# Patient Record
Sex: FEMALE | Race: WHITE | NOT HISPANIC OR LATINO | Employment: OTHER | URBAN - METROPOLITAN AREA
[De-identification: names, ages, dates, MRNs, and addresses within clinical notes are randomized per-mention and may not be internally consistent; named-entity substitution may affect disease eponyms.]

---

## 2017-01-26 ENCOUNTER — ALLSCRIPTS OFFICE VISIT (OUTPATIENT)
Dept: OTHER | Facility: OTHER | Age: 82
End: 2017-01-26

## 2017-02-06 ENCOUNTER — ALLSCRIPTS OFFICE VISIT (OUTPATIENT)
Dept: OTHER | Facility: OTHER | Age: 82
End: 2017-02-06

## 2017-03-02 ENCOUNTER — GENERIC CONVERSION - ENCOUNTER (OUTPATIENT)
Dept: OTHER | Facility: OTHER | Age: 82
End: 2017-03-02

## 2017-03-02 ENCOUNTER — ALLSCRIPTS OFFICE VISIT (OUTPATIENT)
Dept: OTHER | Facility: OTHER | Age: 82
End: 2017-03-02

## 2017-03-02 LAB
BILIRUB UR QL STRIP: NEGATIVE
CLARITY UR: NORMAL
COLOR UR: YELLOW
GLUCOSE (HISTORICAL): NORMAL
HGB UR QL STRIP.AUTO: 250
KETONES UR STRIP-MCNC: NEGATIVE MG/DL
LEUKOCYTE ESTERASE UR QL STRIP: NEGATIVE
NITRITE UR QL STRIP: NEGATIVE
PH UR STRIP.AUTO: 6 [PH]
PROT UR STRIP-MCNC: NEGATIVE MG/DL
SP GR UR STRIP.AUTO: 1.01
UROBILINOGEN UR QL STRIP.AUTO: NORMAL

## 2017-03-06 ENCOUNTER — GENERIC CONVERSION - ENCOUNTER (OUTPATIENT)
Dept: OTHER | Facility: OTHER | Age: 82
End: 2017-03-06

## 2017-03-06 LAB
CULTURE RESULT (HISTORICAL): NORMAL
MISCELLANEOUS LAB TEST RESULT (HISTORICAL): NORMAL

## 2017-03-10 ENCOUNTER — HOSPITAL ENCOUNTER (OUTPATIENT)
Dept: RADIOLOGY | Facility: CLINIC | Age: 82
Discharge: HOME | End: 2017-03-10
Payer: COMMERCIAL

## 2017-03-10 ENCOUNTER — ALLSCRIPTS OFFICE VISIT (OUTPATIENT)
Dept: OTHER | Facility: OTHER | Age: 82
End: 2017-03-10

## 2017-03-10 DIAGNOSIS — M25.562 PAIN IN LEFT KNEE: ICD-10-CM

## 2017-03-10 PROCEDURE — 73562 X-RAY EXAM OF KNEE 3: CPT

## 2017-03-17 ENCOUNTER — ALLSCRIPTS OFFICE VISIT (OUTPATIENT)
Dept: OTHER | Facility: OTHER | Age: 82
End: 2017-03-17

## 2017-07-28 ENCOUNTER — ALLSCRIPTS OFFICE VISIT (OUTPATIENT)
Dept: OTHER | Facility: OTHER | Age: 82
End: 2017-07-28

## 2017-08-25 ENCOUNTER — TRANSCRIBE ORDERS (OUTPATIENT)
Dept: LAB | Facility: CLINIC | Age: 82
End: 2017-08-25

## 2017-08-25 ENCOUNTER — APPOINTMENT (OUTPATIENT)
Dept: LAB | Facility: CLINIC | Age: 82
End: 2017-08-25
Payer: COMMERCIAL

## 2017-08-25 DIAGNOSIS — E78.5 OTHER AND UNSPECIFIED HYPERLIPIDEMIA: Primary | ICD-10-CM

## 2017-08-25 LAB
ALBUMIN SERPL BCP-MCNC: 3.5 G/DL (ref 3.5–5)
ALP SERPL-CCNC: 74 U/L (ref 46–116)
ALT SERPL W P-5'-P-CCNC: 19 U/L (ref 12–78)
ANION GAP SERPL CALCULATED.3IONS-SCNC: 9 MMOL/L (ref 4–13)
AST SERPL W P-5'-P-CCNC: 20 U/L (ref 5–45)
BILIRUB SERPL-MCNC: 0.39 MG/DL (ref 0.2–1)
BUN SERPL-MCNC: 19 MG/DL (ref 5–25)
CALCIUM SERPL-MCNC: 9.3 MG/DL (ref 8.3–10.1)
CHLORIDE SERPL-SCNC: 103 MMOL/L (ref 100–108)
CHOLEST SERPL-MCNC: 196 MG/DL (ref 50–200)
CO2 SERPL-SCNC: 26 MMOL/L (ref 21–32)
CREAT SERPL-MCNC: 0.62 MG/DL (ref 0.6–1.3)
GFR SERPL CREATININE-BSD FRML MDRD: 84 ML/MIN/1.73SQ M
GLUCOSE P FAST SERPL-MCNC: 105 MG/DL (ref 65–99)
HDLC SERPL-MCNC: 62 MG/DL (ref 40–60)
LDLC SERPL CALC-MCNC: 105 MG/DL (ref 0–100)
POTASSIUM SERPL-SCNC: 3.5 MMOL/L (ref 3.5–5.3)
PROT SERPL-MCNC: 7.4 G/DL (ref 6.4–8.2)
SODIUM SERPL-SCNC: 138 MMOL/L (ref 136–145)
TRIGL SERPL-MCNC: 144 MG/DL

## 2017-08-25 PROCEDURE — 80061 LIPID PANEL: CPT | Performed by: INTERNAL MEDICINE

## 2017-08-25 PROCEDURE — 36415 COLL VENOUS BLD VENIPUNCTURE: CPT | Performed by: INTERNAL MEDICINE

## 2017-08-25 PROCEDURE — 80053 COMPREHEN METABOLIC PANEL: CPT | Performed by: INTERNAL MEDICINE

## 2017-08-29 ENCOUNTER — ALLSCRIPTS OFFICE VISIT (OUTPATIENT)
Dept: OTHER | Facility: OTHER | Age: 82
End: 2017-08-29

## 2017-08-29 DIAGNOSIS — Z12.31 ENCOUNTER FOR SCREENING MAMMOGRAM FOR MALIGNANT NEOPLASM OF BREAST: ICD-10-CM

## 2017-08-29 DIAGNOSIS — M81.0 AGE-RELATED OSTEOPOROSIS WITHOUT CURRENT PATHOLOGICAL FRACTURE: ICD-10-CM

## 2017-08-29 DIAGNOSIS — M79.671 PAIN OF RIGHT FOOT: ICD-10-CM

## 2017-08-30 ENCOUNTER — GENERIC CONVERSION - ENCOUNTER (OUTPATIENT)
Dept: OTHER | Facility: OTHER | Age: 82
End: 2017-08-30

## 2017-09-12 ENCOUNTER — HOSPITAL ENCOUNTER (OUTPATIENT)
Dept: RADIOLOGY | Facility: CLINIC | Age: 82
Discharge: HOME | End: 2017-09-12
Payer: COMMERCIAL

## 2017-09-12 ENCOUNTER — GENERIC CONVERSION - ENCOUNTER (OUTPATIENT)
Dept: OTHER | Facility: OTHER | Age: 82
End: 2017-09-12

## 2017-09-12 DIAGNOSIS — Z12.31 ENCOUNTER FOR SCREENING MAMMOGRAM FOR MALIGNANT NEOPLASM OF BREAST: ICD-10-CM

## 2017-09-12 DIAGNOSIS — M81.0 AGE-RELATED OSTEOPOROSIS WITHOUT CURRENT PATHOLOGICAL FRACTURE: ICD-10-CM

## 2017-09-12 PROCEDURE — 77080 DXA BONE DENSITY AXIAL: CPT

## 2017-09-12 PROCEDURE — G0202 SCR MAMMO BI INCL CAD: HCPCS

## 2017-09-14 ENCOUNTER — GENERIC CONVERSION - ENCOUNTER (OUTPATIENT)
Dept: OTHER | Facility: OTHER | Age: 82
End: 2017-09-14

## 2017-09-14 ENCOUNTER — TRANSCRIBE ORDERS (OUTPATIENT)
Dept: RADIOLOGY | Facility: CLINIC | Age: 82
End: 2017-09-14

## 2017-09-14 ENCOUNTER — APPOINTMENT (OUTPATIENT)
Dept: RADIOLOGY | Facility: CLINIC | Age: 82
End: 2017-09-14
Payer: COMMERCIAL

## 2017-09-14 DIAGNOSIS — M79.671 PAIN OF RIGHT FOOT: ICD-10-CM

## 2017-09-14 PROCEDURE — 73630 X-RAY EXAM OF FOOT: CPT

## 2018-01-11 NOTE — PROGRESS NOTES
Chief Complaint  F/U - ekg done for cardiac clearance - prolapse uterine  ylm/ma      Active Problems    1  Actinic keratosis (702 0) (L57 0)   2  Arteriosclerosis of coronary artery (414 00) (I25 10)   3  Back pain (724 5) (M54 9)   4  Benign essential hypertension (401 1) (I10)   5  Benign paroxysmal positional vertigo of right ear (386 11) (H81 11)   6  Bilateral hearing loss (389 9) (H91 93)   7  Bladder prolapse, female, acquired (618 01) (N81 10)   8  Chronic systolic congestive heart failure (428 22,428 0) (I50 22)   9  Dyslipidemia (272 4) (E78 5)   10  Encounter for screening mammogram for malignant neoplasm of breast (V76 12)    (Z12 31)   11  Esophagitis, reflux (530 11) (K21 0)   12  Fatigue (780 79) (R53 83)   13  Hyperlipidemia (272 4) (E78 5)   14  Insomnia, persistent (307 42) (G47 00)   15  Irregular heart beats (427 9) (I49 9)   16  Ischemic heart disease, chronic (414 9) (I25 9)   17  Localized, primary osteoarthritis of shoulder region, unspecified laterality (715 11)    (M19 019)   18  Need for DTaP vaccine (V06 1) (Z23)   19  Need for prophylactic vaccination and inoculation against influenza (V04 81) (Z23)   20  Osteoarthritis of ankle or foot (715 97) (M19 079)   21  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)   22  Post-artificial Menopause State (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 4)   23  Post-menopausal osteoporosis (733 01) (M81 0)   24  Pre-operative clearance (V72 84) (Z01 818)   25  Primary localized osteoarthrosis of the hip, unspecified laterality (715 15) (M16 10)   26  Seasonal allergies (477 9) (J30 2)   27  Shoulder joint pain, unspecified laterality   28  Statin intolerance (995 27) (Z78 9)   29  Status post angioplasty (V45 89) (Z98 62)    Current Meds   1  Allegra 180 MG TABS; 1 Every Morning as needed; Therapy: 22Csq2527 to  Requested for: 22Sjg7387 Recorded   2  Aspirin Low Dose 81 MG TABS; TAKE 1 TABLET DAILY; Therapy: 88AXS0476 to (Evaluate:09Apr2016);  Last Rx:09Jan2015 Ordered 3  Belsomra 15 MG Oral Tablet; TAKE 1 TABLET AT BEDTIME AS NEEDED FOR SLEEP; Therapy: 40ZOB6548 to (Evaluate:01Jun2016); Last Rx:15Bie1145 Ordered   4  Crestor 5 MG Oral Tablet; one twice a week; Therapy: 26GVT0825 to (Last Rx:31Zmu0523)  Requested for: 77Zcl5950 Ordered   5  Nitrostat 0 4 MG Sublingual Tablet Sublingual; DISSOLVE 1 TABLET UNDER THE   TONGUE AS NEEDED FOR CHEST PAIN;   Therapy: 91AKK1605 to (Evaluate:51Ulp7153)  Requested for: 25LAC6297; Last   Rx:82Ixc9962 Ordered   6  RABEprazole Sodium 20 MG Oral Tablet Delayed Release; Take 1 tablet daily; Therapy: 07Gkg3399 to (Last Rx:27Jun2016)  Requested for: 36UDO0266 Ordered   7  Tribenzor 40-5-12 5 MG Oral Tablet; Take 1 tablet daily; Therapy: 13FUD4504 to (Last Rx:67Mwz2165)  Requested for: 96NXU2922 Ordered   8  Zolpidem Tartrate 10 MG Oral Tablet; TAKE 1 TABLET DAILY AT BEDTIME AS NEEDED; Therapy: 69GCL6996 to (Evaluate:01Jun2016)  Requested for: 37DWJ8472; Last   Rx:58Afq1091 Ordered    Allergies    1  Versed SOLN   2  Erythromycin TABS   3  Zithromax TABS   4  Altace TABS   5  Estrogens   6  Penicillins   7  Statins   8  Vasotec TABS    9  Bee sting    Plan  Chronic systolic congestive heart failure, Pre-operative clearance    · EKG/ECG- POC; Status:Complete;   Done: 65XJS4666    Discussion/Summary    EKG reviewed  No change from previous  She may proceed with surgery as planned  She is medically optimized for procedure        Future Appointments    Date/Time Provider Specialty Site   07/20/2016 11:00 AM Minna Valdez DO Internal Medicine [facility]     Signatures   Electronically signed by : Vianey Farmer DO; Jul 15 2016 12:28PM EST                       (Author)

## 2018-01-12 NOTE — RESULT NOTES
Discussion/Summary   Your mammogram is normal  NICHOLAS Ramirez NP      Verified Results  * DXA BONE DENSITY SPINE HIP AND PELVIS 66Dvq4357 01:49PM Samy Carmona Order Number: [id number]    - Patient Instructions: To schedule this appointment, please contact Central Scheduling at [phone]  Test Name Result Flag Reference   DXA BONE DENSITY SPINE HIP AND PELVIS (Report)     CENTRAL DXA SCAN     CLINICAL HISTORY:  80year old post-menopausal  female risk factors include right arm fracture  TECHNIQUE: Bone densitometry was performed using a [employer] bone densitometer  Regions of interest appear properly placed  L4 was excluded due to sclerosis  There are no obvious fractures or other confounding variables which could limit the study  COMPARISON: None  RESULTS:    LUMBAR SPINE: L1-L4:   BMD 0 944 gm/cm2   T-score -1 9   Z-score 0 0     LEFT TOTAL HIP:   BMD 0 772 gm/cm2   T-score -1 9   Z-score 0 3     LEFT FEMORAL NECK:   BMD 0 787 gm/cm2   T-score -1 8   Z-score 0 5     RIGHT TOTAL HIP:   BMD 0 747 gm/cm2   T-score -2 1   Z-score 0 1     RIGHT FEMORAL NECK:   BMD 0 775 gm/cm2   T-score -1 9   Z-score 0 4             IMPRESSION:   1  Based on the [facility] classification, the T-score of -2 1 in the right hip is consistent with low bone mineral density  2  Any secondary causes of low bone mineral density should be excluded prior to treatment, if clinically indicated  3  A daily intake of at least 1200 mg calcium and 800 to 1000 IU of Vitamin D, as well as weight bearing and muscle strengthening exercise, fall prevention and avoidance of tobacco and excessive alcohol intake as basic preventive measures are suggested  4  Repeat DXA in 18 - 24 months, on the same machine, as clinically indicated  The 10 year risk of hip fracture is 6 2%, with the 10 year risk of major osteoporotic fracture being 22%, as calculated by the [facility] fracture risk assessment tool (FRAX)   The current NOF guidelines recommend treating patients with FRAX 10 year risk score    of >3% for hip fracture and >20% for major osteoporotic fracture  WHO CLASSIFICATION:   Normal (a T-score of -1 0 or higher)   Low bone mineral density (a T-score of less than -1 0 but higher than -2 5)   Osteoporosis (a T-score of -2 5 or less)   Severe osteoporosis (a T-score of -2 5 or less with a fragility fracture)             Workstation performed: [id number]     Signed by:   Joie Ayers MD   9/12/17     * MAMMO SCREENING BILATERAL W CAD [id number] 01:47PM [facility] Order Number: [id number]    - Patient Instructions: To schedule this appointment, please contact Central Scheduling at [phone]  Do not wear any perfume, powder, lotion or deodorant on breast or underarm area  Please bring your doctors order, referral (if needed) and insurance information with you on the day of the test  Failure to bring this information may result in this test being rescheduled  Arrive 15 minutes prior to your appointment time to register  On the day of your test, please bring any prior mammogram or breast studies with you that were not performed at a [facility]  Failure to bring prior exams may result in your test needing to be rescheduled  Test Name Result Flag Reference   MAMMO SCREENING BILATERAL W CAD (Report)     Patient History:   Patient is postmenopausal    Benign excisional biopsy of both breasts, 1976  Patient has never smoked  Patient's BMI is 25 2  Reason for exam: screening, asymptomatic  Mammo Screening Bilateral W CAD: September 12, 2017 - Check In #:   [de-identified]   Bilateral CC and MLO view(s) were taken  Technologist: ALFREDO Wade   Prior study comparison: July 28, 2016, mammo screening bilateral    W CAD, performed at [place] [address]  November 12, 2012, bilateral screening mammogram, performed at    [place] [address]   October 24, 2011, bilateral   screening mammogram, performed at [facility]  October 7, 2010, bilateral screening mammogram,    performed at [facility]  August 14, 2009, bilateral screening mammogram, performed at [facility]  There are scattered fibroglandular densities  The parenchymal pattern appears stable  No dominant soft tissue    mass or suspicious calcifications are noted  The skin and nipple   contours are within normal limits  No mammographic evidence of malignancy  No    significant changes when compared with prior studies  ACR BI-RADSï¾® Assessments: BiRad:1 - Negative     Recommendation:   Routine screening mammogram in 1 year  Analyzed by CAD     The patient is scheduled in a reminder system for screening    mammography  8-10% of cancers will be missed on mammography  Management of a    palpable abnormality must be based on clinical grounds  Patients   will be notified of their results via letter from our facility  Accredited by [employer] of [employer] and FDA       Transcription Location:  [address]: [id number]     Risk Value(s):   Tyrer-Cuzick 10 Year: 0 500%, Tyrer-Cuzick Lifetime: 0 500%,    Myriad Table: 1 5%, MURALI 5 Year: 1 4%, NCI Lifetime: 1 7%   Signed by:   Uma Bradshaw MD   9/12/17

## 2018-01-12 NOTE — RESULT NOTES
Verified Results  *VB - Urinary Incontinence Screen (Dx Z13 89 Screen for UI) 78Qiw2437 01:30PM Joby Johnson     Test Name Result Flag Reference   Urinary Incontinence Assessment [id number]

## 2018-01-13 VITALS
HEART RATE: 76 BPM | HEIGHT: 65 IN | TEMPERATURE: 98.1 F | BODY MASS INDEX: 24.32 KG/M2 | OXYGEN SATURATION: 98 % | RESPIRATION RATE: 16 BRPM | WEIGHT: 146 LBS | DIASTOLIC BLOOD PRESSURE: 90 MMHG | SYSTOLIC BLOOD PRESSURE: 130 MMHG

## 2018-01-13 VITALS
HEART RATE: 92 BPM | WEIGHT: 142 LBS | HEIGHT: 65 IN | SYSTOLIC BLOOD PRESSURE: 110 MMHG | BODY MASS INDEX: 23.66 KG/M2 | DIASTOLIC BLOOD PRESSURE: 72 MMHG | TEMPERATURE: 98.2 F | RESPIRATION RATE: 16 BRPM

## 2018-01-14 VITALS
TEMPERATURE: 97 F | DIASTOLIC BLOOD PRESSURE: 80 MMHG | HEIGHT: 64 IN | HEART RATE: 72 BPM | WEIGHT: 145.41 LBS | BODY MASS INDEX: 24.83 KG/M2 | RESPIRATION RATE: 16 BRPM | SYSTOLIC BLOOD PRESSURE: 120 MMHG

## 2018-01-14 VITALS
BODY MASS INDEX: 24.63 KG/M2 | WEIGHT: 144.25 LBS | HEIGHT: 64 IN | SYSTOLIC BLOOD PRESSURE: 116 MMHG | HEART RATE: 80 BPM | OXYGEN SATURATION: 97 % | DIASTOLIC BLOOD PRESSURE: 70 MMHG

## 2018-01-14 VITALS
HEIGHT: 65 IN | HEART RATE: 88 BPM | OXYGEN SATURATION: 97 % | DIASTOLIC BLOOD PRESSURE: 66 MMHG | BODY MASS INDEX: 23.58 KG/M2 | SYSTOLIC BLOOD PRESSURE: 120 MMHG | WEIGHT: 141.56 LBS

## 2018-01-14 NOTE — RESULT NOTES
Verified Results  Urine Dip Automated- POC [id number] 12:00AM Christopher Barnett     Test Name Result Flag Reference   Color Yellow     Clarity Transparent     Leukocytes negative     Nitrite negative     Blood 250     Bilirubin negative     Urobilinogen normal     Protein negative     Ph 6     Specific Gravity 1 015     Ketone negative     Glucose normal     Color Yellow     Clarity Transparent     Leukocytes negative     Nitrite negative     Blood 250     Bilirubin negative     Urobilinogen normal     Protein negative     Ph 6     Specific Gravity 1 015     Ketone negative     Glucose normal               Plan  Vulvovaginitis    · (1) URINE CULTURE; Source:Urine, Clean Catch; Status:Active;  Requested  for:02Mar2017;

## 2018-01-14 NOTE — PROGRESS NOTES
Assessment    1  Encounter for preventive health examination (V70 0) (Z00 00)   2  Benign essential hypertension (401 1) (I10)   3  Dyslipidemia (272 4) (E78 5)   4  Status post angioplasty (V45 89) (Z98 62)    Plan  Dyslipidemia    · Crestor 5 MG Oral Tablet; one twice a week  Health Maintenance    · *VB - Fall Risk Assessment  (Dx V80 09 Screen for Neurologic Disorder);  Status:Complete;   Done: 17IHV9965 04:00PM   · *VB-Depression Screening; Status:Complete;   Done: 96GSU1589 04:00PM   · *VB-Urinary Incontinence Screen (Dx V81 6 Screen for UI); Status:Complete;   Done:  80FUX5641 04:00PM   · Follow-up visit in 1 year Evaluation and Treatment  Follow-up  Status: Hold For -  Scheduling  Requested for: 74Wji2929   · We encourage all of our patients to exercise regularly  30 minutes of exercise or physical  activity five or more days a week is recommended for children and adults ;  Status:Complete;   Done: 83BGL3885 04:01PM    Discussion/Summary    Patient is anxious to continue exercising, prolapsed bladder is causing some concern  Has appointment with GYN surgeon in one month  She denies urinary incontinence  Taking statin twice a week has avoided side effects of muscle pain  Cholesterol done in December is excellent  Impression: Initial Annual Wellness Visit, with preventive exam as well as age and risk appropriate counseling completed  Cardiovascular screening and counseling: screening is current and counseling was given on maintaining a healthy diet  Diabetes screening and counseling: screening is current  Colorectal cancer screening and counseling: screening not indicated  Breast cancer screening and counseling: screening not indicated  Cervical cancer screening and counseling: screening not indicated  Abdominal aortic aneurysm screening and counseling: screening not indicated  Glaucoma screening and counseling: screening is current  HIV screening and counseling: screening not indicated  Immunizations: influenza vaccine is up to date this year, the lifetime pneumococcal vaccine has been completed, hepatitis B vaccination series is not indicated at this time due to the patient's low risk of vivienne the disease and Zostavax vaccine needed today  Patient Discussion: plan discussed with the patient, follow-up visit needed in one year  Chief Complaint  AWV  ck/lpn      History of Present Illness  Welcome to Medicare and Wellness Visits: The patient is being seen for the initial annual wellness visit  Medicare Screening and Risk Factors   Hospitalizations: she has been previously hospitalizied, she has been hospitalized times and cardiac cath  Once per lifetime medicare screening tests: ECG has not been done and AAA screening US has not yet been done  Medicare Screening Tests Risk Questions   Abdominal aortic aneurysm risk assessment: none indicated  Osteoporosis risk assessment: none indicated  HIV risk assessment: none indicated  Drug and Alcohol Use: The patient has never smoked cigarettes  The patient reports occasional alcohol use  She has never used illicit drugs  Mood Disorder and Cognitive Impairment Screening: PHQ-9 Depression Scale   Over the past 2 weeks, how often have you been bothered by the following problems? 1 ) Little interest or pleasure in doing things? Not at all    2 ) Feeling down, depressed or hopeless? Not at all  TOTAL SCORE: 0    Cognitive impairment screening: denies difficulty learning/retaining new information, denies difficulty handling complex tasks, denies difficulty with reasoning, denies difficulty with spatial ability and orientation, denies difficulty with language and denies difficulty with behavior  Functional Ability/Level of Safety: Hearing is normal bilaterally and a hearing aid is used  She reports hearing difficulties   The patient is currently able to do activities of daily living without limitations, able to do instrumental activities of daily living without limitations, able to participate in social activities without limitations and able to drive without limitations  Activities of daily living details: does not need help using the phone, no transportation help needed, does not need help shopping, no meal preparation help needed, does not need help doing housework, does not need help doing laundry, does not need help managing medications and does not need help managing money  Fall risk factors:  alcohol use and antihypertensive use, but The patient fell 0 times in the past 12 months , no polypharmacy, no mobility impairment, no antidepressant use, no deconditioning, no postural hypotension, no visual impairment, no urinary incontinence, no cognitive impairment, up and go test was normal and no previous fall  Home safety risk factors:  no unfamiliar surroundings, no loose rugs, no poor household lighting, no uneven floors, no household clutter, grab bars in the bathroom and handrails on the stairs  Advance Directives: Advance directives: living will, durable power of  for health care directives and advance directives  end of life decisions were reviewed with the patient and I agree with the patient's decisions  Co-Managers and Medical Equipment/Suppliers: See Patient Care Team      Patient Care Team    Care Team Member Role Specialty Office Number   Blaire Cantu [facility]  Cardiology [phone]   Wilma TEJEDA  Family Medicine [phone]   Henreitta Lesches MD  Ophthalmology [phone]     Review of Systems    Constitutional: negative  Eyes: negative  ENT: negative  Cardiovascular: negative  Respiratory: negative  Gastrointestinal: negative  Genitourinary: negative  Musculoskeletal: negative  Integumentary and Breasts: negative  Neurological: negative  Psychiatric: negative  Endocrine: negative  Hematologic and Lymphatic: negative  Active Problems    1   Actinic keratosis (702 0) (L57 0)   2  Back pain (724 5) (M54 9)   3  Benign essential hypertension (401 1) (I10)   4  Benign paroxysmal positional vertigo of right ear (386 11) (H81 11)   5  Bilateral hearing loss (389 9) (H91 93)   6  Bladder prolapse, female, acquired (618 01) (N81 10)   7  CAD (coronary artery disease), native coronary artery (414 01) (I25 10)   8  Chronic systolic congestive heart failure (428 22,428 0) (I50 22)   9  Cough (786 2) (R05)   10  Dyslipidemia (272 4) (E78 5)   11  Encounter for screening mammogram for malignant neoplasm of breast (V76 12)    (Z12 31)   12  Esophagitis, reflux (530 11) (K21 0)   13  Fatigue (780 79) (R53 83)   14  Hyperlipidemia (272 4) (E78 5)   15  Insomnia, persistent (307 42) (G47 00)   16  Irregular heart beats (427 9) (I49 9)   17  Ischemic heart disease, chronic (414 9) (I25 9)   18  Localized, primary osteoarthritis of shoulder region, unspecified laterality (715 11)    (M19 019)   19  Midline low back pain without sciatica (724 2) (M54 5)   20  Nausea (787 02) (R11 0)   21  Need for DTaP vaccine (V06 1) (Z23)   22  Need for prophylactic vaccination and inoculation against influenza (V04 81) (Z23)   23  Osteoarthritis of ankle or foot (715 97) (M19 079)   24  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)   25  Post-artificial Menopause State (627 4)   26  Post-menopausal osteoporosis (733 01) (M81 0)   27  Primary localized osteoarthrosis of the hip, unspecified laterality (715 15) (M16 10)   28  Seasonal allergies (477 9) (J30 2)   29  Shoulder joint pain, unspecified laterality   30  Statin intolerance (995 27) (Z78 9)   31  Status post angioplasty (V45 89) (Z98 62)   32   Thoracic back pain (724 1) (M54 6)    Past Medical History    · History of Abdominal pain, epigastric (789 06) (R10 13)   · History of Abdominal pain, left upper quadrant (789 02) (R10 12)   · History of Abdominal pain, RLQ (right lower quadrant) (789 03) (R10 31)   · History of Abscess of chest wall (682  2) (L02 213)   · Acute cystitis (595 0) (N30 00)   · Acute maxillary sinusitis (461 0) (J01 00)   · History of Acute upper respiratory infection (465 9) (J06 9)   · 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)   · History of Allergic rhinitis due to pollen (477 0) (J30 1)   · History of Cervicalgia (723 1) (M54 2)   · History of Earache on left (388 70) (H92 02)   · History of Fracture Of The Humerus (812 20)   · History of Genitourinary infection, candidal (112 2) (B37 49)   · History of Hip pain, unspecified laterality   · History of chest pain (V13 89) (A13 741)   · History of conjunctivitis (V12 49) (Z86 69)   · History of diverticulitis of colon (V12 79) (Z87 19)   · History of eczema (V13 3) (Z87 2)   · History of headache (V13 89) (K92 387)   · History of hematuria (V13 09) (Z87 448)   · History of hyperkeratosis of skin (V13 3) (Z87 2)   · History of hypokalemia (V12 29) (Z86 39)   · History of nausea (V12 79) (P59 066)   · History of sciatica (V12 49) (Z86 69)   · History of sebaceous cyst (V13 3) (Z87 2)   · History of syncope (V15 89) (Z19 091)   · History of syncope (V15 89) (W68 358)   · History of upper respiratory infection (V12 09) (Z87 09)   · History of vertigo (V12 49) (R69 279)   · History of viral gastroenteritis (V12 09) (Z86 19)   · History of viral gastroenteritis (V12 09) (Z86 19)   · History of viremia (V12 3) (Z86 2)   · History of Incomplete uterovaginal prolapse (618 2) (N81 2)   · History of Limb pain (729 5) (M79 609)   · History of Myalgia And Myositis (729 1)   · History of Neck Sprain (847 0)   · History of Pain, upper back (724 5) (M54 9)   · 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)   · History of Reported Hearing Problems   · History of Shoulder Sprain (840 9)   · History of Sprain Of The Back (847 9)   · History of Trigeminal neuralgia (350 1) (G50 0)    Surgical History    · History of Appendectomy   · History of Appendico-vesicostomy status (V44 52) (Z93 52)   · History of Cath Stent 2 Distal Left Anterior Descending Artery   · History of Cholecystectomy   · History of Colonoscopy (Fiberoptic) Screening   · History of Hysterectomy   · History of PTCA   · History of Rectal Surgery   · History of Vaginal Pap smear (V76 47) (Z12 72)    Family History    · Family history of Coronary Artery Disease (V17 49)   · Family history of Hyperlipidemia   · Family history of Hypertension (V17 49)    · Family history of Sudden/Instantaneous Cardiac Death (V17 41)    · Family history of Coronary Artery Disease (V17 49)    Social History    · Being A Social Drinker   · Daily Coffee Consumption (2  Cups/Day)   · Exercising Regularly   · Marital History -    · Never A Smoker  The social history was reviewed and is unchanged  Current Meds   1  Allegra 180 MG TABS; 1 Every Morning as needed; Therapy: 75Fur5848 to  Requested for: 75Ixk0195 Recorded   2  Aspirin Low Dose 81 MG Oral Tablet; TAKE 1 TABLET DAILY; Therapy: 95HPG7098 to (Evaluate:09Apr2016); Last Rx:09Jan2015 Ordered   3  Belsomra 15 MG Oral Tablet; TAKE 1 TABLET AT BEDTIME AS NEEDED FOR SLEEP; Therapy: 63VTK2678 to (Evaluate:01Jun2016); Last Rx:10Vxc7892 Ordered   4  Crestor 5 MG Oral Tablet; one twice a week; Therapy: 72OEM3931 to (Last Rx:01Feb2016)  Requested for: 53QVW2221 Ordered   5  Nitrostat 0 4 MG Sublingual Tablet Sublingual; DISSOLVE 1 TABLET UNDER THE   TONGUE AS NEEDED FOR CHEST PAIN;   Therapy: 78HRT3311 to (Evaluate:39Xld5330)  Requested for: 20OWD5269; Last   Rx:74Rfy3107 Ordered   6  Potassium Chloride Saumya ER 20 MEQ Oral Tablet Extended Release; take 1 tablet twice a   day; Therapy: 26Wpp3616 to (Last Rx:12Nov2015)  Requested for: 57ZJS3661 Ordered   7  RABEprazole Sodium 20 MG Oral Tablet Delayed Release; Take 1 tablet daily; Therapy: 39Ylo3981 to (Last Rx:12Nov2015)  Requested for: 02IBY1591 Ordered   8   Tribenzor 40-5-12 5 MG Oral Tablet; Take 1 tablet daily; Therapy: 02NXK7060 to (Last Rx:17Jan2016)  Requested for: 97QVA6596 Ordered   9  Zolpidem Tartrate 10 MG Oral Tablet; TAKE 1 TABLET DAILY AT BEDTIME AS NEEDED; Therapy: 28IXM5349 to (Evaluate:01Jun2016)  Requested for: 58ZZV3981; Last   Rx:56Zdy7839 Ordered    The medication list was reviewed and updated today  Allergies    1  Versed SOLN   2  Erythromycin TABS   3  Zithromax TABS   4  Altace TABS   5  Estrogens   6  Penicillins   7  Statins   8  Vasotec TABS    9  Bee sting    Immunizations   ** Printed in Appendix #1 below  Vitals  Signs [Data Includes: Current Encounter]    Temperature: 96 8 F  Heart Rate: 66  Respiration: 16  Systolic: 656  Diastolic: 90  Height: 5 ft 3 6 in  Weight: 146 lb   BMI Calculated: 25 38  BSA Calculated: 1 7    Physical Exam    Constitutional   General appearance: No acute distress, well appearing and well nourished  Eyes   Conjunctiva and lids: No swelling, erythema or discharge  Pupils and irises: Equal, round, reactive to light  Ophthalmoscopic examination: Normal fundi and optic discs  Ears, Nose, Mouth, and Throat   Otoscopic examination: Tympanic membranes translucent with normal light reflex  Canals patent without erythema  Lips, teeth, and gums: Normal, good dentition  Oropharynx: Normal with no erythema, edema, exudate or lesions  Neck   Neck: Supple, symmetric, trachea midline, no masses  Thyroid: Normal, no thyromegaly  Pulmonary   Respiratory effort: No increased work of breathing or signs of respiratory distress  Percussion of chest: Normal     Palpation of chest: Normal     Auscultation of lungs: Clear to auscultation  Cardiovascular   Auscultation of heart: Normal rate and rhythm, normal S1 and S2, no murmurs  Carotid pulses: 2+ bilaterally  Abdominal aorta: Normal     Examination of extremities for edema and/or varicosities: Normal     Abdomen   Abdomen: Non-tender, no masses      Liver and spleen: No hepatomegaly or splenomegaly  Lymphatic   Palpation of lymph nodes in neck: No lymphadenopathy  Musculoskeletal   Gait and station: Normal     Skin   Skin and subcutaneous tissue: Normal without rashes or lesions  Neurologic   Cranial nerves: Cranial nerves II-XII intact  Cortical function: Normal mental status  Psychiatric   Judgment and insight: Normal     Mood and affect: Normal        Health Management  CAD (coronary artery disease), native coronary artery   (1) LIPID PANEL, FASTING; every 1 day; Last 63RPF7521;  Next Permanently Deferred;     Signatures   Electronically signed by : JON Escobar ; 2016  4:04PM EST                       (Author)    Appendix #1     Sadie Miller ; : 1933; MRN: [id number]      1 2 3 4 5 6    DTP/DTaP  95WFA4221         Influenza  85KTL9711 24Bqf4205 10HDF4904 50PVN1960 80Lwr7988 86Kte6442    Pneumococcal  58Kzy3762 00Cri0659        Tdap  28JQW5943

## 2018-01-15 VITALS
HEIGHT: 64 IN | WEIGHT: 146.31 LBS | HEART RATE: 76 BPM | OXYGEN SATURATION: 98 % | DIASTOLIC BLOOD PRESSURE: 80 MMHG | SYSTOLIC BLOOD PRESSURE: 122 MMHG | BODY MASS INDEX: 24.98 KG/M2

## 2018-01-15 NOTE — RESULT NOTES
Verified Results  * XR FOOT 3+ VIEW RIGHT 01Ley3960 12:35PM Jean Paul Gunning Order Number: [id number]     Test Name Result Flag Reference   XR FOOT 3+ VW RIGHT (Report)     RIGHT FOOT     INDICATION: Injury in home pain laterally     COMPARISON: None     VIEWS: 3     IMAGES: 3     FINDINGS:     There is a nondisplaced oblique fracture seen running through the proximal diaphysis of the proximal phalanx 5th digit  No additional fractures were seen  Degenerative changes are noted within the midfoot  No lytic or blastic lesions are seen  Soft tissues are unremarkable  IMPRESSION:     Nondisplaced fracture proximal aspect proximal phalanx 5th digit         ##imslh##imslh       Workstation performed: [id number]     Signed by:   Damari Ko MD   9/14/17

## 2018-01-15 NOTE — PROGRESS NOTES
Assessment    1  Patellofemoral syndrome, left (760 97) (M22 2X2)    Plan  Left knee pain    · * XR KNEE 3 VW LEFT NON INJURY; Status:Active - Retrospective By Protocol  Authorization; Requested for:10Mar2017; She is suffering from patellar tendinitis or patellofemoral syndrome  I did give her some exercises to do at home and she has a relatively mild disorder at this point  I also gave her some information sheet on her disorder  She will do ice as well  She will follow-up with me as needed  Her arthritic changes minimal      Discussion/Summary  The patient was counseled regarding diagnostic results, instructions for management, prognosis, patient and family education, impressions, risks and benefits of treatment options, importance of compliance with treatment  Chief Complaint    1  Knee Pain    History of Present Illness  Trung Piña is an 70-year-old female who has had left knee pain for the past 3 weeks  She does not recall trauma but states that she has had a moderate and dull ache about the left knee that radiates anteromedially  It hurts her more when she goes downstairs  It is somewhat better with rest  The pain really only radiates anteriorly  Review of Systems    Constitutional: No fever, no chills, feels well, no tiredness, no recent weight gain or loss  Eyes: No complaints of eyesight problems, no red eyes  ENT: no loss of hearing, no nosebleeds, no sore throat  Cardiovascular: No complaints of chest pain, no palpitations, no leg claudication or lower extremity edema  Respiratory: no compliants of shortness of breath, no wheezing, no cough  Gastrointestinal: no complaints of abdominal pain, no constipation, no nausea or diarrhea, no vomiting, no bloody stools  Genitourinary: no complaints of dysuria, no incontinence  Musculoskeletal: as noted in HPI  Integumentary: no complaints of skin rash or lesion, no itching or dry skin, no skin wounds     Neurological: no complaints of headache, no confusion, no numbness or tingling, no dizziness  Endocrine: No complaints of muscle weakness, no feelings of weakness, no frequent urination, no excessive thirst    Psychiatric: No suicidal thoughts, no anxiety, no feelings of depression  Active Problems    1  Actinic keratosis (702 0) (L57 0)   2  Acute cystitis (595 0) (N30 00)   3  Acute upper respiratory infection (465 9) (J06 9)   4  Arteriosclerosis of coronary artery (414 00) (I25 10)   5  Back pain (724 5) (M54 9)   6  Benign essential hypertension (401 1) (I10)   7  Benign paroxysmal positional vertigo of right ear (386 11) (H81 11)   8  Bilateral hearing loss (389 9) (H91 93)   9  Bladder prolapse, female, acquired (618 01) (N81 10)   10  Breast pain, left (611 71) (N64 4)   11  Chronic systolic congestive heart failure (428 22,428 0) (I50 22)   12  Dyslipidemia (272 4) (E78 5)   13  Encounter for screening mammogram for malignant neoplasm of breast (V76 12)    (Z12 31)   14  Esophagitis, reflux (530 11) (K21 0)   15  Fatigue (780 79) (R53 83)   16  Hyperlipidemia (272 4) (E78 5)   17  Insomnia, persistent (307 42) (G47 00)   18  Irregular heart beats (427 9) (I49 9)   19  Ischemic heart disease, chronic (414 9) (I25 9)   20  Left knee pain (719 46) (M25 562)   21  Localized, primary osteoarthritis of shoulder region, unspecified laterality (715 11)    (M19 019)   22  Need for DTaP vaccine (V06 1) (Z23)   23  Need for prophylactic vaccination and inoculation against influenza (V04 81) (Z23)   24  Osteoarthritis of ankle or foot (715 97) (M19 079)   25  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)   26  Post-artificial Menopause State (627 4)   27  Post-menopausal osteoporosis (733 01) (M81 0)   28  Preoperative clearance (V72 84) (Z01 818)   29  Pre-operative clearance (V72 84) (Z01 818)   30  Primary localized osteoarthrosis of the hip, unspecified laterality (715 15) (M16 10)   31   Seasonal allergies (477 9) (J30 2) 32  Shoulder joint pain, unspecified laterality   33  Statin intolerance (995 27) (Z78 9)   34  Status post angioplasty (V45 89) (Z98 62)   35  Vaginal bleeding (623 8) (N93 9)   36   Vulvovaginitis (616 10) (N76 0)    Past Medical History    · History of Abdominal pain, epigastric (789 06) (R10 13)   · History of Abdominal pain, left upper quadrant (789 02) (R10 12)   · History of Abdominal pain, RLQ (right lower quadrant) (789 03) (R10 31)   · History of Abscess of chest wall (682 2) (L02 213)   · Acute maxillary sinusitis (461 0) (J01 00)   · 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)   · History of Allergic rhinitis due to pollen (477 0) (J30 1)   · History of Cervicalgia (723 1) (M54 2)   · History of Cough (786 2) (R05)   · History of Earache on left (388 70) (H92 02)   · History of Fracture Of The Humerus (812 20)   · History of Genitourinary infection, candidal (112 2) (B37 49)   · History of Hip pain, unspecified laterality   · History of chest pain (V13 89) (H28 291)   · History of conjunctivitis (V12 49) (Z86 69)   · History of diverticulitis of colon (V12 79) (Z87 19)   · History of eczema (V13 3) (Z87 2)   · History of headache (V13 89) (R16 282)   · History of hematuria (V13 09) (Z87 448)   · History of hyperkeratosis of skin (V13 3) (Z87 2)   · History of hypokalemia (V12 29) (Z86 39)   · History of nausea (V12 79) (Z87 898)   · History of nausea (V12 79) (Z87 898)   · History of sciatica (V12 49) (Z86 69)   · History of sebaceous cyst (V13 3) (Z87 2)   · History of syncope (V15 89) (O01 244)   · History of syncope (V15 89) (N26 145)   · History of upper respiratory infection (V12 09) (Z87 09)   · History of vertigo (V12 49) (U14 048)   · History of viral gastroenteritis (V12 09) (Z86 19)   · History of viral gastroenteritis (V12 09) (Z86 19)   · History of viremia (V12 3) (Z86 2)   · History of Incomplete uterovaginal prolapse (618 2) (N81 2)   · History of Limb pain (729 5) (M79 609)   · History of Midline low back pain without sciatica (724 2) (M54 5)   · History of Myalgia And Myositis (729 1)   · History of Neck Sprain (847 0)   · History of Pain, upper back (724 5) (M54 9)   · 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)   · History of Reported Hearing Problems   · History of Shoulder Sprain (840 9)   · History of Sprain Of The Back (847 9)   · History of Thoracic back pain (724 1) (M54 6)   · History of Trigeminal neuralgia (350 1) (G50 0)   · URI, acute (465 9) (J06 9)    The active problems and past medical history were reviewed and updated today  Surgical History    · History of Appendectomy   · History of Appendico-vesicostomy status (V44 52) (Z93 52)   · History of Cath Stent 2 Distal Left Anterior Descending Artery   · History of Cholecystectomy   · History of Colonoscopy (Fiberoptic) Screening   · History of Hysterectomy   · History of PTCA   · History of Rectal Surgery   · History of Vaginal Pap smear (V76 47) (Z12 72)    The surgical history was reviewed and updated today  Family History  Mother    · Family history of Coronary Artery Disease (V17 49)   · Family history of arthritis (V17 7) (Z82 61)   · Family history of osteoporosis (V17 81) (Z82 62)   · Family history of Hyperlipidemia   · Family history of Hypertension (V17 49)  Father    · Family history of Sudden/Instantaneous Cardiac Death (V17 41)  Sister    · Family history of Coronary Artery Disease (V17 49)    The family history was reviewed and updated today  Social History    · Being A Social Drinker   · Daily Coffee Consumption (2  Cups/Day)   · Exercising Regularly   · Marital History -    · Never A Smoker  The social history was reviewed and updated today  Current Meds   1  Allegra 180 MG TABS (Fexofenadine HCl); 1 Every Morning as needed; Therapy: 14Wbx6302 to  Requested for: 75Cbk7842 Recorded   2   Aspirin Low Dose 81 MG TABS; TAKE 1 TABLET DAILY; Therapy: 42IMP2591 to (Evaluate:09Apr2016); Last Rx:09Jan2015 Ordered   3  Ciprofloxacin HCl - 250 MG Oral Tablet; one tab BID; Therapy: 23CGT4861 to (Last Rx:02Mar2017)  Requested for: 02Mar2017; Status:   ACTIVE - Transmit to Pharmacy - Awaiting Verification Ordered   4  Clotrimazole-Betamethasone 1-0 05 % External Cream (Lotrisone); Apply thin film BID   Do not exceed 2 weeks of use; Therapy: 24LEB8476 to (Last Rx:02Mar2017)  Requested for: 73YKE2684 Ordered   5  Mometasone Furoate 50 MCG/ACT Nasal Suspension (Nasonex); Two sprays in each   nostril once daily; Therapy: 17HXS9627 to (Last Rx:23Nov2016)  Requested for: 23Nov2016 Ordered   6  Nitrostat 0 4 MG Sublingual Tablet Sublingual (Nitroglycerin); DISSOLVE 1 TABLET   UNDER THE TONGUE AS NEEDED FOR CHEST PAIN;   Therapy: 00FXB5968 to (Evaluate:75Cfa6116)  Requested for: 19MVV3961; Last   Rx:40Rmz2461 Ordered   7  Olmesartan-Amlodipine-HCTZ 20-5-12 5 MG Oral Tablet (Tribenzor); TAKE 1 TABLET BY   MOUTH ONCE DAILY; Therapy: 42OKX4851 to (Evaluate:80Gag0040)  Requested for: 26YND2439; Last   Rx:06Feb2017 Ordered   8  Potassium Chloride Saumya ER 20 MEQ Oral Tablet Extended Release; take 1 tablet twice a   day; Therapy: 88Hes8803 to (Last Rx:10Jan2017)  Requested for: 47DED8826 Ordered   9  RABEprazole Sodium 20 MG Oral Tablet Delayed Release; Take 1 tablet daily; Therapy: 18Apr2014 to (Last Rx:10Jan2017)  Requested for: 36KOX7685 Ordered   10  Ranexa 500 MG Oral Tablet Extended Release 12 Hour; TAKE 1 TABLET EVERY 12    HOURS; Therapy: 64ZLK7364 to (Evaluate:78Yjp3258); Last Rx:38Umm2212 Ordered   11  Rosuvastatin Calcium 5 MG Oral Tablet (Crestor); one twice a week; Therapy: 81NCM9647 to (Evaluate:87Lqn9018)  Requested for: 72DRM4974; Last    Rx:24Rep6853 Ordered   12  Zolpidem Tartrate 10 MG Oral Tablet (Ambien); TAKE 1 TABLET DAILY AT BEDTIME AS    NEEDED;     Therapy: 69JEV9330 to (Evaluate:70Mlz8027)  Requested for: 76Exg2162; Last    Rx:41Fqz1037 Ordered    The medication list was reviewed and updated today  Allergies    1  Versed SOLN   2  Erythromycin TABS   3  Zithromax TABS   4  Altace TABS   5  Estrogens   6  Penicillins   7  Statins   8  Vasotec TABS    9  Bee sting    Vitals  Signs    Heart Rate: 87  Systolic: 579  Diastolic: 84   Height: 5 ft 4 in  Weight: 144 lb   BMI Calculated: 24 72  BSA Calculated: 1 7    Physical Exam    Left Knee: Appearance: Normal  Tenderness: diffuse anteromedial aspect  Flexion: painless normal AROM  Extension: painless normal AROM  Flexion was 5/5  Extension was 5/5  Special Test: negative Anterior Drawer sign, negative Posterior Drawer sign, no laxity on Valgus Stress and no laxity on Varus Stress  Constitutional - General appearance: Normal    Musculoskeletal - Gait and station: Normal  Digits and nails: Normal  Muscle strength/tone: Normal  Lower extremity compartments: Normal    Cardiovascular - Pulses: Normal  Examination of extremities for edema and/or varicosities: Normal    Skin - Skin and subcutaneous tissue: Normal    Neurologic - Sensation: Normal  Lower extremity peripheral neuro exam: Normal    Psychiatric - Orientation to person, place, and time: Normal  Mood and affect: Normal    Eyes   Conjunctiva and lids: Normal     Pupils and irises: Normal        Results/Data  I personally reviewed the films/images/results in the office today  My interpretation follows  X-ray Review Left knee x-rays are straight  Minimal arthritic change in the medial compartment        Future Appointments    Date/Time Provider Specialty Site   03/17/2017 02:20 PM Michael Santos DO Cardiology [address] [address] [facility] [address]     Signatures   Electronically signed by : JON Cummings ; Mar 10 2017 10:00AM EST                       (Author)

## 2018-01-15 NOTE — PROGRESS NOTES
Assessment    1  Encounter for Medicare annual wellness exam (V70 0) (Z00 00)    Plan  Encounter for Medicare annual wellness exam    · (1) COMPREHENSIVE METABOLIC PANEL; Status:Active; Requested for:94Hkb0683;   Encounter for screening mammogram for malignant neoplasm of breast    · * MAMMO SCREENING BILATERAL W CAD; Status:Hold For - Scheduling; Requested  for:57Ujs9777;   Need for hepatitis C screening test    · (Q) HEPATITIS C AB W/RFL RNA, PCR, W/ RFL GENOTYPE, LIPA; Status:Active; Requested for:76Ucs8995; Post-menopausal osteoporosis    · * DXA BONE DENSITY SPINE HIP AND PELVIS; Status:Hold For - Scheduling; Requested  for:80Nfq6258;   Screening for genitourinary condition    · *VB - Urinary Incontinence Screen (Dx Z13 89 Screen for UI); Status:Resulted - Requires  Verification,Retrospective Authorization;   Done: 04WFQ9831 01:30PM    Discussion/Summary    Pt doing very, rto one year awv  chronic visit in 6 years  Impression: Subsequent Annual Wellness Visit  Cardiovascular screening and counseling: the risks and benefits of screening were discussed, screening is current and Dx - V81 2 Screen for CV Disorder  Diabetes screening and counseling: the risks and benefits of screening were discussed, screening is current and Dx - V77 1 Screen for DM  Colorectal cancer screening and counseling: the risks and benefits of screening were discussed, the patient declines screening and Dx - V76 51 Screen for CRC  Breast cancer screening and counseling: the risks and benefits of screening were discussed and due for a screening mammogram    Cervical cancer screening and counseling: the risks and benefits of screening were discussed and screening is current  Osteoporosis screening and counseling: the risks and benefits of screening were discussed and due for DXA axial skeleton     Abdominal aortic aneurysm screening and counseling: the risks and benefits of screening were discussed, the patient declines screening and Dx - V81 2 Screen for CV Disorder  Glaucoma screening and counseling: the risks and benefits of screening were discussed, screening is current and Dx - V80 1 Screen for Glaucoma  HIV screening and counseling: the risks and benefits of screening were discussed and the patient declines screening  Hepatitis C Screening: the patient was counseled on Hepatitis C screening  The patient agrees to Hepatitis C screening  Immunizations: the risks and benefits of influenza vaccination were discussed with the patient, influenza vaccination is recommended annually, the risks and benefits of pneumococcal vaccination were discussed with the patient, the lifetime pneumococcal vaccine has been completed, hepatitis B prevention counseling was provided, hepatitis B vaccination series is not indicated at this time due to the patient's low risk of vivienne the disease, the risks and benefits of the Zostavax vaccine were discussed with the patient, the patient declines the Zostavax vaccine, the risks and benefits of the Td vaccine were discussed with the patient, Td vaccination up to date, the risks and benefits of the Tdap vaccine were discussed with the patient and Tdap vaccination up to date  Advance Directive Planning: complete and up to date, she was encouraged to follow-up with me to discuss her questions and/or decisions  Advice and education were given regarding fall risk reduction, helmet use, nutrition (non-diabetic), seat belt use, skin self-exam and sunscreen use  She was referred to No new specialty referrals needed  Patient Discussion: plan discussed with the patient, follow-up visit needed in one year        Chief Complaint  patient presenting for her AWV, Patient requested an order for a mammography and will also print order for DEXA scan sl/cma      Advance Directives  Advance Directive [facility]:   The patient is in agreement to receive the Advance Care Planning service    YES - Patient has an advance health care directive  The patient has a living will located   Capacity/Competence: This patient does not have full decision making capacity for discussion of advance care planning, This patient does not have limited decision making capacity for discussion of advance care planning, This patient does not have full decision making competency for discussion of advance care planning and This patient does not have limited decision making competency for discussion of advance care planning  Summary of Advance Directive Conversation  patient has a living will , she states a copy is a SLW sl/cma  History of Present Illness  The patient is being seen for the subsequent annual wellness visit  Medicare Screening and Risk Factors   Hospitalizations: no previous hospitalizations  Once per lifetime medicare screening tests: ECG has not been done and AAA screening US has not yet been done  Medicare Screening Tests Risk Questions   Abdominal aortic aneurysm risk assessment: none indicated  Osteoporosis risk assessment: none indicated  HIV risk assessment: none indicated  Drug and Alcohol Use: The patient has never smoked cigarettes  The patient reports occasional alcohol use  Alcohol concern:   The patient has no concerns about alcohol abuse  She has never used illicit drugs  Diet and Physical Activity: Current diet includes well balanced meals and low fat food choices  She exercises infrequently and exercises 3 times per week  Exercise: walking, strength training  Mood Disorder and Cognitive Impairment Screening: PHQ-9 Depression Scale Anxiety screening was done using  Depression screening  no significant symptoms  She denies feeling down, depressed, or hopeless over the past two weeks  She denies feeling little interest or pleasure in doing things over the past two weeks     Cognitive impairment screening: denies difficulty learning/retaining new information, denies difficulty handling complex tasks, denies difficulty with reasoning, denies difficulty with spatial ability and orientation, denies difficulty with language and denies difficulty with behavior  Functional Ability/Level of Safety: Hearing is slightly decreased, slightly decreased in the right ear and slightly decreased in the left ear  She uses a hearing aid  Activities of daily living details: does not need help using the phone, no transportation help needed, does not need help shopping, no meal preparation help needed, does not need help doing housework, does not need help doing laundry, does not need help managing medications and does not need help managing money  Injury History: no polypharmacy, no alcohol use, no mobility impairment, no antidepressant use, no deconditioning, no postural hypotension, no sedative use, no visual impairment, no urinary incontinence, no antihypertensive use, no cognitive impairment, up and go test was normal and no previous fall  Home safety risk factors:  no unfamiliar surroundings, no loose rugs, no poor household lighting, no uneven floors, no household clutter, grab bars in the bathroom and handrails on the stairs  Advance Directives: Advance directives: living will, durable power of  for health care directives and advance directives  end of life decisions were reviewed with the patient and I agree with the patient's decisions  Co-Managers and Medical Equipment/Suppliers: See Patient Care Team     Last Medicare Wellness Visit Information was reviewed, patient interviewed, no change since last AWV  Falls Risk: The patient fell 0 times in the past 12 months  The patient is currently asymptomatic Symptoms Include:  Associated symptoms:  No associated symptoms are reported  Urinary Incontinence Symptoms includes: urinary incontinence and urinary urgency    improved after surgery with Dr Marimar Farias     Date of last glaucoma screen was Dr Cathleen Sher      Patient Care Team    Care Team Member Role Specialty Office Number   Nori Martínez DO  Cardiology [phone]   Jazzy TEJEDA  Family Medicine (1) Sirisha Rajan MD  Ophthalmology [phone]     Review of Systems    Constitutional: negative  Head and Face: negative  Eyes: negative  ENT: negative  Cardiovascular: negative  Respiratory: negative  Gastrointestinal: negative  Genitourinary: as noted in HPI  Musculoskeletal: negative  Integumentary and Breasts: negative  Neurological: negative  Psychiatric: negative  Endocrine: negative  Hematologic and Lymphatic: negative  Over the past 2 weeks, how often have you been bothered by the following problems? 1 ) Little interest or pleasure in doing things? Not at all    2 ) Feeling down, depressed or hopeless? Not at all    3 ) Trouble falling asleep or sleeping too much? Not at all    4 ) Feeling tired or having little energy? Not at all    5 ) Poor appetite or overeating? Not at all    6 ) Feeling bad about yourself, or that you are a failure, or have let yourself or your family down? Not at all    7 ) Trouble concentrating on things, such as reading a newspaper or watching television? Not at all    8 ) Moving or speaking so slowly that other people could have noticed, or the opposite, moving or speaking faster than usual? Not at all  How difficult have these problems made it for you to do your work, take care of things at home, or get along with people? Not at all  Active Problems    1  Actinic keratosis (702 0) (L57 0)   2  Acute cystitis (595 0) (N30 00)   3  Acute upper respiratory infection (465 9) (J06 9)   4  Arteriosclerosis of coronary artery (414 00) (I25 10)   5  Back pain (724 5) (M54 9)   6  Benign essential hypertension (401 1) (I10)   7  Benign paroxysmal positional vertigo of right ear (386 11) (H81 11)   8  Bilateral hearing loss (389 9) (H91 93)   9  Bladder prolapse, female, acquired (618 01) (N81 10)   10   Breast pain, left (611 71) (N64 4)   11  Chronic systolic congestive heart failure (428 22,428 0) (I50 22)   12  Dyslipidemia (272 4) (E78 5)   13  Encounter for screening mammogram for malignant neoplasm of breast (V76 12)    (Z12 31)   14  Esophagitis, reflux (530 11) (K21 0)   15  Fatigue (780 79) (R53 83)   16  Hyperlipidemia (272 4) (E78 5)   17  Insomnia, persistent (307 42) (G47 00)   18  Irregular heart beats (427 9) (I49 9)   19  Ischemic heart disease, chronic (414 9) (I25 9)   20  Left knee pain (719 46) (M25 562)   21  Localized, primary osteoarthritis of shoulder region, unspecified laterality (715 11)    (M19 019)   22  Need for DTaP vaccine (V06 1) (Z23)   23  Need for prophylactic vaccination and inoculation against influenza (V04 81) (Z23)   24  Osteoarthritis of ankle or foot (715 97) (M19 079)   25  Patellofemoral syndrome, left (719 46) (M22 2X2)   26  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)   27  Post-artificial Menopause State (627 4)   28  Post-menopausal osteoporosis (733 01) (M81 0)   29  Preoperative clearance (V72 84) (Z01 818)   30  Pre-operative clearance (V72 84) (Z01 818)   31  Primary localized osteoarthrosis of the hip, unspecified laterality (715 15) (M16 10)   32  Seasonal allergies (477 9) (J30 2)   33  Shoulder joint pain, unspecified laterality   34  Statin intolerance (995 27) (Z78 9)   35  Status post angioplasty (V45 89) (Z98 62)   36  Vaginal bleeding (623 8) (N93 9)   37  Vulvovaginitis (616 10) (N76 0)    Past Medical History    1  History of Abdominal pain, epigastric (789 06) (R10 13)   2  History of Abdominal pain, left upper quadrant (789 02) (R10 12)   3  History of Abdominal pain, RLQ (right lower quadrant) (789 03) (R10 31)   4  History of Abscess of chest wall (682 2) (L02 213)   5  Acute maxillary sinusitis (461 0) (J01 00)   6  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)   7   History of Allergic rhinitis due to pollen (477 0) (J30 1)   8  History of Cervicalgia (723 1) (M54 2)   9  History of Cough (786 2) (R05)   10  History of Earache on left (388 70) (H92 02)   11  History of Fracture Of The Humerus (812 20)   12  History of Genitourinary infection, candidal (112 2) (B37 49)   13  History of Hip pain, unspecified laterality   14  History of chest pain (V13 89) (Z87 898)   15  History of conjunctivitis (V12 49) (Z86 69)   16  History of diverticulitis of colon (V12 79) (Z87 19)   17  History of eczema (V13 3) (Z87 2)   18  History of headache (V13 89) (Z87 898)   19  History of hematuria (V13 09) (Z87 448)   20  History of hyperkeratosis of skin (V13 3) (Z87 2)   21  History of hypokalemia (V12 29) (Z86 39)   22  History of nausea (V12 79) (Z87 898)   23  History of nausea (V12 79) (Z87 898)   24  History of sciatica (V12 49) (Z86 69)   25  History of sebaceous cyst (V13 3) (Z87 2)   26  History of syncope (V15 89) (Z87 898)   27  History of syncope (V15 89) (Z87 898)   28  History of upper respiratory infection (V12 09) (Z87 09)   29  History of vertigo (V12 49) (Z87 898)   30  History of viral gastroenteritis (V12 09) (Z86 19)   31  History of viral gastroenteritis (V12 09) (Z86 19)   32  History of viremia (V12 3) (Z86 2)   33  History of Incomplete uterovaginal prolapse (618 2) (N81 2)   34  History of Limb pain (729 5) (M79 609)   35  History of Midline low back pain without sciatica (724 2) (M54 5)   36  History of Myalgia And Myositis (729 1)   37  History of Neck Sprain (847 0)   38  History of Pain, upper back (724 5) (M54 9)   39  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)   40  History of Reported Hearing Problems   41  History of Shoulder Sprain (840 9)   42  History of Sprain Of The Back (847 9)   43  History of Thoracic back pain (724 1) (M54 6)   44  History of Trigeminal neuralgia (350 1) (G50 0)   45  URI, acute (465 9) (J06 9)    Surgical History    1  History of Appendectomy   2  History of Appendico-vesicostomy status (V44 52) (Z93 52)   3  History of Cath Stent 2 Distal Left Anterior Descending Artery   4  History of Cholecystectomy   5  History of Colonoscopy (Fiberoptic) Screening   6  History of Hysterectomy   7  History of PTCA   8  History of Rectal Surgery   9  History of Vaginal Pap smear (V76 47) (Z12 72)    The surgical history was reviewed and updated today  Family History  Mother    1  Family history of Coronary Artery Disease (V17 49)   2  Family history of arthritis (V17 7) (Z82 61)   3  Family history of osteoporosis (V17 81) (Z82 62)   4  Family history of Hyperlipidemia   5  Family history of Hypertension (V17 49)  Father    10  Family history of Sudden/Instantaneous Cardiac Death (V17 41)  Sister    7  Family history of Coronary Artery Disease (V17 49)    The family history was reviewed and updated today  Social History    · Being A Social Drinker   · Daily Coffee Consumption (2  Cups/Day)   · Exercising Regularly   · Marital History -    · Never A Smoker  The social history was reviewed and updated today  Current Meds   1  Allegra 180 MG TABS; 1 Every Morning as needed; Therapy: 31Jiw8949 to  Requested for: 24Bro5857 Recorded   2  Aspirin Low Dose 81 MG TABS; TAKE 1 TABLET DAILY; Therapy: 62QWD8032 to (Evaluate:09Apr2016); Last Rx:09Jan2015 Ordered   3  Clotrimazole-Betamethasone 1-0 05 % External Cream; Apply thin film BID Do not   exceed 2 weeks of use; Therapy: 61YMI6079 to (Last Rx:02Mar2017)  Requested for: 70AHM9313 Ordered   4  CO-Q 10 Omega-3 Fish Oil CAPS; take 1 capsule daily; Therapy: (Recorded:17Mar2017) to Recorded   5  Nitrostat 0 4 MG Sublingual Tablet Sublingual; DISSOLVE 1 TABLET UNDER THE   TONGUE AS NEEDED FOR CHEST PAIN;   Therapy: 22UJC7509 to (Evaluate:13Sep2016)  Requested for: 46KCV8752; Last   Rx:76Iph8462 Ordered   6   Olmesartan-Amlodipine-HCTZ 20-5-12 5 MG Oral Tablet; TAKE 1 TABLET BY MOUTH   ONCE DAILY; Therapy: 38XMW3346 to (Evaluate:81Nsf1572)  Requested for: 03KBG4535; Last   Rx:05Lgq5673 Ordered   7  Potassium Chloride Saumya ER 20 MEQ Oral Tablet Extended Release; take 1 tablet twice a   day; Therapy: 48Alr0381 to (Last Rx:47Gbo1073)  Requested for: 22DXG5601 Ordered   8  RABEprazole Sodium 20 MG Oral Tablet Delayed Release; Take 1 tablet daily; Therapy: 20Kgx1621 to (Last Rx:88Usm1788)  Requested for: 54KPT0207 Ordered   9  Rosuvastatin Calcium 5 MG Oral Tablet; one twice a week; Therapy: 03YFL6774 to (Evaluate:86Oyg7915)  Requested for: 04CBQ7149; Last   Rx:50Ahc9481 Ordered    The medication list was reviewed and updated today  Allergies    1  Versed SOLN   2  Erythromycin TABS   3  Zithromax TABS   4  Altace TABS   5  Estrogens   6  Penicillins   7  Statins   8  Vasotec TABS    9  Bee sting    Immunizations  DTP/DTaP --- Miri Jobs: 21-Mar-2007   Influenza --- Miri Jobs: [id number]-6878Rqtsmv Elton: 15-Dec-2000; Virgia Shells: 06-Nov-2001; Lanny Cellar:  33-IWD-7735; Series5: 12-Oct-2005; Series6: 37-Fmz-1213Xetmvyu Martini: 16-Oct-2007; Series8:  09-Oct-2008; Dante Corti: 33-Ekl-5562Rqypee Ree: 05-Oct-2010;  VRZVII36: 15-Sep-2011; UAVEFL70:  24-Oct-2012; HNAISD79: 03-Oct-2013; FVXSJW05: 17-Oct-2014; IQJXZE02: 15-Oct-2015; Bfdqqd71:  07-Nov-2016   PCV --- Series1: 26-Aug-2015   PPSV --- Series1: 18-Feb-2000   Tdap --- Series1: 18-Dec-2014     Vitals  Signs   Recorded: 01Wdd2764 01:13PM   Temperature: 97 F  Heart Rate: 72  Respiration: 16  Systolic: 356  Diastolic: 80  Height: 5 ft 4 in  Weight: 145 lb 6 56 oz  BMI Calculated: 24 96  BSA Calculated: 1 71    Results/Data  *VB - Urinary Incontinence Screen (Dx Z13 89 Screen for UI) 93EVH4508 01:30PM Phuong Perez     Test Name Result Flag Reference   Urinary Incontinence Assessment 32Jwo2120                   Falls Risk Assessment (Dx Z13 89 Screen for Neurologic Disorder) 23Dfq1158 01:22PM User, Ahs     Test Name Result Flag Reference   Falls Risk      No falls in the past year Health Management  Arteriosclerosis of coronary artery   (1) LIPID PANEL, FASTING; every 1 day; Last 25Aug2017; Next Permanently Deferred;     Signatures   Electronically signed by : BERTIN Dhillon;  Aug 29 2017  1:58PM EST                       (Author)    Electronically signed by : JON Paniagua ; Aug 29 2017  2:21PM EST                       (Author)

## 2018-01-16 NOTE — MISCELLANEOUS
Message  patient called with fever and cough  Started Friday  Much worse  pneumonia? Plan  Benign essential hypertension, CAD (coronary artery disease), native coronary artery    · Tribenzor 40-5-12 5 MG Oral Tablet;  Take 1 tablet daily  Cough    · Doxycycline Hyclate 100 MG Oral Tablet; TAKE 1 TABLET TWICE DAILY  Dyslipidemia    · Crestor 5 MG Oral Tablet; one twice a week   · Crestor 5 MG Oral Tablet; one twice a week    Signatures   Electronically signed by : JON Johns ; Feb 28 2016  7:35AM EST                       (Author)

## 2018-01-18 NOTE — RESULT NOTES
Message   no growth on urine culture, make sure her sx are resolving, if not needs re-eval appt  thanks  Verified Results  (1) URINE CULTURE 44QIA7351 12:00AM Hien Morris     Test Name Result Flag Reference   Urine Culture,Comprehensive Final report     Result 1 Comment     No growth in 36 - 48 hours

## 2018-01-22 VITALS
SYSTOLIC BLOOD PRESSURE: 130 MMHG | HEART RATE: 84 BPM | WEIGHT: 146 LBS | RESPIRATION RATE: 14 BRPM | BODY MASS INDEX: 24.92 KG/M2 | DIASTOLIC BLOOD PRESSURE: 80 MMHG | TEMPERATURE: 97.8 F | HEIGHT: 64 IN

## 2018-01-22 VITALS
HEIGHT: 64 IN | WEIGHT: 144 LBS | DIASTOLIC BLOOD PRESSURE: 84 MMHG | BODY MASS INDEX: 24.59 KG/M2 | SYSTOLIC BLOOD PRESSURE: 169 MMHG | HEART RATE: 87 BPM

## 2018-02-26 DIAGNOSIS — Z00.00 ENCOUNTER FOR GENERAL ADULT MEDICAL EXAMINATION WITHOUT ABNORMAL FINDINGS: ICD-10-CM

## 2018-03-01 ENCOUNTER — TRANSCRIBE ORDERS (OUTPATIENT)
Dept: LAB | Facility: CLINIC | Age: 83
End: 2018-03-01

## 2018-03-01 ENCOUNTER — APPOINTMENT (OUTPATIENT)
Dept: LAB | Facility: CLINIC | Age: 83
End: 2018-03-01
Payer: COMMERCIAL

## 2018-03-01 DIAGNOSIS — Z11.59 SCREENING EXAMINATION FOR POLIOMYELITIS: ICD-10-CM

## 2018-03-01 DIAGNOSIS — Z11.59 SCREENING EXAMINATION FOR POLIOMYELITIS: Primary | ICD-10-CM

## 2018-03-01 DIAGNOSIS — Z00.00 ENCOUNTER FOR GENERAL ADULT MEDICAL EXAMINATION WITHOUT ABNORMAL FINDINGS: ICD-10-CM

## 2018-03-01 LAB
ALBUMIN SERPL BCP-MCNC: 3.5 G/DL (ref 3.5–5)
ALP SERPL-CCNC: 79 U/L (ref 46–116)
ALT SERPL W P-5'-P-CCNC: 23 U/L (ref 12–78)
ANION GAP SERPL CALCULATED.3IONS-SCNC: 6 MMOL/L (ref 4–13)
AST SERPL W P-5'-P-CCNC: 21 U/L (ref 5–45)
BILIRUB SERPL-MCNC: 0.43 MG/DL (ref 0.2–1)
BUN SERPL-MCNC: 16 MG/DL (ref 5–25)
CALCIUM SERPL-MCNC: 9 MG/DL (ref 8.3–10.1)
CHLORIDE SERPL-SCNC: 104 MMOL/L (ref 100–108)
CO2 SERPL-SCNC: 29 MMOL/L (ref 21–32)
CREAT SERPL-MCNC: 0.6 MG/DL (ref 0.6–1.3)
GFR SERPL CREATININE-BSD FRML MDRD: 84 ML/MIN/1.73SQ M
GLUCOSE SERPL-MCNC: 100 MG/DL (ref 65–140)
POTASSIUM SERPL-SCNC: 3.2 MMOL/L (ref 3.5–5.3)
PROT SERPL-MCNC: 7.3 G/DL (ref 6.4–8.2)
SODIUM SERPL-SCNC: 139 MMOL/L (ref 136–145)

## 2018-03-01 PROCEDURE — 80053 COMPREHEN METABOLIC PANEL: CPT

## 2018-03-01 PROCEDURE — 86803 HEPATITIS C AB TEST: CPT

## 2018-03-01 PROCEDURE — 36415 COLL VENOUS BLD VENIPUNCTURE: CPT

## 2018-03-02 LAB — HCV AB SER QL: NORMAL

## 2018-03-05 RX ORDER — NITROGLYCERIN 0.4 MG/1
1 TABLET SUBLINGUAL
COMMUNITY
Start: 2015-08-14 | End: 2018-05-19

## 2018-03-05 RX ORDER — POTASSIUM CHLORIDE 20 MEQ/1
1 TABLET, EXTENDED RELEASE ORAL 2 TIMES DAILY
Status: ON HOLD | COMMUNITY
Start: 2009-07-22 | End: 2018-05-20

## 2018-03-05 RX ORDER — FEXOFENADINE HCL 180 MG/1
TABLET ORAL
COMMUNITY
Start: 2009-08-31

## 2018-03-05 RX ORDER — CLOTRIMAZOLE AND BETAMETHASONE DIPROPIONATE 10; .64 MG/G; MG/G
CREAM TOPICAL
COMMUNITY
Start: 2017-03-02 | End: 2018-03-09

## 2018-03-06 ENCOUNTER — OFFICE VISIT (OUTPATIENT)
Dept: ENDOCRINOLOGY | Facility: CLINIC | Age: 83
End: 2018-03-06
Payer: COMMERCIAL

## 2018-03-06 VITALS
WEIGHT: 145.8 LBS | DIASTOLIC BLOOD PRESSURE: 80 MMHG | HEART RATE: 74 BPM | SYSTOLIC BLOOD PRESSURE: 142 MMHG | HEIGHT: 64 IN | BODY MASS INDEX: 24.89 KG/M2

## 2018-03-06 DIAGNOSIS — M81.0 OSTEOPOROSIS, UNSPECIFIED OSTEOPOROSIS TYPE, UNSPECIFIED PATHOLOGICAL FRACTURE PRESENCE: Primary | ICD-10-CM

## 2018-03-06 PROCEDURE — 99204 OFFICE O/P NEW MOD 45 MIN: CPT | Performed by: INTERNAL MEDICINE

## 2018-03-06 NOTE — LETTER
March 6, 2018     Zhanna Blount [address] [place] [address]   [id number]    Patient: Daryle Baptist   YOB: 1933   Date of Visit: 3/6/2018       Dear Dr Ayo Segura: Thank you for referring Alisia Thapa to me for evaluation  Below are my notes for this consultation  If you have questions, please do not hesitate to call me  I look forward to following your patient along with you  Sincerely,        Fernando Grace MD        CC: No Recipients  Fernando Grace MD  3/6/2018  9:28 PM  Sign at close encounter    New Patient Progress Note      No chief complaint on file  Referring Provider  Belen Patrick  [address] [address]     History of Present Illness:     Subjective   Daryle Baptist is a 80 y o  female who presents in consultation for osteoporosis  OSTEOPOROSIS risk factors (non-modifiable):  Caucasion race, female sex and personal history of fracture as an adult, Family Hx of Fracture   Denies h/o Smoking, ETOH use ( excessive) or excessive use high phsophate drinks   OSTEOPOROSIS risk factors (potentially modifiable):     Current calcium and Vit D intake:   adequate dietary sources      Results of most recent DEXA scan: osteopenia   Dexa scan , Sept 2017   RESULTS:   LUMBAR SPINE:  L1-L4:  BMD 0 944 gm/cm2  T-score -1 9  Z-score 0 0     LEFT TOTAL HIP:  BMD 0 772 gm/cm2  T-score -1 9  Z-score 0 3     LEFT FEMORAL NECK:  BMD 0 787 gm/cm2  T-score -1 8  Z-score 0 5     RIGHT TOTAL HIP:  BMD 0 747 gm/cm2  T-score -2 1  Z-score 0 1     RIGHT FEMORAL NECK:  BMD 0 775 gm/cm2  T-score -1 9  Z-score 0 4     1  Based on the [facility] classification, the T-score of -2 1 in the right hip is consistent with low bone mineral density  2   Any secondary causes of low bone mineral density should be excluded prior to treatment, if clinically indicated    3   A daily intake of at least 1200 mg calcium and 800 to 1000 IU of Vitamin D, as well as weight bearing and muscle strengthening exercise, fall prevention and avoidance of tobacco and excessive alcohol intake as basic preventive measures are suggested  4   Repeat DXA  in 18 - 24 months, on the same machine, as clinically indicated  PT denies history of thyroid problems, parathyroid problems or calcium issues  Patient had history of fracture of humerus, 2 years ago from fall from standing height    Pt is  Euthyroid without any medication    There is no problem list on file for this patient  Past Medical History:   Diagnosis Date    Arthritis     Back pain     Cataract     Start of    Coronary artery disease     Female bladder prolapse     Gastric reflux     History of diverticulitis     [place] (hard of hearing)     Hypercholesteremia     Hypertension     Osteoporosis     Seasonal allergies     Urinary incontinence     Uterine prolapse     Wears glasses     Wears hearing aid     States she rarely wears them    Wears partial dentures     Upper       Past Surgical History:   Procedure Laterality Date    APPENDECTOMY      CHOLECYSTECTOMY      Laparoscopic    COLONOSCOPY      CORONARY ANGIOPLASTY WITH STENT PLACEMENT      2 aortic stents    ESOPHAGOGASTRODUODENOSCOPY      FRACTURE SURGERY Right     Repair right arm- titanium servando from shoulder to elbow      HYSTERECTOMY      Complete    NM CMBND ANTERPOST COLPORRAPHY W/CYSTO N/A 9/20/2016    Procedure: COLPORRHAPHY ANTERIOR POSTERIOR GRAFT;  Surgeon: Iram Silver MD;  [facility];  Service: UroGynecology           NM CYSTOURETHROSCOPY N/A 9/20/2016    Procedure: Cordell Valdez;  Surgeon: Iram Silver MD;  [facility];  Service: UroGynecology           NM REVAGINAL PROLAPSE,SACROSP LIG N/A 9/20/2016    Procedure: COLPOPEXY VAGINAL EXTRAPERITONEAL  incision and drainage of vagina inclusion cyst x 4;  Surgeon: Iram Silver MD;  [facility];  Service: UroGynecology           NM SLING OPER STRES INCONTINENCE N/A 9/20/2016    Procedure: INSERTION PUBOVAGINAL SLING RETROPUBIC ;  Surgeon: Shruthi Brothers MD;  [facility];  Service: UroGynecology             Family History   Problem Relation Age of Onset    Hypertension Mother     Heart failure Mother      Social History   Substance Use Topics    Smoking status: Never Smoker    Smokeless tobacco: Never Used    Alcohol use 1 2 oz/week     2 Glasses of wine per week     Allergies   Allergen Reactions    Penicillins Hives     "All cillins", "and all myocillins"    Versed [Midazolam] Other (See Comments)     States she "passed out" when recovering from a procedure and was told to not use it again   Zithromax [Azithromycin] Anaphylaxis    Bee Venom      Reaction Date: 02Feb2004; Annotation - [id number]: jjw    Enalapril      Reaction Date: [id number];     Erythromycin Dizziness     Reaction Date: [id number];     Estrogens      Reaction Date: 02Feb2004; Annotation - [id number]: jjw    Ramipril      Reaction Date: 02Feb2004;  Annotation - [id number]: jjw    Statins Other (See Comments)     Muscle pain       Current Outpatient Prescriptions:     aspirin (ASPIRIN LOW DOSE) 81 MG tablet, Take 1 tablet by mouth daily, Disp: , Rfl:     co-enzyme Q-10 30 MG capsule, Take 30 mg by mouth daily  , Disp: , Rfl:     fexofenadine (ALLEGRA ALLERGY) 180 MG tablet, Take by mouth, Disp: , Rfl:     nitroglycerin (NITROSTAT) 0 4 mg SL tablet, Place 1 tablet under the tongue, Disp: , Rfl:     Olmesartan-Amlodipine-HCTZ (TRIBENZOR) 40-5-12 5 MG TABS, Take by mouth , Disp: , Rfl:     potassium chloride (K-DUR,KLOR-CON) 20 mEq tablet, Take 1 tablet by mouth 2 (two) times a day, Disp: , Rfl:     RABEprazole (ACIPHEX) 20 MG tablet, Take 20 mg by mouth every morning , Disp: , Rfl:     rosuvastatin (CRESTOR) 5 mg tablet, Take 5 mg by mouth 2 (two) times a week  , Disp: , Rfl:     clotrimazole-betamethasone (LOTRISONE) 1-0 05 % cream, Apply topically, Disp: , Rfl:     Coenzyme Q10-Fish Oil-Vit E (CO-Q 10 OMEGA-3 FISH OIL PO), Take 1 capsule by mouth daily, Disp: , Rfl:     ibuprofen (MOTRIN) 200 mg tablet, Take 200 mg by mouth every 6 (six) hours as needed for mild pain , Disp: , Rfl:     potassium chloride (K-DUR) 10 mEq tablet, Take 20 mEq by mouth daily at bedtime  , Disp: , Rfl:     zolpidem (AMBIEN) 10 mg tablet, Take 10 mg by mouth daily at bedtime as needed for sleep , Disp: , Rfl:     Review of Systems   Constitutional: Negative for activity change, appetite change, fatigue and unexpected weight change  HENT: Negative  Eyes: Negative for visual disturbance  Respiratory: Negative for cough, chest tightness and shortness of breath  Cardiovascular: Negative for chest pain, palpitations and leg swelling  Gastrointestinal: Negative  Endocrine: Negative  Genitourinary: Negative  Musculoskeletal: Negative for back pain, gait problem, myalgias and neck pain  Neurological: Negative for dizziness, tremors, light-headedness and numbness  Hematological: Negative  Psychiatric/Behavioral: Negative  Physical Exam:  Body mass index is 25 03 kg/m²  /80   Pulse 74   Ht 5' 4" (1 626 m)   Wt 66 1 kg (145 lb 12 8 oz)   BMI 25 03 kg/m²     Wt Readings from Last 3 Encounters:   03/06/18 66 1 kg (145 lb 12 8 oz)   09/14/17 66 2 kg (146 lb)   08/29/17 66 kg (145 lb 6 5 oz)        Physical Exam   Constitutional: She is oriented to person, place, and time  She appears well-developed and well-nourished  No distress  Appears younger than her age  HENT:   Head: Normocephalic and atraumatic  Eyes: Conjunctivae and EOM are normal  Right eye exhibits no discharge  Left eye exhibits no discharge  Neck: Normal range of motion  Neck supple  No thyromegaly present  Cardiovascular: Normal rate, regular rhythm and normal heart sounds  No murmur heard  Pulmonary/Chest: Effort normal and breath sounds normal  No respiratory distress  She has no wheezes  Abdominal: Soft  Bowel sounds are normal  She exhibits no distension  There is no tenderness  Musculoskeletal: Normal range of motion  She exhibits no edema, tenderness or deformity  Neurological: She is alert and oriented to person, place, and time  She has normal reflexes  Skin: Skin is warm and dry  No rash noted  She is not diaphoretic  No erythema  Psychiatric: She has a normal mood and affect  Her behavior is normal    Vitals reviewed  Diabetic Foot Exam    Labs:   No components found for: HA1C  No results found for: GLU    Lab Results   Component Value Date    CREATININE 0 60 03/01/2018    CREATININE 0 62 08/25/2017    CREATININE 0 57 (L) 09/21/2016    BUN 16 03/01/2018     03/01/2018    K 3 2 (L) 03/01/2018     03/01/2018    CO2 29 03/01/2018     eGFR   Date Value Ref Range Status   03/01/2018 84 ml/min/1 73sq m Final     No components found for: [facility]    Lab Results   Component Value Date    CHOL 196 08/25/2017    HDL 62 (H) 08/25/2017    TRIG 144 08/25/2017       Lab Results   Component Value Date    ALT 23 03/01/2018    AST 21 03/01/2018    ALKPHOS 79 03/01/2018    BILITOT 0 43 03/01/2018       No results found for: TSH, FREET4, TSI    Impression:  1  Osteoporosis, unspecified osteoporosis type, unspecified pathological fracture presence         Plan:    Diagnoses and all orders for this visit:    Osteoporosis, unspecified osteoporosis type, unspecified pathological fracture presence  Lowest T-score of -2 1 at right total hip , with history of fracture from standing veronica, suggestive of osteoporosis  Discussed different options of osteoporosis management, including oral bisphosphonates, IV bisphosphonates as well as subcutaneous Prolia injection every 6 months   Considering history of GERD oral bisphosphonates will not be a good option for her  Discussed side effects of Reclast as well as Prolia injections, and benefits    Also discussed about drug holiday with Reclast after 5 years, if bone density is improving on stable  Repeat patient prefers to take Reclast injection, she wants to read about it and will let us know once she decides  Discuss to get vitamin-D, protein electrophoresis a PTH level done, his before treating with Reclast injection her vitamin-D should be in normal range  Education material regarding Reclast provided to patient     -     Vitamin D 25 hydroxy; Future  -     PTH, intact- Lab Collect; Future  -     Protein electrophoresis, serum Lab Collect; Future  -     Basic metabolic panel; Future  -     Vitamin D 25 hydroxy- Lab Collect; Future  -     TSH, 3rd generation; Future  -     T4, free- Lab Collect; Future      Discussed with the patient and all questioned fully answered  She will call me if any problems arise      Counseled patient on diagnostic results, prognosis, risk and benefit of treatment options, instruction for management, importance of treatment compliance, Risk  factor reduction and impressions      Saira Allan MD

## 2018-03-06 NOTE — PROGRESS NOTES
New Patient Progress Note      No chief complaint on file  Referring Provider  Belen Espino  [address] [address]     History of Present Illness:     Judy Recinos is a 80 y o  female who presents in consultation for osteoporosis  OSTEOPOROSIS risk factors (non-modifiable):  Caucasion race, female sex and personal history of fracture as an adult, Family Hx of Fracture   Denies h/o Smoking, ETOH use ( excessive) or excessive use high phsophate drinks   OSTEOPOROSIS risk factors (potentially modifiable):     Current calcium and Vit D intake:   adequate dietary sources      Results of most recent DEXA scan: osteopenia   Dexa scan , Sept 2017   RESULTS:   LUMBAR SPINE:  L1-L4:  BMD 0 944 gm/cm2  T-score -1 9  Z-score 0 0     LEFT TOTAL HIP:  BMD 0 772 gm/cm2  T-score -1 9  Z-score 0 3     LEFT FEMORAL NECK:  BMD 0 787 gm/cm2  T-score -1 8  Z-score 0 5     RIGHT TOTAL HIP:  BMD 0 747 gm/cm2  T-score -2 1  Z-score 0 1     RIGHT FEMORAL NECK:  BMD 0 775 gm/cm2  T-score -1 9  Z-score 0 4     1  Based on the [facility] classification, the T-score of -2 1 in the right hip is consistent with low bone mineral density  2   Any secondary causes of low bone mineral density should be excluded prior to treatment, if clinically indicated  3   A daily intake of at least 1200 mg calcium and 800 to 1000 IU of Vitamin D, as well as weight bearing and muscle strengthening exercise, fall prevention and avoidance of tobacco and excessive alcohol intake as basic preventive measures are suggested  4   Repeat DXA  in 18 - 24 months, on the same machine, as clinically indicated  PT denies history of thyroid problems, parathyroid problems or calcium issues  Patient had history of fracture of humerus, 2 years ago from fall from standing height    Pt is  Euthyroid without any medication    There is no problem list on file for this patient      Past Medical History:   Diagnosis Date    Arthritis  Back pain     Cataract     Start of    Coronary artery disease     Female bladder prolapse     Gastric reflux     History of diverticulitis     [place] (hard of hearing)     Hypercholesteremia     Hypertension     Osteoporosis     Seasonal allergies     Urinary incontinence     Uterine prolapse     Wears glasses     Wears hearing aid     States she rarely wears them    Wears partial dentures     Upper       Past Surgical History:   Procedure Laterality Date    APPENDECTOMY      CHOLECYSTECTOMY      Laparoscopic    COLONOSCOPY      CORONARY ANGIOPLASTY WITH STENT PLACEMENT      2 aortic stents    ESOPHAGOGASTRODUODENOSCOPY      FRACTURE SURGERY Right     Repair right arm- titanium servando from shoulder to elbow      HYSTERECTOMY      Complete    LA CMBND ANTERPOST COLPORRAPHY W/CYSTO N/A 9/20/2016    Procedure: COLPORRHAPHY ANTERIOR POSTERIOR GRAFT;  Surgeon: Tiffany Watson MD;  [facility];  Service: UroGynecology           LA CYSTOURETHROSCOPY N/A 9/20/2016    Procedure: Kathleen Islas;  Surgeon: Tiffany Watson MD;  [facility];  Service: UroGynecology           LA REVAGINAL PROLAPSE,SACROSP LIG N/A 9/20/2016    Procedure: COLPOPEXY VAGINAL EXTRAPERITONEAL  incision and drainage of vagina inclusion cyst x 4;  Surgeon: Tiffany Watson MD;  [facility];  Service: UroGynecology           LA SLING OPER STRES INCONTINENCE N/A 9/20/2016    Procedure: INSERTION PUBOVAGINAL SLING RETROPUBIC ;  Surgeon: Tiffany Watson MD;  [facility];  Service: UroGynecology             Family History   Problem Relation Age of Onset    Hypertension Mother     Heart failure Mother      Social History   Substance Use Topics    Smoking status: Never Smoker    Smokeless tobacco: Never Used    Alcohol use 1 2 oz/week     2 Glasses of wine per week     Allergies   Allergen Reactions    Penicillins Hives     "All cillins", "and all myocillins"    Versed [Midazolam] Other (See Comments)     States she "passed out" when recovering from a procedure and was told to not use it again   Zithromax [Azithromycin] Anaphylaxis    Bee Venom      Reaction Date: 02Feb2004; Annotation - [id number]: j    Enalapril      Reaction Date: [id number];     Erythromycin Dizziness     Reaction Date: [id number];     Estrogens      Reaction Date: 02Feb2004; Annotation - [id number]: jw    Ramipril      Reaction Date: 02Feb2004; Annotation - [id number]: jw    Statins Other (See Comments)     Muscle pain       Current Outpatient Prescriptions:     aspirin (ASPIRIN LOW DOSE) 81 MG tablet, Take 1 tablet by mouth daily, Disp: , Rfl:     co-enzyme Q-10 30 MG capsule, Take 30 mg by mouth daily  , Disp: , Rfl:     fexofenadine (ALLEGRA ALLERGY) 180 MG tablet, Take by mouth, Disp: , Rfl:     nitroglycerin (NITROSTAT) 0 4 mg SL tablet, Place 1 tablet under the tongue, Disp: , Rfl:     Olmesartan-Amlodipine-HCTZ (TRIBENZOR) 40-5-12 5 MG TABS, Take by mouth , Disp: , Rfl:     potassium chloride (K-DUR,KLOR-CON) 20 mEq tablet, Take 1 tablet by mouth 2 (two) times a day, Disp: , Rfl:     RABEprazole (ACIPHEX) 20 MG tablet, Take 20 mg by mouth every morning , Disp: , Rfl:     rosuvastatin (CRESTOR) 5 mg tablet, Take 5 mg by mouth 2 (two) times a week  , Disp: , Rfl:     clotrimazole-betamethasone (LOTRISONE) 1-0 05 % cream, Apply topically, Disp: , Rfl:     Coenzyme Q10-Fish Oil-Vit E (CO-Q 10 OMEGA-3 FISH OIL PO), Take 1 capsule by mouth daily, Disp: , Rfl:     ibuprofen (MOTRIN) 200 mg tablet, Take 200 mg by mouth every 6 (six) hours as needed for mild pain , Disp: , Rfl:     potassium chloride (K-DUR) 10 mEq tablet, Take 20 mEq by mouth daily at bedtime  , Disp: , Rfl:     zolpidem (AMBIEN) 10 mg tablet, Take 10 mg by mouth daily at bedtime as needed for sleep , Disp: , Rfl:     Review of Systems   Constitutional: Negative for activity change, appetite change, fatigue and unexpected weight change     HENT: Negative  Eyes: Negative for visual disturbance  Respiratory: Negative for cough, chest tightness and shortness of breath  Cardiovascular: Negative for chest pain, palpitations and leg swelling  Gastrointestinal: Negative  Endocrine: Negative  Genitourinary: Negative  Musculoskeletal: Negative for back pain, gait problem, myalgias and neck pain  Neurological: Negative for dizziness, tremors, light-headedness and numbness  Hematological: Negative  Psychiatric/Behavioral: Negative  Physical Exam:  Body mass index is 25 03 kg/m²  /80   Pulse 74   Ht 5' 4" (1 626 m)   Wt 66 1 kg (145 lb 12 8 oz)   BMI 25 03 kg/m²    Wt Readings from Last 3 Encounters:   03/06/18 66 1 kg (145 lb 12 8 oz)   09/14/17 66 2 kg (146 lb)   08/29/17 66 kg (145 lb 6 5 oz)        Physical Exam   Constitutional: She is oriented to person, place, and time  She appears well-developed and well-nourished  No distress  Appears younger than her age  HENT:   Head: Normocephalic and atraumatic  Eyes: Conjunctivae and EOM are normal  Right eye exhibits no discharge  Left eye exhibits no discharge  Neck: Normal range of motion  Neck supple  No thyromegaly present  Cardiovascular: Normal rate, regular rhythm and normal heart sounds  No murmur heard  Pulmonary/Chest: Effort normal and breath sounds normal  No respiratory distress  She has no wheezes  Abdominal: Soft  Bowel sounds are normal  She exhibits no distension  There is no tenderness  Musculoskeletal: Normal range of motion  She exhibits no edema, tenderness or deformity  Neurological: She is alert and oriented to person, place, and time  She has normal reflexes  Skin: Skin is warm and dry  No rash noted  She is not diaphoretic  No erythema  Psychiatric: She has a normal mood and affect  Her behavior is normal    Vitals reviewed      Diabetic Foot Exam    Labs:   No components found for: HA1C  No results found for: GLU    Lab Results Component Value Date    CREATININE 0 60 03/01/2018    CREATININE 0 62 08/25/2017    CREATININE 0 57 (L) 09/21/2016    BUN 16 03/01/2018     03/01/2018    K 3 2 (L) 03/01/2018     03/01/2018    CO2 29 03/01/2018     eGFR   Date Value Ref Range Status   03/01/2018 84 ml/min/1 73sq m Final     No components found for: [facility]    Lab Results   Component Value Date    CHOL 196 08/25/2017    HDL 62 (H) 08/25/2017    TRIG 144 08/25/2017       Lab Results   Component Value Date    ALT 23 03/01/2018    AST 21 03/01/2018    ALKPHOS 79 03/01/2018    BILITOT 0 43 03/01/2018       No results found for: TSH, FREET4, TSI    Impression:  1  Osteoporosis, unspecified osteoporosis type, unspecified pathological fracture presence         Plan:    Diagnoses and all orders for this visit:    Osteoporosis, unspecified osteoporosis type, unspecified pathological fracture presence  Lowest T-score of -2 1 at right total hip , with history of fracture from standing veronica, suggestive of osteoporosis  Discussed different options of osteoporosis management, including oral bisphosphonates, IV bisphosphonates as well as subcutaneous Prolia injection every 6 months   Considering history of GERD oral bisphosphonates will not be a good option for her  Discussed side effects of Reclast as well as Prolia injections, and benefits  Also discussed about drug holiday with Reclast after 5 years, if bone density is improving on stable  Repeat patient prefers to take Reclast injection, she wants to read about it and will let us know once she decides  Discuss to get vitamin-D, protein electrophoresis a PTH level done, his before treating with Reclast injection her vitamin-D should be in normal range  Education material regarding Reclast provided to patient     -     Vitamin D 25 hydroxy; Future  -     PTH, intact- Lab Collect; Future  -     Protein electrophoresis, serum Lab Collect; Future  -     Basic metabolic panel;  Future  - Vitamin D 25 hydroxy- Lab Collect; Future  -     TSH, 3rd generation; Future  -     T4, free- Lab Collect; Future      Discussed with the patient and all questioned fully answered  She will call me if any problems arise      Counseled patient on diagnostic results, prognosis, risk and benefit of treatment options, instruction for management, importance of treatment compliance, Risk  factor reduction and impressions      Maru Baca MD

## 2018-03-06 NOTE — PATIENT INSTRUCTIONS
Please do suggested blood work  Take calcium carbonate 600 mg twice a day   Take vitamin D 1000 IU daily    will start Reclast Iv 5 mg IV infusion, Once we have blood work results back

## 2018-03-08 ENCOUNTER — TELEPHONE (OUTPATIENT)
Dept: ENDOCRINOLOGY | Facility: CLINIC | Age: 83
End: 2018-03-08

## 2018-03-08 ENCOUNTER — LAB (OUTPATIENT)
Dept: LAB | Facility: CLINIC | Age: 83
End: 2018-03-08
Payer: COMMERCIAL

## 2018-03-08 ENCOUNTER — TRANSCRIBE ORDERS (OUTPATIENT)
Dept: LAB | Facility: CLINIC | Age: 83
End: 2018-03-08

## 2018-03-08 DIAGNOSIS — M81.0 SENILE OSTEOPOROSIS: ICD-10-CM

## 2018-03-08 DIAGNOSIS — M81.0 SENILE OSTEOPOROSIS: Primary | ICD-10-CM

## 2018-03-08 LAB
T4 FREE SERPL-MCNC: 1.07 NG/DL (ref 0.76–1.46)
TSH SERPL DL<=0.05 MIU/L-ACNC: 2.14 UIU/ML (ref 0.36–3.74)

## 2018-03-08 PROCEDURE — 84439 ASSAY OF FREE THYROXINE: CPT | Performed by: INTERNAL MEDICINE

## 2018-03-08 PROCEDURE — 36415 COLL VENOUS BLD VENIPUNCTURE: CPT | Performed by: INTERNAL MEDICINE

## 2018-03-08 PROCEDURE — 84443 ASSAY THYROID STIM HORMONE: CPT

## 2018-03-08 NOTE — TELEPHONE ENCOUNTER
----- Message from Qian Stovall MD sent at 3/8/2018  2:12 PM EST -----  Please inform patient that thyroid blood work is normal,

## 2018-03-09 ENCOUNTER — OFFICE VISIT (OUTPATIENT)
Dept: CARDIOLOGY CLINIC | Facility: CLINIC | Age: 83
End: 2018-03-09
Payer: COMMERCIAL

## 2018-03-09 VITALS
SYSTOLIC BLOOD PRESSURE: 132 MMHG | BODY MASS INDEX: 24.75 KG/M2 | OXYGEN SATURATION: 91 % | HEIGHT: 64 IN | HEART RATE: 74 BPM | WEIGHT: 145 LBS | DIASTOLIC BLOOD PRESSURE: 80 MMHG

## 2018-03-09 DIAGNOSIS — I25.10 ARTERIOSCLEROSIS OF CORONARY ARTERY: ICD-10-CM

## 2018-03-09 DIAGNOSIS — I50.22 CHRONIC SYSTOLIC CONGESTIVE HEART FAILURE (HCC): ICD-10-CM

## 2018-03-09 DIAGNOSIS — Z78.9 STATIN INTOLERANCE: ICD-10-CM

## 2018-03-09 DIAGNOSIS — I10 BENIGN ESSENTIAL HYPERTENSION: ICD-10-CM

## 2018-03-09 DIAGNOSIS — I25.9 ISCHEMIC HEART DISEASE: Primary | ICD-10-CM

## 2018-03-09 DIAGNOSIS — E78.2 MIXED HYPERLIPIDEMIA: ICD-10-CM

## 2018-03-09 PROCEDURE — 99214 OFFICE O/P EST MOD 30 MIN: CPT | Performed by: INTERNAL MEDICINE

## 2018-03-09 PROCEDURE — 93000 ELECTROCARDIOGRAM COMPLETE: CPT | Performed by: INTERNAL MEDICINE

## 2018-03-09 NOTE — PROGRESS NOTES
Adan Arriaga Adriana  1933  [id number]  [facility]  [facility]  [address] [id number]    INTERVAL HISTORY:  Samir Rasheed is a 80 y o  female who presents for routine coronary artery disease follow-up  Current symptoms: none  She had some episode of arm pain a few weeks ago that did not occur with exertion  Denies any shortness of breath or chest pain  Denies orthopnea, PND or LE edema  Most recent cardiac catheterization showed patent stents in mid LAD and diagonal vessels  (Stents were placed 5/29/2013 at [address] [place] [address] by Dr Darrian Chamberlain)  RCA showed severe calcified mid RCA disease  Compared to previous catheterization, there had not been any major changes  Past Medical History:   Diagnosis Date    Arthritis     Back pain     Cataract     Start of    Coronary artery disease     Female bladder prolapse     Gastric reflux     History of diverticulitis     [place] (hard of hearing)     Hypercholesteremia     Hypertension     Osteoporosis     Seasonal allergies     Urinary incontinence     Uterine prolapse     Wears glasses     Wears hearing aid     States she rarely wears them    Wears partial dentures     Upper      Past Surgical History:   Procedure Laterality Date    APPENDECTOMY      CHOLECYSTECTOMY      Laparoscopic    COLONOSCOPY      CORONARY ANGIOPLASTY WITH STENT PLACEMENT      2 aortic stents    ESOPHAGOGASTRODUODENOSCOPY      FRACTURE SURGERY Right     Repair right arm- titanium servando from shoulder to elbow      HYSTERECTOMY      Complete    [place] CMBND ANTERPOST COLPORRAPHY W/CYSTO N/A 9/20/2016    Procedure: COLPORRHAPHY ANTERIOR POSTERIOR GRAFT;  Surgeon: Maurisio Campoverde MD;  [facility];  Service: UroGynecology           [place] CYSTOURETHROSCOPY N/A 9/20/2016    Procedure: Isma Rios;  Surgeon: Maurisio Campoverde MD;  [facility];  Service: UroGynecology           W[place] Nestor Harada PROLAPSE,SACROSP LIG N/A 9/20/2016    Procedure: COLPOPEXY VAGINAL EXTRAPERITONEAL  incision and drainage of vagina inclusion cyst x 4;  Surgeon: Pola Eng MD;  [facility];  Service: UroGynecology           [place] SLING OPER STRES INCONTINENCE N/A 9/20/2016    Procedure: INSERTION PUBOVAGINAL SLING RETROPUBIC ;  Surgeon: Pola Eng MD;  [facility];  Service: UroGynecology            Social History     Social History    Marital status:      Spouse name: N/A    Number of children: N/A    Years of education: N/A     Occupational History    Not on file       Social History Main Topics    Smoking status: Never Smoker    Smokeless tobacco: Never Used    Alcohol use 1 2 oz/week     2 Glasses of wine per week    Drug use: No    Sexual activity: Not on file     Other Topics Concern    Not on file     Social History Narrative    No narrative on file     Family History   Problem Relation Age of Onset    Hypertension Mother     Heart failure Mother        Current Outpatient Prescriptions:     aspirin (ASPIRIN LOW DOSE) 81 MG tablet, Take 1 tablet by mouth daily, Disp: , Rfl:     Coenzyme Q10-Fish Oil-Vit E (CO-Q 10 OMEGA-3 FISH OIL PO), Take 1 capsule by mouth daily, Disp: , Rfl:     fexofenadine (ALLEGRA ALLERGY) 180 MG tablet, Take by mouth, Disp: , Rfl:     nitroglycerin (NITROSTAT) 0 4 mg SL tablet, Place 1 tablet under the tongue, Disp: , Rfl:     Olmesartan-Amlodipine-HCTZ (TRIBENZOR) 40-5-12 5 MG TABS, Take by mouth , Disp: , Rfl:     potassium chloride (K-DUR,KLOR-CON) 20 mEq tablet, Take 1 tablet by mouth 2 (two) times a day, Disp: , Rfl:     RABEprazole (ACIPHEX) 20 MG tablet, Take 20 mg by mouth every morning , Disp: , Rfl:     rosuvastatin (CRESTOR) 5 mg tablet, Take 5 mg by mouth 2 (two) times a week  , Disp: , Rfl:   The following portions of the patient's history were reviewed and updated as appropriate: allergies, current medications, past family history, past medical history, past social history, past surgical history and problem list     Review of Systems:  Review of Systems   Constitutional: Negative for chills, fatigue and fever  HENT: Negative for congestion, nosebleeds and postnasal drip  Respiratory: Negative for cough, chest tightness and shortness of breath  Cardiovascular: Negative for chest pain, palpitations and leg swelling  Gastrointestinal: Negative for abdominal distention, abdominal pain, diarrhea, nausea and vomiting  Endocrine: Negative for polydipsia, polyphagia and polyuria  Musculoskeletal: Negative for gait problem and myalgias  Skin: Negative for color change, pallor and rash  Allergic/Immunologic: Negative for environmental allergies, food allergies and immunocompromised state  Neurological: Negative for dizziness, seizures, syncope and light-headedness  Hematological: Negative for adenopathy  Does not bruise/bleed easily  Psychiatric/Behavioral: Negative for dysphoric mood  The patient is not nervous/anxious  Physical Exam:  Physical Exam   Constitutional: She is oriented to person, place, and time  She appears well-developed  No distress  HENT:   Head: Normocephalic and atraumatic  Eyes: Conjunctivae and EOM are normal  Pupils are equal, round, and reactive to light  Neck: Neck supple  No JVD present  No thyromegaly present  Cardiovascular: Normal rate, regular rhythm, normal heart sounds and intact distal pulses  Exam reveals no gallop and no friction rub  No murmur heard  Pulmonary/Chest: Effort normal and breath sounds normal    Abdominal: Soft  She exhibits no distension  There is no tenderness  Musculoskeletal: She exhibits no edema  Neurological: She is alert and oriented to person, place, and time  No cranial nerve deficit  Skin: Skin is warm and dry  No rash noted  She is not diaphoretic  No erythema  Psychiatric: She has a normal mood and affect   Her behavior is normal  Judgment and thought content normal          Cardiographics  ECG: normal sinus rhythm, occasional premature ventricular beats  LV Ejection Fraction: not done recently  Imaging:No results found  Lab Review  Lab Results   Component Value Date    CHOL 196 08/25/2017    CHOL 192 12/03/2013    TRIG 144 08/25/2017    TRIG 216 12/03/2013    HDL 62 (H) 08/25/2017    HDL 58 12/03/2013     Lab Results   Component Value Date     03/01/2018     09/09/2016    K 3 2 (L) 03/01/2018    K 3 6 09/09/2016     03/01/2018    CL 99 09/09/2016    CO2 29 03/01/2018    CO2 25 09/09/2016    BUN 16 03/01/2018    BUN 16 09/09/2016    CREATININE 0 60 03/01/2018    CREATININE 0 55 (L) 09/09/2016    GLUCOSE 100 03/01/2018    GLUCOSE 99 09/09/2016    CALCIUM 9 0 03/01/2018    CALCIUM 9 6 09/09/2016     Lab Results   Component Value Date    WBC 8 15 09/21/2016    WBC 8 0 09/09/2016    HGB 11 1 (L) 09/21/2016    HGB 13 7 09/09/2016    HCT 33 9 (L) 09/21/2016    HCT 42 7 09/09/2016    MCV 92 09/21/2016    MCV 92 09/09/2016     09/21/2016     09/09/2016     Lab Results   Component Value Date    CHOL 196 08/25/2017    CHOL 192 12/03/2013    TRIG 144 08/25/2017    TRIG 216 12/03/2013    HDL 62 (H) 08/25/2017    HDL 58 12/03/2013       ASSESSMENT/PLAN:  1  Ischemic heart disease -  well controlled  Current treatment plan is effective, no change in therapy  Reviewed diet, exercise and weight control  Reviewed medications and side effects in detail  Use of aspirin to prevent MI and TIA's discussed  Use and side effects of nitroglycerine reviewed, call 911 if chest pain unrelieved by 3 tablets         2  Arteriosclerosis of coronary artery  As abov    3  Chronic systolic congestive heart failure (HCC)  - discussed repeating echocardiogram as she has not had one in a few years  Will defer for now as she is feeling well without any symptoms of CHF      4  Benign essential hypertension  - BP controlled on current Rx     5  Mixed hyperlipidemia  - Statin intolerant but has been able to tolerate Crestor twice a week    6  Statin intolerance  - Crestor twice a week  Develops symptoms with increased frequency

## 2018-04-10 ENCOUNTER — TELEPHONE (OUTPATIENT)
Dept: ENDOCRINOLOGY | Facility: CLINIC | Age: 83
End: 2018-04-10

## 2018-04-11 NOTE — TELEPHONE ENCOUNTER
Recent creatinine is 0 6  Please, her range for IV Reclast 5 mg infusion for 2 hours at infusion center   I will signed a paper ordere once it is ready  Thanks

## 2018-04-16 ENCOUNTER — TELEPHONE (OUTPATIENT)
Dept: ENDOCRINOLOGY | Facility: CLINIC | Age: 83
End: 2018-04-16

## 2018-04-16 NOTE — TELEPHONE ENCOUNTER
Patient scheduled at [facility] on 4/27/18 @ 1:30 pm for Reclast injection  Order faxed  Patient informed  Launch [employer] and print the 'Prescriptions from this Visit' Report

## 2018-04-25 ENCOUNTER — TELEPHONE (OUTPATIENT)
Dept: ENDOCRINOLOGY | Facility: CLINIC | Age: 83
End: 2018-04-25

## 2018-04-26 RX ORDER — ZOLEDRONIC ACID 5 MG/100ML
5 INJECTION, SOLUTION INTRAVENOUS ONCE
Status: COMPLETED | OUTPATIENT
Start: 2018-04-27 | End: 2018-04-27

## 2018-04-26 RX ORDER — SODIUM CHLORIDE 9 MG/ML
20 INJECTION, SOLUTION INTRAVENOUS CONTINUOUS
Status: DISCONTINUED | OUTPATIENT
Start: 2018-04-27 | End: 2018-04-30 | Stop reason: HOSPADM

## 2018-04-27 ENCOUNTER — HOSPITAL ENCOUNTER (OUTPATIENT)
Dept: INFUSION CENTER | Facility: CLINIC | Age: 83
Discharge: HOME | End: 2018-04-27
Payer: COMMERCIAL

## 2018-04-27 VITALS
BODY MASS INDEX: 24.89 KG/M2 | RESPIRATION RATE: 18 BRPM | DIASTOLIC BLOOD PRESSURE: 78 MMHG | WEIGHT: 145 LBS | SYSTOLIC BLOOD PRESSURE: 174 MMHG | TEMPERATURE: 97.6 F | HEART RATE: 71 BPM

## 2018-04-27 PROCEDURE — 96365 THER/PROPH/DIAG IV INF INIT: CPT

## 2018-04-27 PROCEDURE — 96366 THER/PROPH/DIAG IV INF ADDON: CPT

## 2018-04-27 RX ORDER — MELATONIN
1000 DAILY
COMMUNITY
End: 2018-10-19 | Stop reason: DRUGHIGH

## 2018-04-27 RX ADMIN — ZOLEDRONIC ACID 5 MG: 0.05 INJECTION, SOLUTION INTRAVENOUS at 14:06

## 2018-04-27 RX ADMIN — SODIUM CHLORIDE 20 ML/HR: 0.9 INJECTION, SOLUTION INTRAVENOUS at 13:48

## 2018-05-04 ENCOUNTER — TELEPHONE (OUTPATIENT)
Dept: ENDOCRINOLOGY | Facility: CLINIC | Age: 83
End: 2018-05-04

## 2018-05-04 NOTE — TELEPHONE ENCOUNTER
Order clarification for Reclast as requested by Stephan Mohan at [phone] faxed to Lalito Iraheta

## 2018-05-19 ENCOUNTER — HOSPITAL ENCOUNTER (INPATIENT)
Facility: HOSPITAL | Age: 83
LOS: 1 days | Discharge: HOME | DRG: 312 | End: 2018-05-20
Attending: EMERGENCY MEDICINE | Admitting: INTERNAL MEDICINE
Payer: COMMERCIAL

## 2018-05-19 DIAGNOSIS — R55 POSTURAL DIZZINESS WITH PRESYNCOPE: Primary | ICD-10-CM

## 2018-05-19 DIAGNOSIS — R55 SYNCOPE, UNSPECIFIED SYNCOPE TYPE: ICD-10-CM

## 2018-05-19 DIAGNOSIS — I50.22 CHRONIC SYSTOLIC CONGESTIVE HEART FAILURE (HCC): ICD-10-CM

## 2018-05-19 DIAGNOSIS — I25.10 CORONARY ARTERY DISEASE INVOLVING NATIVE CORONARY ARTERY OF NATIVE HEART WITHOUT ANGINA PECTORIS: ICD-10-CM

## 2018-05-19 DIAGNOSIS — E78.5 HYPERLIPIDEMIA, UNSPECIFIED HYPERLIPIDEMIA TYPE: ICD-10-CM

## 2018-05-19 DIAGNOSIS — R42 POSTURAL DIZZINESS WITH PRESYNCOPE: Primary | ICD-10-CM

## 2018-05-19 PROBLEM — E87.6 HYPOKALEMIA: Status: ACTIVE | Noted: 2018-05-19

## 2018-05-19 LAB
ANION GAP SERPL CALCULATED.3IONS-SCNC: 11 MMOL/L (ref 4–13)
APTT PPP: 24 SECONDS (ref 24–36)
BASOPHILS # BLD AUTO: 0.03 THOUSANDS/ΜL (ref 0–0.1)
BASOPHILS NFR BLD AUTO: 0 % (ref 0–1)
BUN SERPL-MCNC: 19 MG/DL (ref 5–25)
CALCIUM SERPL-MCNC: 9.9 MG/DL (ref 8.3–10.1)
CHLORIDE SERPL-SCNC: 102 MMOL/L (ref 100–108)
CO2 SERPL-SCNC: 28 MMOL/L (ref 21–32)
CREAT SERPL-MCNC: 0.81 MG/DL (ref 0.6–1.3)
EOSINOPHIL # BLD AUTO: 0.17 THOUSAND/ΜL (ref 0–0.61)
EOSINOPHIL NFR BLD AUTO: 2 % (ref 0–6)
ERYTHROCYTE [DISTWIDTH] IN BLOOD BY AUTOMATED COUNT: 14.3 % (ref 11.6–15.1)
GFR SERPL CREATININE-BSD FRML MDRD: 67 ML/MIN/1.73SQ M
GLUCOSE SERPL-MCNC: 149 MG/DL (ref 65–140)
HCT VFR BLD AUTO: 44.4 % (ref 34.8–46.1)
HGB BLD-MCNC: 14.5 G/DL (ref 11.5–15.4)
INR PPP: 0.95 (ref 0.86–1.17)
LYMPHOCYTES # BLD AUTO: 2.56 THOUSANDS/ΜL (ref 0.6–4.47)
LYMPHOCYTES NFR BLD AUTO: 28 % (ref 14–44)
MCH RBC QN AUTO: 30.7 PG (ref 26.8–34.3)
MCHC RBC AUTO-ENTMCNC: 32.7 G/DL (ref 31.4–37.4)
MCV RBC AUTO: 94 FL (ref 82–98)
MONOCYTES # BLD AUTO: 0.67 THOUSAND/ΜL (ref 0.17–1.22)
MONOCYTES NFR BLD AUTO: 7 % (ref 4–12)
NEUTROPHILS # BLD AUTO: 5.77 THOUSANDS/ΜL (ref 1.85–7.62)
NEUTS SEG NFR BLD AUTO: 63 % (ref 43–75)
PLATELET # BLD AUTO: 350 THOUSANDS/UL (ref 149–390)
PMV BLD AUTO: 10.4 FL (ref 8.9–12.7)
POTASSIUM SERPL-SCNC: 3 MMOL/L (ref 3.5–5.3)
PROTHROMBIN TIME: 12.1 SECONDS (ref 11.8–14.2)
RBC # BLD AUTO: 4.72 MILLION/UL (ref 3.81–5.12)
SODIUM SERPL-SCNC: 141 MMOL/L (ref 136–145)
TROPONIN I SERPL-MCNC: <0.02 NG/ML
WBC # BLD AUTO: 9.2 THOUSAND/UL (ref 4.31–10.16)

## 2018-05-19 PROCEDURE — 99220 PR INITIAL OBSERVATION CARE/DAY 70 MINUTES: CPT | Performed by: NURSE PRACTITIONER

## 2018-05-19 PROCEDURE — 84484 ASSAY OF TROPONIN QUANT: CPT | Performed by: NURSE PRACTITIONER

## 2018-05-19 PROCEDURE — 36415 COLL VENOUS BLD VENIPUNCTURE: CPT | Performed by: EMERGENCY MEDICINE

## 2018-05-19 PROCEDURE — 85025 COMPLETE CBC W/AUTO DIFF WBC: CPT | Performed by: EMERGENCY MEDICINE

## 2018-05-19 PROCEDURE — 84484 ASSAY OF TROPONIN QUANT: CPT | Performed by: EMERGENCY MEDICINE

## 2018-05-19 PROCEDURE — 99285 EMERGENCY DEPT VISIT HI MDM: CPT

## 2018-05-19 PROCEDURE — 85610 PROTHROMBIN TIME: CPT | Performed by: EMERGENCY MEDICINE

## 2018-05-19 PROCEDURE — 80061 LIPID PANEL: CPT | Performed by: NURSE PRACTITIONER

## 2018-05-19 PROCEDURE — 80048 BASIC METABOLIC PNL TOTAL CA: CPT | Performed by: EMERGENCY MEDICINE

## 2018-05-19 PROCEDURE — 93005 ELECTROCARDIOGRAM TRACING: CPT

## 2018-05-19 PROCEDURE — 83036 HEMOGLOBIN GLYCOSYLATED A1C: CPT | Performed by: NURSE PRACTITIONER

## 2018-05-19 PROCEDURE — 85730 THROMBOPLASTIN TIME PARTIAL: CPT | Performed by: EMERGENCY MEDICINE

## 2018-05-19 RX ORDER — ASPIRIN 81 MG/1
81 TABLET, CHEWABLE ORAL DAILY
Status: DISCONTINUED | OUTPATIENT
Start: 2018-05-20 | End: 2018-05-20 | Stop reason: HOSPADM

## 2018-05-19 RX ORDER — PANTOPRAZOLE SODIUM 40 MG/1
40 TABLET, DELAYED RELEASE ORAL
Status: DISCONTINUED | OUTPATIENT
Start: 2018-05-20 | End: 2018-05-20 | Stop reason: HOSPADM

## 2018-05-19 RX ORDER — ONDANSETRON 2 MG/ML
4 INJECTION INTRAMUSCULAR; INTRAVENOUS EVERY 6 HOURS PRN
Status: DISCONTINUED | OUTPATIENT
Start: 2018-05-19 | End: 2018-05-20 | Stop reason: HOSPADM

## 2018-05-19 RX ORDER — POTASSIUM CHLORIDE 20 MEQ/1
20 TABLET, EXTENDED RELEASE ORAL 2 TIMES DAILY
Status: DISCONTINUED | OUTPATIENT
Start: 2018-05-20 | End: 2018-05-20 | Stop reason: HOSPADM

## 2018-05-19 RX ORDER — LORATADINE 10 MG/1
10 TABLET ORAL DAILY
Status: DISCONTINUED | OUTPATIENT
Start: 2018-05-20 | End: 2018-05-20 | Stop reason: HOSPADM

## 2018-05-19 RX ORDER — PRAVASTATIN SODIUM 40 MG
40 TABLET ORAL 2 TIMES WEEKLY
Status: DISCONTINUED | OUTPATIENT
Start: 2018-05-21 | End: 2018-05-20 | Stop reason: HOSPADM

## 2018-05-19 RX ORDER — COLESEVELAM 180 1/1
3750 TABLET ORAL 2 TIMES DAILY WITH MEALS
COMMUNITY
End: 2018-05-19

## 2018-05-19 RX ORDER — B-COMPLEX WITH VITAMIN C
1 TABLET ORAL 2 TIMES DAILY WITH MEALS
Status: DISCONTINUED | OUTPATIENT
Start: 2018-05-20 | End: 2018-05-20 | Stop reason: HOSPADM

## 2018-05-19 RX ORDER — MELATONIN
1000 DAILY
Status: DISCONTINUED | OUTPATIENT
Start: 2018-05-20 | End: 2018-05-20 | Stop reason: HOSPADM

## 2018-05-19 NOTE — ED PROVIDER NOTES
History  Chief Complaint   Patient presents with    Syncope     Pt at a wedding , syncopal episode, witnessed by family  Per daughter, + LOC, grandaughter prevented fall  Daughter relates confusion with facial droop  Symptoms resolved prior to EMS arrival        This 24-year-old female with history of coronary artery disease status post stent of LAD, hypertension, chronic systolic CHF and dyslipidemia passed out today  Patient says that she has been feeling well recently and has had no change in medications or diet  She was at a wedding today and felt fine until this evening  She stood up from the stool she was sitting at and started walking  At that point she felt lightheaded and weak and said she felt that she was going to pass out  She came back to the stool and then had a witnessed syncopal episode  Family observed her become unresponsive and lose muscle tone  They prevented her from falling to the floor and placed her in a chair  Her daughter states she was awake but unresponsive for at least two minutes  The afterwards it took her a little while to become fully oriented again  Patient does not recall the incident  Patient denies recent headache diplopia dyspnea diaphoresis palpitations nausea and black or bloody stool  Upon further questioning the family and the patient admits that she has had at least a dozen syncopal episodes in the last 6 years per  She has not told her doctors about at  She did fracture her right humerus several years ago in a syncopal episode  Prior to Admission Medications   Prescriptions Last Dose Informant Patient Reported? Taking?    Calcium Carbonate-Vitamin D (CALCIUM 600+D HIGH POTENCY PO)  Self Yes No   Sig: Take 600 mg by mouth 2 (two) times a day     Coenzyme Q10-Fish Oil-Vit E (CO-Q 10 OMEGA-3 FISH OIL PO)  Self Yes No   Sig: Take 1 capsule by mouth daily   Olmesartan-Amlodipine-HCTZ (TRIBENZOR) 40-5-12 5 MG TABS  Self Yes No   Sig: Take 1 tablet by mouth daily     RABEprazole (ACIPHEX) 20 MG tablet  Self Yes No   Sig: Take 20 mg by mouth every morning  aspirin (ASPIRIN LOW DOSE) 81 MG tablet  Self Yes No   Sig: Take 1 tablet by mouth daily   cholecalciferol (VITAMIN D3) 1,000 units tablet  Self Yes No   Sig: Take 1,000 Units by mouth daily   fexofenadine (ALLEGRA ALLERGY) 180 MG tablet  Self Yes No   Sig: Take by mouth   potassium chloride (K-DUR,KLOR-CON) 20 mEq tablet  Self Yes No   Sig: Take 1 tablet by mouth 2 (two) times a day   rosuvastatin (CRESTOR) 5 mg tablet  Self Yes No   Sig: Take 5 mg by mouth 2 (two) times a week        Facility-Administered Medications: None       Past Medical History:   Diagnosis Date    Arthritis     Back pain     Cataract     Start of    Coronary artery disease     Female bladder prolapse     Gastric reflux     History of diverticulitis     [place] (hard of hearing)     Hypercholesteremia     Hypertension     Osteoporosis     Seasonal allergies     Urinary incontinence     Uterine prolapse     Wears glasses     Wears hearing aid     States she rarely wears them    Wears partial dentures     Upper        Past Surgical History:   Procedure Laterality Date    APPENDECTOMY      CHOLECYSTECTOMY      Laparoscopic    COLONOSCOPY      CORONARY ANGIOPLASTY WITH STENT PLACEMENT      2 aortic stents    ESOPHAGOGASTRODUODENOSCOPY      FRACTURE SURGERY Right     Repair right arm- titanium servando from shoulder to elbow      HYSTERECTOMY      Complete    MD CMBND ANTERPOST COLPORRAPHY W/CYSTO N/A 9/20/2016    Procedure: COLPORRHAPHY ANTERIOR POSTERIOR GRAFT;  Surgeon: Rito Cranker, MD;  [facility];  Service: UroGynecology           MD CYSTOURETHROSCOPY N/A 9/20/2016    Procedure: Missael Sep;  Surgeon: Rito Cranker, MD;  [facility];  Service: UroGynecology           MD [facility] N/A 9/20/2016    Procedure: COLPOPEXY VAGINAL EXTRAPERITONEAL  incision and drainage of vagina inclusion cyst x 4;  Surgeon: Leticia Dodge MD;  [facility];  Service: UroGynecology           [place] SLING OPER STRES INCONTINENCE N/A 9/20/2016    Procedure: INSERTION PUBOVAGINAL SLING RETROPUBIC ;  Surgeon: Leticia Dodge MD;  [facility];  Service: UroGynecology              Family History   Problem Relation Age of Onset    Hypertension Mother     Heart failure Mother      I have reviewed and agree with the history as documented  Social History   Substance Use Topics    Smoking status: Never Smoker    Smokeless tobacco: Never Used    Alcohol use 0 6 - 1 2 oz/week     1 - 2 Glasses of wine per week      Comment: per day        Review of Systems   Constitutional: Negative  HENT: Negative  Eyes: Negative  Respiratory: Negative  Cardiovascular: Negative  Gastrointestinal: Negative  Endocrine: Negative  Genitourinary: Negative  Musculoskeletal: Negative  Skin: Negative  Allergic/Immunologic: Negative  Neurological: Positive for syncope (  Multiple times in the past)  Negative for dizziness, seizures, speech difficulty, numbness and headaches  Hematological: Negative  Psychiatric/Behavioral: Negative  All other systems reviewed and are negative  Physical Exam  Physical Exam   Constitutional: She is oriented to person, place, and time  She appears well-developed and well-nourished  HENT:   Head: Normocephalic and atraumatic  Eyes: Conjunctivae and EOM are normal  Pupils are equal, round, and reactive to light  Neck: Normal range of motion  Neck supple  No JVD present  Cardiovascular: Normal rate and regular rhythm  No murmur heard  Pulmonary/Chest: Effort normal and breath sounds normal  No stridor  Abdominal: Soft  Bowel sounds are normal  There is no tenderness  Musculoskeletal: Normal range of motion  She exhibits no edema  Neurological: She is alert and oriented to person, place, and time  She has normal reflexes   No cranial nerve deficit  Coordination normal    Skin: Skin is warm and dry  Capillary refill takes less than 2 seconds  No rash noted  Psychiatric: She has a normal mood and affect  Nursing note and vitals reviewed  Vital Signs  ED Triage Vitals [05/19/18 1908]   Temp Pulse Respirations Blood Pressure SpO2   -- 75 18 142/80 97 %      Temp src Heart Rate Source Patient Position - Orthostatic VS BP Location FiO2 (%)   -- Monitor Lying Left arm --      Pain Score       --           Vitals:    05/19/18 1908 05/19/18 1940 05/19/18 1942 05/19/18 1944   BP: 142/80 162/74 146/67 151/67   Pulse: 75 77 86 91   Patient Position - Orthostatic VS: Lying Lying - Orthostatic VS Sitting - Orthostatic VS Standing - Orthostatic VS       Visual Acuity  Visual Acuity      Most Recent Value   L Pupil Size (mm)  3   R Pupil Size (mm)  3          ED Medications  Medications - No data to display    Diagnostic Studies  Results Reviewed     Procedure Component Value Units Date/Time    Troponin I [[id number]]  (Normal) Collected:  05/19/18 1930    Lab Status:  Final result Specimen:  Blood from Arm, Left Updated:  05/19/18 2000     Troponin I <0 02 ng/mL     Narrative:         [employer] Chemistry analyzer 99% cutoff is > 0 04 ng/mL in network labs    o cTnI 99% cutoff is useful only when applied to patients in the clinical setting of myocardial ischemia  o cTnI 99% cutoff should be interpreted in the context of clinical history, ECG findings and possibly cardiac imaging to establish correct diagnosis  o cTnI 99% cutoff may be suggestive but clearly not indicative of a coronary event without the clinical setting of myocardial ischemia      Lydia Zapien [[id number]]  (Normal) Collected:  05/19/18 1930    Lab Status:  Final result Specimen:  Blood from Arm, Left Updated:  05/19/18 1952     Protime 12 1 seconds      INR 0 95    APTT [[id number]]  (Normal) Collected:  05/19/18 1930    Lab Status:  Final result Specimen:  Blood from Arm, Left Updated:  05/19/18 1952     PTT 24 seconds     Basic metabolic panel [[id number]]  (Abnormal) Collected:  05/19/18 1930    Lab Status:  Final result Specimen:  Blood from Arm, Left Updated:  05/19/18 1950     Sodium 141 mmol/L      Potassium 3 0 (L) mmol/L      Chloride 102 mmol/L      CO2 28 mmol/L      Anion Gap 11 mmol/L      BUN 19 mg/dL      Creatinine 0 81 mg/dL      Glucose 149 (H) mg/dL      Calcium 9 9 mg/dL      eGFR 67 ml/min/1 73sq m     Narrative:         National Kidney Disease Education Program recommendations are as follows:  GFR calculation is accurate only with a steady state creatinine  Chronic Kidney disease less than 60 ml/min/1 73 sq  meters  Kidney failure less than 15 ml/min/1 73 sq  meters  CBC and differential [[id number]]  (Normal) Collected:  05/19/18 1930    Lab Status:  Final result Specimen:  Blood from Arm, Left Updated:  05/19/18 1940     WBC 9 20 Thousand/uL      RBC 4 72 Million/uL      Hemoglobin 14 5 g/dL      Hematocrit 44 4 %      MCV 94 fL      MCH 30 7 pg      MCHC 32 7 g/dL      RDW 14 3 %      MPV 10 4 fL      Platelets 653 Thousands/uL      Neutrophils Relative 63 %      Lymphocytes Relative 28 %      Monocytes Relative 7 %      Eosinophils Relative 2 %      Basophils Relative 0 %      Neutrophils Absolute 5 77 Thousands/µL      Lymphocytes Absolute 2 56 Thousands/µL      Monocytes Absolute 0 67 Thousand/µL      Eosinophils Absolute 0 17 Thousand/µL      Basophils Absolute 0 03 Thousands/µL                  No orders to display              Procedures  ECG 12 Lead Documentation  Date/Time: 5/19/2018 7:00 PM  Performed by: Amanda Jane  Authorized by: Amanda Jane     ECG reviewed by me, the ED Provider: yes    Patient location:  ED  Previous ECG:     Previous ECG:  Compared to current    Comparison ECG info:  Compared to EKG of 03/29/2018    Similarity:  No change  Interpretation:     Interpretation comment:  Normal sinus rhythm  Septal MI, age undetermined  Nonspecific ST abnormality           Phone Contacts  ED Phone Contact    ED Course                               MDM  Number of Diagnoses or Management Options  Postural dizziness with presyncope: established and worsening  Diagnosis management comments:  Patient is not low risk according to [facility] syncope Rule and [facility] syncope Rule  She is elderly, has history of chronic CHF and coronary artery disease with multiple prior unexplained syncopal events  Admission is warranted at least for observation status       Amount and/or Complexity of Data Reviewed  Clinical lab tests: ordered and reviewed  Discuss the patient with other providers: yes  Independent visualization of images, tracings, or specimens: yes      CritCare Time    Disposition  Final diagnoses:   Postural dizziness with presyncope     Time reflects when diagnosis was documented in both MDM as applicable and the Disposition within this note     Time User Action Codes Description Comment    5/19/2018  8:52 PM Marti Bernheim Add [D60,  R55] Postural dizziness with presyncope       ED Disposition     ED Disposition Condition Comment    Admit  Case was discussed with  NP and the patient's admission status was agreed to be Admission Status: observation status to the service of Dr Lizet Jacobs     Follow-up Information    None         Patient's Medications   Discharge Prescriptions    No medications on file     No discharge procedures on file      ED Provider  Electronically Signed by           Jorge Oconnor DO  05/19/18 9667

## 2018-05-20 VITALS
SYSTOLIC BLOOD PRESSURE: 134 MMHG | DIASTOLIC BLOOD PRESSURE: 63 MMHG | HEIGHT: 64 IN | RESPIRATION RATE: 18 BRPM | TEMPERATURE: 97.7 F | OXYGEN SATURATION: 95 % | HEART RATE: 66 BPM

## 2018-05-20 LAB
ANION GAP SERPL CALCULATED.3IONS-SCNC: 9 MMOL/L (ref 4–13)
ATRIAL RATE: 87 BPM
BUN SERPL-MCNC: 21 MG/DL (ref 5–25)
CALCIUM SERPL-MCNC: 9 MG/DL (ref 8.3–10.1)
CHLORIDE SERPL-SCNC: 106 MMOL/L (ref 100–108)
CHOLEST SERPL-MCNC: 202 MG/DL (ref 50–200)
CO2 SERPL-SCNC: 28 MMOL/L (ref 21–32)
CREAT SERPL-MCNC: 0.66 MG/DL (ref 0.6–1.3)
EST. AVERAGE GLUCOSE BLD GHB EST-MCNC: 123 MG/DL
GFR SERPL CREATININE-BSD FRML MDRD: 81 ML/MIN/1.73SQ M
GLUCOSE P FAST SERPL-MCNC: 99 MG/DL (ref 65–99)
GLUCOSE SERPL-MCNC: 99 MG/DL (ref 65–140)
HBA1C MFR BLD: 5.9 % (ref 4.2–6.3)
HDLC SERPL-MCNC: 47 MG/DL (ref 40–60)
LDLC SERPL CALC-MCNC: 114 MG/DL (ref 0–100)
NONHDLC SERPL-MCNC: 155 MG/DL
P AXIS: 24 DEGREES
POTASSIUM SERPL-SCNC: 3 MMOL/L (ref 3.5–5.3)
PR INTERVAL: 198 MS
QRS AXIS: 31 DEGREES
QRSD INTERVAL: 96 MS
QT INTERVAL: 364 MS
QTC INTERVAL: 438 MS
SODIUM SERPL-SCNC: 143 MMOL/L (ref 136–145)
T WAVE AXIS: 67 DEGREES
TRIGL SERPL-MCNC: 204 MG/DL
TROPONIN I SERPL-MCNC: 0.02 NG/ML
TROPONIN I SERPL-MCNC: 0.03 NG/ML
VENTRICULAR RATE: 87 BPM

## 2018-05-20 PROCEDURE — 84484 ASSAY OF TROPONIN QUANT: CPT | Performed by: NURSE PRACTITIONER

## 2018-05-20 PROCEDURE — 93010 ELECTROCARDIOGRAM REPORT: CPT | Performed by: INTERNAL MEDICINE

## 2018-05-20 PROCEDURE — 80048 BASIC METABOLIC PNL TOTAL CA: CPT | Performed by: NURSE PRACTITIONER

## 2018-05-20 PROCEDURE — 99217 PR OBSERVATION CARE DISCHARGE MANAGEMENT: CPT | Performed by: INTERNAL MEDICINE

## 2018-05-20 RX ORDER — POTASSIUM CHLORIDE 20 MEQ/1
20 TABLET, EXTENDED RELEASE ORAL 3 TIMES DAILY
Refills: 0
Start: 2018-05-20 | End: 2018-07-12 | Stop reason: SDUPTHER

## 2018-05-20 RX ORDER — POTASSIUM CHLORIDE 14.9 MG/ML
20 INJECTION INTRAVENOUS ONCE
Status: DISCONTINUED | OUTPATIENT
Start: 2018-05-20 | End: 2018-05-20 | Stop reason: HOSPADM

## 2018-05-20 RX ADMIN — OLMESARTAN MEDOXOMIL: 20 TABLET, FILM COATED ORAL at 09:59

## 2018-05-20 RX ADMIN — CALCIUM CARBONATE-VITAMIN D TAB 500 MG-200 UNIT 1 TABLET: 500-200 TAB at 08:00

## 2018-05-20 RX ADMIN — ASPIRIN 81 MG 81 MG: 81 TABLET ORAL at 10:00

## 2018-05-20 RX ADMIN — POTASSIUM CHLORIDE 20 MEQ: 1500 TABLET, EXTENDED RELEASE ORAL at 10:00

## 2018-05-20 RX ADMIN — PANTOPRAZOLE SODIUM 40 MG: 40 TABLET, DELAYED RELEASE ORAL at 05:11

## 2018-05-20 RX ADMIN — VITAMIN D, TAB 1000IU (100/BT) 1000 UNITS: 25 TAB at 10:00

## 2018-05-20 RX ADMIN — LORATADINE 10 MG: 10 TABLET ORAL at 10:00

## 2018-05-20 NOTE — CASE MANAGEMENT
Initial Clinical Review    Admission: Date/Time/Statement:  5/19/2018 2053 OBSERVATION AND CHANGED 5/20/18 @ 0644 INPATIENT RE:  Numerous episodes of syncope require work up    Orders Placed This Encounter   Procedures    Place in Observation (expected length of stay for this patient is less than two midnights)     Standing Status:   Standing     Number of Occurrences:   1     Order Specific Question:   Admitting Physician     Answer:   Charlee Brunner [[id number]]     Order Specific Question:   Level of Care     Answer:   Med Surg [16]    Inpatient Admission     Standing Status:   Standing     Number of Occurrences:   1     Order Specific Question:   Admitting Physician     Answer:   Charlee Brunner [[id number]]     Order Specific Question:   Level of Care     Answer:   Med Surg [16]     Order Specific Question:   Estimated length of stay     Answer:   More than 2 Midnights     Order Specific Question:   Certification     Answer:   I certify that inpatient services are medically necessary for this patient for a duration of greater than two midnights  See H&P and MD Progress Notes for additional information about the patient's course of treatment  ED: Date/Time/Mode of Arrival:   ED Arrival Information     Expected Arrival Acuity Means of Arrival Escorted By Service Admission Type    - 5/19/2018 18:52 [facility] [address] [place] [address]) General Medicine Urgent    Arrival Complaint    FAINTING/POSS TIA          Chief Complaint:   Chief Complaint   Patient presents with    Syncope     Pt at a wedding , syncopal episode, witnessed by family  Per daughter, + LOC, grandaughter prevented fall  Daughter relates confusion with facial droop  Symptoms resolved prior to EMS arrival         History of Illness:  80 y o  female with history of CAD, systolic CHF, hypertension, and hyperlipidemia who presents after having a witnessed syncopal episode  Patient was at a wedding with family today    She got up to walk to the bathroom and when she began to walk "just did not feel right" so she turned around and just as she was sitting down she passed out  Family guided her to chair  Daughter states that she loss consciousness for approximately 2 minutes  When she did come to she could not smile or follow commands  She was confused and frequently yawning    ED Vital Signs:   ED Triage Vitals   Temperature Pulse Respirations Blood Pressure SpO2   05/19/18 2153 05/19/18 1908 05/19/18 1908 05/19/18 1908 05/19/18 1908   98 3 °F (36 8 °C) 75 18 142/80 97 %      Temp Source Heart Rate Source Patient Position - Orthostatic VS BP Location FiO2 (%)   05/19/18 2153 05/19/18 1908 05/19/18 1908 05/19/18 1908 --   Oral Monitor Lying Left arm       Pain Score       05/19/18 2153       No Pain        Wt Readings from Last 1 Encounters:   04/27/18 65 8 kg (145 lb)       Vital Signs (abnormal):  Maximum /73     05/19/18 2339  --   114  18  142/55  95 %  None (Room air)  Standing - Orthostatic VS   05/19/18 2338  --  76  18  150/70  96 %  None (Room air)  Sitting - Orthostatic VS   05/19/18 2336  97 9 °F (36 6 °C)  80  18  165/72  94 %  None (Room air)  Lying     Monitor - SR with occasional PVCs    Abnormal Labs/Diagnostic Test Results:  K 3  Glucose 149     5/20/2018- K 3  ED Treatment:   Medication Administration from 05/19/2018 1852 to 05/19/2018 2144     None          Past Medical/Surgical History:    Active Ambulatory Problems     Diagnosis Date Noted    Arteriosclerosis of coronary artery 11/08/2013    Benign essential hypertension 09/04/2012    Chronic systolic congestive heart failure ([facility] [address] ) 11/08/2013    Hyperlipidemia 10/28/2013    Statin intolerance 08/14/2015     Resolved Ambulatory Problems     Diagnosis Date Noted    Hypertension     Hypercholesteremia      Past Medical History:   Diagnosis Date    Arthritis     Back pain     Cataract     Coronary artery disease     Female bladder prolapse     Gastric reflux     History of diverticulitis     [place] (hard of hearing)     Hypercholesteremia     Hypertension     Osteoporosis     Seasonal allergies     Urinary incontinence     Uterine prolapse     Wears glasses     Wears hearing aid     Wears partial dentures        Admitting Diagnosis: Fainting episodes [Y98]  Chronic systolic congestive heart failure (HCC) [I50 22]  Syncope, unspecified syncope type [R55]  Coronary artery disease involving native coronary artery of native heart without angina pectoris [I25 10]  Postural dizziness with presyncope [R42, R55]  Hyperlipidemia, unspecified hyperlipidemia type [E78 5]    Age/Sex: 80 y o  female    Assessment/Plan:   Syncope   Assessment & Plan     Patient had syncopal episode with loss of consciousness for approximately 2 minutes  Patient was standing at the time of this episode  Will obtain orthostatic BPs  Monitor on telemetry  Serial troponins  EKG with chest pain or change in rhythm  Will obtain echocardiogram   Neuro checks q 4 hours times 24 hours  Cardiology consult           Hypokalemia   Assessment & Plan     Potassium in the ED was 3 0  Patient takes 20 mEq p  O  potassium b i d     Will replete with 40 mEq p o  x1   Recheck BMP in a m             Benign essential hypertension   Assessment & Plan     Continue Tribenzor  Monitor BP per nursing unit           Coronary artery disease   Assessment & Plan     Patient has history of stents  Continue aspirin 81 mg daily           Chronic systolic congestive heart failure [facility])   Assessment & Plan     Patient appears euvolemic  Continue Tribenzor combination medication           Hyperlipidemia   Assessment & Plan     Patient has intolerance to statin  Continue pravastatin 40 mg 2x weekly    Check fasting lipid panel         Admission Orders:  5/19/2018  2053 OBSERVATION and CHANGED TO INPATIENT 5/20/2018  0644    Scheduled Meds:   Current Facility-Administered Medications:  aspirin 81 mg Oral Daily Adina Aase BERTIN Galloway   calcium carbonate-vitamin D 1 tablet Oral BID With Meals BERTIN Zavaleta   cholecalciferol 1,000 Units Oral Daily Evy MaxiBERTIN   CO-Q 10 Omega-3 Fish Oil  Oral Daily BERTIN Garcia   enoxaparin 40 mg Subcutaneous Daily [place] MaxiBERTIN   loratadine 10 mg Oral Daily [place] BERTIN German   olmesartan-amLODIPine-HCTZ (Dauna Rather 40/5/12  5) combo dose  Oral Daily BERTIN Garcia   pantoprazole 40 mg Oral Early Morning BERTIN Garcia   potassium chloride 20 mEq Oral BID BERTIN Zavaleta   [START ON 5/21/2018] pravastatin 40 mg Oral Once per day on Mon Thu BERTIN Garcia     Continuous Infusions:    PRN Meds: ondansetron - not used  OTHER ORDERS: echo  Neuro checks q 4h  Telemetry  Consult cardiology    Thank you,  [facility] in the [facility] for 2017  Network Utilization Review Department  Phone: [phone]; Fax [phone]  ATTENTION: The Network Utilization Review Department is now centralized for our 7 Facilities  Make a note that we have a new phone and fax numbers for our Department  Please call with any questions or concerns to [phone] and carefully follow the prompts so that you are directed to the right person  All voicemails are confidential  Fax any determinations, approvals, denials, and requests for initial or continue stay review clinical to [phone]  Due to HIGH CALL volume, it would be easier if you could please send faxed requests to expedite your requests and in part, help us provide discharge notifications faster

## 2018-05-20 NOTE — PLAN OF CARE
Problem: Potential for Falls  Goal: Patient will remain free of falls  INTERVENTIONS:  - Assess patient frequently for physical needs  -  Identify cognitive and physical deficits and behaviors that affect risk of falls    -  [place] fall precautions as indicated by assessment   - Educate patient/family on patient safety including physical limitations  - Instruct patient to call for assistance with activity based on assessment  - Modify environment to reduce risk of injury  - Consider OT/PT consult to assist with strengthening/mobility   Outcome: Completed Date Met: 05/20/18

## 2018-05-20 NOTE — ASSESSMENT & PLAN NOTE
Patient had syncopal episode with loss of consciousness for approximately 2 minutes  Patient was standing at the time of this episode  Will obtain orthostatic BPs  Monitor on telemetry  Serial troponins  EKG with chest pain or change in rhythm  Will obtain echocardiogram   Neuro checks q 4 hours times 24 hours  Cardiology consult

## 2018-05-20 NOTE — ASSESSMENT & PLAN NOTE
Potassium in the ED was 3 0  Patient takes 20 mEq p  O  potassium b i d     Will replete with 40 mEq p o  x1   Recheck BMP in [place]

## 2018-05-20 NOTE — PLAN OF CARE
Problem: Knowledge Deficit  Goal: Patient/family/caregiver demonstrates understanding of disease process, treatment plan, medications, and discharge instructions  Complete learning assessment and assess knowledge base    Interventions:  - Provide teaching at level of understanding  - Provide teaching via preferred learning methods   Outcome: Adequate for Discharge

## 2018-05-20 NOTE — H&P
H&P- Esa Breaker 1933, 80 y o  female MRN: [id number]    Unit/Bed#: [address] [place] [address] Encounter: [id number]    Primary Care Provider: Silas Weems MD   Date and time admitted to hospital: 5/19/2018  7:03 PM        * Syncope   Assessment & Plan    Patient had syncopal episode with loss of consciousness for approximately 2 minutes  Patient was standing at the time of this episode  Will obtain orthostatic BPs  Monitor on telemetry  Serial troponins  EKG with chest pain or change in rhythm  Will obtain echocardiogram   Neuro checks q 4 hours times 24 hours  Cardiology consult  Hypokalemia   Assessment & Plan    Potassium in the ED was 3 0  Patient takes 20 mEq p  O  potassium b i d     Will replete with 40 mEq p o  x1   Recheck BMP in a m           Benign essential hypertension   Assessment & Plan    Continue Tribenzor  Monitor BP per nursing unit  Coronary artery disease   Assessment & Plan    Patient has history of stents  Continue aspirin 81 mg daily  Chronic systolic congestive heart failure [facility])   Assessment & Plan    Patient appears euvolemic  Continue Tribenzor combination medication  Hyperlipidemia   Assessment & Plan    Patient has intolerance to statin  Continue pravastatin 40 mg 2x weekly  Check fasting lipid panel  VTE Prophylaxis: Enoxaparin (Lovenox)  / reason for no mechanical VTE prophylaxis Patient ambulatory   Code Status:  Full code  POLST: There is no POLST form on file for this patient (pre-hospital)  Discussion with family:  Daughter and son-in-law present during admission    Anticipated Length of Stay:  Patient will be admitted on an Observation basis with an anticipated length of stay of  < 2 midnights  Justification for Hospital Stay:  Syncope workup    Total Time for Visit, including Counseling / Coordination of Care: 30 minutes    Greater than 50% of this total time spent on direct patient counseling and coordination of care     Chief Complaint:   Syncope    History of Present Illness:    Ade Fontenot is a 80 y o  female with history of CAD, systolic CHF, hypertension, and hyperlipidemia who presents after having a witnessed syncopal episode  Patient was at a wedding with family today  She got up to walk to the bathroom and when she began to walk "just did not feel right" so she turned around and just as she was sitting down she passed out  Family guided her to chair  Daughter states that she loss consciousness for approximately 2 minutes  When she did come to she could not smile or follow commands  She was confused and frequently yawning  Patient has been having syncopal episodes for the last 6 years  Son feels that alcohol and he had heat may be the cause  Symptoms have resolved  Review of Systems:    Review of Systems   Constitutional: Positive for activity change  Negative for appetite change, chills and fever  Respiratory: Negative for apnea, cough and shortness of breath  Cardiovascular: Negative for chest pain, palpitations and leg swelling  Gastrointestinal: Negative for abdominal distention, abdominal pain, constipation, diarrhea, nausea and vomiting  Genitourinary: Negative for dysuria  Neurological: Positive for syncope  Negative for seizures, facial asymmetry, weakness, light-headedness, numbness and headaches  Psychiatric/Behavioral: Negative for agitation and confusion  The patient is not nervous/anxious  All other systems reviewed and are negative        Past Medical and Surgical History:     Past Medical History:   Diagnosis Date    Arthritis     Back pain     Cataract     Start of    Coronary artery disease     Female bladder prolapse     Gastric reflux     History of diverticulitis     [place] (hard of hearing)     Hypercholesteremia     Hypertension     Osteoporosis     Seasonal allergies     Urinary incontinence     Uterine prolapse     Wears glasses     Wears hearing aid     States she rarely wears them    Wears partial dentures     Upper        Past Surgical History:   Procedure Laterality Date    APPENDECTOMY      CHOLECYSTECTOMY      Laparoscopic    COLONOSCOPY      CORONARY ANGIOPLASTY WITH STENT PLACEMENT      2 aortic stents    ESOPHAGOGASTRODUODENOSCOPY      FRACTURE SURGERY Right     Repair right arm- titanium servando from shoulder to elbow   HYSTERECTOMY      Complete    SD CMBND ANTERPOST COLPORRAPHY W/CYSTO N/A 9/20/2016    Procedure: COLPORRHAPHY ANTERIOR POSTERIOR GRAFT;  Surgeon: Bereket Alatorre MD;  [facility];  Service: UroGynecology           SD CYSTOURETHROSCOPY N/A 9/20/2016    Procedure: Thurnell Livers;  Surgeon: Bereket Alatorre MD;  [facility];  Service: UroGynecology           SD REVAGINAL PROLAPSE,SACROSP LIG N/A 9/20/2016    Procedure: COLPOPEXY VAGINAL EXTRAPERITONEAL  incision and drainage of vagina inclusion cyst x 4;  Surgeon: Bereket Alatorre MD;  [facility];  Service: UroGynecology           SD SLING OPER STRES INCONTINENCE N/A 9/20/2016    Procedure: INSERTION PUBOVAGINAL SLING RETROPUBIC ;  Surgeon: Bereket Alatorre MD;  [facility];  Service: UroGynecology              Meds/Allergies:    Prior to Admission medications    Medication Sig Start Date End Date Taking?  Authorizing Provider   [facility]) 625 mg tablet Take 3,750 mg by mouth 2 (two) times a day with meals  5/19/18 Yes Historical Provider, MD   aspirin (ASPIRIN LOW DOSE) 81 MG tablet Take 1 tablet by mouth daily 1/9/15   Historical Provider, MD   Calcium Carbonate-Vitamin D (CALCIUM 600+D HIGH POTENCY PO) Take 600 mg by mouth 2 (two) times a day      Historical Provider, MD   cholecalciferol (VITAMIN D3) 1,000 units tablet Take 1,000 Units by mouth daily    Historical Provider, MD   Coenzyme Q10-Fish Oil-Vit E (CO-Q 10 OMEGA-3 FISH OIL PO) Take 1 capsule by mouth daily    Historical Provider, MD   fexofenadine (ALLEGRA ALLERGY) 180 MG tablet Take by mouth 8/31/09   Historical Provider, MD   Olmesartan-Amlodipine-HCTZ (TRIBENZOR) 40-5-12 5 MG TABS Take 1 tablet by mouth daily      Historical Provider, MD   potassium chloride (K-DUR,KLOR-CON) 20 mEq tablet Take 1 tablet by mouth 2 (two) times a day 7/22/09   Historical Provider, MD   RABEprazole (ACIPHEX) 20 MG tablet Take 20 mg by mouth every morning  Historical Provider, MD   rosuvastatin (CRESTOR) 5 mg tablet Take 5 mg by mouth 2 (two) times a week      Historical Provider, MD   nitroglycerin (NITROSTAT) 0 4 mg SL tablet Place 1 tablet under the tongue 8/14/15 5/19/18  Historical Provider, MD     I have reviewed home medications with patient personally  Allergies: Allergies   Allergen Reactions    Penicillins Hives     "All cillins", "and all myocillins"    Versed [Midazolam] Other (See Comments)     States she "passed out" when recovering from a procedure and was told to not use it again   Zithromax [Azithromycin] Anaphylaxis    Bee Venom      Reaction Date: 02Feb2004; Annotation - [id number]: jjw    Enalapril      Reaction Date: [id number];     Erythromycin Dizziness     Reaction Date: [id number];     Estrogens      Reaction Date: 02Feb2004; Annotation - [id number]: jjw    Ramipril      Reaction Date: 02Feb2004; Annotation - [id number]: jjw    Statins Other (See Comments)     Muscle pain       Social History:     Marital Status:     Occupation:  Retired  Patient Pre-hospital Living Situation:  Lives alone  Patient Pre-hospital Level of Mobility:  Independent  Patient Pre-hospital Diet Restrictions:  None  Substance Use History:   History   Alcohol Use    0 6 - 1 2 oz/week    1 - 2 Glasses of wine per week     Comment: per day     History   Smoking Status    Never Smoker   Smokeless Tobacco    Never Used     History   Drug Use No       Family History:    non-contributory    Physical Exam:     Vitals:   Blood Pressure: (!) 174/73 (05/19/18 2153)  Pulse: 76 (05/19/18 2153)  Temperature: 98 3 °F (36 8 °C) (05/19/18 2153)  Temp Source: Oral (05/19/18 2153)  Respirations: 18 (05/19/18 2153)  Height: 5' 4" (162 6 cm) (05/19/18 2153)  SpO2: 96 % (05/19/18 2153)    Physical Exam   Constitutional: She is oriented to person, place, and time  She appears well-developed and well-nourished  No distress  HENT:   Head: Normocephalic and atraumatic  Eyes: EOM are normal  Pupils are equal, round, and reactive to light  Neck: Normal range of motion  Neck supple  Cardiovascular: Normal rate, regular rhythm, normal heart sounds and intact distal pulses  Exam reveals no gallop and no friction rub  No murmur heard  Pulmonary/Chest: Effort normal and breath sounds normal  No respiratory distress  She has no wheezes  She has no rales  Abdominal: Soft  Bowel sounds are normal  She exhibits no distension  There is no tenderness  Musculoskeletal: Normal range of motion  She exhibits no edema  Neurological: She is alert and oriented to person, place, and time  Skin: Skin is warm and dry  Psychiatric: She has a normal mood and affect  Her behavior is normal  Judgment and thought content normal    Nursing note and vitals reviewed  Additional Data:     Lab Results: I have personally reviewed pertinent reports  Results from last 7 days  Lab Units 05/19/18 1930   WBC Thousand/uL 9 20   HEMOGLOBIN g/dL 14 5   HEMATOCRIT % 44 4   PLATELETS Thousands/uL 350   NEUTROS PCT % 63   LYMPHS PCT % 28   MONOS PCT % 7   EOS PCT % 2       Results from last 7 days  Lab Units 05/19/18 1930   SODIUM mmol/L 141   POTASSIUM mmol/L 3 0*   CHLORIDE mmol/L 102   CO2 mmol/L 28   BUN mg/dL 19   CREATININE mg/dL 0 81   CALCIUM mg/dL 9 9   GLUCOSE RANDOM mg/dL 149*       Results from last 7 days  Lab Units 05/19/18 1930   INR  0 95               Imaging: No images noted from this admission      No orders to display       EKG, Pathology, and Other Studies Reviewed on Admission:   · EKG:  Sinus rhythm with incomplete right bundle branch block    Allscripts / Epic Records Reviewed: Yes     ** Please Note: This note has been constructed using a voice recognition system   **

## 2018-05-20 NOTE — DISCHARGE SUMMARY
Discharge Summary - [facility] Internal Medicine    Patient Information: Ronnie Ugalde 80 y o  female MRN: [id number]  Unit/Bed#: [address] [place] [address] Encounter: [id number]    Discharging Physician / Practitioner: Betty Stevenson MD  PCP: Deana La MD  Admission Date: 5/19/2018  Discharge Date: 05/20/18    Reason for Admission: * syncope    Discharge Diagnoses:  Syncope, hypokalemia    Principal Problem:    Syncope  Active Problems:    Benign essential hypertension    Chronic systolic congestive heart failure ([address] )    Hyperlipidemia    Coronary artery disease    Hypokalemia  Resolved Problems:    * No resolved hospital problems  *      Consultations During Hospital Stay:  · None    Procedures Performed:     No results found  Significant Findings:     · Patient presented with a witnessed episode of syncope then approximated 2 minutes this is noted while she was standing for an extended period of time had had 1 on-call a drink at a wedding  She has had numerous previous episodes of syncope etiology undetermined  She has a longstanding history of hypokalemia and she takes potassium supplements on presentation potassium was 3 0  · She takes a combination of ARB amlodipine and hydrochlorothiazide for blood pressure management  · She has short period of disorientation and immediate a Fe G a and weakness after event unclear whether she has ever been assess for seizure disorder  · Patient was insistent on being discharged today after 12:12 p m  hours observation status was unremarkable with neuro checks being normal   · Potassium supplement was increased to t i d  and suggested to her to follow up with Cardiology and consider assessment for outpatient EEG to assess for possible underlying seizure disorder  She refused to stay for at further for any echocardiographic imaging or further studies    Serial troponins normal here rest chemistries within normal limits    Incidental Findings:   · LDL 114/potassium remained depressed at 3 0 after attempted repletion    Test Results Pending at Discharge (will require follow up): · None other than refusing to stay for 2D echocardiogram ordered     Outpatient Tests Requested:  · Suggested echocardiogram EEG in future    Complications:      Hospital Course:     Terrie Dowd is a 80 y o  female patient who originally presented to the hospital on 5/19/2018 due to syncope  Please see above significant findings for hospital course and treatment plan      Condition at Discharge: good     Discharge Day Visit / Exam:     * Please refer to separate progress for these details *    Discharge instructions/Information to patient and family:   See after visit summary for information provided to patient and family  Provisions for Follow-Up Care:  See after visit summary for information related to follow-up care and any pertinent home health orders  Disposition:     Home    For Discharges to   [address] SNF:   · Not Applicable to this Patient - Not Applicable to this Patient      Discharge Statement:  I spent 45 minutes discharging the patient  This time was spent on the day of discharge  I had direct contact with the patient on the day of discharge  Greater than 50% of the total time was spent examining patient, answering all patient questions, arranging and discussing plan of care with patient as well as directly providing post-discharge instructions  Additional time then spent on discharge activities  Discharge Medications:  See after visit summary for reconciled discharge medications provided to patient and family  ** Please Note: Dragon 360 Dictation voice to text software may have been used in the creation of this document   **

## 2018-05-20 NOTE — ED NOTES
Patient is resting in bed with family at bedside  SR with occasional PVCs seen on monitor        Mahesh Vergara RN  05/19/18 9505

## 2018-05-20 NOTE — PLAN OF CARE
Problem: Potential for Falls  Goal: Patient will remain free of falls  INTERVENTIONS:  - Assess patient frequently for physical needs  -  Identify cognitive and physical deficits and behaviors that affect risk of falls    -  [place] fall precautions as indicated by assessment   - Educate patient/family on patient safety including physical limitations  - Instruct patient to call for assistance with activity based on assessment  - Modify environment to reduce risk of injury  - Consider OT/PT consult to assist with strengthening/mobility   Outcome: Adequate for Discharge

## 2018-05-21 ENCOUNTER — TRANSITIONAL CARE MANAGEMENT (OUTPATIENT)
Dept: FAMILY MEDICINE CLINIC | Facility: CLINIC | Age: 83
End: 2018-05-21

## 2018-05-23 ENCOUNTER — OFFICE VISIT (OUTPATIENT)
Dept: FAMILY MEDICINE CLINIC | Facility: CLINIC | Age: 83
End: 2018-05-23
Payer: COMMERCIAL

## 2018-05-23 VITALS
BODY MASS INDEX: 24.41 KG/M2 | DIASTOLIC BLOOD PRESSURE: 74 MMHG | HEART RATE: 64 BPM | TEMPERATURE: 98 F | RESPIRATION RATE: 14 BRPM | SYSTOLIC BLOOD PRESSURE: 138 MMHG | WEIGHT: 143 LBS | HEIGHT: 64 IN

## 2018-05-23 DIAGNOSIS — I10 HYPERTENSION, UNSPECIFIED TYPE: ICD-10-CM

## 2018-05-23 DIAGNOSIS — IMO0001 TRANSITION OF CARE PERFORMED WITH SHARING OF CLINICAL SUMMARY: Primary | ICD-10-CM

## 2018-05-23 DIAGNOSIS — Z86.39 HISTORY OF LOW POTASSIUM: ICD-10-CM

## 2018-05-23 PROCEDURE — 1111F DSCHRG MED/CURRENT MED MERGE: CPT | Performed by: FAMILY MEDICINE

## 2018-05-23 PROCEDURE — 99496 TRANSJ CARE MGMT HIGH F2F 7D: CPT | Performed by: FAMILY MEDICINE

## 2018-05-23 RX ORDER — AMLODIPINE AND OLMESARTAN MEDOXOMIL 5; 40 MG/1; MG/1
1 TABLET ORAL DAILY
Qty: 30 TABLET | Refills: 1 | Status: SHIPPED | OUTPATIENT
Start: 2018-05-23 | End: 2018-07-09

## 2018-05-25 NOTE — PROGRESS NOTES
Assessment/Plan:   Diagnoses and all orders for this visit:    Transition of care performed with sharing of clinical summary  Comments:  hospital reports/tests reviewed  Hypertension, unspecified type  Comments:  BP controlled  cont  current mgt  Orders:  -     amlodipine-olmesartan (GERI) 5-40 MG; Take 1 tablet by mouth daily  -     Basic metabolic panel; Future    History of low potassium  Comments:  check follow-up BMP  Subjective:      Patient ID: Esa Osman is a 80 y o  female  Chief Complaint   Patient presents with    Transition of Care Management     519-5/20 [facility] for syncope, low pottasium       79 yo pt in for [facility] s/p admission [facility] 5/19/18-5/20/18-syncopal episode  Pt reports being at wedding reception-felt light-headed and passed out  Taken to ER or eval at assistance of family  Pt reports past syncopal episodes--etiology not determined  Does follow w cardio for prob  r/t CAD/CHF/HTN  Also has hx low potassium--on arrival to ER level=3 0--repleted but not sure level now        Date and time hospital follow up call was made:  5/21/2018  2:16 PM  Hospital care reviewed:  Records reviewed  Patient was [facility]italized at:  [facility]  Date of admission:  5/19/18  Date of discharge:  5/20/18  Diagnosis:  syncope  Disposition:  Home  Were the patients medicaitons reviewed and updated:  Yes  Current symptoms:  None  Post hospital issues:  None  Should patient be enrolled in anticoag monitoring?:  No  Scheduled for follow up?:  Yes  I have advised the patient to call PCP with any new or worsening symptoms (please type in name along with any credentials):  alanis Fuentes cma         The following portions of the patient's history were reviewed and updated as appropriate: allergies, current medications, past family history, past medical history, past social history, past surgical history and problem list      Review of Systems   Constitutional: Positive for fatigue  Eyes:        Wears glasses   Respiratory: Positive for shortness of breath  Cardiovascular: Positive for palpitations  Negative for chest pain  Gastrointestinal: Negative for abdominal pain, blood in stool, diarrhea, nausea and vomiting  Genitourinary: Positive for dysuria  Musculoskeletal: Positive for arthralgias and myalgias  Negative for neck stiffness  Skin: Negative for rash  Allergic/Immunologic: Positive for environmental allergies  Neurological: Positive for dizziness and syncope  Negative for headaches  Psychiatric/Behavioral: Positive for hallucinations and sleep disturbance  The patient is not nervous/anxious  Objective:    /74 (BP Location: Left arm, Patient Position: Sitting, Cuff Size: Standard)   Pulse 64   Temp 98 °F (36 7 °C)   Resp 14   Ht 5' 4" (1 626 m)   Wt 64 9 kg (143 lb)   BMI 24 55 kg/m²        Physical Exam   Constitutional: She is oriented to person, place, and time  She appears well-developed and well-nourished  She appears distressed  Eyes: Conjunctivae and EOM are normal  Pupils are equal, round, and reactive to light  Neck: Neck supple  Cardiovascular: Normal rate and regular rhythm  Pulmonary/Chest: Effort normal and breath sounds normal    Abdominal: Soft  Bowel sounds are normal  There is no tenderness  Musculoskeletal: Normal range of motion  Neurological: She is alert and oriented to person, place, and time  No cranial nerve deficit  Skin: Skin is warm and dry  Psychiatric: She has a normal mood and affect  Nursing note and vitals reviewed  Labs;  Labs in chart were reviewed      Jorje Cline MD

## 2018-05-28 NOTE — PROGRESS NOTES
Progress Note - Cardiology Office  [facility]  Sofy Wright 80 y o  female MRN: [id number]  : 1933  Encounter: [id number]      Assessment:     1  Coronary artery disease involving native coronary artery of native heart without angina pectoris    2  Benign essential hypertension    3  Syncope, unspecified syncope type    4  Statin intolerance    5  Mixed hyperlipidemia    6  Hypokalemia        Discussion Summary and Plan:  1  Syncope  No clear etiology  Family reports that almost all episode happens in similar situation when patient is in a restaurant and she has a glass of wine  There may be some interaction  At this point patient is instructed to avoid similar situations  Keep herself hydrated  Will check echo Doppler for LV function Holter monitor to rule out any other arrhythmias  2   Coronary artery disease with history of angioplasty of LAD and diagonal with a stent in  by Dr Ana M Fonseca and repeat catheterization in  shows patent stent and has mid RCA significant stenosis but very medium to small size artery not suitable for percutaneous techniques on medical Rx     3   Dyslipidemia but intolerant to statins  She takes Crestor 5 mg twice a week not sufficient enough to control her cholesterol and she can be consider for PS K inhibitors  Family likes the idea would would like to wait until this workup is completed  4   Persistent hypokalemia  May be related to possible underlying some hyperaldosteronism  Will try low-dose Aldactone  Patient already on 5 mg amlodipine and 40 mg olmesrtan  A blood pressure becomes low we can consider decreasing 1 of these medications  Patient does not like taking multiple potassium pills hopefully this will help decrease her potassium intake will check BMP  5   Benign essential hypertension  Blood pressure is acceptable  Counseling :  A description of the counseling    Patient advised to keep herself hydrated avoid similar situation where she passed out  Patient daughter was present all the time and all their questions were answered  Patient's ability to self care: Yes  Medication side effect reviewed with patient in detail and all their questions answered to their satisfaction  HPI :     Amada Kent is a 80y o  year old female who came for follow up  Patient has Past medical history significant for coronary artery disease with remote angioplasty of LAD and diagonal by Dr Stephanie Humphries in 2013 and then repeat cardiac catheterization in 2015 at [facility] shows patent stent in LAD and diagonal and has small RCA mid 80-90% stenosis which is very tortuous and not suitable for percutaneous technique  Patient has repeated history of syncopal episode and most of time it happened situation when she had a glass of wine and she is in a restaurant  She does not remember what happens and she feels fine after few minutes off it  She denies any fever or any chills any nausea and vomiting  She is otherwise very active  She cannot take statins even though her cholesterol is very high  She takes Crestor 5 mg twice a week and she is able to handle that  No fever no chills no nausea no vomiting no other significant complaint  Review of Systems   Constitutional: Negative for activity change, chills, diaphoresis, fever and unexpected weight change  HENT: Negative for congestion  Eyes: Negative for discharge and redness  Respiratory: Negative for cough, chest tightness, shortness of breath and wheezing  Cardiovascular: Negative  Negative for chest pain, palpitations and leg swelling  History of coronary artery disease and stents   Gastrointestinal: Negative for abdominal pain, diarrhea and nausea  Endocrine: Negative  Genitourinary: Negative for decreased urine volume and urgency  Musculoskeletal: Negative  Negative for arthralgias, back pain and gait problem     Skin: Negative for rash and wound  Allergic/Immunologic: Negative  Neurological: Positive for syncope  Negative for dizziness, seizures, weakness, light-headedness and headaches  Has multiple episodes in the past   Hematological: Negative  Psychiatric/Behavioral: Negative for agitation and confusion  The patient is not nervous/anxious  Historical Information   Past Medical History:   Diagnosis Date    Abscess of chest wall     Last Assessed: 2/27/2014    Arthritis     Back pain     Cataract     Start of    Coronary artery disease     Diverticulitis of colon 12/04/2008    Female bladder prolapse     Gastric reflux     Hematuria     Last Assessed: 11/18/2014     History of diverticulitis     [place] ([place] hearing[place]     Hypercholesteremia     Hyperkeratosis of skin 11/03/2011    Hypertension     Hypokalemia 12/13/2011    Incomplete uterovaginal prolapse 05/28/2010    Osteoporosis     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    Last Assessed: 10/24/2012     Seasonal allergies     Sebaceous cyst     Last Assessed: 2/8/2014    Syncope     Trigeminal neuralgia 03/20/2012    Urinary incontinence     Uterine prolapse     Vertigo 09/15/2011    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    Wears glasses     Wears hearing aid     States she rarely wears them    Wears partial dentures     Upper      Past Surgical History:   Procedure Laterality Date    APPENDECTOMY      APPENDICO-VESICOSTOMY CUTANEOUS  03/01/2010    CHOLECYSTECTOMY      Laparoscopic    COLONOSCOPY      CORONARY ANGIOPLASTY      CORONARY ANGIOPLASTY WITH STENT PLACEMENT      2 aortic stents    CORONARY ANGIOPLASTY WITH STENT PLACEMENT  2013    2 distal left anterior descending artery     ESOPHAGOGASTRODUODENOSCOPY      FRACTURE SURGERY Right     Repair right arm- titanium servando from shoulder to elbow      HYSTERECTOMY      Complete    CT CMBND ANTERPOST COLPORRAPHY W/CYSTO N/A 9/20/2016    Procedure: COLPORRHAPHY ANTERIOR POSTERIOR GRAFT;  Surgeon: Tom Sow MD;  [facility];  Service: UroGynecology           KS CYSTOURETHROSCOPY N/A 9/20/2016    Procedure: Sheryle Lehmann;  Surgeon: Tom Sow MD;  [facility];  Service: UroGynecology           KS REVAGINAL PROLAPSE,SACROSP [address] N/A 9/20/2016    Procedure: COLPOPEXY VAGINAL EXTRAPERITONEAL  incision and drainage of vagina inclusion cyst x 4;  Surgeon: Tom Sow MD;  [facility];  Service: UroGynecology           KS SLING OPER STRES INCONTINENCE N/A 9/20/2016    Procedure: INSERTION PUBOVAGINAL SLING RETROPUBIC ;  Surgeon: Tom Sow MD;  [facility];  Service: UroGynecology            History   Alcohol Use    0 6 - 1 2 oz/week    1 - 2 Glasses of wine per week     Comment: per day, per allscripts: Being a social drinker      History   Drug Use No     History   Smoking Status    Never Smoker   Smokeless Tobacco    Never Used     Family History:   Family History   Problem Relation Age of Onset    Hypertension Mother     Heart failure Mother     Coronary artery disease Mother     Arthritis Mother     Osteoporosis Mother     Hyperlipidemia Mother     Coronary artery disease Sister        Meds/Allergies     Allergies   Allergen Reactions    Penicillins Hives     "All cillins", "and all myocillins"    Versed [Midazolam] Other (See Comments)     States she "passed out" when recovering from a procedure and was told to not use it again   Zithromax [Azithromycin] Anaphylaxis    Bee Venom      Reaction Date: 02Feb2004; Annotation - [id number]: jjensw    Enalapril      Reaction Date: [id number];     Erythromycin Dizziness     Reaction Date: [id number];     Estrogens      Reaction Date: 02Feb2004; Annotation - [id number]: jjw    Ramipril      Reaction Date: 02Feb2004;  Annotation - [id number]: jjw    Statins Other (See Comments)     Muscle pain       Current Outpatient Prescriptions:     amlodipine-olmesartan (GERI) 5-40 MG, Take 1 tablet by mouth daily, Disp: 30 tablet, Rfl: 1    aspirin (ASPIRIN LOW DOSE) 81 MG tablet, Take 1 tablet by mouth daily, Disp: , Rfl:     Calcium Carbonate-Vitamin D (CALCIUM 600+D HIGH POTENCY PO), Take 600 mg by mouth 2 (two) times a day  , Disp: , Rfl:     cholecalciferol (VITAMIN D3) 1,000 units tablet, Take 1,000 Units by mouth daily, Disp: , Rfl:     Coenzyme Q10-Fish Oil-Vit E (CO-Q 10 OMEGA-3 FISH OIL PO), Take 1 capsule by mouth daily, Disp: , Rfl:     fexofenadine (ALLEGRA ALLERGY) 180 MG tablet, Take by mouth, Disp: , Rfl:     potassium chloride (K-DUR,KLOR-CON) 20 mEq tablet, Take 1 tablet (20 mEq total) by mouth 3 (three) times a day, Disp: , Rfl: 0    RABEprazole (ACIPHEX) 20 MG tablet, Take 20 mg by mouth every morning , Disp: , Rfl:     rosuvastatin (CRESTOR) 5 mg tablet, Take 5 mg by mouth 2 (two) times a week  , Disp: , Rfl:     spironolactone (ALDACTONE) 25 mg tablet, Take 0 5 tablets (12 5 mg total) by mouth daily, Disp: 15 tablet, Rfl: 1    Vitals: Blood pressure 140/70, pulse 79, weight 66 5 kg (146 lb 11 2 oz), SpO2 96 %  Body mass index is 25 18 kg/m²  Vitals:    05/30/18 1442   Weight: 66 5 kg (146 lb 11 2 oz)     BP Readings from Last 3 Encounters:   05/30/18 140/70   05/23/18 138/74   05/20/18 134/63         Physical Exam  Neurologic:  Alert & oriented x 3, no new focal deficits, Not in any acute distress,  Constitutional:  Well developed, well nourished, non-toxic appearance   Eyes:  Pupil equal and reacting to light, conjunctiva normal, No JVP, No LNP   HENT:  Atraumatic, oropharynx moist, Neck- normal range of motion, no tenderness,  Neck supple, no carotid bruit   Respiratory:  Bilateral air entry, mostly clear to auscultation  Cardiovascular: S1-S2 regular with a 2/6 ejection systolic murmur   GI:  Soft, nondistended, normal bowel sounds, nontender, no hepatosplenomegaly appreciated  Musculoskeletal:  No edema, no tenderness, no deformities     Skin:  Well hydrated, no rash   Lymphatic:  No lymphadenopathy noted   Extremities:  No edema and distal pulses are present      Diagnostic Studies Review Cardio:   cardiac catheterization :   Patient's cardiac catheterization from 2015 reviewed  She has patent stent seen in LAD and diagonal   Apical LAD has around 70 80% stenosis which small vessel, mid circumflex around 40-50% stenosis  RCA which is a small size artery and only gives RPDA has around 90% stenosis in the mid it is very tortuous artery not suitable for percutaneous techniques  No recent echo Doppler  Doppler report from 2015 reviewed    EKG:    Twelve lead EKG done at  [facility] shows normal sinus rhythm heart rate around 87 beats per minute  Nonspecific ST changes  Cardiac testing:       Results for orders placed in visit on 08/20/15   Cardiac catheterization    Narrative [facility] [address] [facility]   Phone: [phone]      INVASIVE CARDIOVASCULAR LAB COMPLETE REPORT         Patient: James MENDOSA   Location: Outpatient   MR number: [id number]   Account number: [de-identified]   Study date: 2015   Gender: Female   : 1933   Height: 64 2 in   Weight: 147 6 lb   BSA: 1 72 m squared   Allergies: Altace, beestings, Erythromycin Base, estrogen, penicilin, Versed,   Vasotec, Zithromax, all cillins, mycins, Vytorin 10-10, Zetia   Diagnostic Cardiologist:  Janina Galvan MD   Primary Physician:  Veronica Coleman MD   SUMMARY   CORONARY CIRCULATION:   Left main: Normal    LAD: The vessel was normal sized  The were no obstructive stenoses  The   previously deployed stent remains widely patent  The stent at the ostium of D1   remains widely patent  Circumflex: The vessel was normal sized  There was a   non-critical 50% plaque in mid vessel  There were no obstructive stenoses  The   major OM branches were normal  There was faint collateral flow from the   circumflex to the distal RCA   RCA: The vessel was small to medium sized and   dominant, giving rise to the PDA  There was a long, tortuous 95% stenosis in   mid vessel  [place] of severe tortuosity, the lesion is poorly suited for PCI  REPORT ELEMENT SELECTION:   Right radial access was employed  Summary: The patient has single vessel disease with a long, tortuous subtotal   stenosis in the mid RCA  Elucidating symptom status is difficult in this   patient, since she previously had very severe LAD disease and diagonal disease   with atypical symptoms as the only presentation  Medical therapy is    recommended   for the following reasons 1) the lesion has been stable angiographically for 2   years  2) the patient was able to exercise to a high level after PCI 2 years   ago with this lesion already present  3) Nuclear imaging 1 year ago showed no   ischemia in the presence of this lesion  4) There is some collateral flow from   the circumflex  5) Current symptoms are atypical and not exertional  If PCI is   required in the future because of exertional angina refractory to medication,   it is recommended that the procedure be performed via the femoral approach,    and   INVASIVE CARDIOVASCULAR LAB COMPLETE REPORT   Patient: Diego MENDOSA   MR number: [id number]    ------ Page 2   BSA: 1 72 m squared   that the procedure be performed at the [facility] because of the   increased risk of comlications  Treatment with rosuvastatin, 2 5mg once per   week was initiated  The patient has a history of statin intolerance at low   doses  INDICATIONS:   --  Possible CAD: unstable angina  PROCEDURES PERFORMED   --  Left coronary angiography  --  Right coronary angiography  --  Outpatient  --  Coronary Catheterization (w/o C)  PROCEDURE: The risks and alternatives of the procedures and conscious sedation   were explained to the patient and informed consent was obtained  The patient   was brought to the cath lab and placed on the table   The planned puncture    sites   were prepped and draped in the usual sterile fashion  --  Right radial artery access  After performing an Eusebio's test to verify   adequate ulnar artery supply to the hand, the radial site was prepped  The   puncture site was infiltrated with local anesthetic  The vessel was accessed   using the modified Seldinger technique, a wire was advanced into the vessel,   and a sheath was advanced over the wire into the vessel  --  Left coronary artery angiography  A catheter was advanced over a guidewire   into the aorta and positioned in the left coronary artery ostium under   fluoroscopic guidance  Angiography was performed  --  Right coronary artery angiography  A catheter was advanced over a    guidewire   into the aorta and positioned in the right coronary artery ostium under   fluoroscopic guidance  Angiography was performed  --  Outpatient  --  Coronary Catheterization (w/o [facility])  PROCEDURE COMPLETION: The patient tolerated the procedure well and was   discharged from the cath lab  TIMING: Test started at 09:38  Test concluded at   10:03  HEMOSTASIS: The sheath was removed  The site was compressed with a   Hemoband device  Hemostasis was obtained  MEDICATIONS GIVEN: Verapamil   (Isoptin, Calan, Covera), 1 25 mg, IA, at 09:52  Fentanyl (1OOmcg/2 ml), 50   mcg, IV, at 09:42  Fentanyl (1OOmcg/2 ml), 50 mcg, IV, at 09:45  1% Lidocaine,   1 ml, subcutaneously, at 09:49  Fentanyl (1OOmcg/2 ml), 25 mcg, IV, at 09:50  Nitroglycerin (200mcg/ml), 200 mcg, at 09:52  Heparin 1000 units/ml, 4,000   units, IV, at 09:52  CONTRAST GIVEN: 70 ml Omnipaque (350mg I /ml)  RADIATION   EXPOSURE: Fluoroscopy time: 1 8 min  CORONARY VESSELS:   --  Left main: Normal    INVASIVE CARDIOVASCULAR LAB COMPLETE REPORT   Patient: Delfina MENDOSA   MR number: [id number]    ------ Page 3   BSA: 1 72 m squared   --  LAD: The vessel was normal sized  The were no obstructive stenoses   The   previously deployed stent remains widely patent  The stent at the ostium of D1   remains widely patent  --  Circumflex: The vessel was normal sized  There was    a   non-critical 50% plaque in mid vessel  There were no obstructive stenoses  The   major OM branches were normal  There was faint collateral flow from the   circumflex to the distal RCA  --  RCA: The vessel was small to medium sized    and   dominant, giving rise to the PDA  There was a long, tortuous 95% stenosis in   mid vessel  [place] of severe tortuosity, the lesion is poorly suited for PCI  NOTE: Right radial access was employed  Prepared and signed by   Jorje Martin MD   Signed 2015 11:05:34   CC: Dr Hal Varela   Study diagram   Hemodynamic tables   Pressures:  Baseline   Pressures:  - HR: 70   Pressures:  - Rhythm:   Pressures:  -- Aortic Pressure (S/D/M): 157/83/116   Outputs:  Baseline   Outputs:  -- CALCULATIONS[age]   Outputs:  -- CALCULATIONS: Body Surface Area: 1 72   Outputs:  -- CALCULATIONS: Height in cm: 163 00   Outputs:  -- CALCULATIONS: Sex: Female   Outputs:  -- CALCULATIONS: Weight in k 10        Lab Review   Lab Results   Component Value Date    WBC 9 20 2018    HGB 14 5 2018    HCT 44 4 2018    MCV 94 2018    RDW 14 3 2018     2018     BMP:  Lab Results   Component Value Date     2018    K 3 0 (L) 2018     2018    CO2 28 2018    ANIONGAP 9 2018    BUN 21 2018    CREATININE 0 66 2018    GLUCOSE 99 2018    GLUF 99 2018    CALCIUM 9 0 2018    EGFR 81 2018     LFT:  Lab Results   Component Value Date    AST 21 2018    ALT 23 2018    ALKPHOS 79 2018    PROT 7 3 2018    BILITOT 0 43 2018       Lab Results   Component Value Date    HGBA1C 5 9 2018     Lipid Profile:   Lab Results   Component Value Date    CHOL 202 (H) 2018    HDL 47 2018    LDLCALC 114 (H) 2018 TRIG 204 (H) 05/19/2018     Lab Results   Component Value Date    CHOL 202 (H) 05/19/2018    CHOL 196 08/25/2017     Lab Results   Component Value Date    TROPONINI 0 03 05/20/2018       Dr Santiago Mccall MD [facility]      "This note has been constructed using a voice recognition system  Therefore there may be syntax, spelling, and/or grammatical errors   Please call if you have any questions  "

## 2018-05-30 ENCOUNTER — OFFICE VISIT (OUTPATIENT)
Dept: CARDIOLOGY CLINIC | Facility: CLINIC | Age: 83
End: 2018-05-30
Payer: COMMERCIAL

## 2018-05-30 VITALS
DIASTOLIC BLOOD PRESSURE: 70 MMHG | OXYGEN SATURATION: 96 % | SYSTOLIC BLOOD PRESSURE: 140 MMHG | HEART RATE: 79 BPM | BODY MASS INDEX: 25.18 KG/M2 | WEIGHT: 146.7 LBS

## 2018-05-30 DIAGNOSIS — Z78.9 STATIN INTOLERANCE: ICD-10-CM

## 2018-05-30 DIAGNOSIS — E87.6 HYPOKALEMIA: ICD-10-CM

## 2018-05-30 DIAGNOSIS — R55 SYNCOPE, UNSPECIFIED SYNCOPE TYPE: ICD-10-CM

## 2018-05-30 DIAGNOSIS — I25.10 CORONARY ARTERY DISEASE INVOLVING NATIVE CORONARY ARTERY OF NATIVE HEART WITHOUT ANGINA PECTORIS: ICD-10-CM

## 2018-05-30 DIAGNOSIS — E78.2 MIXED HYPERLIPIDEMIA: ICD-10-CM

## 2018-05-30 DIAGNOSIS — I10 BENIGN ESSENTIAL HYPERTENSION: ICD-10-CM

## 2018-05-30 PROCEDURE — 99214 OFFICE O/P EST MOD 30 MIN: CPT | Performed by: INTERNAL MEDICINE

## 2018-05-30 RX ORDER — SPIRONOLACTONE 25 MG/1
12.5 TABLET ORAL DAILY
Qty: 15 TABLET | Refills: 1 | Status: SHIPPED | OUTPATIENT
Start: 2018-05-30 | End: 2018-12-05

## 2018-06-08 ENCOUNTER — TRANSCRIBE ORDERS (OUTPATIENT)
Dept: LAB | Facility: CLINIC | Age: 83
End: 2018-06-08

## 2018-06-08 ENCOUNTER — LAB (OUTPATIENT)
Dept: LAB | Facility: CLINIC | Age: 83
End: 2018-06-08
Payer: COMMERCIAL

## 2018-06-08 DIAGNOSIS — E87.6 HYPOKALEMIA: ICD-10-CM

## 2018-06-08 LAB
ANION GAP SERPL CALCULATED.3IONS-SCNC: 6 MMOL/L (ref 4–13)
BUN SERPL-MCNC: 18 MG/DL (ref 5–25)
CALCIUM SERPL-MCNC: 8.7 MG/DL (ref 8.3–10.1)
CHLORIDE SERPL-SCNC: 108 MMOL/L (ref 100–108)
CO2 SERPL-SCNC: 26 MMOL/L (ref 21–32)
CREAT SERPL-MCNC: 0.68 MG/DL (ref 0.6–1.3)
GFR SERPL CREATININE-BSD FRML MDRD: 81 ML/MIN/1.73SQ M
GLUCOSE P FAST SERPL-MCNC: 103 MG/DL (ref 65–99)
POTASSIUM SERPL-SCNC: 4.2 MMOL/L (ref 3.5–5.3)
SODIUM SERPL-SCNC: 140 MMOL/L (ref 136–145)

## 2018-06-08 PROCEDURE — 80048 BASIC METABOLIC PNL TOTAL CA: CPT

## 2018-06-08 PROCEDURE — 36415 COLL VENOUS BLD VENIPUNCTURE: CPT

## 2018-06-15 ENCOUNTER — OFFICE VISIT (OUTPATIENT)
Dept: FAMILY MEDICINE CLINIC | Facility: CLINIC | Age: 83
End: 2018-06-15
Payer: COMMERCIAL

## 2018-06-15 VITALS
TEMPERATURE: 100 F | HEIGHT: 64 IN | SYSTOLIC BLOOD PRESSURE: 134 MMHG | WEIGHT: 144 LBS | HEART RATE: 78 BPM | RESPIRATION RATE: 12 BRPM | DIASTOLIC BLOOD PRESSURE: 80 MMHG | BODY MASS INDEX: 24.59 KG/M2

## 2018-06-15 DIAGNOSIS — J06.9 VIRAL UPPER RESPIRATORY TRACT INFECTION: Primary | ICD-10-CM

## 2018-06-15 PROCEDURE — 99213 OFFICE O/P EST LOW 20 MIN: CPT | Performed by: FAMILY MEDICINE

## 2018-06-15 RX ORDER — FLUTICASONE PROPIONATE 50 MCG
1 SPRAY, SUSPENSION (ML) NASAL DAILY
Qty: 16 G | Refills: 1 | Status: SHIPPED | OUTPATIENT
Start: 2018-06-15 | End: 2019-05-10

## 2018-06-15 RX ORDER — BENZONATATE 200 MG/1
200 CAPSULE ORAL 3 TIMES DAILY PRN
Qty: 30 CAPSULE | Refills: 1 | Status: SHIPPED | OUTPATIENT
Start: 2018-06-15 | End: 2018-10-17

## 2018-06-15 NOTE — PATIENT INSTRUCTIONS
Symptoms consistent with viral infection and no antibiotics are needed at this time  Supportive care discussed including increasing fluid intake and rest   Recommend buckwheat honey for cough    Follow up recommend if no improvement in 7-14 days or worsening of symptoms       Use allegra daily  Use flonase daily  Use tessalon perles as needed

## 2018-06-21 ENCOUNTER — OFFICE VISIT (OUTPATIENT)
Dept: FAMILY MEDICINE CLINIC | Facility: CLINIC | Age: 83
End: 2018-06-21
Payer: COMMERCIAL

## 2018-06-21 ENCOUNTER — APPOINTMENT (OUTPATIENT)
Dept: RADIOLOGY | Facility: CLINIC | Age: 83
End: 2018-06-21
Payer: COMMERCIAL

## 2018-06-21 ENCOUNTER — TRANSCRIBE ORDERS (OUTPATIENT)
Dept: RADIOLOGY | Facility: CLINIC | Age: 83
End: 2018-06-21

## 2018-06-21 ENCOUNTER — TELEPHONE (OUTPATIENT)
Dept: FAMILY MEDICINE CLINIC | Facility: CLINIC | Age: 83
End: 2018-06-21

## 2018-06-21 VITALS
BODY MASS INDEX: 24.2 KG/M2 | SYSTOLIC BLOOD PRESSURE: 120 MMHG | WEIGHT: 141 LBS | RESPIRATION RATE: 14 BRPM | OXYGEN SATURATION: 95 % | TEMPERATURE: 97.7 F | DIASTOLIC BLOOD PRESSURE: 80 MMHG | HEART RATE: 92 BPM

## 2018-06-21 DIAGNOSIS — J06.9 VIRAL URI: Primary | ICD-10-CM

## 2018-06-21 DIAGNOSIS — J30.2 SEASONAL ALLERGIC RHINITIS, UNSPECIFIED TRIGGER: ICD-10-CM

## 2018-06-21 DIAGNOSIS — R05.9 COUGH: ICD-10-CM

## 2018-06-21 PROCEDURE — 99214 OFFICE O/P EST MOD 30 MIN: CPT | Performed by: NURSE PRACTITIONER

## 2018-06-21 PROCEDURE — 71046 X-RAY EXAM CHEST 2 VIEWS: CPT

## 2018-06-21 NOTE — PROGRESS NOTES
Assessment/Plan:  1  Viral URI  Fluids  Rest  Nasal saline  Supportive care for symptom management  Antibiotics are not indicated at this time  2  Cough  Can continue with tessalon perles  - XR chest pa & lateral; Future  3  Seasonal allergic rhinitis, unspecified trigger  Continue daily allegra and flonase  Subjective:      Patient ID: Trina Najera is a 80 y o  female who presents for congestion and cold symptoms  Symptoms for over a week  Seen one week ago  Diagnosed with viral uri  Cough is improved  Was given tessalon perles and taking three times daily  No fevers  A lot of phleghm  Taking daily allegra  Has been using Dayquil and that helps  Concerned about possible pneumonia  Definitely feels much better but would like cxr done  The following portions of the patient's history were reviewed and updated as appropriate: allergies, current medications, past family history, past medical history, past social history, past surgical history and problem list     Review of Systems   Constitutional: Negative for fatigue and fever  HENT: Positive for congestion, postnasal drip, rhinorrhea and sinus pressure  Negative for facial swelling, sinus pain and sore throat  Respiratory: Positive for cough (improved but still with a lot of phleghm)  Negative for shortness of breath  Gastrointestinal: Negative for diarrhea, nausea and vomiting  Musculoskeletal: Negative for myalgias  Skin: Negative for rash  Neurological: Negative for dizziness and headaches  Hematological: Negative for adenopathy  Objective:      /80 (BP Location: Left arm, Patient Position: Sitting, Cuff Size: Standard)   Pulse 92   Temp 97 7 °F (36 5 °C) (Temporal)   Resp 14   Wt 64 kg (141 lb)   SpO2 95%   BMI 24 20 kg/m²          Physical Exam   Constitutional: She appears well-developed and well-nourished  No distress     HENT:   [address] [place] WNL  Turbinates inflamed on left  Oropharynx with no erythema or exudate  No sinus tenderness to palpation    (+) PND     Cardiovascular: Normal rate, regular rhythm and normal heart sounds  No murmur heard  Pulmonary/Chest: Effort normal and breath sounds normal  No respiratory distress  She has no wheezes  Skin: Skin is warm and dry  No rash noted  No erythema  Psychiatric: She has a normal mood and affect  Her behavior is normal  Judgment and thought content normal    Vitals reviewed

## 2018-06-21 NOTE — PATIENT INSTRUCTIONS
Chest xray to evaluate for pneumonia  Continue Allegra and Flonase  Over the counter meds as needed for cough and congestion

## 2018-06-21 NOTE — TELEPHONE ENCOUNTER
----- Message from [address] [place] [address] sent at 6/21/2018 11:36 AM EDT -----  Please call pt and advise that cxr was normal

## 2018-06-22 ENCOUNTER — TELEPHONE (OUTPATIENT)
Dept: CARDIOLOGY CLINIC | Facility: CLINIC | Age: 83
End: 2018-06-22

## 2018-06-22 NOTE — TELEPHONE ENCOUNTER
----- Message from David Mariano MD sent at 6/22/2018 12:30 PM EDT -----  Patient labs are acceptable  Continue same medications  She is advised to follow up her appointment regularly

## 2018-06-28 ENCOUNTER — HOSPITAL ENCOUNTER (OUTPATIENT)
Dept: NON INVASIVE DIAGNOSTICS | Facility: HOSPITAL | Age: 83
Discharge: HOME | End: 2018-06-28
Attending: INTERNAL MEDICINE
Payer: COMMERCIAL

## 2018-06-28 DIAGNOSIS — R55 SYNCOPE, UNSPECIFIED SYNCOPE TYPE: ICD-10-CM

## 2018-06-28 DIAGNOSIS — I25.10 CORONARY ARTERY DISEASE INVOLVING NATIVE CORONARY ARTERY OF NATIVE HEART WITHOUT ANGINA PECTORIS: ICD-10-CM

## 2018-06-28 PROCEDURE — 93226 XTRNL ECG REC<48 HR SCAN A/R: CPT

## 2018-06-28 PROCEDURE — 93225 XTRNL ECG REC<48 HRS REC: CPT

## 2018-06-28 PROCEDURE — 93306 TTE W/DOPPLER COMPLETE: CPT

## 2018-06-28 PROCEDURE — 93306 TTE W/DOPPLER COMPLETE: CPT | Performed by: INTERNAL MEDICINE

## 2018-07-09 ENCOUNTER — OFFICE VISIT (OUTPATIENT)
Dept: CARDIOLOGY CLINIC | Facility: CLINIC | Age: 83
End: 2018-07-09
Payer: COMMERCIAL

## 2018-07-09 VITALS
OXYGEN SATURATION: 98 % | BODY MASS INDEX: 24.61 KG/M2 | SYSTOLIC BLOOD PRESSURE: 134 MMHG | WEIGHT: 143.4 LBS | DIASTOLIC BLOOD PRESSURE: 72 MMHG | HEART RATE: 88 BPM

## 2018-07-09 DIAGNOSIS — I10 BENIGN ESSENTIAL HYPERTENSION: ICD-10-CM

## 2018-07-09 DIAGNOSIS — R55 SYNCOPE, UNSPECIFIED SYNCOPE TYPE: ICD-10-CM

## 2018-07-09 DIAGNOSIS — E78.2 MIXED HYPERLIPIDEMIA: ICD-10-CM

## 2018-07-09 DIAGNOSIS — I25.10 ARTERIOSCLEROSIS OF CORONARY ARTERY: ICD-10-CM

## 2018-07-09 DIAGNOSIS — Z78.9 STATIN INTOLERANCE: ICD-10-CM

## 2018-07-09 DIAGNOSIS — I50.22 CHRONIC SYSTOLIC CONGESTIVE HEART FAILURE (HCC): ICD-10-CM

## 2018-07-09 PROCEDURE — 99214 OFFICE O/P EST MOD 30 MIN: CPT | Performed by: INTERNAL MEDICINE

## 2018-07-09 NOTE — PROGRESS NOTES
Progress Note - Cardiology Office  [facility]  Lillian Wright 80 y o  female MRN: [id number]  : 1933  Encounter: [id number]      Assessment:     1  Syncope, unspecified syncope type    2  Arteriosclerosis of coronary artery    3  Benign essential hypertension    4  Chronic systolic congestive heart failure ([facility] [address] )    5  Mixed hyperlipidemia    6  Statin intolerance        Discussion Summary and Plan:  1  Syncope  No clear etiology  Family reports that almost all episode happens in similar situation when patient is in a restaurant and she has a glass of wine  There may be some interaction  At this point patient is instructed to avoid  situations, where she passes out  [facility] Keep herself hydrated  Echo shows normal LV systolic function  I recommended to have tilt-table test done  Will review Holter when completed  2   Coronary artery disease with history of angioplasty of LAD and diagonal with a stent in  by Dr Miranda Severin and repeat catheterization in  shows patent stent and has mid RCA significant stenosis but very medium to small size artery not suitable for percutaneous techniques on medical Rx     3   Dyslipidemia but intolerant to statins  She takes Crestor 5 mg twice a week not sufficient enough to control her cholesterol and she can be consider for PS K inhibitors  Family likes the idea would would like to wait until this workup is completed  4   Persistent hypokalemia  May be related to possible underlying some hyperaldosteronism  I still believe she can benefit from addition of Aldactone, may need 25 milligram Aldactone slowly titrate up  Patient is back to [employer]  Monitor electrolyte closely  She will call us with the list of medications  5   Benign essential hypertension  Blood pressure is acceptable  Counseling :  A description of the counseling  Patient advised to keep herself hydrated avoid similar situation where she passed out  Patient daughter was present all the time and all their questions were answered  Patient's ability to self care: Yes  Medication side effect reviewed with patient in detail and all their questions answered to their satisfaction  HPI :     Ronnie Ugalde is a 80y o  year old female who came for follow up  Patient has Past medical history significant for coronary artery disease with remote angioplasty of LAD and diagonal by Dr Femi Hammer in 2013 and then repeat cardiac catheterization in 2015 at [facility] shows patent stent in LAD and diagonal and has small RCA mid 80-90% stenosis which is very tortuous and not suitable for percutaneous technique  Patient has repeated history of syncopal episode and most of time it happened situation when she had a glass of wine and she is in a restaurant  She does not remember what happens and she feels fine after few minutes off it  She denies any fever or any chills any nausea and vomiting  She is otherwise very active  She cannot take statins even though her cholesterol is very high  She takes Crestor 5 mg twice a week and she is able to handle that  No fever no chills no nausea no vomiting no other significant complaint  07/09/2018  Patient came 1 day before appointment  Above reviewed  No more episodes of dizziness lightheadedness or syncope  She said she could not tolerate Aldactone as blood pressure was not good she is taking Tribenzor as before  She did not bring her med list   Not sure how Echo rate she is taking her medications  She seemed to be getting forgetful  Her blood test from June was reviewed there were acceptable  Potassium was acceptable  She is taking K-Dur 20 milligram 3 times daily  No dizziness no lightheadedness  She does have history of coronary artery disease  She had history of recurrent hypokalemia  No other complaint today  Patient's Holter report is still pending    She did not do tilt-table test as ordered  Review of Systems   Constitutional: Negative for activity change, chills, diaphoresis, fever and unexpected weight change  HENT: Negative for congestion  Eyes: Negative for discharge and redness  Respiratory: Negative for cough, chest tightness, shortness of breath and wheezing  Cardiovascular: Negative  Negative for chest pain, palpitations and leg swelling  History of coronary artery disease and stents   Gastrointestinal: Negative for abdominal pain, diarrhea and nausea  Endocrine: Negative  History of hypokalemia   Genitourinary: Negative for decreased urine volume and urgency  Musculoskeletal: Negative  Negative for arthralgias, back pain and gait problem  Skin: Negative for rash and wound  Allergic/Immunologic: Negative  Neurological: Negative for dizziness, seizures, weakness, light-headedness and headaches  Hematological: Negative  Psychiatric/Behavioral: Negative for agitation and confusion  The patient is nervous/anxious          Historical Information   Past Medical History:   Diagnosis Date    Abscess of chest wall     Last Assessed: 2/27/2014    Arthritis     Back pain     Cataract     Start of    Coronary artery disease     Diverticulitis of colon 12/04/2008    Female bladder prolapse     Gastric reflux     Hematuria     Last Assessed: 11/18/2014     History of diverticulitis     [place] (hard of hearing)     Hypercholesteremia     Hyperkeratosis of skin 11/03/2011    Hypertension     Hypokalemia 12/13/2011    Incomplete uterovaginal prolapse 05/28/2010    Osteoporosis     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    Last Assessed: 10/24/2012     Seasonal allergies     Sebaceous cyst     Last Assessed: 2/8/2014    Syncope     Trigeminal neuralgia 03/20/2012    Urinary incontinence     Uterine prolapse     Vertigo 09/15/2011    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    Wears glasses     Wears hearing aid     States she rarely wears them  Wears partial dentures     Upper      Past Surgical History:   Procedure Laterality Date    APPENDECTOMY      APPENDICO-VESICOSTOMY CUTANEOUS  03/01/2010    CHOLECYSTECTOMY      Laparoscopic    COLONOSCOPY      CORONARY ANGIOPLASTY      CORONARY ANGIOPLASTY WITH STENT PLACEMENT      2 aortic stents    CORONARY ANGIOPLASTY WITH STENT PLACEMENT  2013    2 distal left anterior descending artery     ESOPHAGOGASTRODUODENOSCOPY      FRACTURE SURGERY Right     Repair right arm- titanium servando from shoulder to elbow      HYSTERECTOMY      Complete    WI CMBND ANTERPOST COLPORRAPHY W/CYSTO N/A 9/20/2016    Procedure: COLPORRHAPHY ANTERIOR POSTERIOR GRAFT;  Surgeon: Dara Boyd MD;  [facility];  Service: UroGynecology           WI CYSTOURETHROSCOPY N/A 9/20/2016    Procedure: Ulanda Course;  Surgeon: Dara Boyd MD;  [facility];  Service: UroGynecology           WI REVAGINAL PROLAPSE,SACROSP [address] N/A 9/20/2016    Procedure: COLPOPEXY VAGINAL EXTRAPERITONEAL  incision and drainage of vagina inclusion cyst x 4;  Surgeon: Dara Boyd MD;  [facility];  Service: UroGynecology           WI SLING OPER STRES INCONTINENCE N/A 9/20/2016    Procedure: INSERTION PUBOVAGINAL SLING RETROPUBIC ;  Surgeon: Dara Boyd MD;  [facility];  Service: UroGynecology            History   Alcohol Use    0 6 - 1 2 oz/week    1 - 2 Glasses of wine per week     Comment: per day, per allscripts: Being a social drinker      History   Drug Use No     History   Smoking Status    Never Smoker   Smokeless Tobacco    Never Used     Family History:   Family History   Problem Relation Age of Onset    Hypertension Mother     Heart failure Mother     Coronary artery disease Mother     Arthritis Mother     Osteoporosis Mother     Hyperlipidemia Mother     Coronary artery disease Sister        Meds/Allergies     Allergies   Allergen Reactions    Penicillins Hives     "All cillins", "and all myocillins"    Versed [Midazolam] Other (See Comments)     States she "passed out" when recovering from a procedure and was told to not use it again   Zithromax [Azithromycin] Anaphylaxis    Bee Venom      Reaction Date: 02Feb2004; Annotation - [id number]: jjw    Enalapril      Reaction Date: [id number];     Erythromycin Dizziness     Reaction Date: [id number];     Estrogens      Reaction Date: 02Feb2004; Annotation - [id number]: jjw    Ramipril      Reaction Date: 02Feb2004;  Annotation - [id number]: jjw    Statins Other (See Comments)     Muscle pain       Current Outpatient Prescriptions:     aspirin (ASPIRIN LOW DOSE) 81 MG tablet, Take 1 tablet by mouth daily, Disp: , Rfl:     Calcium Carbonate-Vitamin D (CALCIUM 600+D HIGH POTENCY PO), Take 600 mg by mouth 2 (two) times a day  , Disp: , Rfl:     cholecalciferol (VITAMIN D3) 1,000 units tablet, Take 1,000 Units by mouth daily, Disp: , Rfl:     Coenzyme Q10-Fish Oil-Vit E (CO-Q 10 OMEGA-3 FISH OIL PO), Take 1 capsule by mouth daily, Disp: , Rfl:     fexofenadine (ALLEGRA ALLERGY) 180 MG tablet, Take by mouth, Disp: , Rfl:     olmesartan 20 mg TABS 20 mg, amLODIPine 5 mg TABS 5 mg, hydrochlorothiazide 12 5 mg TABS 12 5 mg, Take by mouth daily, Disp: , Rfl:     potassium chloride (K-DUR,KLOR-CON) 20 mEq tablet, Take 1 tablet (20 mEq total) by mouth 3 (three) times a day, Disp: , Rfl: 0    RABEprazole (ACIPHEX) 20 MG tablet, Take 20 mg by mouth every morning , Disp: , Rfl:     rosuvastatin (CRESTOR) 5 mg tablet, Take 5 mg by mouth 2 (two) times a week  , Disp: , Rfl:     benzonatate (TESSALON) 200 MG capsule, Take 1 capsule (200 mg total) by mouth 3 (three) times a day as needed for cough, Disp: 30 capsule, Rfl: 1    fluticasone (FLONASE) 50 mcg/act nasal spray, 1 spray into each nostril daily, Disp: 16 g, Rfl: 1    spironolactone (ALDACTONE) 25 mg tablet, Take 0 5 tablets (12 5 mg total) by mouth daily, Disp: 15 tablet, Rfl: 1    Vitals: Blood pressure 134/72, pulse 88, weight 65 kg (143 lb 6 4 oz), SpO2 98 %  Body mass index is 24 61 kg/m²  Vitals:    07/09/18 1325   Weight: 65 kg (143 lb 6 4 oz)     BP Readings from Last 3 Encounters:   07/09/18 134/72   06/21/18 120/80   06/15/18 134/80         Physical Exam   Constitutional: She is oriented to person, place, and time  She appears well-developed  No distress  HENT:   Head: Normocephalic and atraumatic  Eyes: Pupils are equal, round, and reactive to light  Neck: Normal range of motion  Neck supple  No JVD present  No thyromegaly present  Cardiovascular: Normal rate and regular rhythm  S1-S2 regular with 2/6 ejection systolic murmur  Murmur is at aortic area   Pulmonary/Chest: Effort normal and breath sounds normal  No respiratory distress  She has no wheezes  She has no rales  Abdominal: Soft  Bowel sounds are normal  She exhibits no distension  There is no tenderness  There is no rebound  Musculoskeletal: Normal range of motion  She exhibits no edema or tenderness  Neurological: She is alert and oriented to person, place, and time  Skin: Skin is warm and dry  She is not diaphoretic  Psychiatric: She has a normal mood and affect  Her behavior is normal  Judgment normal      Diagnostic Studies Review Cardio:   cardiac catheterization :   Patient's cardiac catheterization from 2015 reviewed  She has patent stent seen in LAD and diagonal   Apical LAD has around 70 80% stenosis which small vessel, mid circumflex around 40-50% stenosis  RCA which is a small size artery and only gives RPDA has around 90% stenosis in the mid it is very tortuous artery not suitable for percutaneous techniques  Echo Doppler done 06/28/2018 shows low-normal LV systolic function EF around 55 percent, mild concentric left atrial hypertrophy, mild MR, mild AI, mild TR PA pressure 30 millimeters of mercury        EKG:    Twelve lead EKG done at  [facility] shows normal sinus rhythm heart rate around 87 beats per minute  Nonspecific ST changes  Cardiac testing:       Results for orders placed in visit on 08/20/15   Cardiac catheterization    Narrative [address] [facility], [address] [facility]   Phone: [phone]      INVASIVE CARDIOVASCULAR LAB COMPLETE REPORT         Patient: Jewell MENDOSA   Location: Outpatient   MR number: [id number]   Account number: [de-identified]   Study date: 2015   Gender: Female   : 1933   Height: 64 2 in   Weight: 147 6 lb   BSA: 1 72 m squared   Allergies: Altace, beestings, Erythromycin Base, estrogen, penicilin, Versed,   Vasotec, Zithromax, all cillins, mycins, Vytorin 10-10, Zetia   Diagnostic Cardiologist:  Sinai Garcia MD   Primary Physician:  Apolinar Wang MD   SUMMARY   CORONARY CIRCULATION:   Left main: Normal    LAD: The vessel was normal sized  The were no obstructive stenoses  The   previously deployed stent remains widely patent  The stent at the ostium of D1   remains widely patent  Circumflex: The vessel was normal sized  There was a   non-critical 50% plaque in mid vessel  There were no obstructive stenoses  The   major OM branches were normal  There was faint collateral flow from the   circumflex to the distal RCA  RCA: The vessel was small to medium sized and   dominant, giving rise to the PDA  There was a long, tortuous 95% stenosis in   mid vessel  [place] of severe tortuosity, the lesion is poorly suited for PCI  REPORT ELEMENT SELECTION:   Right radial access was employed  Summary: The patient has single vessel disease with a long, tortuous subtotal   stenosis in the mid RCA  Elucidating symptom status is difficult in this   patient, since she previously had very severe LAD disease and diagonal disease   with atypical symptoms as the only presentation  Medical therapy is    recommended   for the following reasons 1) the lesion has been stable angiographically for 2   years   2) the patient was able to exercise to a high level after PCI 2 years   ago with this lesion already present  3) Nuclear imaging 1 year ago showed no   ischemia in the presence of this lesion  4) There is some collateral flow from   the circumflex  5) Current symptoms are atypical and not exertional  If PCI is   required in the future because of exertional angina refractory to medication,   it is recommended that the procedure be performed via the femoral approach,    and   INVASIVE CARDIOVASCULAR LAB COMPLETE REPORT   Patient: Karyn MENDOSA   MR number: [id number]    ------ Page 2   BSA: 1 72 m squared   that the procedure be performed at the [facility] because of the   increased risk of comlications  Treatment with rosuvastatin, 2 5mg once per   week was initiated  The patient has a history of statin intolerance at low   doses  INDICATIONS:   --  Possible CAD: unstable angina  PROCEDURES PERFORMED   --  Left coronary angiography  --  Right coronary angiography  --  Outpatient  --  Coronary Catheterization (w/o LHC)  PROCEDURE: The risks and alternatives of the procedures and conscious sedation   were explained to the patient and informed consent was obtained  The patient   was brought to the cath lab and placed on the table  The planned puncture    sites   were prepped and draped in the usual sterile fashion  --  Right radial artery access  After performing an Eusebio's test to verify   adequate ulnar artery supply to the hand, the radial site was prepped  The   puncture site was infiltrated with local anesthetic  The vessel was accessed   using the modified Seldinger technique, a wire was advanced into the vessel,   and a sheath was advanced over the wire into the vessel  --  Left coronary artery angiography  A catheter was advanced over a guidewire   into the aorta and positioned in the left coronary artery ostium under   fluoroscopic guidance  Angiography was performed     --  Right coronary artery angiography  A catheter was advanced over a    guidewire   into the aorta and positioned in the right coronary artery ostium under   fluoroscopic guidance  Angiography was performed  --  Outpatient  --  Coronary Catheterization (w/o [facility])  PROCEDURE COMPLETION: The patient tolerated the procedure well and was   discharged from the cath lab  TIMING: Test started at 09:38  Test concluded at   10:03  HEMOSTASIS: The sheath was removed  The site was compressed with a   Hemoband device  Hemostasis was obtained  MEDICATIONS GIVEN: Verapamil   (Isoptin, Calan, Covera), 1 25 mg, IA, at 09:52  Fentanyl (1OOmcg/2 ml), 50   mcg, IV, at 09:42  Fentanyl (1OOmcg/2 ml), 50 mcg, IV, at 09:45  1% Lidocaine,   1 ml, subcutaneously, at 09:49  Fentanyl (1OOmcg/2 ml), 25 mcg, IV, at 09:50  Nitroglycerin (200mcg/ml), 200 mcg, at 09:52  Heparin 1000 units/ml, 4,000   units, IV, at 09:52  CONTRAST GIVEN: 70 ml Omnipaque (350mg I /ml)  RADIATION   EXPOSURE: Fluoroscopy time: 1 8 min  CORONARY VESSELS:   --  Left main: Normal    INVASIVE CARDIOVASCULAR LAB COMPLETE REPORT   Patient: Geoff MENDOSA   MR number: [id number]    ------ Page 3   BSA: 1 72 m squared   --  LAD: The vessel was normal sized  The were no obstructive stenoses  The   previously deployed stent remains widely patent  The stent at the ostium of D1   remains widely patent  --  Circumflex: The vessel was normal sized  There was    a   non-critical 50% plaque in mid vessel  There were no obstructive stenoses  The   major OM branches were normal  There was faint collateral flow from the   circumflex to the distal RCA  --  RCA: The vessel was small to medium sized    and   dominant, giving rise to the PDA  There was a long, tortuous 95% stenosis in   mid vessel  [place] of severe tortuosity, the lesion is poorly suited for PCI  NOTE: Right radial access was employed     Prepared and signed by   Irasema Montesinos MD   Signed 08/20/2015 11:05:34   CC: Dr Karma Balderas Study diagram   Hemodynamic tables   Pressures:  Baseline   Pressures:  - HR: 70   Pressures:  - Rhythm:   Pressures:  -- Aortic Pressure (S/D/M): 157/83/116   Outputs:  Baseline   Outputs:  -- CALCULATIONS[age]   Outputs:  -- CALCULATIONS: Body Surface Area: 1 72   Outputs:  -- CALCULATIONS: Height in cm: 163 00   Outputs:  -- CALCULATIONS: Sex: Female   Outputs:  -- CALCULATIONS: Weight in k 10      Lab Review   Lab Results   Component Value Date    WBC 9 20 2018    HGB 14 5 2018    HCT 44 4 2018    MCV 94 2018    RDW 14 3 2018     2018     BMP:  Lab Results   Component Value Date     2018    K 4 2 2018     2018    CO2 26 2018    ANIONGAP 6 2018    BUN 18 2018    CREATININE 0 68 2018    GLUCOSE 99 2018    GLUF 103 (H) 2018    CALCIUM 8 7 2018    EGFR 81 2018     LFT:  Lab Results   Component Value Date    AST 21 2018    ALT 23 2018    ALKPHOS 79 2018    PROT 7 3 2018    BILITOT 0 43 2018       Lab Results   Component Value Date    HGBA1C 5 9 2018     Lipid Profile:   Lab Results   Component Value Date    CHOL 202 (H) 2018    HDL 47 2018    LDLCALC 114 (H) 2018    TRIG 204 (H) 2018     Lab Results   Component Value Date    CHOL 202 (H) 2018    CHOL 196 2017     Lab Results   Component Value Date    TROPONINI 0 03 2018       MD Kendrick Bustos      "This note has been constructed using a voice recognition system  Therefore there may be syntax, spelling, and/or grammatical errors   Please call if you have any questions  "

## 2018-07-12 DIAGNOSIS — R55 SYNCOPE, UNSPECIFIED SYNCOPE TYPE: ICD-10-CM

## 2018-07-12 RX ORDER — POTASSIUM CHLORIDE 20 MEQ/1
20 TABLET, EXTENDED RELEASE ORAL 3 TIMES DAILY
Qty: 180 TABLET | Refills: 0
Start: 2018-07-12 | End: 2018-07-13 | Stop reason: SDUPTHER

## 2018-07-13 DIAGNOSIS — R55 SYNCOPE, UNSPECIFIED SYNCOPE TYPE: ICD-10-CM

## 2018-07-13 RX ORDER — POTASSIUM CHLORIDE 20 MEQ/1
20 TABLET, EXTENDED RELEASE ORAL 2 TIMES DAILY
Qty: 180 TABLET | Refills: 1
Start: 2018-07-13 | End: 2018-10-17 | Stop reason: SDUPTHER

## 2018-07-18 ENCOUNTER — TELEPHONE (OUTPATIENT)
Dept: CARDIOLOGY CLINIC | Facility: CLINIC | Age: 83
End: 2018-07-18

## 2018-07-18 PROCEDURE — 93227 XTRNL ECG REC<48 HR R&I: CPT | Performed by: INTERNAL MEDICINE

## 2018-07-18 NOTE — TELEPHONE ENCOUNTER
----- Message from Daniel Barnhart MD sent at 7/18/2018  3:53 PM EDT -----  Pt's Holter shows no significant arrhthymias  Pt can keep his appointment  Please call patient

## 2018-10-17 ENCOUNTER — OFFICE VISIT (OUTPATIENT)
Dept: FAMILY MEDICINE CLINIC | Facility: CLINIC | Age: 83
End: 2018-10-17
Payer: COMMERCIAL

## 2018-10-17 ENCOUNTER — APPOINTMENT (OUTPATIENT)
Dept: LAB | Facility: CLINIC | Age: 83
End: 2018-10-17
Payer: COMMERCIAL

## 2018-10-17 ENCOUNTER — TRANSCRIBE ORDERS (OUTPATIENT)
Dept: LAB | Facility: CLINIC | Age: 83
End: 2018-10-17

## 2018-10-17 VITALS
HEART RATE: 72 BPM | SYSTOLIC BLOOD PRESSURE: 130 MMHG | DIASTOLIC BLOOD PRESSURE: 80 MMHG | BODY MASS INDEX: 24.24 KG/M2 | WEIGHT: 142 LBS | HEIGHT: 64 IN | TEMPERATURE: 97.8 F | RESPIRATION RATE: 16 BRPM

## 2018-10-17 DIAGNOSIS — M81.0 OSTEOPOROSIS, UNSPECIFIED OSTEOPOROSIS TYPE, UNSPECIFIED PATHOLOGICAL FRACTURE PRESENCE: ICD-10-CM

## 2018-10-17 DIAGNOSIS — R55 SYNCOPE, UNSPECIFIED SYNCOPE TYPE: ICD-10-CM

## 2018-10-17 DIAGNOSIS — E78.2 MIXED HYPERLIPIDEMIA: ICD-10-CM

## 2018-10-17 DIAGNOSIS — E87.6 HYPOKALEMIA: ICD-10-CM

## 2018-10-17 DIAGNOSIS — E87.6 HYPOKALEMIA: Primary | ICD-10-CM

## 2018-10-17 DIAGNOSIS — Z23 NEED FOR INFLUENZA VACCINATION: Primary | ICD-10-CM

## 2018-10-17 DIAGNOSIS — I25.10 ARTERIOSCLEROSIS OF CORONARY ARTERY: ICD-10-CM

## 2018-10-17 DIAGNOSIS — I10 BENIGN ESSENTIAL HYPERTENSION: ICD-10-CM

## 2018-10-17 DIAGNOSIS — I50.22 CHRONIC SYSTOLIC CONGESTIVE HEART FAILURE (HCC): ICD-10-CM

## 2018-10-17 DIAGNOSIS — Z78.9 STATIN INTOLERANCE: ICD-10-CM

## 2018-10-17 DIAGNOSIS — I25.10 CORONARY ARTERY DISEASE INVOLVING NATIVE CORONARY ARTERY OF NATIVE HEART WITHOUT ANGINA PECTORIS: ICD-10-CM

## 2018-10-17 LAB
25(OH)D3 SERPL-MCNC: 30.4 NG/ML (ref 30–100)
ANION GAP SERPL CALCULATED.3IONS-SCNC: 7 MMOL/L (ref 4–13)
BUN SERPL-MCNC: 25 MG/DL (ref 5–25)
CALCIUM SERPL-MCNC: 9 MG/DL (ref 8.3–10.1)
CHLORIDE SERPL-SCNC: 103 MMOL/L (ref 100–108)
CO2 SERPL-SCNC: 27 MMOL/L (ref 21–32)
CREAT SERPL-MCNC: 0.68 MG/DL (ref 0.6–1.3)
GFR SERPL CREATININE-BSD FRML MDRD: 80 ML/MIN/1.73SQ M
GLUCOSE P FAST SERPL-MCNC: 109 MG/DL (ref 65–99)
POTASSIUM SERPL-SCNC: 3.2 MMOL/L (ref 3.5–5.3)
PTH-INTACT SERPL-MCNC: 99.6 PG/ML (ref 18.4–80.1)
SODIUM SERPL-SCNC: 137 MMOL/L (ref 136–145)

## 2018-10-17 PROCEDURE — 84165 PROTEIN E-PHORESIS SERUM: CPT

## 2018-10-17 PROCEDURE — 36415 COLL VENOUS BLD VENIPUNCTURE: CPT

## 2018-10-17 PROCEDURE — 82306 VITAMIN D 25 HYDROXY: CPT

## 2018-10-17 PROCEDURE — 83970 ASSAY OF PARATHORMONE: CPT

## 2018-10-17 PROCEDURE — 99214 OFFICE O/P EST MOD 30 MIN: CPT | Performed by: NURSE PRACTITIONER

## 2018-10-17 PROCEDURE — 84165 PROTEIN E-PHORESIS SERUM: CPT | Performed by: PATHOLOGY

## 2018-10-17 PROCEDURE — 90662 IIV NO PRSV INCREASED AG IM: CPT | Performed by: NURSE PRACTITIONER

## 2018-10-17 PROCEDURE — G0008 ADMIN INFLUENZA VIRUS VAC: HCPCS | Performed by: NURSE PRACTITIONER

## 2018-10-17 PROCEDURE — 80048 BASIC METABOLIC PNL TOTAL CA: CPT

## 2018-10-17 RX ORDER — POTASSIUM CHLORIDE 20 MEQ/1
20 TABLET, EXTENDED RELEASE ORAL 3 TIMES DAILY
Qty: 270 TABLET | Refills: 3 | Status: SHIPPED | OUTPATIENT
Start: 2018-10-17 | End: 2018-10-17 | Stop reason: SDUPTHER

## 2018-10-17 RX ORDER — POTASSIUM CHLORIDE 20 MEQ/1
20 TABLET, EXTENDED RELEASE ORAL 4 TIMES DAILY
Qty: 360 TABLET | Refills: 1 | Status: SHIPPED | OUTPATIENT
Start: 2018-10-17 | End: 2019-04-29 | Stop reason: SDUPTHER

## 2018-10-17 NOTE — PROGRESS NOTES
Chief Complaint   Patient presents with    Follow-up     chronic conditions        Patient ID: Isidra Gomez is a 80 y o  female  Chronic problem check up:    CHF:  Patient denies any cardiovascular symptoms of concern today  She reports compliance with all of her medications without side effects at this time  She reports her weight is stable  HTN:Pt presents for follow up hypertension  Reports medication compliance with meds as listed in medication list  Pt denies cardiovascular sx of concern or new events at the time of the visit or since the previous visit  BP readings outside the office as reported by the patient are normotensive/at goal      Hyperlipidemia:Pt presents for follow up hyperlipidemia  Pt reports medication compliance to medications for disorder as seen in medication legend  Pt denies cardiovascular symptoms of concern at the time of the visit or events of concern at the time of the visit or since the last visit  Pt denies medication side effects  Patient is currently tolerating Crestor the wife weekly  She denies any statin myopathy on this regimen  Hypokalemia:  Patient is now on a three time a day potassium regimen with good effect  No further syncopal episodes          Past Medical History:   Diagnosis Date    Abscess of chest wall     Last Assessed: 2/27/2014    Arthritis     Back pain     Cataract     Start of    Coronary artery disease     Diverticulitis of colon 12/04/2008    Female bladder prolapse     Gastric reflux     Hematuria     Last Assessed: 11/18/2014     History of diverticulitis     [place]     Hypercholesteremia     Hyperkeratosis of skin 11/03/2011    Hypertension     Hypokalemia 12/13/2011    Incomplete uterovaginal prolapse 05/28/2010    Osteoporosis     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    Last Assessed: 10/24/2012     Seasonal allergies     Sebaceous cyst     Last Assessed: 2/8/2014    Syncope     Trigeminal neuralgia 03/20/2012    Urinary incontinence     Uterine prolapse     Vertigo 09/15/2011    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    Wears glasses     Wears hearing aid     States she rarely wears them    Wears partial dentures     Upper        Past Surgical History:   Procedure Laterality Date    APPENDECTOMY      APPENDICO-VESICOSTOMY CUTANEOUS  03/01/2010    CHOLECYSTECTOMY      Laparoscopic    COLONOSCOPY      CORONARY ANGIOPLASTY      CORONARY ANGIOPLASTY WITH STENT PLACEMENT      2 aortic stents    CORONARY ANGIOPLASTY WITH STENT PLACEMENT  2013    2 distal left anterior descending artery     ESOPHAGOGASTRODUODENOSCOPY      FRACTURE SURGERY Right     Repair right arm- titanium servando from shoulder to elbow      HYSTERECTOMY      Complete    [place] CMBND ANTERPOST COLPORRAPHY W/CYSTO N/A 9/20/2016    Procedure: COLPORRHAPHY ANTERIOR POSTERIOR GRAFT;  Surgeon: Cailin Nolasco MD;  [facility];  Service: UroGynecology           W[place] CYSTOURETHROSCOPY N/A 9/20/2016    Procedure: Darius Graves;  Surgeon: Cailin Nolasco MD;  [facility];  Service: UroGynecology           [place] REVAGINAL PROLAPSE,SACROSP [address] N/A 9/20/2016    Procedure: COLPOPEXY VAGINAL EXTRAPERITONEAL  incision and drainage of vagina inclusion cyst x 4;  Surgeon: Cailin Nolasco MD;  [facility];  Service: UroGynecology           [place] SLING OPER STRES INCONTINENCE N/A 9/20/2016    Procedure: INSERTION PUBOVAGINAL SLING RETROPUBIC ;  Surgeon: Cailin Nolasco MD;  [facility];  Service: UroGynecology              Patient Active Problem List   Diagnosis    Arteriosclerosis of coronary artery    Benign essential hypertension    Chronic systolic congestive heart failure ([facility] [address] )    Hyperlipidemia    Statin intolerance    Coronary artery disease    Syncope    Hypokalemia       Family History   Problem Relation Age of Onset    Hypertension Mother     Heart failure Mother     Coronary artery disease Mother    [place] Arthritis Mother     Osteoporosis Mother     Hyperlipidemia Mother     Coronary artery disease Sister        Immunization History   Administered Date(s) Administered    DTaP 5 03/21/2007    Influenza Split High Dose Preservative Free IM 10/24/2012, 10/03/2013, 10/17/2014, 10/15/2015, 11/07/2016, 10/26/2017    Influenza TIV (IM) 10/19/1999, 12/15/2000, 11/06/2001, 11/05/2003, 10/12/2005, 10/26/2006, 10/16/2007, 10/09/2008, 08/31/2009, 10/05/2010, 09/15/2011    Influenza, high dose seasonal 0 5 mL 10/17/2018    Pneumococcal Conjugate 13-Valent 08/26/2015    Pneumococcal Polysaccharide PPV23 02/18/2000    Tdap 12/18/2014       Allergies   Allergen Reactions    Penicillins Hives     "All cillins", "and all myocillins"    Versed [Midazolam] Other (See Comments)     States she "passed out" when recovering from a procedure and was told to not use it again   Zithromax [Azithromycin] Anaphylaxis    Bee Venom      Reaction Date: 02Feb2004; Annotation - [id number]: jjw    Enalapril      Reaction Date: [id number];     Erythromycin Dizziness     Reaction Date: [id number];     Estrogens      Reaction Date: 02Feb2004; Annotation - [id number]: jjw    Ramipril      Reaction Date: 02Feb2004;  Annotation - [id number]: jjw    Statins Other (See Comments)     Muscle pain       Current Outpatient Prescriptions   Medication Sig Dispense Refill    aspirin (ASPIRIN LOW DOSE) 81 MG tablet Take 1 tablet by mouth daily      Calcium Carbonate-Vitamin D (CALCIUM 600+D HIGH POTENCY PO) Take 600 mg by mouth 2 (two) times a day        cholecalciferol (VITAMIN D3) 1,000 units tablet Take 1,000 Units by mouth daily      Coenzyme Q10-Fish Oil-Vit E (CO-Q 10 OMEGA-3 FISH OIL PO) Take 1 capsule by mouth daily      fexofenadine (ALLEGRA ALLERGY) 180 MG tablet Take by mouth      fluticasone (FLONASE) 50 mcg/act nasal spray 1 spray into each nostril daily 16 g 1    olmesartan 20 mg TABS 20 mg, amLODIPine 5 mg TABS 5 mg, hydrochlorothiazide 12 5 mg TABS 12 5 mg Take by mouth daily      potassium chloride (K-DUR,KLOR-CON) 20 mEq tablet Take 1 tablet (20 mEq total) by mouth 3 (three) times a day 270 tablet 3    RABEprazole (ACIPHEX) 20 MG tablet Take 20 mg by mouth every morning   rosuvastatin (CRESTOR) 5 mg tablet Take 5 mg by mouth 2 (two) times a week        spironolactone (ALDACTONE) 25 mg tablet Take 0 5 tablets (12 5 mg total) by mouth daily (Patient not taking: Reported on 10/17/2018 ) 15 tablet 1     No current facility-administered medications for this visit  Social History     Social History    Marital status:      Spouse name: N/A    Number of children: N/A    Years of education: N/A     Social History Main Topics    Smoking status: Never Smoker    Smokeless tobacco: Never Used    Alcohol use 0 6 - 1 2 oz/week     1 - 2 Glasses of wine per week      Comment: per day, per allscripts: Being a social drinker     Drug use: No    Sexual activity: Not Asked     Other Topics Concern    None     Social History Narrative    Daily Coffee Consumption- 2 cups/day     Exercising Regularly        Review of Systems   Constitutional: Negative  HENT: Negative  Eyes: Negative  Respiratory: Negative  Cardiovascular: Negative  Gastrointestinal: Negative  Endocrine: Negative  Genitourinary: Negative  Musculoskeletal: Negative  Skin: Negative  Allergic/Immunologic: Negative  Neurological: Negative  Hematological: Negative  Psychiatric/Behavioral: Negative  Objective:    /80 (BP Location: Left arm, Patient Position: Sitting, Cuff Size: Standard)   Pulse 72   Temp 97 8 °F (36 6 °C) (Tympanic)   Resp 16   Ht 5' 4" (1 626 m)   Wt 64 4 kg (142 lb)   BMI 24 37 kg/m²        Physical Exam   Constitutional: She is oriented to person, place, and time  She appears well-developed and well-nourished  No distress  HENT:   Head: Normocephalic     Right Ear: External ear normal    Left Ear: External ear normal    Eyes: Conjunctivae are normal  No scleral icterus  Neck: No JVD present  Cardiovascular: Normal rate, regular rhythm and normal heart sounds  Pulmonary/Chest: Effort normal and breath sounds normal    Musculoskeletal: She exhibits no edema  Neurological: She is alert and oriented to person, place, and time  Skin: Skin is warm and dry  Psychiatric: She has a normal mood and affect  Assessment/Plan:    No problem-specific Assessment & Plan notes found for this encounter  Diagnoses and all orders for this visit:    Need for influenza vaccination  -     influenza vaccine, [id number], high-dose, PF 0 5 mL, for patients 65 yr+ (FLUZONE HIGH-DOSE)    Coronary artery disease involving native coronary artery of native heart without angina pectoris  Comments:  Stable no medication changes  Continue to follow with Cardiology  Orders:  -     potassium chloride (K-DUR,KLOR-CON) 20 mEq tablet; Take 1 tablet (20 mEq total) by mouth 3 (three) times a day  -     Comprehensive metabolic panel; Future  -     TSH, 3rd generation with Free T4 reflex; Future  -     Lipid panel; Future    [facility]  Comments:  Stable no medication changes  Orders:  -     potassium chloride (K-DUR,KLOR-CON) 20 mEq tablet; Take 1 tablet (20 mEq total) by mouth 3 (three) times a day  -     Comprehensive metabolic panel; Future  -     TSH, 3rd generation with Free T4 reflex; Future  -     Lipid panel; Future    Benign essential hypertension  Comments:  BP at goal no medication changes  Orders:  -     potassium chloride (K-DUR,KLOR-CON) 20 mEq tablet; Take 1 tablet (20 mEq total) by mouth 3 (three) times a day  -     Comprehensive metabolic panel; Future    Arteriosclerosis of coronary artery  Comments:  No symptoms of concern  No medication changes  Orders:  -     Comprehensive metabolic panel; Future  -     Lipid panel;  Future    Mixed hyperlipidemia  Comments:  No medication change  Due for lipid assessment  Orders:  -     Comprehensive metabolic panel; Future  -     TSH, 3rd generation with Free T4 reflex; Future  -     Lipid panel; Future    Hypokalemia  Comments:  Reduce potassium t i d  Repeat level  Orders:  -     potassium chloride (K-DUR,KLOR-CON) 20 mEq tablet; Take 1 tablet (20 mEq total) by mouth 3 (three) times a day  -     Comprehensive metabolic panel; Future    Statin intolerance  Comments:  Patient tolerating Crestor twice weekly  Orders:  -     Lipid panel; Future    Syncope, unspecified syncope type  Comments:  No further syncopal episodes  There are no Patient Instructions on file for this visit                    Barbara Fernández

## 2018-10-19 DIAGNOSIS — E55.9 VITAMIN D DEFICIENCY: Primary | ICD-10-CM

## 2018-10-19 LAB
ALBUMIN SERPL ELPH-MCNC: 3.95 G/DL (ref 3.5–5)
ALBUMIN SERPL ELPH-MCNC: 55.7 % (ref 52–65)
ALPHA1 GLOB SERPL ELPH-MCNC: 0.31 G/DL (ref 0.1–0.4)
ALPHA1 GLOB SERPL ELPH-MCNC: 4.4 % (ref 2.5–5)
ALPHA2 GLOB SERPL ELPH-MCNC: 0.79 G/DL (ref 0.4–1.2)
ALPHA2 GLOB SERPL ELPH-MCNC: 11.1 % (ref 7–13)
BETA GLOB ABNORMAL SERPL ELPH-MCNC: 0.5 G/DL (ref 0.4–0.8)
BETA1 GLOB SERPL ELPH-MCNC: 7 % (ref 5–13)
BETA2 GLOB SERPL ELPH-MCNC: 7.5 % (ref 2–8)
BETA2+GAMMA GLOB SERPL ELPH-MCNC: 0.53 G/DL (ref 0.2–0.5)
GAMMA GLOB ABNORMAL SERPL ELPH-MCNC: 1.02 G/DL (ref 0.5–1.6)
GAMMA GLOB SERPL ELPH-MCNC: 14.3 % (ref 12–22)
IGG/ALB SER: 1.26 {RATIO} (ref 1.1–1.8)
PROT PATTERN SERPL ELPH-IMP: ABNORMAL
PROT SERPL-MCNC: 7.1 G/DL (ref 6.4–8.2)

## 2018-10-19 RX ORDER — CHOLECALCIFEROL (VITAMIN D3) 50 MCG
2000 TABLET ORAL DAILY
Start: 2018-10-19

## 2018-10-19 NOTE — TELEPHONE ENCOUNTER
----- Message from Saira Allan MD sent at 10/19/2018  8:18 AM EDT -----  Please call the patient to increase vitamin-D 2 2000 International Units daily

## 2018-10-22 ENCOUNTER — OFFICE VISIT (OUTPATIENT)
Dept: ENDOCRINOLOGY | Facility: CLINIC | Age: 83
End: 2018-10-22
Payer: COMMERCIAL

## 2018-10-22 VITALS
DIASTOLIC BLOOD PRESSURE: 82 MMHG | SYSTOLIC BLOOD PRESSURE: 116 MMHG | BODY MASS INDEX: 24.65 KG/M2 | HEIGHT: 64 IN | WEIGHT: 144.4 LBS | HEART RATE: 66 BPM

## 2018-10-22 DIAGNOSIS — M81.0 OSTEOPOROSIS, UNSPECIFIED OSTEOPOROSIS TYPE, UNSPECIFIED PATHOLOGICAL FRACTURE PRESENCE: Primary | ICD-10-CM

## 2018-10-22 DIAGNOSIS — E55.9 VITAMIN D DEFICIENCY: ICD-10-CM

## 2018-10-22 DIAGNOSIS — E87.6 HYPOKALEMIA: ICD-10-CM

## 2018-10-22 PROCEDURE — 99214 OFFICE O/P EST MOD 30 MIN: CPT | Performed by: INTERNAL MEDICINE

## 2018-10-22 NOTE — PATIENT INSTRUCTIONS
Take Caltrate 500/400, 1 tablet 2 times a day  Take vitamin-D 3, 4000 IU daily   You are due for Reclast injection in April 2019

## 2018-11-06 ENCOUNTER — APPOINTMENT (OUTPATIENT)
Dept: LAB | Facility: CLINIC | Age: 83
End: 2018-11-06
Payer: COMMERCIAL

## 2018-11-06 ENCOUNTER — TRANSCRIBE ORDERS (OUTPATIENT)
Dept: LAB | Facility: CLINIC | Age: 83
End: 2018-11-06

## 2018-11-06 DIAGNOSIS — I10 BENIGN ESSENTIAL HYPERTENSION: ICD-10-CM

## 2018-11-06 DIAGNOSIS — E78.2 MIXED HYPERLIPIDEMIA: ICD-10-CM

## 2018-11-06 DIAGNOSIS — E87.6 HYPOKALEMIA: ICD-10-CM

## 2018-11-06 DIAGNOSIS — Z78.9 STATIN INTOLERANCE: ICD-10-CM

## 2018-11-06 DIAGNOSIS — I25.10 CORONARY ARTERY DISEASE INVOLVING NATIVE CORONARY ARTERY OF NATIVE HEART WITHOUT ANGINA PECTORIS: ICD-10-CM

## 2018-11-06 DIAGNOSIS — I25.10 ARTERIOSCLEROSIS OF CORONARY ARTERY: ICD-10-CM

## 2018-11-06 DIAGNOSIS — I50.22 CHRONIC SYSTOLIC CONGESTIVE HEART FAILURE (HCC): ICD-10-CM

## 2018-11-06 LAB
ALBUMIN SERPL BCP-MCNC: 3.6 G/DL (ref 3.5–5)
ALP SERPL-CCNC: 64 U/L (ref 46–116)
ALT SERPL W P-5'-P-CCNC: 17 U/L (ref 12–78)
ANION GAP SERPL CALCULATED.3IONS-SCNC: 8 MMOL/L (ref 4–13)
AST SERPL W P-5'-P-CCNC: 16 U/L (ref 5–45)
BILIRUB SERPL-MCNC: 0.59 MG/DL (ref 0.2–1)
BUN SERPL-MCNC: 16 MG/DL (ref 5–25)
CALCIUM SERPL-MCNC: 9.3 MG/DL (ref 8.3–10.1)
CHLORIDE SERPL-SCNC: 106 MMOL/L (ref 100–108)
CHOLEST SERPL-MCNC: 175 MG/DL (ref 50–200)
CO2 SERPL-SCNC: 26 MMOL/L (ref 21–32)
CREAT SERPL-MCNC: 0.69 MG/DL (ref 0.6–1.3)
GFR SERPL CREATININE-BSD FRML MDRD: 80 ML/MIN/1.73SQ M
GLUCOSE P FAST SERPL-MCNC: 96 MG/DL (ref 65–99)
HDLC SERPL-MCNC: 65 MG/DL (ref 40–60)
LDLC SERPL CALC-MCNC: 74 MG/DL (ref 0–100)
NONHDLC SERPL-MCNC: 110 MG/DL
POTASSIUM SERPL-SCNC: 3.6 MMOL/L (ref 3.5–5.3)
PROT SERPL-MCNC: 7.3 G/DL (ref 6.4–8.2)
SODIUM SERPL-SCNC: 140 MMOL/L (ref 136–145)
TRIGL SERPL-MCNC: 179 MG/DL
TSH SERPL DL<=0.05 MIU/L-ACNC: 1.53 UIU/ML (ref 0.36–3.74)

## 2018-11-06 PROCEDURE — 80053 COMPREHEN METABOLIC PANEL: CPT

## 2018-11-06 PROCEDURE — 36415 COLL VENOUS BLD VENIPUNCTURE: CPT

## 2018-11-06 PROCEDURE — 80061 LIPID PANEL: CPT

## 2018-11-06 PROCEDURE — 84443 ASSAY THYROID STIM HORMONE: CPT

## 2018-11-12 DIAGNOSIS — E78.5 DYSLIPIDEMIA: Primary | ICD-10-CM

## 2018-11-12 RX ORDER — ROSUVASTATIN CALCIUM 5 MG/1
TABLET, COATED ORAL
Qty: 26 TABLET | Refills: 3 | Status: SHIPPED | OUTPATIENT
Start: 2018-11-12 | End: 2019-11-09 | Stop reason: SDUPTHER

## 2018-12-02 NOTE — PROGRESS NOTES
Progress Note - Cardiology Office  [facility]  Altagracia Kennedy Adriana 80 y o  female MRN: [id number]  : 1933  Encounter: [id number]      Assessment:     1  Chest pressure    2  Benign essential hypertension    3  Chronic systolic congestive heart failure ([address] )    4  Coronary artery disease involving native coronary artery of native heart without angina pectoris    5  Arteriosclerosis of coronary artery    6  Syncope, unspecified syncope type    7  Mixed hyperlipidemia    8  Statin intolerance        Discussion Summary and Plan:  1  Syncope  No clear etiology  No more episode since that episode  It did happen while she was drinking glass of wine  Advised to keep herself hydrated  Patient's echo and Holter reviewed with the patient  She did not do tilt-table test   Holter monitor shows no evidence of any tachy-jesse arrhythmias  She was in sinus rhythm  2   Coronary artery disease with history of angioplasty of LAD and diagonal with a stent in  by Dr Brendon Barlow and repeat catheterization in  shows patent stent and has mid RCA significant stenosis but very medium to small size artery not suitable for percutaneous techniques on medical Rx  That RCA is possible  can cause some symptoms of chronic angina  Not suitable for angioplasty    3  Dyslipidemia but intolerant to statins  She takes Crestor 5 mg twice a week not sufficient enough to control her cholesterol and she can be consider for PS K inhibitors  Patient cholesterol has improved  She is not completely sold out for PS skin hip tests yet  4   Persistent hypokalemia  May be related to possible underlying some hyperaldosteronism  I still believe she can benefit from addition of Aldactone  However patient could not tolerate Aldactone and does not want to change her blood pressure medications  5   Benign essential hypertension  Blood pressure is acceptable    He is on Tribenzor and potassium supplementation  Not interested in changing that medications as she has tolerated this medication very well  All these issues discussed with patient at length all her questions answered to her satisfaction  Counseling :  A description of the counseling  Patient advised to keep herself hydrated avoid similar situation where she passed out  Patient daughter was present all the time and all their questions were answered  Patient's ability to self care: Yes  Medication side effect reviewed with patient in detail and all their questions answered to their satisfaction  HPI :     Vidya Barragan is a 80y o  year old female who came for follow up  Patient has Past medical history significant for coronary artery disease with remote angioplasty of LAD and diagonal by Dr Gadiel Lara in 2013 and then repeat cardiac catheterization in 2015 at [facility] shows patent stent in LAD and diagonal and has small RCA mid 80-90% stenosis which is very tortuous and not suitable for percutaneous technique  Patient has repeated history of syncopal episode and most of time it happened situation when she had a glass of wine and she is in a restaurant  She does not remember what happens and she feels fine after few minutes off it  She denies any fever or any chills any nausea and vomiting  She is otherwise very active  She cannot take statins even though her cholesterol is very high  She takes Crestor 5 mg twice a week and she is able to handle that  No fever no chills no nausea no vomiting no other significant complaint  07/09/2018  Patient came 1 day before appointment  Above reviewed  No more episodes of dizziness lightheadedness or syncope  She said she could not tolerate Aldactone as blood pressure was not good she is taking Tribenzor as before  She did not bring her med list   Not sure how Echo rate she is taking her medications  She seemed to be getting forgetful    Her blood test from June was reviewed there were acceptable  Potassium was acceptable  She is taking K-Dur 20 milligram 3 times daily  No dizziness no lightheadedness  She does have history of coronary artery disease  She had history of recurrent hypokalemia  No other complaint today  Patient's Holter report is still pending  She did not do tilt-table test as ordered  12/05/2018  Above reviewed  Patient came for follow-up  His she had episode of chest tightness  Her previous angiogram reviewed  She had RCA 80-90% stenosis is a medium size artery but very tortuous not suitable for percutaneous techniques  Pain got better with aspirin  She could not tolerate Aldactone as blood pressure was not good  Five she is otherwise doing very well  She is taking now potassium 4 times daily and potassium is acceptable  No nausea no vomiting no PND no orthopnea no other significant issues at this time  No more syncopal episode  Review of Systems   Constitutional: Negative for activity change, chills, diaphoresis, fever and unexpected weight change  HENT: Negative for congestion  Eyes: Negative for discharge and redness  Respiratory: Positive for chest tightness  Negative for cough, shortness of breath and wheezing  One episode of chest tightness as mentioned above   Cardiovascular: Negative for palpitations and leg swelling  History of coronary artery disease and stents   Gastrointestinal: Negative for abdominal pain, diarrhea and nausea  Endocrine: Negative  History of hypokalemia   Genitourinary: Negative for decreased urine volume and urgency  Musculoskeletal: Negative  Negative for arthralgias, back pain and gait problem  Skin: Negative for rash and wound  Allergic/Immunologic: Negative  Neurological: Negative for dizziness, seizures, weakness, light-headedness and headaches  Hematological: Negative  Psychiatric/Behavioral: Negative for agitation and confusion         Historical Information   Past Medical History:   Diagnosis Date    Abscess of chest wall     Last Assessed: 2/27/2014    Arthritis     Back pain     Cataract     Start of    Coronary artery disease     Diverticulitis of colon 12/04/2008    Female bladder prolapse     Gastric reflux     Hematuria     Last Assessed: 11/18/2014     History of diverticulitis     [place] (hard of hearing)     Hypercholesteremia     Hyperkeratosis of skin 11/03/2011    Hypertension     Hypokalemia 12/13/2011    Incomplete uterovaginal prolapse 05/28/2010    Osteoporosis     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    Last Assessed: 10/24/2012     Seasonal allergies     Sebaceous cyst     Last Assessed: 2/8/2014    Syncope     Trigeminal neuralgia 03/20/2012    Urinary incontinence     Uterine prolapse     Vertigo 09/15/2011    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    Wears glasses     Wears hearing aid     States she rarely wears them    Wears partial dentures     Upper      Past Surgical History:   Procedure Laterality Date    APPENDECTOMY      APPENDICO-VESICOSTOMY CUTANEOUS  03/01/2010    CHOLECYSTECTOMY      Laparoscopic    COLONOSCOPY      CORONARY ANGIOPLASTY      CORONARY ANGIOPLASTY WITH STENT PLACEMENT      2 aortic stents    CORONARY ANGIOPLASTY WITH STENT PLACEMENT  2013    2 distal left anterior descending artery     ESOPHAGOGASTRODUODENOSCOPY      FRACTURE SURGERY Right     Repair right arm- titanium servando from shoulder to elbow      HYSTERECTOMY      Complete    MN CMBND ANTERPOST COLPORRAPHY W/CYSTO N/A 9/20/2016    Procedure: COLPORRHAPHY ANTERIOR POSTERIOR GRAFT;  Surgeon: Cailin Nolasco MD;  [facility];  Service: UroGynecology           MN CYSTOURETHROSCOPY N/A 9/20/2016    Procedure: Darius Graves;  Surgeon: Cailin Nolasco MD;  [facility];  Service: UroGynecology           MN REVAGINAL PROLAPSE,SACROSP LIG N/A 9/20/2016    Procedure: COLPOPEXY VAGINAL EXTRAPERITONEAL  incision and drainage of vagina inclusion cyst x 4;  Surgeon: Mariana Garcia MD;  [facility];  Service: UroGynecology           GA SLING OPER STRES INCONTINENCE N/A 9/20/2016    Procedure: INSERTION PUBOVAGINAL SLING RETROPUBIC ;  Surgeon: Mariana Garcia MD;  [facility];  Service: UroGynecology            History   Alcohol Use    0 6 - 1 2 oz/week    1 - 2 Glasses of wine per week     Comment: per day, per allscripts: Being a social drinker      History   Drug Use No     History   Smoking Status    Never Smoker   Smokeless Tobacco    Never Used     Family History:   Family History   Problem Relation Age of Onset    Hypertension Mother     Heart failure Mother     Coronary artery disease Mother     Arthritis Mother     Osteoporosis Mother     Hyperlipidemia Mother     Coronary artery disease Sister        Meds/Allergies     Allergies   Allergen Reactions    Penicillins Hives     "All cillins", "and all myocillins"    Versed [Midazolam] Other (See Comments)     States she "passed out" when recovering from a procedure and was told to not use it again   Zithromax [Azithromycin] Anaphylaxis    Bee Venom      Reaction Date: 02Feb2004; Annotation - [id number]: jjw    Enalapril      Reaction Date: [id number];     Erythromycin Dizziness     Reaction Date: [id number];     Estrogens      Reaction Date: 02Feb2004; Annotation - [id number]: jjw    Ramipril      Reaction Date: 02Feb2004;  Annotation - [id number]: jjw    Statins Other (See Comments)     Muscle pain       Current Outpatient Prescriptions:     aspirin (ASPIRIN LOW DOSE) 81 MG tablet, Take 1 tablet by mouth daily, Disp: , Rfl:     Calcium Carbonate-Vitamin D (CALCIUM 600+D HIGH POTENCY PO), Take 600 mg by mouth 3 (three) times a day  , Disp: , Rfl:     Cholecalciferol (VITAMIN D) 2000 units tablet, Take 1 tablet (2,000 Units total) by mouth daily, Disp: , Rfl:     Coenzyme Q10-Fish Oil-Vit E (CO-Q 10 OMEGA-3 FISH OIL PO), Take 1 capsule by mouth daily, Disp: , Rfl:     fexofenadine (ALLEGRA ALLERGY) 180 MG tablet, Take by mouth, Disp: , Rfl:     fluticasone (FLONASE) 50 mcg/act nasal spray, 1 spray into each nostril daily (Patient taking differently: 1 spray into each nostril as needed  ), Disp: 16 g, Rfl: 1    olmesartan 20 mg TABS 20 mg, amLODIPine 5 mg TABS 5 mg, hydrochlorothiazide 12 5 mg TABS 12 5 mg, Take by mouth daily, Disp: , Rfl:     potassium chloride (K-DUR,KLOR-CON) 20 mEq tablet, Take 1 tablet (20 mEq total) by mouth 4 (four) times a day, Disp: 360 tablet, Rfl: 1    RABEprazole (ACIPHEX) 20 MG tablet, Take 20 mg by mouth every morning , Disp: , Rfl:     rosuvastatin (CRESTOR) 5 mg tablet, TAKE 1 TABLET TWICE A WEEK, Disp: 26 tablet, Rfl: 3    Vitals: Blood pressure 130/78, pulse 84, height 5' 4" (1 626 m), weight 65 8 kg (145 lb), SpO2 98 %  Body mass index is 24 89 kg/m²  Vitals:    12/05/18 1255   Weight: 65 8 kg (145 lb)     BP Readings from Last 3 Encounters:   12/05/18 130/78   10/22/18 116/82   10/17/18 130/80         Physical Exam   Constitutional: She is oriented to person, place, and time  She appears well-developed  No distress  HENT:   Head: Normocephalic and atraumatic  Eyes: Pupils are equal, round, and reactive to light  Neck: Normal range of motion  Neck supple  No JVD present  No thyromegaly present  Cardiovascular: Normal rate and regular rhythm  S1-S2 regular with 2/6 ejection systolic murmur  Murmur is at aortic area   Pulmonary/Chest: Effort normal and breath sounds normal  No respiratory distress  She has no wheezes  She has no rales  Abdominal: Soft  Bowel sounds are normal  She exhibits no distension  There is no tenderness  There is no rebound  Musculoskeletal: Normal range of motion  She exhibits no edema or tenderness  Neurological: She is alert and oriented to person, place, and time  Skin: Skin is warm and dry  She is not diaphoretic     Psychiatric: She has a normal mood and affect  Her behavior is normal  Judgment normal      Diagnostic Studies Review Cardio:   cardiac catheterization :   Patient's cardiac catheterization from  reviewed  She has patent stent seen in LAD and diagonal   Apical LAD has around 70 80% stenosis which small vessel, mid circumflex around 40-50% stenosis  RCA which is a small size artery and only gives RPDA has around 90% stenosis in the mid it is very tortuous artery not suitable for percutaneous techniques  Echo Doppler done 2018 shows low-normal LV systolic function EF around 55 percent, mild concentric left atrial hypertrophy, mild MR, mild AI, mild TR PA pressure 30 millimeters of mercury  Holter monitor done on 2018 shows normal sinus rhythm  Few PACs seen  Rare episode of PVCs  EKG:    Twelve lead E[facility] done at  [facility] shows normal sinus rhythm heart rate around 87 beats per minute  Nonspecific ST changes  Twelve lead EKG done on 2018 shows normal sinus rhythm heart rate 81 beats per minute  No significant ST changes  No change from old EKG  Cardiac testing:       Results for orders placed in visit on 08/20/15   Cardiac catheterization    Narrative [facility], [address] [place] [address]   Phone: [phone]      INVASIVE CARDIOVASCULAR LAB COMPLETE REPORT         Patient: Brayan MENDOSA   Location: Outpatient   MR number: [id number]   Account number: [de-identified]   Study date: 2015   Gender: Female   : 1933   Height: 64 2 in   Weight: 147 6 lb   BSA: 1 72 m squared   Allergies: Altace, beestings, Erythromycin Base, estrogen, penicilin, Versed,   Vasotec, Zithromax, all cillins, mycins, Vytorin 10-10, Zetia   Diagnostic Cardiologist:  Suzan Harris MD   Primary Physician:  Zev Cramer MD   SUMMARY   CORONARY CIRCULATION:   Left main: Normal    LAD: The vessel was normal sized  The were no obstructive stenoses   The   previously deployed stent remains widely patent  The stent at the ostium of D1   remains widely patent  Circumflex: The vessel was normal sized  There was a   non-critical 50% plaque in mid vessel  There were no obstructive stenoses  The   major OM branches were normal  There was faint collateral flow from the   circumflex to the distal RCA  RCA: The vessel was small to medium sized and   dominant, giving rise to the PDA  There was a long, tortuous 95% stenosis in   mid vessel  [place] of severe tortuosity, the lesion is poorly suited for PCI  REPORT ELEMENT SELECTION:   Right radial access was employed  Summary: The patient has single vessel disease with a long, tortuous subtotal   stenosis in the mid RCA  Elucidating symptom status is difficult in this   patient, since she previously had very severe LAD disease and diagonal disease   with atypical symptoms as the only presentation  Medical therapy is    recommended   for the following reasons 1) the lesion has been stable angiographically for 2   years  2) the patient was able to exercise to a high level after PCI 2 years   ago with this lesion already present  3) Nuclear imaging 1 year ago showed no   ischemia in the presence of this lesion  4) There is some collateral flow from   the circumflex  5) Current symptoms are atypical and not exertional  If PCI is   required in the future because of exertional angina refractory to medication,   it is recommended that the procedure be performed via the femoral approach,    and   INVASIVE CARDIOVASCULAR LAB COMPLETE REPORT   Patient: Elissa MENDOSA   MR number: [id number]    ------ Page 2   BSA: 1 72 m squared   that the procedure be performed at the [facility] because of the   increased risk of comlications  Treatment with rosuvastatin, 2 5mg once per   week was initiated  The patient has a history of statin intolerance at low   doses  INDICATIONS:   --  Possible CAD: unstable angina     PROCEDURES PERFORMED   --  Left coronary angiography  --  Right coronary angiography  --  Outpatient  --  Coronary Catheterization (w/o [facility])  PROCEDURE: The risks and alternatives of the procedures and conscious sedation   were explained to the patient and informed consent was obtained  The patient   was brought to the cath lab and placed on the table  The planned puncture    sites   were prepped and draped in the usual sterile fashion  --  Right radial artery access  After performing an Eusebio's test to verify   adequate ulnar artery supply to the hand, the radial site was prepped  The   puncture site was infiltrated with local anesthetic  The vessel was accessed   using the modified Seldinger technique, a wire was advanced into the vessel,   and a sheath was advanced over the wire into the vessel  --  Left coronary artery angiography  A catheter was advanced over a guidewire   into the aorta and positioned in the left coronary artery ostium under   fluoroscopic guidance  Angiography was performed  --  Right coronary artery angiography  A catheter was advanced over a    guidewire   into the aorta and positioned in the right coronary artery ostium under   fluoroscopic guidance  Angiography was performed  --  Outpatient  --  Coronary Catheterization (w/o [facility])  PROCEDURE COMPLETION: The patient tolerated the procedure well and was   discharged from the cath lab  TIMING: Test started at 09:38  Test concluded at   10:03  HEMOSTASIS: The sheath was removed  The site was compressed with a   Hemoband device  Hemostasis was obtained  MEDICATIONS GIVEN: Verapamil   (Isoptin, Calan, Covera), 1 25 mg, IA, at 09:52  Fentanyl (1OOmcg/2 ml), 50   mcg, IV, at 09:42  Fentanyl (1OOmcg/2 ml), 50 mcg, IV, at 09:45  1% Lidocaine,   1 ml, subcutaneously, at 09:49  Fentanyl (1OOmcg/2 ml), 25 mcg, IV, at 09:50  Nitroglycerin (200mcg/ml), 200 mcg, at 09:52  Heparin 1000 units/ml, 4,000   units, IV, at 09:52  CONTRAST GIVEN: 70 ml Omnipaque (350mg I /ml)  RADIATION   EXPOSURE: Fluoroscopy time: 1 8 min  CORONARY VESSELS:   --  Left main: Normal    INVASIVE CARDIOVASCULAR LAB COMPLETE REPORT   Patient: Jayro MENDOSA   MR number: [id number]    ------ Page 3   BSA: 1 72 m squared   --  LAD: The vessel was normal sized  The were no obstructive stenoses  The   previously deployed stent remains widely patent  The stent at the ostium of D1   remains widely patent  --  Circumflex: The vessel was normal sized  There was    a   non-critical 50% plaque in mid vessel  There were no obstructive stenoses  The   major OM branches were normal  There was faint collateral flow from the   circumflex to the distal RCA  --  RCA: The vessel was small to medium sized    and   dominant, giving rise to the PDA  There was a long, tortuous 95% stenosis in   mid vessel  [place] of severe tortuosity, the lesion is poorly suited for PCI  NOTE: Right radial access was employed  Prepared and signed by   Julee Friedman MD   Signed 2015 11:05:34   CC: Dr Lisbeth Santos   Study diagram   Hemodynamic tables   Pressures:  Baseline   Pressures:  - HR: 70   Pressures:  - Rhythm:   Pressures:  -- Aortic Pressure (S/D/M): 157/83/116   Outputs:  Baseline   Outputs:  -- CALCULATIONS[age]   Outputs:  -- CALCULATIONS: Body Surface Area: 1 72   Outputs:  -- CALCULATIONS: Height in cm: 163 00   Outputs:  -- CALCULATIONS: Sex: Female   Outputs:  -- CALCULATIONS: Weight in k 10        Lab Review   Lab Results   Component Value Date    WBC 9 20 2018    HGB 14 5 2018    HCT 44 4 2018    MCV 94 2018    RDW 14 3 2018     2018     BMP:  Lab Results   Component Value Date     2016    K 3 6 2018     2018    CO2 26 2018    BUN 16 2018    CREATININE 0 69 2018    GLUCOSE 99 2016    GLUF 96 2018    CALCIUM 9 3 2018    EGFR 80 2018     LFT:  Lab Results   Component Value Date    AST 16 11/06/2018    ALT 17 11/06/2018    ALKPHOS 64 11/06/2018    PROT 7 1 07/20/2016    BILITOT 0 3 07/20/2016       Lab Results   Component Value Date    HGBA1C 5 9 05/19/2018     Lipid Profile:   Lab Results   Component Value Date    CHOL 192 12/03/2013    HDL 65 (H) 11/06/2018    LDLCALC 74 11/06/2018    TRIG 179 (H) 11/06/2018     Lab Results   Component Value Date    CHOL 192 12/03/2013     Lab Results   Component Value Date    TROPONINI 0 03 05/20/2018       Dr Audie Swain MD [facility]      "This note has been constructed using a voice recognition system  Therefore there may be syntax, spelling, and/or grammatical errors   Please call if you have any questions  "

## 2018-12-05 ENCOUNTER — OFFICE VISIT (OUTPATIENT)
Dept: CARDIOLOGY CLINIC | Facility: CLINIC | Age: 83
End: 2018-12-05
Payer: COMMERCIAL

## 2018-12-05 VITALS
HEIGHT: 64 IN | SYSTOLIC BLOOD PRESSURE: 130 MMHG | DIASTOLIC BLOOD PRESSURE: 78 MMHG | WEIGHT: 145 LBS | HEART RATE: 84 BPM | OXYGEN SATURATION: 98 % | BODY MASS INDEX: 24.75 KG/M2

## 2018-12-05 DIAGNOSIS — R07.89 CHEST PRESSURE: Primary | ICD-10-CM

## 2018-12-05 DIAGNOSIS — I25.10 CORONARY ARTERY DISEASE INVOLVING NATIVE CORONARY ARTERY OF NATIVE HEART WITHOUT ANGINA PECTORIS: ICD-10-CM

## 2018-12-05 DIAGNOSIS — I50.22 CHRONIC SYSTOLIC CONGESTIVE HEART FAILURE (HCC): ICD-10-CM

## 2018-12-05 DIAGNOSIS — I10 BENIGN ESSENTIAL HYPERTENSION: ICD-10-CM

## 2018-12-05 DIAGNOSIS — Z78.9 STATIN INTOLERANCE: ICD-10-CM

## 2018-12-05 DIAGNOSIS — I25.10 ARTERIOSCLEROSIS OF CORONARY ARTERY: ICD-10-CM

## 2018-12-05 DIAGNOSIS — R55 SYNCOPE, UNSPECIFIED SYNCOPE TYPE: ICD-10-CM

## 2018-12-05 DIAGNOSIS — E78.2 MIXED HYPERLIPIDEMIA: ICD-10-CM

## 2018-12-05 PROCEDURE — 93000 ELECTROCARDIOGRAM COMPLETE: CPT | Performed by: INTERNAL MEDICINE

## 2018-12-05 PROCEDURE — 99214 OFFICE O/P EST MOD 30 MIN: CPT | Performed by: INTERNAL MEDICINE

## 2018-12-05 PROCEDURE — 4040F PNEUMOC VAC/ADMIN/RCVD: CPT | Performed by: INTERNAL MEDICINE

## 2019-01-13 DIAGNOSIS — I10 ESSENTIAL HYPERTENSION: Primary | ICD-10-CM

## 2019-01-13 RX ORDER — OLMESARTAN MEDOXOMIL, AMLODIPINE AND HYDROCHLOROTHIAZIDE TABLET 20/5/12.5 MG 20; 5; 12.5 MG/1; MG/1; MG/1
TABLET ORAL
Qty: 90 TABLET | Refills: 3 | Status: SHIPPED | OUTPATIENT
Start: 2019-01-13 | End: 2019-04-26

## 2019-04-07 DIAGNOSIS — K21.9 GASTROESOPHAGEAL REFLUX DISEASE, ESOPHAGITIS PRESENCE NOT SPECIFIED: Primary | ICD-10-CM

## 2019-04-07 RX ORDER — RABEPRAZOLE SODIUM 20 MG/1
TABLET, DELAYED RELEASE ORAL
Qty: 90 TABLET | Refills: 1 | Status: SHIPPED | OUTPATIENT
Start: 2019-04-07 | End: 2019-10-04 | Stop reason: SDUPTHER

## 2019-04-22 ENCOUNTER — TELEPHONE (OUTPATIENT)
Dept: ENDOCRINOLOGY | Facility: CLINIC | Age: 84
End: 2019-04-22

## 2019-04-22 ENCOUNTER — LAB (OUTPATIENT)
Dept: LAB | Facility: CLINIC | Age: 84
End: 2019-04-22
Payer: COMMERCIAL

## 2019-04-22 ENCOUNTER — TRANSCRIBE ORDERS (OUTPATIENT)
Dept: LAB | Facility: CLINIC | Age: 84
End: 2019-04-22

## 2019-04-22 DIAGNOSIS — I10 HYPERTENSION, UNSPECIFIED TYPE: ICD-10-CM

## 2019-04-22 DIAGNOSIS — M81.0 OSTEOPOROSIS, UNSPECIFIED OSTEOPOROSIS TYPE, UNSPECIFIED PATHOLOGICAL FRACTURE PRESENCE: ICD-10-CM

## 2019-04-22 LAB
25(OH)D3 SERPL-MCNC: 32.6 NG/ML (ref 30–100)
ANION GAP SERPL CALCULATED.3IONS-SCNC: 5 MMOL/L (ref 4–13)
BUN SERPL-MCNC: 14 MG/DL (ref 5–25)
CALCIUM SERPL-MCNC: 8.7 MG/DL (ref 8.3–10.1)
CHLORIDE SERPL-SCNC: 104 MMOL/L (ref 100–108)
CO2 SERPL-SCNC: 29 MMOL/L (ref 21–32)
CREAT SERPL-MCNC: 0.62 MG/DL (ref 0.6–1.3)
GFR SERPL CREATININE-BSD FRML MDRD: 82 ML/MIN/1.73SQ M
GLUCOSE P FAST SERPL-MCNC: 98 MG/DL (ref 65–99)
POTASSIUM SERPL-SCNC: 3.2 MMOL/L (ref 3.5–5.3)
PTH-INTACT SERPL-MCNC: 79.2 PG/ML (ref 18.4–80.1)
SODIUM SERPL-SCNC: 138 MMOL/L (ref 136–145)

## 2019-04-22 PROCEDURE — 36415 COLL VENOUS BLD VENIPUNCTURE: CPT

## 2019-04-22 PROCEDURE — 83970 ASSAY OF PARATHORMONE: CPT

## 2019-04-22 PROCEDURE — 82306 VITAMIN D 25 HYDROXY: CPT

## 2019-04-22 PROCEDURE — 80048 BASIC METABOLIC PNL TOTAL CA: CPT

## 2019-04-26 ENCOUNTER — OFFICE VISIT (OUTPATIENT)
Dept: CARDIOLOGY CLINIC | Facility: CLINIC | Age: 84
End: 2019-04-26
Payer: COMMERCIAL

## 2019-04-26 VITALS
HEART RATE: 93 BPM | HEIGHT: 64 IN | OXYGEN SATURATION: 98 % | DIASTOLIC BLOOD PRESSURE: 82 MMHG | BODY MASS INDEX: 24.75 KG/M2 | SYSTOLIC BLOOD PRESSURE: 140 MMHG | WEIGHT: 145 LBS

## 2019-04-26 DIAGNOSIS — R07.89 CHEST PRESSURE: ICD-10-CM

## 2019-04-26 DIAGNOSIS — I50.22 CHRONIC SYSTOLIC CONGESTIVE HEART FAILURE (HCC): ICD-10-CM

## 2019-04-26 DIAGNOSIS — I25.10 ARTERIOSCLEROSIS OF CORONARY ARTERY: ICD-10-CM

## 2019-04-26 DIAGNOSIS — I10 BENIGN ESSENTIAL HYPERTENSION: ICD-10-CM

## 2019-04-26 DIAGNOSIS — E87.6 HYPOKALEMIA: ICD-10-CM

## 2019-04-26 DIAGNOSIS — I25.10 CORONARY ARTERY DISEASE INVOLVING NATIVE CORONARY ARTERY OF NATIVE HEART WITHOUT ANGINA PECTORIS: ICD-10-CM

## 2019-04-26 DIAGNOSIS — E78.2 MIXED HYPERLIPIDEMIA: ICD-10-CM

## 2019-04-26 DIAGNOSIS — Z78.9 STATIN INTOLERANCE: ICD-10-CM

## 2019-04-26 PROCEDURE — 99215 OFFICE O/P EST HI 40 MIN: CPT | Performed by: INTERNAL MEDICINE

## 2019-04-26 PROCEDURE — 93000 ELECTROCARDIOGRAM COMPLETE: CPT | Performed by: INTERNAL MEDICINE

## 2019-04-26 RX ORDER — AMLODIPINE BESYLATE 5 MG/1
5 TABLET ORAL DAILY
Qty: 30 TABLET | Refills: 2 | Status: SHIPPED | OUTPATIENT
Start: 2019-04-26 | End: 2019-06-18 | Stop reason: SDUPTHER

## 2019-04-26 RX ORDER — OLMESARTAN MEDOXOMIL 20 MG/1
20 TABLET ORAL DAILY
Qty: 30 TABLET | Refills: 1 | Status: SHIPPED | OUTPATIENT
Start: 2019-04-26 | End: 2019-12-17

## 2019-04-26 RX ORDER — METOPROLOL SUCCINATE 25 MG/1
25 TABLET, EXTENDED RELEASE ORAL DAILY
Qty: 30 TABLET | Refills: 2 | Status: SHIPPED | OUTPATIENT
Start: 2019-04-26 | End: 2019-06-14 | Stop reason: SDUPTHER

## 2019-04-26 RX ORDER — SPIRONOLACTONE 25 MG/1
25 TABLET ORAL DAILY
Qty: 30 TABLET | Refills: 2 | Status: SHIPPED | OUTPATIENT
Start: 2019-04-26 | End: 2019-06-14 | Stop reason: SDUPTHER

## 2019-04-27 ENCOUNTER — APPOINTMENT (OUTPATIENT)
Dept: LAB | Facility: HOSPITAL | Age: 84
End: 2019-04-27
Attending: INTERNAL MEDICINE
Payer: COMMERCIAL

## 2019-04-27 ENCOUNTER — TRANSCRIBE ORDERS (OUTPATIENT)
Dept: ADMINISTRATIVE | Facility: HOSPITAL | Age: 84
End: 2019-04-27

## 2019-04-27 DIAGNOSIS — E87.6 HYPOKALEMIA: ICD-10-CM

## 2019-04-27 PROCEDURE — 36415 COLL VENOUS BLD VENIPUNCTURE: CPT

## 2019-04-27 PROCEDURE — 84244 ASSAY OF RENIN: CPT

## 2019-04-27 PROCEDURE — 82088 ASSAY OF ALDOSTERONE: CPT

## 2019-04-29 ENCOUNTER — OFFICE VISIT (OUTPATIENT)
Dept: ENDOCRINOLOGY | Facility: CLINIC | Age: 84
End: 2019-04-29
Payer: COMMERCIAL

## 2019-04-29 ENCOUNTER — DOCUMENTATION (OUTPATIENT)
Dept: ENDOCRINOLOGY | Facility: CLINIC | Age: 84
End: 2019-04-29

## 2019-04-29 VITALS
SYSTOLIC BLOOD PRESSURE: 142 MMHG | HEIGHT: 64 IN | DIASTOLIC BLOOD PRESSURE: 84 MMHG | BODY MASS INDEX: 25.1 KG/M2 | WEIGHT: 147 LBS

## 2019-04-29 DIAGNOSIS — M81.0 OSTEOPOROSIS, UNSPECIFIED OSTEOPOROSIS TYPE, UNSPECIFIED PATHOLOGICAL FRACTURE PRESENCE: Primary | ICD-10-CM

## 2019-04-29 DIAGNOSIS — I50.22 CHRONIC SYSTOLIC CONGESTIVE HEART FAILURE (HCC): ICD-10-CM

## 2019-04-29 DIAGNOSIS — E87.6 HYPOKALEMIA: ICD-10-CM

## 2019-04-29 DIAGNOSIS — E55.9 VITAMIN D DEFICIENCY: ICD-10-CM

## 2019-04-29 DIAGNOSIS — I10 BENIGN ESSENTIAL HYPERTENSION: ICD-10-CM

## 2019-04-29 DIAGNOSIS — I25.10 CORONARY ARTERY DISEASE INVOLVING NATIVE CORONARY ARTERY OF NATIVE HEART WITHOUT ANGINA PECTORIS: ICD-10-CM

## 2019-04-29 PROCEDURE — 99214 OFFICE O/P EST MOD 30 MIN: CPT | Performed by: PHYSICIAN ASSISTANT

## 2019-04-29 RX ORDER — POTASSIUM CHLORIDE 20 MEQ/1
TABLET, EXTENDED RELEASE ORAL
Qty: 360 TABLET | Refills: 1 | Status: SHIPPED | OUTPATIENT
Start: 2019-04-29 | End: 2019-10-27 | Stop reason: SDUPTHER

## 2019-04-30 ENCOUNTER — TELEPHONE (OUTPATIENT)
Dept: CARDIOLOGY CLINIC | Facility: CLINIC | Age: 84
End: 2019-04-30

## 2019-05-02 ENCOUNTER — TELEPHONE (OUTPATIENT)
Dept: CARDIOLOGY CLINIC | Facility: CLINIC | Age: 84
End: 2019-05-02

## 2019-05-04 RX ORDER — SODIUM CHLORIDE 9 MG/ML
20 INJECTION, SOLUTION INTRAVENOUS ONCE
Status: DISCONTINUED | OUTPATIENT
Start: 2019-05-07 | End: 2019-05-08

## 2019-05-04 RX ORDER — ZOLEDRONIC ACID 5 MG/100ML
5 INJECTION, SOLUTION INTRAVENOUS ONCE
Status: DISCONTINUED | OUTPATIENT
Start: 2019-05-07 | End: 2019-05-08

## 2019-05-06 ENCOUNTER — TELEPHONE (OUTPATIENT)
Dept: ENDOCRINOLOGY | Facility: CLINIC | Age: 84
End: 2019-05-06

## 2019-05-07 ENCOUNTER — TELEPHONE (OUTPATIENT)
Dept: ENDOCRINOLOGY | Facility: CLINIC | Age: 84
End: 2019-05-07

## 2019-05-07 ENCOUNTER — HOSPITAL ENCOUNTER (OUTPATIENT)
Dept: INFUSION CENTER | Facility: HOSPITAL | Age: 84
Discharge: HOME | End: 2019-05-07

## 2019-05-08 ENCOUNTER — TELEPHONE (OUTPATIENT)
Dept: ENDOCRINOLOGY | Facility: CLINIC | Age: 84
End: 2019-05-08

## 2019-05-10 ENCOUNTER — OFFICE VISIT (OUTPATIENT)
Dept: FAMILY MEDICINE CLINIC | Facility: CLINIC | Age: 84
End: 2019-05-10
Payer: COMMERCIAL

## 2019-05-10 VITALS
BODY MASS INDEX: 24.41 KG/M2 | HEART RATE: 72 BPM | RESPIRATION RATE: 16 BRPM | WEIGHT: 143 LBS | TEMPERATURE: 97.8 F | DIASTOLIC BLOOD PRESSURE: 90 MMHG | HEIGHT: 64 IN | SYSTOLIC BLOOD PRESSURE: 156 MMHG

## 2019-05-10 DIAGNOSIS — R42 DIZZINESS: Primary | ICD-10-CM

## 2019-05-10 PROCEDURE — 99214 OFFICE O/P EST MOD 30 MIN: CPT | Performed by: FAMILY MEDICINE

## 2019-05-13 LAB — MISCELLANEOUS LAB TEST RESULT: NORMAL

## 2019-05-17 ENCOUNTER — HOSPITAL ENCOUNTER (OUTPATIENT)
Dept: INFUSION CENTER | Facility: HOSPITAL | Age: 84
Discharge: HOME | End: 2019-05-17
Payer: COMMERCIAL

## 2019-05-17 VITALS
SYSTOLIC BLOOD PRESSURE: 148 MMHG | HEART RATE: 62 BPM | DIASTOLIC BLOOD PRESSURE: 72 MMHG | RESPIRATION RATE: 18 BRPM | OXYGEN SATURATION: 98 % | TEMPERATURE: 98.6 F

## 2019-05-17 DIAGNOSIS — M81.0 OSTEOPOROSIS, UNSPECIFIED OSTEOPOROSIS TYPE, UNSPECIFIED PATHOLOGICAL FRACTURE PRESENCE: Primary | ICD-10-CM

## 2019-05-17 PROCEDURE — 96374 THER/PROPH/DIAG INJ IV PUSH: CPT

## 2019-05-17 RX ORDER — ZOLEDRONIC ACID 5 MG/100ML
5 INJECTION, SOLUTION INTRAVENOUS ONCE
Status: CANCELLED | OUTPATIENT
Start: 2020-05-16

## 2019-05-17 RX ORDER — SODIUM CHLORIDE 9 MG/ML
20 INJECTION, SOLUTION INTRAVENOUS ONCE
Status: COMPLETED | OUTPATIENT
Start: 2019-05-17 | End: 2019-05-17

## 2019-05-17 RX ORDER — ZOLEDRONIC ACID 5 MG/100ML
5 INJECTION, SOLUTION INTRAVENOUS ONCE
Status: COMPLETED | OUTPATIENT
Start: 2019-05-17 | End: 2019-05-17

## 2019-05-17 RX ORDER — SODIUM CHLORIDE 9 MG/ML
20 INJECTION, SOLUTION INTRAVENOUS ONCE
Status: CANCELLED | OUTPATIENT
Start: 2020-05-16

## 2019-05-17 RX ADMIN — ZOLEDRONIC ACID 5 MG: 5 INJECTION, SOLUTION INTRAVENOUS at 11:11

## 2019-05-17 RX ADMIN — SODIUM CHLORIDE 20 ML/HR: 0.9 INJECTION, SOLUTION INTRAVENOUS at 10:48

## 2019-05-17 NOTE — PLAN OF CARE
Problem: Potential for Falls  Goal: Patient will remain free of falls  Description  INTERVENTIONS:  - Assess patient frequently for physical needs  -  Identify cognitive and physical deficits and behaviors that affect risk of falls    -  Johnson fall precautions as indicated by assessment   - Educate patient/family on patient safety including physical limitations  - Instruct patient to call for assistance with activity based on assessment  - Modify environment to reduce risk of injury  - Consider OT/PT consult to assist with strengthening/mobility  Outcome: Progressing

## 2019-05-20 ENCOUNTER — HOSPITAL ENCOUNTER (OUTPATIENT)
Dept: RADIOLOGY | Facility: HOSPITAL | Age: 84
Discharge: HOME | End: 2019-05-20
Attending: FAMILY MEDICINE
Payer: COMMERCIAL

## 2019-05-20 DIAGNOSIS — R42 DIZZINESS: ICD-10-CM

## 2019-05-20 PROCEDURE — 93880 EXTRACRANIAL BILAT STUDY: CPT

## 2019-05-20 PROCEDURE — 93880 EXTRACRANIAL BILAT STUDY: CPT | Performed by: SURGERY

## 2019-05-23 ENCOUNTER — OFFICE VISIT (OUTPATIENT)
Dept: FAMILY MEDICINE CLINIC | Facility: CLINIC | Age: 84
End: 2019-05-23
Payer: COMMERCIAL

## 2019-05-23 VITALS
DIASTOLIC BLOOD PRESSURE: 90 MMHG | HEART RATE: 66 BPM | HEIGHT: 64 IN | WEIGHT: 143 LBS | SYSTOLIC BLOOD PRESSURE: 142 MMHG | BODY MASS INDEX: 24.41 KG/M2 | TEMPERATURE: 98.3 F

## 2019-05-23 DIAGNOSIS — B02.9 HERPES ZOSTER WITHOUT COMPLICATION: Primary | ICD-10-CM

## 2019-05-23 PROCEDURE — 3725F SCREEN DEPRESSION PERFORMED: CPT | Performed by: NURSE PRACTITIONER

## 2019-05-23 PROCEDURE — 1160F RVW MEDS BY RX/DR IN RCRD: CPT | Performed by: NURSE PRACTITIONER

## 2019-05-23 PROCEDURE — 99213 OFFICE O/P EST LOW 20 MIN: CPT | Performed by: NURSE PRACTITIONER

## 2019-05-23 PROCEDURE — 1101F PT FALLS ASSESS-DOCD LE1/YR: CPT | Performed by: NURSE PRACTITIONER

## 2019-05-23 RX ORDER — VALACYCLOVIR HYDROCHLORIDE 1 G/1
1000 TABLET, FILM COATED ORAL 3 TIMES DAILY
Qty: 21 TABLET | Refills: 0 | Status: SHIPPED | OUTPATIENT
Start: 2019-05-23 | End: 2019-06-04

## 2019-05-23 RX ORDER — PREDNISONE 10 MG/1
TABLET ORAL
Qty: 12 TABLET | Refills: 0 | Status: SHIPPED | OUTPATIENT
Start: 2019-05-23 | End: 2019-06-04

## 2019-06-04 ENCOUNTER — OFFICE VISIT (OUTPATIENT)
Dept: CARDIOLOGY CLINIC | Facility: CLINIC | Age: 84
End: 2019-06-04
Payer: COMMERCIAL

## 2019-06-04 ENCOUNTER — APPOINTMENT (OUTPATIENT)
Dept: LAB | Facility: HOSPITAL | Age: 84
End: 2019-06-04
Attending: INTERNAL MEDICINE
Payer: COMMERCIAL

## 2019-06-04 ENCOUNTER — TRANSCRIBE ORDERS (OUTPATIENT)
Dept: ADMINISTRATIVE | Facility: HOSPITAL | Age: 84
End: 2019-06-04

## 2019-06-04 VITALS
DIASTOLIC BLOOD PRESSURE: 80 MMHG | HEIGHT: 64 IN | HEART RATE: 66 BPM | WEIGHT: 143.8 LBS | OXYGEN SATURATION: 97 % | SYSTOLIC BLOOD PRESSURE: 120 MMHG | BODY MASS INDEX: 24.55 KG/M2

## 2019-06-04 DIAGNOSIS — E87.6 HYPOKALEMIA: ICD-10-CM

## 2019-06-04 DIAGNOSIS — E78.2 MIXED HYPERLIPIDEMIA: ICD-10-CM

## 2019-06-04 DIAGNOSIS — I10 BENIGN ESSENTIAL HYPERTENSION: ICD-10-CM

## 2019-06-04 DIAGNOSIS — R55 SYNCOPE, UNSPECIFIED SYNCOPE TYPE: ICD-10-CM

## 2019-06-04 DIAGNOSIS — I25.10 ARTERIOSCLEROSIS OF CORONARY ARTERY: ICD-10-CM

## 2019-06-04 LAB
ANION GAP SERPL CALCULATED.3IONS-SCNC: 10 MMOL/L (ref 4–13)
BUN SERPL-MCNC: 17 MG/DL (ref 5–25)
CALCIUM SERPL-MCNC: 8.7 MG/DL (ref 8.3–10.1)
CHLORIDE SERPL-SCNC: 106 MMOL/L (ref 100–108)
CO2 SERPL-SCNC: 24 MMOL/L (ref 21–32)
CREAT SERPL-MCNC: 0.7 MG/DL (ref 0.6–1.3)
GFR SERPL CREATININE-BSD FRML MDRD: 79 ML/MIN/1.73SQ M
GLUCOSE SERPL-MCNC: 97 MG/DL (ref 65–140)
POTASSIUM SERPL-SCNC: 4.3 MMOL/L (ref 3.5–5.3)
SODIUM SERPL-SCNC: 140 MMOL/L (ref 136–145)

## 2019-06-04 PROCEDURE — 80048 BASIC METABOLIC PNL TOTAL CA: CPT

## 2019-06-04 PROCEDURE — 93000 ELECTROCARDIOGRAM COMPLETE: CPT | Performed by: INTERNAL MEDICINE

## 2019-06-04 PROCEDURE — 99214 OFFICE O/P EST MOD 30 MIN: CPT | Performed by: INTERNAL MEDICINE

## 2019-06-04 PROCEDURE — 36415 COLL VENOUS BLD VENIPUNCTURE: CPT

## 2019-06-06 ENCOUNTER — TELEPHONE (OUTPATIENT)
Dept: CARDIOLOGY CLINIC | Facility: CLINIC | Age: 84
End: 2019-06-06

## 2019-06-14 DIAGNOSIS — E87.6 HYPOKALEMIA: ICD-10-CM

## 2019-06-14 DIAGNOSIS — I10 BENIGN ESSENTIAL HYPERTENSION: ICD-10-CM

## 2019-06-14 DIAGNOSIS — I25.10 ARTERIOSCLEROSIS OF CORONARY ARTERY: ICD-10-CM

## 2019-06-14 RX ORDER — METOPROLOL SUCCINATE 25 MG/1
25 TABLET, EXTENDED RELEASE ORAL DAILY
Qty: 90 TABLET | Refills: 3 | Status: SHIPPED | OUTPATIENT
Start: 2019-06-14 | End: 2020-06-10 | Stop reason: SDUPTHER

## 2019-06-14 RX ORDER — SPIRONOLACTONE 25 MG/1
25 TABLET ORAL DAILY
Qty: 90 TABLET | Refills: 3 | Status: SHIPPED | OUTPATIENT
Start: 2019-06-14 | End: 2020-06-19 | Stop reason: SDUPTHER

## 2019-06-18 DIAGNOSIS — I25.10 ARTERIOSCLEROSIS OF CORONARY ARTERY: ICD-10-CM

## 2019-06-18 DIAGNOSIS — I10 BENIGN ESSENTIAL HYPERTENSION: ICD-10-CM

## 2019-06-19 RX ORDER — AMLODIPINE BESYLATE 5 MG/1
5 TABLET ORAL DAILY
Qty: 90 TABLET | Refills: 3 | Status: SHIPPED | OUTPATIENT
Start: 2019-06-19 | End: 2020-11-17 | Stop reason: SDUPTHER

## 2019-08-23 ENCOUNTER — OFFICE VISIT (OUTPATIENT)
Dept: FAMILY MEDICINE CLINIC | Facility: CLINIC | Age: 84
End: 2019-08-23
Payer: COMMERCIAL

## 2019-08-23 VITALS
WEIGHT: 144 LBS | HEART RATE: 68 BPM | HEIGHT: 64 IN | TEMPERATURE: 97.6 F | SYSTOLIC BLOOD PRESSURE: 160 MMHG | RESPIRATION RATE: 16 BRPM | DIASTOLIC BLOOD PRESSURE: 90 MMHG | BODY MASS INDEX: 24.59 KG/M2

## 2019-08-23 DIAGNOSIS — Z13.29 THYROID DISORDER SCREEN: ICD-10-CM

## 2019-08-23 DIAGNOSIS — M79.672 LEFT FOOT PAIN: Primary | ICD-10-CM

## 2019-08-23 DIAGNOSIS — M79.89 SWELLING OF LEFT FOOT: ICD-10-CM

## 2019-08-23 DIAGNOSIS — Z79.899 HIGH RISK MEDICATION USE: ICD-10-CM

## 2019-08-23 PROCEDURE — 99213 OFFICE O/P EST LOW 20 MIN: CPT | Performed by: NURSE PRACTITIONER

## 2019-08-23 RX ORDER — PREDNISONE 10 MG/1
TABLET ORAL
Qty: 11 TABLET | Refills: 0 | Status: SHIPPED | OUTPATIENT
Start: 2019-08-23 | End: 2020-03-17

## 2019-08-23 NOTE — PROGRESS NOTES
Subjective     Bridgette Wise is a 80 y o  female who presents with left foot pain  Onset of the symptoms was 3 days ago  Precipitating event: none known  Current symptoms include: ability to bear weight, but with some pain, redness and swelling  Aggravating factors: walking and wearing shoes  Symptoms have progressed to a point and plateaued and been intermittent  Patient has had prior foot problems  Had similar presentation 14 years ago  Per pt, "I was diagnosed with arthritis  Steroids were the only thing that worked"  Evaluation to date: none  Treatment to date: none  Denies h/o blood clot, gout  Denies recent travel, period of inactivity  Denies trauma to area  The following portions of the patient's history were reviewed and updated as appropriate: allergies, current medications, past family history, past medical history, past social history, past surgical history and problem list     Review of Systems:  Pertinent items are noted in HPI       Objective     /90 (BP Location: Left arm, Patient Position: Sitting, Cuff Size: Standard)   Pulse 68   Temp 97 6 °F (36 4 °C)   Resp 16   Ht 5' 4" (1 626 m)   Wt 65 3 kg (144 lb)   BMI 24 72 kg/m²   Right foot:  normal exam, no swelling, tenderness, instability; ligaments intact, full range of motion of all ankle/foot joints   Left foot:  dorsum tender, edematous  Normal pulses  No discharge, break in skin integrity  Imaging:  X-ray of the left foot: patient refuses  Assessment/Plan     suspect gout   Patient adamant that this is arthritis  Refuses to take any other diagnosis  Demanding steroids as that is the only treatment that has worked in past      The case discussed with patient using patient centered shared decision making  The patient was counseled regarding instructions for management,-- risk factor reductions,-- prognosis,-- impressions,-- risks and benefits of treatment options,-- importance of compliance with treatment   I have reviewed the instructions with the patient, answering all questions to her satisfaction  Low dose short pred taper prescribed  Natural history and expected course discussed  Questions answered  Educational material distributed  Rest, ice, compression, and elevation (RICE) therapy  Follow up in 1 week  given lab slip for uric acid, cbc, tsh      Advised to call Monday if sxs no better or ER if sxs progress as discussed

## 2019-09-16 ENCOUNTER — HOSPITAL ENCOUNTER (OUTPATIENT)
Dept: RADIOLOGY | Facility: HOSPITAL | Age: 84
Discharge: HOME | End: 2019-09-16
Payer: COMMERCIAL

## 2019-09-16 DIAGNOSIS — M81.0 OSTEOPOROSIS, UNSPECIFIED OSTEOPOROSIS TYPE, UNSPECIFIED PATHOLOGICAL FRACTURE PRESENCE: ICD-10-CM

## 2019-09-16 PROCEDURE — 77080 DXA BONE DENSITY AXIAL: CPT

## 2019-09-17 ENCOUNTER — TELEPHONE (OUTPATIENT)
Dept: ENDOCRINOLOGY | Facility: CLINIC | Age: 84
End: 2019-09-17

## 2019-09-17 NOTE — TELEPHONE ENCOUNTER
----- Message from Chai Badillo PA-C sent at 9/17/2019 12:45 PM EDT -----  Dexa scan shows osteopenia     Continue calcium + vitamin D

## 2019-10-04 DIAGNOSIS — K21.9 GASTROESOPHAGEAL REFLUX DISEASE, ESOPHAGITIS PRESENCE NOT SPECIFIED: ICD-10-CM

## 2019-10-04 RX ORDER — RABEPRAZOLE SODIUM 20 MG/1
TABLET, DELAYED RELEASE ORAL
Qty: 90 TABLET | Refills: 4 | Status: SHIPPED | OUTPATIENT
Start: 2019-10-04 | End: 2020-12-28

## 2019-10-26 DIAGNOSIS — I10 BENIGN ESSENTIAL HYPERTENSION: ICD-10-CM

## 2019-10-26 DIAGNOSIS — I50.22 CHRONIC SYSTOLIC CONGESTIVE HEART FAILURE (HCC): ICD-10-CM

## 2019-10-26 DIAGNOSIS — I25.10 CORONARY ARTERY DISEASE INVOLVING NATIVE CORONARY ARTERY OF NATIVE HEART WITHOUT ANGINA PECTORIS: ICD-10-CM

## 2019-10-26 DIAGNOSIS — E87.6 HYPOKALEMIA: ICD-10-CM

## 2019-10-27 RX ORDER — POTASSIUM CHLORIDE 20 MEQ/1
TABLET, EXTENDED RELEASE ORAL
Qty: 360 TABLET | Refills: 4 | Status: SHIPPED | OUTPATIENT
Start: 2019-10-27 | End: 2021-01-18

## 2019-10-31 ENCOUNTER — LAB (OUTPATIENT)
Dept: LAB | Facility: CLINIC | Age: 84
End: 2019-10-31
Payer: COMMERCIAL

## 2019-10-31 DIAGNOSIS — E55.9 VITAMIN D DEFICIENCY: ICD-10-CM

## 2019-10-31 DIAGNOSIS — M81.0 OSTEOPOROSIS, UNSPECIFIED OSTEOPOROSIS TYPE, UNSPECIFIED PATHOLOGICAL FRACTURE PRESENCE: ICD-10-CM

## 2019-10-31 LAB
25(OH)D3 SERPL-MCNC: 33.1 NG/ML (ref 30–100)
ANION GAP SERPL CALCULATED.3IONS-SCNC: 8 MMOL/L (ref 4–13)
BUN SERPL-MCNC: 19 MG/DL (ref 5–25)
CALCIUM SERPL-MCNC: 9.2 MG/DL (ref 8.3–10.1)
CHLORIDE SERPL-SCNC: 109 MMOL/L (ref 100–108)
CO2 SERPL-SCNC: 24 MMOL/L (ref 21–32)
CREAT SERPL-MCNC: 0.73 MG/DL (ref 0.6–1.3)
GFR SERPL CREATININE-BSD FRML MDRD: 75 ML/MIN/1.73SQ M
GLUCOSE P FAST SERPL-MCNC: 98 MG/DL (ref 65–99)
POTASSIUM SERPL-SCNC: 3.6 MMOL/L (ref 3.5–5.3)
SODIUM SERPL-SCNC: 141 MMOL/L (ref 136–145)

## 2019-10-31 PROCEDURE — 82306 VITAMIN D 25 HYDROXY: CPT

## 2019-10-31 PROCEDURE — 80048 BASIC METABOLIC PNL TOTAL CA: CPT

## 2019-10-31 PROCEDURE — 36415 COLL VENOUS BLD VENIPUNCTURE: CPT

## 2019-11-05 ENCOUNTER — CLINICAL SUPPORT (OUTPATIENT)
Dept: FAMILY MEDICINE CLINIC | Facility: CLINIC | Age: 84
End: 2019-11-05
Payer: COMMERCIAL

## 2019-11-05 ENCOUNTER — OFFICE VISIT (OUTPATIENT)
Dept: ENDOCRINOLOGY | Facility: CLINIC | Age: 84
End: 2019-11-05
Payer: COMMERCIAL

## 2019-11-05 VITALS
HEIGHT: 64 IN | BODY MASS INDEX: 24.82 KG/M2 | DIASTOLIC BLOOD PRESSURE: 72 MMHG | SYSTOLIC BLOOD PRESSURE: 136 MMHG | WEIGHT: 145.4 LBS | HEART RATE: 59 BPM

## 2019-11-05 DIAGNOSIS — M81.0 OSTEOPOROSIS, UNSPECIFIED OSTEOPOROSIS TYPE, UNSPECIFIED PATHOLOGICAL FRACTURE PRESENCE: Primary | ICD-10-CM

## 2019-11-05 DIAGNOSIS — E55.9 VITAMIN D DEFICIENCY: ICD-10-CM

## 2019-11-05 DIAGNOSIS — Z23 NEED FOR INFLUENZA VACCINATION: Primary | ICD-10-CM

## 2019-11-05 PROCEDURE — 99213 OFFICE O/P EST LOW 20 MIN: CPT | Performed by: INTERNAL MEDICINE

## 2019-11-05 PROCEDURE — 90662 IIV NO PRSV INCREASED AG IM: CPT

## 2019-11-05 PROCEDURE — G0008 ADMIN INFLUENZA VIRUS VAC: HCPCS

## 2019-11-05 NOTE — PROGRESS NOTES
Ellen Wright 80 y o  female MRN: [id number]    Encounter: [id number]      Assessment/Plan     Assessment: This is a 80y o -year-old female with history of hypertension, CAD, CHF, hyperlipidemia, vitamin-D deficiency and osteoporosis    Plan:  1  Osteoporosis  2  Vitamin-D deficiency  Status post IV Reclast x2, last in 05/2019  Bone density stable/improved, no interim fractures  Most recent Vitamin-D leve within normal limits  - continue vitamin-D, calcium supplementation  -check urinary NTX  - repeat labs in 6 months   - plan for next Reclast around 04/2020  Goals for osteoporosis   Vitamin D  Goal > 30  Calcium intake ~ 6407-0160 mg/day  Weight bearing exercises as tolerated     3  Hypokalemia-most recent potassium level within normal limits  4  Hypertension-blood pressure at goal   Continue current medications  5  CAD, CHF-following up with cardiology      CC:  Osteoporosis    History of Present Illness     HPI:  Ron Moy is an 42-year-old female who is here for follow-up of osteoporosis, vitamin-D deficiency  Previously was following up with Dr Marysol Padilla infusion 04/2018, 2nd in 5/2019   No side effects post infusion    DEXA scan 09/2019-suggestive of osteopenia with high FRAX score  Comparison was not done/reported however T-score stable/improved at all levels except for right femoral neck  No interval fractures    Currently taking vitamin D3 1000 IU orally daily  Calcium 600 mg po daily    milk, cheese a few times a week   Likes broccoli and spinach     H/o arm fracture 4 years ago after for  Lost 3 inches of adult 5'7"-->5'4"  Very active, Exercise - Gym few times a week   Denies back pain, balance issues, no falls  Is on PPI for GERD  History of hypokalemia, on spironolactone and potassium supplement    All other systems were reviewed and are negative       Review of Systems    Historical Information   Past Medical History:   Diagnosis Date    Abscess of chest wall Last Assessed: 2/27/2014    Arthritis     Back pain     Cataract     Start of    Coronary artery disease     Diverticulitis of colon 12/04/2008    Female bladder prolapse     Gastric reflux     Hematuria     Last Assessed: 11/18/2014     History of diverticulitis     [place] (hard of hearing)     Hypercholesteremia     Hyperkeratosis of skin 11/03/2011    Hypertension     Hypokalemia 12/13/2011    Incomplete uterovaginal prolapse 05/28/2010    Osteoporosis     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    Last Assessed: 10/24/2012     Seasonal allergies     Sebaceous cyst     Last Assessed: 2/8/2014    Syncope     Trigeminal neuralgia 03/20/2012    Urinary incontinence     Uterine prolapse     Vertigo 09/15/2011    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    Wears glasses     Wears hearing aid     States she rarely wears them    Wears partial dentures     Upper      Past Surgical History:   Procedure Laterality Date    APPENDECTOMY      APPENDICO-VESICOSTOMY CUTANEOUS  03/01/2010    CHOLECYSTECTOMY      Laparoscopic    COLONOSCOPY      CORONARY ANGIOPLASTY      CORONARY ANGIOPLASTY WITH STENT PLACEMENT      2 aortic stents    CORONARY ANGIOPLASTY WITH STENT PLACEMENT  2013    2 distal left anterior descending artery     ESOPHAGOGASTRODUODENOSCOPY      FRACTURE SURGERY Right     Repair right arm- titanium servando from shoulder to elbow      HYSTERECTOMY      Complete    OR CMBND ANTERPOST COLPORRAPHY W/CYSTO N/A 9/20/2016    Procedure: COLPORRHAPHY ANTERIOR POSTERIOR GRAFT;  Surgeon: Mariangel Nelson MD;  Location: [facility];  Service: UroGynecology           OR CYSTOURETHROSCOPY N/A 9/20/2016    Procedure: Aleakathya Chavez;  Surgeon: Mariangel Nelson MD;  [facility];  Service: UroGynecology           OR REVAGINAL PROLAPSE,SACROSP LIG N/A 9/20/2016    Procedure: COLPOPEXY VAGINAL EXTRAPERITONEAL  incision and drainage of vagina inclusion cyst x 4;  Surgeon: Mariangel Nelson MD;  Location: [facility];  Service: UroGynecology           UT SLING OPER STRES INCONTINENCE N/A 9/20/2016    Procedure: INSERTION PUBOVAGINAL SLING RETROPUBIC ;  Surgeon: Ulises Bean MD;  Location: [facility];  Service: UroGynecology            Social History   Social History     Substance and Sexual Activity   Alcohol Use Yes    Alcohol/week: 1 0 - 2 0 standard drinks    Types: 1 - 2 Glasses of wine per week    Comment: per day, per allscripts: Being a social drinker      Social History     Substance and Sexual Activity   Drug Use No     Social History     Tobacco Use   Smoking Status Never Smoker   Smokeless Tobacco Never Used     Family History:   Family History   Problem Relation Age of Onset    Hypertension Mother     Heart failure Mother     Coronary artery disease Mother     Arthritis Mother     Osteoporosis Mother     Hyperlipidemia Mother     Coronary artery disease Sister        Meds/Allergies   Current Outpatient Medications   Medication Sig Dispense Refill    amLODIPine (NORVASC) 5 mg tablet Take 1 tablet (5 mg total) by mouth daily 90 tablet 3    aspirin (ASPIRIN LOW DOSE) 81 MG tablet Take 1 tablet by mouth daily      Calcium Carbonate-Vitamin D (CALCIUM 600+D HIGH POTENCY PO) Take 600 mg by mouth 2 (two) times a day      Cholecalciferol (VITAMIN D) 2000 units tablet Take 1 tablet (2,000 Units total) by mouth daily      Coenzyme Q10-Fish Oil-Vit E (CO-Q 10 OMEGA-3 FISH OIL PO) Take 1 capsule by mouth daily      fexofenadine (ALLEGRA ALLERGY) 180 MG tablet Take by mouth      metoprolol succinate (TOPROL-XL) 25 mg 24 hr tablet Take 1 tablet (25 mg total) by mouth daily 90 tablet 3    potassium chloride (K-DUR,KLOR-CON) 20 mEq tablet TAKE 1 TABLET FOUR TIMES A DAY (Patient taking differently: Take 20 mEq by mouth 2 (two) times a day ) 360 tablet 4    RABEprazole (ACIPHEX) 20 MG tablet TAKE 1 TABLET DAILY 90 tablet 4    rosuvastatin (CRESTOR) 5 mg tablet TAKE 1 TABLET TWICE A WEEK 26 tablet 3  spironolactone (ALDACTONE) 25 mg tablet Take 1 tablet (25 mg total) by mouth daily 90 tablet 3    olmesartan (BENICAR) 20 mg tablet Take 1 tablet (20 mg total) by mouth daily (Patient not taking: Reported on 11/5/2019) 30 tablet 1    predniSONE 10 mg tablet Take 3 tabs daily for 2 days, then 2 tabs daily for 2 days, then 1 tab daily x1day then stop (Patient not taking: Reported on 11/5/2019) 11 tablet 0     No current facility-administered medications for this visit  Allergies   Allergen Reactions    Penicillins Hives     "All cillins", "and all myocillins"    Versed [Midazolam] Other (See Comments)     States she "passed out" when recovering from a procedure and was told to not use it again   Zithromax [Azithromycin] Anaphylaxis    Bee Venom      Reaction Date: 02Feb2004; Annotation - [id number]: jjw    Enalapril      Reaction Date: [id number];     Erythromycin Dizziness     Reaction Date: [id number];     Estrogens      Reaction Date: 02Feb2004; Annotation - [id number]: jjw    Ramipril      Reaction Date: 02Feb2004; Annotation - [id number]: jjw    Statins Other (See Comments)     Muscle pain       Objective   Vitals: Blood pressure 136/72, pulse 59, height 5' 4" (1 626 m), weight 66 kg (145 lb 6 4 oz)  Physical Exam   Constitutional: She is oriented to person, place, and time  She appears well-developed and well-nourished  HENT:   Head: Normocephalic and atraumatic  Eyes: Pupils are equal, round, and reactive to light  Conjunctivae and EOM are normal    Neck: Normal range of motion  Neck supple  No thyromegaly present  Cardiovascular: Normal rate, regular rhythm and normal heart sounds  No murmur heard  Pulmonary/Chest: Effort normal and breath sounds normal  She has no wheezes  Abdominal: Soft  She exhibits no distension  There is no tenderness  Musculoskeletal: Normal range of motion  She exhibits no edema or tenderness     Neurological: She is alert and oriented to person, place, and time  Skin: Skin is warm and dry  Psychiatric: She has a normal mood and affect  Her behavior is normal    Vitals reviewed  The history was obtained from the review of the chart, patient  Lab Results:   Lab Results   Component Value Date/Time    Potassium 3 6 10/31/2019 09:32 AM    Potassium 4 3 06/04/2019 02:36 PM    Potassium 3 2 (L) 04/22/2019 08:12 AM    Chloride 109 (H) 10/31/2019 09:32 AM    Chloride 106 06/04/2019 02:36 PM    Chloride 104 04/22/2019 08:12 AM    CO2 24 10/31/2019 09:32 AM    CO2 24 06/04/2019 02:36 PM    CO2 29 04/22/2019 08:12 AM    BUN 19 10/31/2019 09:32 AM    BUN 17 06/04/2019 02:36 PM    BUN 14 04/22/2019 08:12 AM    Creatinine 0 73 10/31/2019 09:32 AM    Creatinine 0 70 06/04/2019 02:36 PM    Creatinine 0 62 04/22/2019 08:12 AM    Glucose, Fasting 98 10/31/2019 09:32 AM    Glucose, Fasting 98 04/22/2019 08:12 AM    Glucose, Fasting 96 11/06/2018 08:48 AM    Calcium 9 2 10/31/2019 09:32 AM    Calcium 8 7 06/04/2019 02:36 PM    Calcium 8 7 04/22/2019 08:12 AM    eGFR 75 10/31/2019 09:32 AM    eGFR 79 06/04/2019 02:36 PM    eGFR 82 04/22/2019 08:12 AM    TSH 3RD GENERATON 1 530 11/06/2018 08:48 AM    PTH 79 2 04/22/2019 08:12 AM    Vit D, 25-Hydroxy 33 1 10/31/2019 09:32 AM    Vit D, 25-Hydroxy 32 6 04/22/2019 08:12 AM         Imaging Studies:            Results for orders placed during the hospital encounter of 09/16/19   DXA bone density spine hip and pelvis    Impression 1  Low bone mass (osteopenia)  2   The 10 year risk of hip fracture is 6 9% with the 10 year risk of major osteoporotic fracture being 22 9% as calculated by the [facility]/WHO fracture risk assessment tool (FRAX)        3   The current NOF guidelines recommend treating patients with a T-score of -2 5 or less in the lumbar spine or hips, or in post-menopausal women and men over the age of 48 with low bone mass (osteopenia) and a FRAX 10 year risk score of >3% for hip   fracture and/or >20% for major osteoporotic fracture  4   The NOF recommends follow-up DXA in 1-2 years after initiating therapy for osteoporosis and every 2 years thereafter  More frequent evaluation is appropriate for patients with conditions associated with rapid bone loss, such as glucocorticoid   therapy  The interval between DXA screenings may be longer for individuals without major risk factors and initial T-score in the normal or upper low bone mass range  The FRAX algorithm has certain limitations:  -FRAX has not been validated in patients currently or previously treated with pharmacotherapy for osteoporosis  In such patients, clinical judgment must be exercised in interpreting FRAX scores  -Prior hip, vertebral and humeral fragility fractures appear to confer greater risk of subsequent fracture than fractures at other sites (this is especially true for individuals with severe vertebral fractures), but quantification of this incremental   risk is not possible with FRAX  -FRAX underestimates fracture risk in patients with history of multiple fragility fractures  -FRAX may underestimate fracture risk in patients with history of frequent falls   -It is not appropriate to use FRAX to monitor treatment response  WHO CLASSIFICATION:  Normal (a T-score of -1 0 or higher)  Low bone mineral density (a T-score of less than -1 0 but higher than -2 5)  Osteoporosis (a T-score of -2 5 or less)  Severe osteoporosis (a T-score of -2 5 or less with a fragility fracture)            Workstation performed: [id number]                  I have personally reviewed pertinent reports  Portions of the record may have been created with voice recognition software  Occasional wrong word or "sound a like" substitutions may have occurred due to the inherent limitations of voice recognition software  Read the chart carefully and recognize, using context, where substitutions have occurred

## 2019-11-05 NOTE — PATIENT INSTRUCTIONS
Continue vitamin-D, calcium supplementation at current dose   Urine test as ordered, repeat labs in 6 months       Goals for osteoporosis   Vitamin D  Goal > 30  Calcium intake ~ 1504-7466 mg/day  Weight bearing exercises as tolerated    Urine NTX levels (N-Telopeptide)    Collection Instructions   Second morning void urine: Discard the first morning void    Collect the second morning void during a 2-hour period afterward      Follow up in 6 months/ around April 2020

## 2019-11-09 DIAGNOSIS — E78.5 DYSLIPIDEMIA: ICD-10-CM

## 2019-11-11 RX ORDER — ROSUVASTATIN CALCIUM 5 MG/1
TABLET, COATED ORAL
Qty: 26 TABLET | Refills: 4 | Status: SHIPPED | OUTPATIENT
Start: 2019-11-11 | End: 2021-02-01

## 2019-12-13 NOTE — PROGRESS NOTES
Progress Note - Cardiology Office  [facility]  Gertrude Wright 80 y o  female MRN: [id number]  : 1933  Encounter: [id number]      Assessment:     1  Arteriosclerosis of coronary artery    2  Benign essential hypertension    3  Mixed hyperlipidemia    4  Statin intolerance    5  Syncope, unspecified syncope type        Discussion Summary and Plan:  1  Atypical chest pain  No more episodes of chest pain  She is doing well  Most likely anxiety driven she does have known coronary artery disease  She had very tortuous RCA stenosis to be managed with medical therapy to small artery  2   Coronary artery disease with history of angioplasty of LAD and diagonal with a stent in  by Dr Gagan Bocanegra and repeat catheterization in 2015 shows patent stent and has mid RCA significant stenosis but very medium to small size artery not suitable for percutaneous techniques on medical Rx  Fortunately patient cannot take statins in high intensity does  She is doing well now  No symptoms of angina  She is taking Crestor 5 milligram twice a week  Concur with other Rx provided    3  Dyslipidemia but intolerant to statins  She is not taking Crestor twice a week  She does not want take any PSK inhibitors  Currently continue on Crestor 5 milligram   Advised her to increase the dose if possible  4   Persistent hypokalemia  May be related to possible underlying some hyperaldosteronism  She has done very well for blood pressure control with addition of Aldactone  Repeat blood test shows potassium 3 6  She was not taking her Benicar as in the past   Will start back at a lower dose of 10 milligram   Will check BMP    5  Benign essential hypertension  Blood pressure is acceptable  Medication changed  Currently she is going to take amlodipine, Benicar, Aldactone and Toprol XL  Blood pressure is much better controlled now  Repeat BMP ordered    She will not take Benicar 10 milligram daily  All these issues discussed with patient at length all her questions answered to her satisfaction  Counseling :  A description of the counseling  Patient advised to keep herself hydrated avoid similar situation where she passed out  Patient daughter was present all the time and all their questions were answered  Patient's ability to self care: Yes  Medication side effect reviewed with patient in detail and all their questions answered to their satisfaction  HPI :     Shari Gutierrez is a 80y o  year old female who came for follow up  Patient has Past medical history significant for coronary artery disease with remote angioplasty of LAD and diagonal by Dr Constantino Owusu in 2013 and then repeat cardiac catheterization in 2015 at [facility] shows patent stent in LAD and diagonal and has small RCA mid 80-90% stenosis which is very tortuous and not suitable for percutaneous technique  Patient has repeated history of syncopal episode and most of time it happened situation when she had a glass of wine and she is in a restaurant  She does not remember what happens and she feels fine after few minutes off it  She denies any fever or any chills any nausea and vomiting  She is otherwise very active  She cannot take statins even though her cholesterol is very high  She takes Crestor 5 mg twice a week and she is able to handle that  No fever no chills no nausea no vomiting no other significant complaint  04/26/2019  Above reviewed  Patient came for follow-up  She still occasionally get episodes of chest tightness  Her blood pressure has been acceptable but heart rate is around 93 beats per minute  She gets very anxious  Her angiogram from previous catheterization reviewed  She has a RCA age to 90% stenosis but it is very tortuous and not suitable for percutaneous techniques  She continues to have problem with low potassium  Now she is willing to change her medications  She will be started on Aldactone along with same dose of amlodipine and Benicar and metoprolol will be added  As her heart rate is around 93 beats per minute  She is taking potassium now 4 times a day and potassium is still little low  Will also check workup for secondary hypertension no nausea no vomiting no PND no orthopnea no other significant complaint  12/17/2019  Above reviewed  Patient came for follow-up for high blood pressure and  coronary artery disease  No more episode of dizziness or lightheadedness blood pressure has improved  She had still persistent hypokalemia which is better with Aldactone and some low-dose potassium  She was on at 1 point [address]  Now she is taking amlodipine  5 mg daily along with Aldactone and she was not taking her Benicar  At 1 point she was on 20 mg  Blood pressure today slightly high generally blood pressure also similar range at home  Heart rate is 68 beats per minute  She denies any palpitations  No more syncopal episodes  No nausea no vomiting no fever no chills  She used to take potassium 4 times daily now she is taking only 2 times daily and potassium is acceptable  She is intolerant to statins  She had  Coronary artery disease with RCA stenosis but is very tortuous artery and was recommended medical therapy  Now she is able to tolerate her statins she take Crestor 5 mg twice a week and is doing well with that  Review of Systems   Constitutional: Negative for activity change, chills, diaphoresis, fever and unexpected weight change  HENT: Negative for congestion  Eyes: Negative for discharge and redness  Respiratory: Negative for cough, chest tightness, shortness of breath and wheezing  Cardiovascular: Negative  Negative for chest pain, palpitations and leg swelling  Gastrointestinal: Negative for abdominal pain, diarrhea and nausea  Endocrine: Negative  Genitourinary: Negative for decreased urine volume and urgency  Musculoskeletal: Positive for arthralgias  Negative for back pain and gait problem  Skin: Negative for rash and wound  Allergic/Immunologic: Negative  Neurological: Negative for dizziness, seizures, syncope, weakness, light-headedness and headaches  Hematological: Negative  Psychiatric/Behavioral: Negative for agitation and confusion  The patient is nervous/anxious  Historical Information   Past Medical History:   Diagnosis Date    Abscess of chest wall     Last Assessed: 2/27/2014    Arthritis     Back pain     Cataract     Start of    Coronary artery disease     Diverticulitis of colon 12/04/2008    Female bladder prolapse     Gastric reflux     Hematuria     Last Assessed: 11/18/2014     History of diverticulitis     [place] (hard of hearing)     Hypercholesteremia     Hyperkeratosis of skin 11/03/2011    Hypertension     Hypokalemia 12/13/2011    Incomplete uterovaginal prolapse 05/28/2010    Osteoporosis     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    Last Assessed: 10/24/2012     Seasonal allergies     Sebaceous cyst     Last Assessed: 2/8/2014    Syncope     Trigeminal neuralgia 03/20/2012    Urinary incontinence     Uterine prolapse     Vertigo 09/15/2011    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    Wears glasses     Wears hearing aid     States she rarely wears them    Wears partial dentures     Upper      Past Surgical History:   Procedure Laterality Date    APPENDECTOMY      APPENDICO-VESICOSTOMY CUTANEOUS  03/01/2010    CHOLECYSTECTOMY      Laparoscopic    COLONOSCOPY      CORONARY ANGIOPLASTY      CORONARY ANGIOPLASTY WITH STENT PLACEMENT      2 aortic stents    CORONARY ANGIOPLASTY WITH STENT PLACEMENT  2013    2 distal left anterior descending artery     ESOPHAGOGASTRODUODENOSCOPY      FRACTURE SURGERY Right     Repair right arm- titanium servando from shoulder to elbow      HYSTERECTOMY      Complete    NC CMBND ANTERPOST COLPORRAPHY W/CYSTO N/A 9/20/2016 Procedure: COLPORRHAPHY ANTERIOR POSTERIOR GRAFT;  Surgeon: Duc Bill MD;  [facility];  Service: UroGynecology           OK CYSTOURETHROSCOPY N/A 9/20/2016    Procedure: Iesha Liao;  Surgeon: Duc Bill MD;  [facility];  Service: UroGynecology           OK REVAGINAL PROLAPSE,SACROSP LIG N/A 9/20/2016    Procedure: COLPOPEXY VAGINAL EXTRAPERITONEAL  incision and drainage of vagina inclusion cyst x 4;  Surgeon: Duc Bill MD;  [facility];  Service: UroGynecology           OK SLING OPER STRES INCONTINENCE N/A 9/20/2016    Procedure: INSERTION PUBOVAGINAL SLING RETROPUBIC ;  Surgeon: Duc Bill MD;  [facility];  Service: UroGynecology            Social History     Substance and Sexual Activity   Alcohol Use Yes    Alcohol/week: 1 0 - 2 0 standard drinks    Types: 1 - 2 Glasses of wine per week    Comment: per day, per allscripts: Being a social drinker      Social History     Substance and Sexual Activity   Drug Use No     Social History     Tobacco Use   Smoking Status Never Smoker   Smokeless Tobacco Never Used     Family History:   Family History   Problem Relation Age of Onset    Hypertension Mother     Heart failure Mother     Coronary artery disease Mother     Arthritis Mother     Osteoporosis Mother     Hyperlipidemia Mother     Coronary artery disease Sister        Meds/Allergies     Allergies   Allergen Reactions    Penicillins Hives     "All cillins", "and all myocillins"    Versed [Midazolam] Other (See Comments)     States she "passed out" when recovering from a procedure and was told to not use it again   Zithromax [Azithromycin] Anaphylaxis    Bee Venom      Reaction Date: 02Feb2004; Annotation - [id number]: lito    Enalapril      Reaction Date: [id number];     Erythromycin Dizziness     Reaction Date: [id number];     Estrogens      Reaction Date: 02Feb2004;  Annotation - [id number]: lito    Ramipril      Reaction Date: [id number]; Annotation - [id number]: jjw    Statins Other (See Comments)     Muscle pain       Current Outpatient Medications:     amLODIPine (NORVASC) 5 mg tablet, Take 1 tablet (5 mg total) by mouth daily, Disp: 90 tablet, Rfl: 3    aspirin (ASPIRIN LOW DOSE) 81 MG tablet, Take 1 tablet by mouth daily, Disp: , Rfl:     Calcium Carbonate-Vitamin D (CALCIUM 600+D HIGH POTENCY PO), Take 600 mg by mouth 2 (two) times a day, Disp: , Rfl:     Cholecalciferol (VITAMIN D) 2000 units tablet, Take 1 tablet (2,000 Units total) by mouth daily, Disp: , Rfl:     Coenzyme Q10-Fish Oil-Vit E (CO-Q 10 OMEGA-3 FISH OIL PO), Take 1 capsule by mouth daily, Disp: , Rfl:     fexofenadine (ALLEGRA ALLERGY) 180 MG tablet, Take by mouth, Disp: , Rfl:     metoprolol succinate (TOPROL-XL) 25 mg 24 hr tablet, Take 1 tablet (25 mg total) by mouth daily, Disp: 90 tablet, Rfl: 3    potassium chloride (K-DUR,KLOR-CON) 20 mEq tablet, TAKE 1 TABLET FOUR TIMES A DAY (Patient taking differently: Take 20 mEq by mouth 2 (two) times a day ), Disp: 360 tablet, Rfl: 4    RABEprazole (ACIPHEX) 20 MG tablet, TAKE 1 TABLET DAILY, Disp: 90 tablet, Rfl: 4    rosuvastatin (CRESTOR) 5 mg tablet, TAKE 1 TABLET TWICE A WEEK, Disp: 26 tablet, Rfl: 4    spironolactone (ALDACTONE) 25 mg tablet, Take 1 tablet (25 mg total) by mouth daily, Disp: 90 tablet, Rfl: 3    predniSONE 10 mg tablet, Take 3 tabs daily for 2 days, then 2 tabs daily for 2 days, then 1 tab daily x1day then stop (Patient not taking: Reported on 12/17/2019), Disp: 11 tablet, Rfl: 0    Vitals: Blood pressure 140/90, pulse 68, height 5' 4" (1 626 m), weight 66 2 kg (146 lb), SpO2 97 %  Body mass index is 25 06 kg/m²  Vitals:    12/17/19 1318   Weight: 66 2 kg (146 lb)     BP Readings from Last 3 Encounters:   12/17/19 140/90   11/05/19 136/72   08/23/19 160/90         Physical Exam   Constitutional: She is oriented to person, place, and time   She appears well-developed and well-nourished  No distress  HENT:   Head: Normocephalic and atraumatic  Eyes: Pupils are equal, round, and reactive to light  Neck: Neck supple  No JVD present  No tracheal deviation present  No thyromegaly present  No carotid bruit   Cardiovascular: Normal rate, regular rhythm, S1 normal and S2 normal  Exam reveals no gallop, no S3, no S4, no distant heart sounds and no friction rub  Murmur heard  Systolic (ejection) murmur is present with a grade of 2/6  S1-S2 regular with a 2/6 as murmur S4 present   Pulmonary/Chest: Effort normal  No respiratory distress  She has no wheezes  She has no rales  She exhibits no tenderness  Bilateral air entry clear to auscultation   Abdominal: Soft  Bowel sounds are normal  She exhibits no distension  There is no tenderness  Musculoskeletal: She exhibits no edema or deformity  Neurological: She is alert and oriented to person, place, and time  Skin: Skin is warm and dry  No rash noted  She is not diaphoretic  No pallor  Psychiatric: She has a normal mood and affect  Her behavior is normal  Judgment normal      Diagnostic Studies Review Cardio:   cardiac catheterization :   Patient's cardiac catheterization from 2015 reviewed  She has patent stent seen in LAD and diagonal   Apical LAD has around 70 80% stenosis which small vessel, mid circumflex around 40-50% stenosis  RCA which is a small size artery and only gives RPDA has around 90% stenosis in the mid it is very tortuous artery not suitable for percutaneous techniques  Echo Doppler done 06/28/2018 shows low-normal LV systolic function EF around 55 percent, mild concentric left atrial hypertrophy, mild MR, mild AI, mild TR PA pressure 30 millimeters of mercury  Carotid Doppler  Carotid study done 05/20/2019 shows less than 50% stenosis bilaterally  Holter monitor done on 06/28/2018 shows normal sinus rhythm  Few PACs seen  Rare episode of PVCs    EKG:    Twelve lead EKG done at  [facility] shows normal sinus rhythm heart rate around 87 beats per minute  Nonspecific ST changes  Twelve lead EKG done on 2018 shows normal sinus rhythm heart rate 81 beats per minute  No significant ST changes  No change from old EKG  Twelve lead EKG done in our office 2019 shows normal sinus rhythm heart rate 93 beats per minute  Twelve lead EKG done in our office 2019 shows normal sinus rhythm QS in V1 to V3 most likely due to lead location  No other significant change no change from old EKG heart rate 66 beats per minute  Twelve lead EKG 2019 shows normal sinus rhythm 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 [address] [facility]   Phone: [phone]      INVASIVE CARDIOVASCULAR LAB COMPLETE REPORT         Patient: Tere MENDOSA   Location: Outpatient   MR number: [id number]   Account number: [de-identified]   Study date: 2015   Gender: Female   : 1933   Height: 64 2 in   Weight: 147 6 lb   BSA: 1 72 m squared   Allergies: Altace, beestings, Erythromycin Base, estrogen, penicilin, Versed,   Vasotec, Zithromax, all cillins, mycins, Vytorin 10-10, Zetia   Diagnostic Cardiologist:  Julio Ramos MD   Primary Physician:  Magdalena Charlton MD   SUMMARY   CORONARY CIRCULATION:   Left main: Normal    LAD: The vessel was normal sized  The were no obstructive stenoses  The   previously deployed stent remains widely patent  The stent at the ostium of D1   remains widely patent  Circumflex: The vessel was normal sized  There was a   non-critical 50% plaque in mid vessel  There were no obstructive stenoses  The   major OM branches were normal  There was faint collateral flow from the   circumflex to the distal RCA   RCA: The vessel was small to medium sized and   dominant, giving rise to the PDA  There was a long, tortuous 95% stenosis in   mid vessel  [place] of severe tortuosity, the lesion is poorly suited for PCI  REPORT ELEMENT SELECTION:   Right radial access was employed  Summary: The patient has single vessel disease with a long, tortuous subtotal   stenosis in the mid RCA  Elucidating symptom status is difficult in this   patient, since she previously had very severe LAD disease and diagonal disease   with atypical symptoms as the only presentation  Medical therapy is    recommended   for the following reasons 1) the lesion has been stable angiographically for 2   years  2) the patient was able to exercise to a high level after PCI 2 years   ago with this lesion already present  3) Nuclear imaging 1 year ago showed no   ischemia in the presence of this lesion  4) There is some collateral flow from   the circumflex  5) Current symptoms are atypical and not exertional  If PCI is   required in the future because of exertional angina refractory to medication,   it is recommended that the procedure be performed via the femoral approach,    and   INVASIVE CARDIOVASCULAR LAB COMPLETE REPORT   Patient: Leola MENDOSA   MR number: [id number]    ------ Page 2   BSA: 1 72 m squared   that the procedure be performed at the [facility] because of the   increased risk of comlications  Treatment with rosuvastatin, 2 5mg once per   week was initiated  The patient has a history of statin intolerance at low   doses  INDICATIONS:   --  Possible CAD: unstable angina  PROCEDURES PERFORMED   --  Left coronary angiography  --  Right coronary angiography  --  Outpatient  --  Coronary Catheterization (w/o LHC)  PROCEDURE: The risks and alternatives of the procedures and conscious sedation   were explained to the patient and informed consent was obtained  The patient   was brought to the cath lab and placed on the table   The planned puncture    sites   were prepped and draped in the usual sterile fashion  --  Right radial artery access  After performing an Eusebio's test to verify   adequate ulnar artery supply to the hand, the radial site was prepped  The   puncture site was infiltrated with local anesthetic  The vessel was accessed   using the modified Seldinger technique, a wire was advanced into the vessel,   and a sheath was advanced over the wire into the vessel  --  Left coronary artery angiography  A catheter was advanced over a guidewire   into the aorta and positioned in the left coronary artery ostium under   fluoroscopic guidance  Angiography was performed  --  Right coronary artery angiography  A catheter was advanced over a    guidewire   into the aorta and positioned in the right coronary artery ostium under   fluoroscopic guidance  Angiography was performed  --  Outpatient  --  Coronary Catheterization (w/o [facility])  PROCEDURE COMPLETION: The patient tolerated the procedure well and was   discharged from the cath lab  TIMING: Test started at 09:38  Test concluded at   10:03  HEMOSTASIS: The sheath was removed  The site was compressed with a   Hemoband device  Hemostasis was obtained  MEDICATIONS GIVEN: Verapamil   (Isoptin, Calan, Covera), 1 25 mg, IA, at 09:52  Fentanyl (1OOmcg/2 ml), 50   mcg, IV, at 09:42  Fentanyl (1OOmcg/2 ml), 50 mcg, IV, at 09:45  1% Lidocaine,   1 ml, subcutaneously, at 09:49  Fentanyl (1OOmcg/2 ml), 25 mcg, IV, at 09:50  Nitroglycerin (200mcg/ml), 200 mcg, at 09:52  Heparin 1000 units/ml, 4,000   units, IV, at 09:52  CONTRAST GIVEN: 70 ml Omnipaque (350mg I /ml)  RADIATION   EXPOSURE: Fluoroscopy time: 1 8 min  CORONARY VESSELS:   --  Left main: Normal    INVASIVE CARDIOVASCULAR LAB COMPLETE REPORT   Patient: Jesse MENDOSA   MR number: [id number]    ------ Page 3   BSA: 1 72 m squared   --  LAD: The vessel was normal sized  The were no obstructive stenoses  The   previously deployed stent remains widely patent   The stent at the ostium of D1 remains widely patent  --  Circumflex: The vessel was normal sized  There was    a   non-critical 50% plaque in mid vessel  There were no obstructive stenoses  The   major OM branches were normal  There was faint collateral flow from the   circumflex to the distal RCA  --  RCA: The vessel was small to medium sized    and   dominant, giving rise to the PDA  There was a long, tortuous 95% stenosis in   mid vessel  [place] of severe tortuosity, the lesion is poorly suited for PCI  NOTE: Right radial access was employed  Prepared and signed by   Zenaida Campoverde MD   Signed 2015 11:05:34   CC: Dr Maik Meyer   Study diagram   Hemodynamic tables   Pressures:  Baseline   Pressures:  - HR: 70   Pressures:  - Rhythm:   Pressures:  -- Aortic Pressure (S/D/M): 157/83/116   Outputs:  Baseline   Outputs:  -- CALCULATIONS[age]   Outputs:  -- CALCULATIONS: Body Surface Area: 1 72   Outputs:  -- CALCULATIONS: Height in cm: 163 00   Outputs:  -- CALCULATIONS: Sex: Female   Outputs:  -- CALCULATIONS: Weight in k 10        Lab Review   Lab Results   Component Value Date    WBC 9 20 2018    HGB 14 5 2018    HCT 44 4 2018    MCV 94 2018    RDW 14 3 2018     2018     BMP:  Lab Results   Component Value Date     2016    K 3 6 10/31/2019     (H) 10/31/2019    CO2 24 10/31/2019    BUN 19 10/31/2019    CREATININE 0 73 10/31/2019    GLUCOSE 99 2016    GLUF 98 10/31/2019    CALCIUM 9 2 10/31/2019    EGFR 75 10/31/2019     LFT:  Lab Results   Component Value Date    AST 16 2018    ALT 17 2018    ALKPHOS 64 2018    PROT 7 1 2016    BILITOT 0 3 2016       Lab Results   Component Value Date    HGBA1C 5 9 2018     Lipid Profile:   Lab Results   Component Value Date    CHOL 192 2013    HDL 65 (H) 2018    LDLCALC 74 2018    TRIG 179 (H) 2018     Lab Results   Component Value Date    CHOL 192 12/03/2013     Lab Results   Component Value Date    TROPONINI 0 03 05/20/2018       Dr Rashmi Mccarthy MD [facility]      "This note has been constructed using a voice recognition system  Therefore there may be syntax, spelling, and/or grammatical errors   Please call if you have any questions  "

## 2019-12-17 ENCOUNTER — OFFICE VISIT (OUTPATIENT)
Dept: CARDIOLOGY CLINIC | Facility: CLINIC | Age: 84
End: 2019-12-17
Payer: COMMERCIAL

## 2019-12-17 VITALS
DIASTOLIC BLOOD PRESSURE: 90 MMHG | WEIGHT: 146 LBS | HEART RATE: 68 BPM | SYSTOLIC BLOOD PRESSURE: 140 MMHG | OXYGEN SATURATION: 97 % | BODY MASS INDEX: 24.92 KG/M2 | HEIGHT: 64 IN

## 2019-12-17 DIAGNOSIS — R55 SYNCOPE, UNSPECIFIED SYNCOPE TYPE: ICD-10-CM

## 2019-12-17 DIAGNOSIS — Z78.9 STATIN INTOLERANCE: ICD-10-CM

## 2019-12-17 DIAGNOSIS — I10 BENIGN ESSENTIAL HYPERTENSION: ICD-10-CM

## 2019-12-17 DIAGNOSIS — I25.10 ARTERIOSCLEROSIS OF CORONARY ARTERY: ICD-10-CM

## 2019-12-17 DIAGNOSIS — E78.2 MIXED HYPERLIPIDEMIA: ICD-10-CM

## 2019-12-17 PROCEDURE — 93000 ELECTROCARDIOGRAM COMPLETE: CPT | Performed by: INTERNAL MEDICINE

## 2019-12-17 PROCEDURE — 99214 OFFICE O/P EST MOD 30 MIN: CPT | Performed by: INTERNAL MEDICINE

## 2019-12-17 RX ORDER — OLMESARTAN MEDOXOMIL 20 MG/1
10 TABLET ORAL DAILY
Qty: 15 TABLET | Refills: 5 | Status: SHIPPED | OUTPATIENT
Start: 2019-12-17 | End: 2020-07-14 | Stop reason: SDUPTHER

## 2020-03-12 ENCOUNTER — OFFICE VISIT (OUTPATIENT)
Dept: FAMILY MEDICINE CLINIC | Facility: CLINIC | Age: 85
End: 2020-03-12
Payer: COMMERCIAL

## 2020-03-12 VITALS
RESPIRATION RATE: 16 BRPM | HEART RATE: 70 BPM | DIASTOLIC BLOOD PRESSURE: 70 MMHG | TEMPERATURE: 96.8 F | SYSTOLIC BLOOD PRESSURE: 120 MMHG

## 2020-03-12 DIAGNOSIS — E87.6 HYPOKALEMIA: ICD-10-CM

## 2020-03-12 DIAGNOSIS — R19.7 DIARRHEA OF PRESUMED INFECTIOUS ORIGIN: Primary | ICD-10-CM

## 2020-03-12 PROCEDURE — 3078F DIAST BP <80 MM HG: CPT | Performed by: NURSE PRACTITIONER

## 2020-03-12 PROCEDURE — 1160F RVW MEDS BY RX/DR IN RCRD: CPT | Performed by: NURSE PRACTITIONER

## 2020-03-12 PROCEDURE — 3074F SYST BP LT 130 MM HG: CPT | Performed by: NURSE PRACTITIONER

## 2020-03-12 PROCEDURE — 4040F PNEUMOC VAC/ADMIN/RCVD: CPT | Performed by: NURSE PRACTITIONER

## 2020-03-12 PROCEDURE — 36415 COLL VENOUS BLD VENIPUNCTURE: CPT | Performed by: NURSE PRACTITIONER

## 2020-03-12 PROCEDURE — 99213 OFFICE O/P EST LOW 20 MIN: CPT | Performed by: NURSE PRACTITIONER

## 2020-03-12 PROCEDURE — 1036F TOBACCO NON-USER: CPT | Performed by: NURSE PRACTITIONER

## 2020-03-12 NOTE — PROGRESS NOTES
Assessment/Plan     Infectious diarrhea, mild in severity  Clinically improving since onset  No signs of dehydration  Appropriate educational material discussed and distributed  Discussed the appropriate management of diarrhea  Lab studies per orders  patient to call with update on monday  if no better will order stool testing  Subjective     Rachel Marin is a 80 y o  female who presents for evaluation of diarrhea  Onset of diarrhea was 5 days ago  Diarrhea is occurring approximately 3 times per day  Patient describes diarrhea as semisolid  Denies household contacts with similar illness, recent antibiotic therapy  , suspicious food/drink   and recent travel  Patient denies blood in stool, fever, illness in household contacts, recent antibiotic use, recent travel, significant abdominal pain, unintentional weight loss  Previous visits for diarrhea: none  Evaluation to date: none  Treatment to date: bland diet, hydration  The following portions of the patient's history were reviewed and updated as appropriate: allergies, current medications, past family history, past medical history, past social history, past surgical history and problem list     Review of Systems  Pertinent items are noted in HPI       Objective     /70 (BP Location: Left arm, Patient Position: Sitting, Cuff Size: Standard)   Pulse 70   Temp (!) 96 8 °F (36 °C)   Resp 16   General: alert and oriented, in no acute distress   Hydration:  well hydrated   Abdomen:    soft, non-tender; bowel sounds normal; no masses,  no organomegaly

## 2020-03-16 ENCOUNTER — TELEPHONE (OUTPATIENT)
Dept: FAMILY MEDICINE CLINIC | Facility: CLINIC | Age: 85
End: 2020-03-16

## 2020-03-16 DIAGNOSIS — R19.7 DIARRHEA OF PRESUMED INFECTIOUS ORIGIN: Primary | ICD-10-CM

## 2020-03-16 LAB
BUN SERPL-MCNC: 17 MG/DL (ref 8–27)
BUN/CREAT SERPL: 22 (ref 12–28)
CALCIUM SERPL-MCNC: 9.2 MG/DL (ref 8.7–10.3)
CHLORIDE SERPL-SCNC: 107 MMOL/L (ref 96–106)
CO2 SERPL-SCNC: 17 MMOL/L (ref 20–29)
CREAT SERPL-MCNC: 0.77 MG/DL (ref 0.57–1)
ERYTHROCYTE [DISTWIDTH] IN BLOOD BY AUTOMATED COUNT: 14 % (ref 11.7–15.4)
GLUCOSE SERPL-MCNC: 138 MG/DL (ref 65–99)
HCT VFR BLD AUTO: 43.7 % (ref 34–46.6)
HGB BLD-MCNC: 14 G/DL (ref 11.1–15.9)
MAGNESIUM SERPL-MCNC: 2 MG/DL (ref 1.6–2.3)
MCH RBC QN AUTO: 30 PG (ref 26.6–33)
MCHC RBC AUTO-ENTMCNC: 32 G/DL (ref 31.5–35.7)
MCV RBC AUTO: 94 FL (ref 79–97)
PLATELET # BLD AUTO: 368 X10E3/UL (ref 150–450)
POTASSIUM SERPL-SCNC: 4.3 MMOL/L (ref 3.5–5.2)
RBC # BLD AUTO: 4.67 X10E6/UL (ref 3.77–5.28)
SL AMB EGFR AFRICAN AMERICAN: 81 ML/MIN/1.73
SL AMB EGFR NON AFRICAN AMERICAN: 70 ML/MIN/1.73
SODIUM SERPL-SCNC: 139 MMOL/L (ref 134–144)
WBC # BLD AUTO: 8.2 X10E3/UL (ref 3.4–10.8)

## 2020-03-16 NOTE — TELEPHONE ENCOUNTER
Glorya Honey,   Pt  Still has diarrhea without a change since she was seen last week  She is having trouble sleeping because of it    Please advise

## 2020-03-16 NOTE — TELEPHONE ENCOUNTER
Still having diarrhea, fecal urgency  No pain or fever  Agrees to stool testing r/o C diff  Kit and order left at    Her daughter will

## 2020-03-17 ENCOUNTER — APPOINTMENT (OUTPATIENT)
Dept: LAB | Facility: CLINIC | Age: 85
End: 2020-03-17
Payer: COMMERCIAL

## 2020-03-17 ENCOUNTER — OFFICE VISIT (OUTPATIENT)
Dept: FAMILY MEDICINE CLINIC | Facility: CLINIC | Age: 85
End: 2020-03-17
Payer: COMMERCIAL

## 2020-03-17 VITALS
OXYGEN SATURATION: 96 % | RESPIRATION RATE: 16 BRPM | DIASTOLIC BLOOD PRESSURE: 70 MMHG | HEART RATE: 76 BPM | SYSTOLIC BLOOD PRESSURE: 110 MMHG | TEMPERATURE: 97.1 F

## 2020-03-17 DIAGNOSIS — R19.7 DIARRHEA OF PRESUMED INFECTIOUS ORIGIN: ICD-10-CM

## 2020-03-17 DIAGNOSIS — A09 DIARRHEA OF INFECTIOUS ORIGIN: Primary | ICD-10-CM

## 2020-03-17 PROCEDURE — 3074F SYST BP LT 130 MM HG: CPT | Performed by: FAMILY MEDICINE

## 2020-03-17 PROCEDURE — 3078F DIAST BP <80 MM HG: CPT | Performed by: FAMILY MEDICINE

## 2020-03-17 PROCEDURE — 1036F TOBACCO NON-USER: CPT | Performed by: FAMILY MEDICINE

## 2020-03-17 PROCEDURE — 87505 NFCT AGENT DETECTION GI: CPT

## 2020-03-17 PROCEDURE — 1160F RVW MEDS BY RX/DR IN RCRD: CPT | Performed by: FAMILY MEDICINE

## 2020-03-17 PROCEDURE — 4040F PNEUMOC VAC/ADMIN/RCVD: CPT | Performed by: FAMILY MEDICINE

## 2020-03-17 PROCEDURE — 99214 OFFICE O/P EST MOD 30 MIN: CPT | Performed by: FAMILY MEDICINE

## 2020-03-17 PROCEDURE — 83993 ASSAY FOR CALPROTECTIN FECAL: CPT

## 2020-03-17 RX ORDER — CIPROFLOXACIN 500 MG/1
500 TABLET, FILM COATED ORAL EVERY 12 HOURS SCHEDULED
Qty: 14 TABLET | Refills: 0 | Status: SHIPPED | OUTPATIENT
Start: 2020-03-17 | End: 2020-03-24

## 2020-03-17 NOTE — PROGRESS NOTES
Chief Complaint   Patient presents with    Diarrhea   x 2 wks      Patient ID: Merly Reyes is a 80 y o  female  HPI  Pt is seeing for watery diarrhea 4-5 times a day x 2 wks, not better, no therapy tried, no risk factors for C Diff - admits eating out at [employer] and at dinner prior to symptoms started,  Normal labs 5 days ago  -  Stool tests were ordered -  Not done yet     The following portions of the patient's history were reviewed and updated as appropriate: allergies, current medications, past family history, past medical history, past social history, past surgical history and problem list     Review of Systems   Constitutional: Negative  Negative for activity change, appetite change, chills, fatigue and fever  Cardiovascular: Negative  Negative for chest pain, palpitations and leg swelling  Gastrointestinal: Positive for diarrhea  Negative for abdominal distention, abdominal pain, blood in stool, nausea and vomiting  Genitourinary: Negative  Negative for decreased urine volume, difficulty urinating and frequency  Skin: Negative  All other systems reviewed and are negative        Current Outpatient Medications   Medication Sig Dispense Refill    amLODIPine (NORVASC) 5 mg tablet Take 1 tablet (5 mg total) by mouth daily 90 tablet 3    aspirin (ASPIRIN LOW DOSE) 81 MG tablet Take 1 tablet by mouth daily      Calcium Carbonate-Vitamin D (CALCIUM 600+D HIGH POTENCY PO) Take 600 mg by mouth 2 (two) times a day      Cholecalciferol (VITAMIN D) 2000 units tablet Take 1 tablet (2,000 Units total) by mouth daily      Coenzyme Q10-Fish Oil-Vit E (CO-Q 10 OMEGA-3 FISH OIL PO) Take 1 capsule by mouth daily      fexofenadine (ALLEGRA ALLERGY) 180 MG tablet Take by mouth      metoprolol succinate (TOPROL-XL) 25 mg 24 hr tablet Take 1 tablet (25 mg total) by mouth daily 90 tablet 3    olmesartan (BENICAR) 20 mg tablet Take 0 5 tablets (10 mg total) by mouth daily 15 tablet 5    potassium chloride (K-DUR,KLOR-CON) 20 mEq tablet TAKE 1 TABLET FOUR TIMES A DAY (Patient taking differently: Take 20 mEq by mouth 2 (two) times a day ) 360 tablet 4    RABEprazole (ACIPHEX) 20 MG tablet TAKE 1 TABLET DAILY 90 tablet 4    rosuvastatin (CRESTOR) 5 mg tablet TAKE 1 TABLET TWICE A WEEK 26 tablet 4    spironolactone (ALDACTONE) 25 mg tablet Take 1 tablet (25 mg total) by mouth daily 90 tablet 3     No current facility-administered medications for this visit  Objective:    /70 (BP Location: Right arm, Patient Position: Sitting, Cuff Size: Standard)   Pulse 76   Temp (!) 97 1 °F (36 2 °C) (Tympanic)   Resp 16   SpO2 96%        Physical Exam   Constitutional: No distress  Cardiovascular: Normal rate and regular rhythm  Pulmonary/Chest: Effort normal    Abdominal: Soft  Bowel sounds are increased  There is no tenderness  Skin: No rash noted  No pallor  Labs in chart were reviewed  Assessment/Plan:         Diagnoses and all orders for this visit:    Diarrhea of infectious origin -  likely  -     ciprofloxacin (CIPRO) 500 mg tablet;  Take 1 tablet (500 mg total) by mouth every 12 (twelve) hours for 7 days  Oral hydration  Probiotic OTC     Was advised to collect stool samples ordered by PCP           rto zulema Patel MD

## 2020-03-18 ENCOUNTER — TELEPHONE (OUTPATIENT)
Dept: FAMILY MEDICINE CLINIC | Facility: CLINIC | Age: 85
End: 2020-03-18

## 2020-03-18 LAB
CAMPYLOBACTER DNA SPEC NAA+PROBE: NORMAL
SALMONELLA DNA SPEC QL NAA+PROBE: NORMAL
SHIGA TOXIN STX GENE SPEC NAA+PROBE: NORMAL
SHIGELLA DNA SPEC QL NAA+PROBE: NORMAL

## 2020-03-18 NOTE — TELEPHONE ENCOUNTER
D/w pt -  Ok to stop after 3 days of having side effects to high dose of Cipro -  Otherwise will finish 7 days

## 2020-03-19 ENCOUNTER — APPOINTMENT (OUTPATIENT)
Dept: LAB | Facility: CLINIC | Age: 85
End: 2020-03-19
Payer: COMMERCIAL

## 2020-03-19 DIAGNOSIS — R19.7 DIARRHEA OF PRESUMED INFECTIOUS ORIGIN: ICD-10-CM

## 2020-03-19 LAB — CALPROTECTIN STL-MCNT: 76 UG/G (ref 0–120)

## 2020-03-19 PROCEDURE — 87493 C DIFF AMPLIFIED PROBE: CPT

## 2020-03-19 PROCEDURE — 89055 LEUKOCYTE ASSESSMENT FECAL: CPT

## 2020-03-20 ENCOUNTER — TELEPHONE (OUTPATIENT)
Dept: FAMILY MEDICINE CLINIC | Facility: CLINIC | Age: 85
End: 2020-03-20

## 2020-03-20 LAB — C DIFF TOX GENS STL QL NAA+PROBE: NEGATIVE

## 2020-03-20 NOTE — TELEPHONE ENCOUNTER
Please advise stool testing negative for infection, bacteria, c diff   If not able to get pt, can call daughter, Carlos A Ramon [phone]  ty

## 2020-03-23 ENCOUNTER — TELEPHONE (OUTPATIENT)
Dept: FAMILY MEDICINE CLINIC | Facility: CLINIC | Age: 85
End: 2020-03-23

## 2020-03-23 LAB — WBC SPEC QL GRAM STN: ABNORMAL

## 2020-03-27 ENCOUNTER — TELEMEDICINE (OUTPATIENT)
Dept: FAMILY MEDICINE CLINIC | Facility: CLINIC | Age: 85
End: 2020-03-27
Payer: COMMERCIAL

## 2020-03-27 DIAGNOSIS — R19.7 DIARRHEA OF PRESUMED INFECTIOUS ORIGIN: Primary | ICD-10-CM

## 2020-03-27 PROCEDURE — G2012 BRIEF CHECK IN BY MD/QHP: HCPCS | Performed by: FAMILY MEDICINE

## 2020-03-27 RX ORDER — METRONIDAZOLE 500 MG/1
500 TABLET ORAL EVERY 8 HOURS SCHEDULED
Qty: 21 TABLET | Refills: 0 | Status: SHIPPED | OUTPATIENT
Start: 2020-03-27 | End: 2020-04-03

## 2020-03-27 NOTE — PROGRESS NOTES
Virtual Regular Visit    Problem List Items Addressed This Visit     None      Visit Diagnoses     Diarrhea of presumed infectious origin    -  Primary    Relevant Medications    metroNIDAZOLE (FLAGYL) 500 mg tablet        Pt was instructed to call next wk with update        Reason for visit is diarrhea     Encounter provider Evangelist Cosby MD    Provider located at [address] [place] [phone]      Recent Visits  Date Type Provider Dept   03/23/20 Telephone Yahir Tate, [address] [facility]   03/20/20 Telephone Obey Venegas 50 recent visits within past 7 days and meeting all other requirements     Today's Visits  Date Type Provider Dept   03/27/20 Telemedicine Evangelist Cosby  [facility] [address] today's visits and meeting all other requirements     Future Appointments  Date Type Provider Dept   03/27/20 Telemedicine Evangelist Cosby MD [address] [facility]   Showing future appointments within next 150 days and meeting all other requirements        After connecting through [employer], the patient was identified by name and date of birth  Yaquelinramírez Del Cidt was informed that this is a telemedicine visit and that the visit is being conducted through telephone which may not be secure and therefore, might not be HIPAA-compliant  My office door was closed  No one else was in the room  She acknowledged consent and understanding of privacy and security of the video platform  The patient has agreed to participate and understands they can discontinue the visit at any time  Subjective  Missael Portillo is a 80 y o  female     Pt is seeing for f/u diarrhea-  Started 3 wks ago -  Negative stool tests except for fecal WBCs -  Was Tx with Cipro 500 mg BID x 1 wk -  Not better-   Cont to have very loose BM -  Last BM this am ( 6 h ago ) no fever, no abd pain, normal appetite     Past Medical History:   Diagnosis Date    Abscess of chest wall     Last Assessed: 2/27/2014    Arthritis     Back pain     Cataract     Start of    Coronary artery disease     Diverticulitis of colon 12/04/2008    Female bladder prolapse     Gastric reflux     Hematuria     Last Assessed: 11/18/2014     History of diverticulitis     [place] (hard of hearing)     Hypercholesteremia     Hyperkeratosis of skin 11/03/2011    Hypertension     Hypokalemia 12/13/2011    Incomplete uterovaginal prolapse 05/28/2010    Osteoporosis     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    Last Assessed: 10/24/2012     Seasonal allergies     Sebaceous cyst     Last Assessed: 2/8/2014    Syncope     Trigeminal neuralgia 03/20/2012    Urinary incontinence     Uterine prolapse     Vertigo 09/15/2011    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    Wears glasses     Wears hearing aid     States she rarely wears them    Wears partial dentures     Upper        Past Surgical History:   Procedure Laterality Date    APPENDECTOMY      APPENDICO-VESICOSTOMY CUTANEOUS  03/01/2010    CHOLECYSTECTOMY      Laparoscopic    COLONOSCOPY      CORONARY ANGIOPLASTY      CORONARY ANGIOPLASTY WITH STENT PLACEMENT      2 aortic stents    CORONARY ANGIOPLASTY WITH STENT PLACEMENT  2013    2 distal left anterior descending artery     ESOPHAGOGASTRODUODENOSCOPY      FRACTURE SURGERY Right     Repair right arm- titanium servando from shoulder to elbow      HYSTERECTOMY      Complete    CT CMBND ANTERPOST COLPORRAPHY W/CYSTO N/A 9/20/2016    Procedure: COLPORRHAPHY ANTERIOR POSTERIOR GRAFT;  Surgeon: Luan Bazzi MD;  [facility];  Service: UroGynecology           CT CYSTOURETHROSCOPY N/A 9/20/2016    Procedure: Rickford Juan;  Surgeon: Luan Bazzi MD;  [facility];  Service: UroGynecology           [facility] N/A 9/20/2016    Procedure: COLPOPEXY VAGINAL EXTRAPERITONEAL  incision and drainage of vagina inclusion cyst x 4;  Surgeon: Eneida Church MD;  [facility];  Service: UroGynecology           KS SLING OPER STRES INCONTINENCE N/A 9/20/2016    Procedure: INSERTION PUBOVAGINAL SLING RETROPUBIC ;  Surgeon: Eneida Church MD;  [facility];  Service: UroGynecology              Current Outpatient Medications   Medication Sig Dispense Refill    amLODIPine (NORVASC) 5 mg tablet Take 1 tablet (5 mg total) by mouth daily 90 tablet 3    aspirin (ASPIRIN LOW DOSE) 81 MG tablet Take 1 tablet by mouth daily      Calcium Carbonate-Vitamin D (CALCIUM 600+D HIGH POTENCY PO) Take 600 mg by mouth 2 (two) times a day      Cholecalciferol (VITAMIN D) 2000 units tablet Take 1 tablet (2,000 Units total) by mouth daily      Coenzyme Q10-Fish Oil-Vit E (CO-Q 10 OMEGA-3 FISH OIL PO) Take 1 capsule by mouth daily      fexofenadine (ALLEGRA ALLERGY) 180 MG tablet Take by mouth      metoprolol succinate (TOPROL-XL) 25 mg 24 hr tablet Take 1 tablet (25 mg total) by mouth daily 90 tablet 3    olmesartan (BENICAR) 20 mg tablet Take 0 5 tablets (10 mg total) by mouth daily 15 tablet 5    potassium chloride (K-DUR,KLOR-CON) 20 mEq tablet TAKE 1 TABLET FOUR TIMES A DAY (Patient taking differently: Take 20 mEq by mouth 2 (two) times a day ) 360 tablet 4    RABEprazole (ACIPHEX) 20 MG tablet TAKE 1 TABLET DAILY 90 tablet 4    rosuvastatin (CRESTOR) 5 mg tablet TAKE 1 TABLET TWICE A WEEK 26 tablet 4    spironolactone (ALDACTONE) 25 mg tablet Take 1 tablet (25 mg total) by mouth daily 90 tablet 3     No current facility-administered medications for this visit  Allergies   Allergen Reactions    Penicillins Hives     "All cillins", "and all myocillins"    Versed [Midazolam] Other (See Comments)     States she "passed out" when recovering from a procedure and was told to not use it again   Zithromax [Azithromycin] Anaphylaxis    Bee Venom      Reaction Date: 02Feb2004;  Annotation - [id number]: Brittany Luna Enalapril      Reaction Date: [id number];     Erythromycin Dizziness     Reaction Date: [id number];     Estrogens      Reaction Date: 02Feb2004; Annotation - [id number]: [email or web address]    Ramipril      Reaction Date: 02Feb2004; Annotation - [id number]: jjensw    Statins Other (See Comments)     Muscle pain       Review of Systems   Constitutional: Negative  Respiratory: Negative  Gastrointestinal: Negative for abdominal pain, nausea and vomiting  Genitourinary: Negative  I spent  8 minutes with the patient during this visit

## 2020-04-01 ENCOUNTER — TELEMEDICINE (OUTPATIENT)
Dept: FAMILY MEDICINE CLINIC | Facility: CLINIC | Age: 85
End: 2020-04-01
Payer: COMMERCIAL

## 2020-04-01 VITALS — TEMPERATURE: 98.1 F

## 2020-04-01 DIAGNOSIS — R19.7 DIARRHEA, UNSPECIFIED TYPE: Primary | ICD-10-CM

## 2020-04-01 PROCEDURE — G2012 BRIEF CHECK IN BY MD/QHP: HCPCS | Performed by: FAMILY MEDICINE

## 2020-04-01 RX ORDER — DIPHENOXYLATE HYDROCHLORIDE AND ATROPINE SULFATE 2.5; .025 MG/1; MG/1
1 TABLET ORAL 2 TIMES DAILY PRN
Qty: 30 TABLET | Refills: 0 | Status: SHIPPED | OUTPATIENT
Start: 2020-04-01 | End: 2022-07-08

## 2020-04-02 ENCOUNTER — TELEPHONE (OUTPATIENT)
Dept: FAMILY MEDICINE CLINIC | Facility: CLINIC | Age: 85
End: 2020-04-02

## 2020-04-02 DIAGNOSIS — R19.7 DIARRHEA, UNSPECIFIED TYPE: Primary | ICD-10-CM

## 2020-04-04 ENCOUNTER — TELEPHONE (OUTPATIENT)
Dept: FAMILY MEDICINE CLINIC | Facility: CLINIC | Age: 85
End: 2020-04-04

## 2020-04-08 ENCOUNTER — TELEMEDICINE (OUTPATIENT)
Dept: ENDOCRINOLOGY | Facility: CLINIC | Age: 85
End: 2020-04-08
Payer: COMMERCIAL

## 2020-04-08 DIAGNOSIS — M81.0 OSTEOPOROSIS, UNSPECIFIED OSTEOPOROSIS TYPE, UNSPECIFIED PATHOLOGICAL FRACTURE PRESENCE: Primary | ICD-10-CM

## 2020-04-08 DIAGNOSIS — E55.9 VITAMIN D DEFICIENCY: ICD-10-CM

## 2020-04-08 PROCEDURE — G2012 BRIEF CHECK IN BY MD/QHP: HCPCS | Performed by: INTERNAL MEDICINE

## 2020-04-27 ENCOUNTER — TELEMEDICINE (OUTPATIENT)
Dept: GASTROENTEROLOGY | Facility: CLINIC | Age: 85
End: 2020-04-27
Payer: COMMERCIAL

## 2020-04-27 DIAGNOSIS — Z11.59 SPECIAL SCREENING EXAMINATION FOR VIRAL DISEASE: ICD-10-CM

## 2020-04-27 DIAGNOSIS — R19.7 DIARRHEA, UNSPECIFIED TYPE: Primary | ICD-10-CM

## 2020-04-27 PROCEDURE — 1160F RVW MEDS BY RX/DR IN RCRD: CPT | Performed by: INTERNAL MEDICINE

## 2020-04-27 PROCEDURE — 99213 OFFICE O/P EST LOW 20 MIN: CPT | Performed by: INTERNAL MEDICINE

## 2020-04-28 ENCOUNTER — TRANSCRIBE ORDERS (OUTPATIENT)
Dept: LAB | Facility: CLINIC | Age: 85
End: 2020-04-28

## 2020-04-30 ENCOUNTER — TELEPHONE (OUTPATIENT)
Dept: GASTROENTEROLOGY | Facility: CLINIC | Age: 85
End: 2020-04-30

## 2020-05-02 ENCOUNTER — TELEPHONE (OUTPATIENT)
Dept: OTHER | Facility: OTHER | Age: 85
End: 2020-05-02

## 2020-05-05 ENCOUNTER — APPOINTMENT (OUTPATIENT)
Dept: LAB | Facility: CLINIC | Age: 85
End: 2020-05-05
Payer: COMMERCIAL

## 2020-05-05 DIAGNOSIS — R19.7 DIARRHEA, UNSPECIFIED TYPE: ICD-10-CM

## 2020-05-05 PROCEDURE — 87505 NFCT AGENT DETECTION GI: CPT

## 2020-05-05 PROCEDURE — 87177 OVA AND PARASITES SMEARS: CPT

## 2020-05-05 PROCEDURE — 87209 SMEAR COMPLEX STAIN: CPT

## 2020-05-05 PROCEDURE — 87046 STOOL CULTR AEROBIC BACT EA: CPT

## 2020-05-07 LAB
O+P STL CONC: NORMAL
VIBRIO STL CULT: NORMAL

## 2020-05-08 ENCOUNTER — TELEPHONE (OUTPATIENT)
Dept: GASTROENTEROLOGY | Facility: AMBULARY SURGERY CENTER | Age: 85
End: 2020-05-08

## 2020-05-08 LAB — G LAMBLIA AG STL QL IA: NEGATIVE

## 2020-05-13 ENCOUNTER — TELEPHONE (OUTPATIENT)
Dept: GASTROENTEROLOGY | Facility: AMBULARY SURGERY CENTER | Age: 85
End: 2020-05-13

## 2020-05-14 DIAGNOSIS — R19.7 DIARRHEA, UNSPECIFIED TYPE: Primary | ICD-10-CM

## 2020-05-18 ENCOUNTER — ANESTHESIA EVENT (OUTPATIENT)
Dept: GASTROENTEROLOGY | Facility: AMBULARY SURGERY CENTER | Age: 85
End: 2020-05-18

## 2020-05-22 ENCOUNTER — DOCUMENTATION (OUTPATIENT)
Dept: URGENT CARE | Facility: CLINIC | Age: 85
End: 2020-05-22

## 2020-05-22 DIAGNOSIS — Z11.59 SPECIAL SCREENING EXAMINATION FOR VIRAL DISEASE: ICD-10-CM

## 2020-05-22 PROCEDURE — U0003 INFECTIOUS AGENT DETECTION BY NUCLEIC ACID (DNA OR RNA); SEVERE ACUTE RESPIRATORY SYNDROME CORONAVIRUS 2 (SARS-COV-2) (CORONAVIRUS DISEASE [COVID-19]), AMPLIFIED PROBE TECHNIQUE, MAKING USE OF HIGH THROUGHPUT TECHNOLOGIES AS DESCRIBED BY CMS-2020-01-R: HCPCS

## 2020-05-24 LAB — SARS-COV-2 RNA SPEC QL NAA+PROBE: NOT DETECTED

## 2020-05-26 ENCOUNTER — TELEPHONE (OUTPATIENT)
Dept: GASTROENTEROLOGY | Facility: CLINIC | Age: 85
End: 2020-05-26

## 2020-05-29 ENCOUNTER — HOSPITAL ENCOUNTER (OUTPATIENT)
Dept: GASTROENTEROLOGY | Facility: AMBULARY SURGERY CENTER | Age: 85
Setting detail: OUTPATIENT SURGERY
Discharge: HOME | End: 2020-05-29
Attending: INTERNAL MEDICINE | Admitting: INTERNAL MEDICINE
Payer: COMMERCIAL

## 2020-05-29 ENCOUNTER — ANESTHESIA (OUTPATIENT)
Dept: GASTROENTEROLOGY | Facility: AMBULARY SURGERY CENTER | Age: 85
End: 2020-05-29

## 2020-05-29 VITALS
TEMPERATURE: 97.1 F | HEIGHT: 64 IN | RESPIRATION RATE: 18 BRPM | HEART RATE: 69 BPM | WEIGHT: 135 LBS | OXYGEN SATURATION: 96 % | DIASTOLIC BLOOD PRESSURE: 69 MMHG | SYSTOLIC BLOOD PRESSURE: 132 MMHG | BODY MASS INDEX: 23.05 KG/M2

## 2020-05-29 DIAGNOSIS — R19.7 DIARRHEA, UNSPECIFIED TYPE: ICD-10-CM

## 2020-05-29 PROCEDURE — 45380 COLONOSCOPY AND BIOPSY: CPT | Performed by: INTERNAL MEDICINE

## 2020-05-29 PROCEDURE — 88305 TISSUE EXAM BY PATHOLOGIST: CPT | Performed by: PATHOLOGY

## 2020-05-29 RX ORDER — PROPOFOL 10 MG/ML
INJECTION, EMULSION INTRAVENOUS AS NEEDED
Status: DISCONTINUED | OUTPATIENT
Start: 2020-05-29 | End: 2020-05-29 | Stop reason: SURG

## 2020-05-29 RX ORDER — SODIUM CHLORIDE, SODIUM LACTATE, POTASSIUM CHLORIDE, CALCIUM CHLORIDE 600; 310; 30; 20 MG/100ML; MG/100ML; MG/100ML; MG/100ML
INJECTION, SOLUTION INTRAVENOUS CONTINUOUS PRN
Status: DISCONTINUED | OUTPATIENT
Start: 2020-05-29 | End: 2020-05-29 | Stop reason: SURG

## 2020-05-29 RX ORDER — SODIUM CHLORIDE, SODIUM LACTATE, POTASSIUM CHLORIDE, CALCIUM CHLORIDE 600; 310; 30; 20 MG/100ML; MG/100ML; MG/100ML; MG/100ML
125 INJECTION, SOLUTION INTRAVENOUS CONTINUOUS
Status: DISCONTINUED | OUTPATIENT
Start: 2020-05-29 | End: 2020-06-02 | Stop reason: HOSPADM

## 2020-05-29 RX ADMIN — PROPOFOL 20 MG: 10 INJECTION, EMULSION INTRAVENOUS at 08:47

## 2020-05-29 RX ADMIN — PROPOFOL 20 MG: 10 INJECTION, EMULSION INTRAVENOUS at 08:44

## 2020-05-29 RX ADMIN — PROPOFOL 70 MG: 10 INJECTION, EMULSION INTRAVENOUS at 08:36

## 2020-05-29 RX ADMIN — SODIUM CHLORIDE, SODIUM LACTATE, POTASSIUM CHLORIDE, AND CALCIUM CHLORIDE 125 ML/HR: .6; .31; .03; .02 INJECTION, SOLUTION INTRAVENOUS at 08:18

## 2020-05-29 RX ADMIN — SODIUM CHLORIDE, SODIUM LACTATE, POTASSIUM CHLORIDE, AND CALCIUM CHLORIDE: .6; .31; .03; .02 INJECTION, SOLUTION INTRAVENOUS at 08:31

## 2020-05-29 RX ADMIN — PROPOFOL 30 MG: 10 INJECTION, EMULSION INTRAVENOUS at 08:38

## 2020-05-29 RX ADMIN — PROPOFOL 30 MG: 10 INJECTION, EMULSION INTRAVENOUS at 08:41

## 2020-06-10 DIAGNOSIS — I10 BENIGN ESSENTIAL HYPERTENSION: ICD-10-CM

## 2020-06-10 DIAGNOSIS — I25.10 ARTERIOSCLEROSIS OF CORONARY ARTERY: ICD-10-CM

## 2020-06-10 RX ORDER — METOPROLOL SUCCINATE 25 MG/1
25 TABLET, EXTENDED RELEASE ORAL DAILY
Qty: 90 TABLET | Refills: 3 | Status: SHIPPED | OUTPATIENT
Start: 2020-06-10 | End: 2021-06-04

## 2020-06-11 ENCOUNTER — TELEPHONE (OUTPATIENT)
Dept: GASTROENTEROLOGY | Facility: AMBULARY SURGERY CENTER | Age: 85
End: 2020-06-11

## 2020-06-19 DIAGNOSIS — E87.6 HYPOKALEMIA: ICD-10-CM

## 2020-06-19 RX ORDER — SPIRONOLACTONE 25 MG/1
25 TABLET ORAL DAILY
Qty: 90 TABLET | Refills: 3 | Status: SHIPPED | OUTPATIENT
Start: 2020-06-19 | End: 2021-07-05

## 2020-06-25 ENCOUNTER — OFFICE VISIT (OUTPATIENT)
Dept: CARDIOLOGY CLINIC | Facility: CLINIC | Age: 85
End: 2020-06-25
Payer: COMMERCIAL

## 2020-06-25 VITALS
HEIGHT: 64 IN | TEMPERATURE: 98.3 F | BODY MASS INDEX: 23.73 KG/M2 | WEIGHT: 139 LBS | OXYGEN SATURATION: 98 % | HEART RATE: 61 BPM | SYSTOLIC BLOOD PRESSURE: 138 MMHG | DIASTOLIC BLOOD PRESSURE: 82 MMHG

## 2020-06-25 DIAGNOSIS — I50.32 CHRONIC DIASTOLIC CONGESTIVE HEART FAILURE (HCC): ICD-10-CM

## 2020-06-25 DIAGNOSIS — E78.2 MIXED HYPERLIPIDEMIA: ICD-10-CM

## 2020-06-25 DIAGNOSIS — I10 BENIGN ESSENTIAL HYPERTENSION: ICD-10-CM

## 2020-06-25 DIAGNOSIS — I25.10 ARTERIOSCLEROSIS OF CORONARY ARTERY: ICD-10-CM

## 2020-06-25 PROCEDURE — 3008F BODY MASS INDEX DOCD: CPT | Performed by: INTERNAL MEDICINE

## 2020-06-25 PROCEDURE — 1160F RVW MEDS BY RX/DR IN RCRD: CPT | Performed by: INTERNAL MEDICINE

## 2020-06-25 PROCEDURE — 3075F SYST BP GE 130 - 139MM HG: CPT | Performed by: INTERNAL MEDICINE

## 2020-06-25 PROCEDURE — 3079F DIAST BP 80-89 MM HG: CPT | Performed by: INTERNAL MEDICINE

## 2020-06-25 PROCEDURE — 1036F TOBACCO NON-USER: CPT | Performed by: INTERNAL MEDICINE

## 2020-06-25 PROCEDURE — 93000 ELECTROCARDIOGRAM COMPLETE: CPT | Performed by: INTERNAL MEDICINE

## 2020-06-25 PROCEDURE — 4040F PNEUMOC VAC/ADMIN/RCVD: CPT | Performed by: INTERNAL MEDICINE

## 2020-06-25 PROCEDURE — 99214 OFFICE O/P EST MOD 30 MIN: CPT | Performed by: INTERNAL MEDICINE

## 2020-07-14 ENCOUNTER — TELEPHONE (OUTPATIENT)
Dept: OTHER | Facility: OTHER | Age: 85
End: 2020-07-14

## 2020-07-14 DIAGNOSIS — I10 BENIGN ESSENTIAL HYPERTENSION: ICD-10-CM

## 2020-07-14 DIAGNOSIS — I25.10 ARTERIOSCLEROSIS OF CORONARY ARTERY: ICD-10-CM

## 2020-07-14 RX ORDER — OLMESARTAN MEDOXOMIL 20 MG/1
10 TABLET ORAL DAILY
Qty: 15 TABLET | Refills: 2 | Status: SHIPPED | OUTPATIENT
Start: 2020-07-14 | End: 2020-07-21 | Stop reason: CLARIF

## 2020-07-14 NOTE — TELEPHONE ENCOUNTER
[employer]ia [employer]:    [phone] H  Adriana 10  3 1933/ Pt threw out her olmesartan 20mg on accident  She is requesting a refill be sent since she now has none

## 2020-07-15 RX ORDER — OLMESARTAN MEDOXOMIL 20 MG/1
10 TABLET ORAL DAILY
Qty: 15 TABLET | Refills: 5 | Status: CANCELLED | OUTPATIENT
Start: 2020-07-15

## 2020-07-21 ENCOUNTER — OFFICE VISIT (OUTPATIENT)
Dept: GASTROENTEROLOGY | Facility: CLINIC | Age: 85
End: 2020-07-21
Payer: COMMERCIAL

## 2020-07-21 VITALS — TEMPERATURE: 98.5 F | HEIGHT: 64 IN | HEART RATE: 74 BPM | WEIGHT: 137 LBS | BODY MASS INDEX: 23.39 KG/M2

## 2020-07-21 DIAGNOSIS — R19.7 DIARRHEA, UNSPECIFIED TYPE: Primary | ICD-10-CM

## 2020-07-21 PROCEDURE — 4040F PNEUMOC VAC/ADMIN/RCVD: CPT | Performed by: INTERNAL MEDICINE

## 2020-07-21 PROCEDURE — 3075F SYST BP GE 130 - 139MM HG: CPT | Performed by: INTERNAL MEDICINE

## 2020-07-21 PROCEDURE — 1036F TOBACCO NON-USER: CPT | Performed by: INTERNAL MEDICINE

## 2020-07-21 PROCEDURE — 3008F BODY MASS INDEX DOCD: CPT | Performed by: INTERNAL MEDICINE

## 2020-07-21 PROCEDURE — 3079F DIAST BP 80-89 MM HG: CPT | Performed by: INTERNAL MEDICINE

## 2020-07-21 PROCEDURE — 99213 OFFICE O/P EST LOW 20 MIN: CPT | Performed by: INTERNAL MEDICINE

## 2020-07-21 PROCEDURE — 1160F RVW MEDS BY RX/DR IN RCRD: CPT | Performed by: INTERNAL MEDICINE

## 2020-07-21 NOTE — PROGRESS NOTES
[facility]  Natasha Tyson 80 y o  female MRN: [id number]            Assessment & Plan:    Pleasant 80year-old female, initially seen due to subacute diarrhea  Colonoscopy demonstrated mild self-limited active inflammatory changes  1  Subacute diarrhea:  Likely post infectious versus medication induced colitis  -improved after 1 week of Pepto-Bismol  -patient's symptoms have resolved at this time, she can follow up as needed in the future  _____________________________________________________________        CC: Follow-up of diarrhea    HPI:  Natasha Tyson is a 80 y  o female who is here for follow-up of diarrhea  As you know this is a pleasant 59-year-old female, initially presented with loose watery diarrhea for approximately 6 weeks  Patient is doing much better  She reports that she has 1 to 2 formed bowel movements daily, no urgency, no blood, no mucus  Denies any abdominal pain  Her weight has been stable, she lost some weight initially however she seems to have leveled off at this point  She patient denies any nausea, vomiting, heartburn, dysphagia  Initially after her colonoscopy she was found to have some mild inflammatory changes, she had taken Pepto-Bismol for approximately 5 days and her symptoms resolved  Clarified with patient she is not taking Benicar  ROS:  The remainder of the ROS was negative except for the pertinent positives mentioned in HPI  Allergies: Penicillins; Versed [midazolam]; Zithromax [azithromycin];  Bee venom; Enalapril; Erythromycin; Estrogens; Ramipril; and Statins    Medications:   Current Outpatient Medications:     amLODIPine (NORVASC) 5 mg tablet, Take 1 tablet (5 mg total) by mouth daily, Disp: 90 tablet, Rfl: 3    aspirin (ASPIRIN LOW DOSE) 81 MG tablet, Take 1 tablet by mouth daily, Disp: , Rfl:     Calcium Carbonate-Vitamin D (CALCIUM 600+D HIGH POTENCY PO), Take 600 mg by mouth 2 (two) times a day, Disp: , Rfl:     Cholecalciferol (VITAMIN D) 2000 units tablet, Take 1 tablet (2,000 Units total) by mouth daily (Patient taking differently: Take 1,000 Units by mouth daily ), Disp: , Rfl:     Coenzyme Q10-Fish Oil-Vit E (CO-Q 10 OMEGA-3 FISH OIL PO), Take 1 capsule by mouth daily, Disp: , Rfl:     diphenoxylate-atropine (LOMOTIL) 2 5-0 025 mg per tablet, Take 1 tablet by mouth 2 (two) times a day as needed for diarrhea, Disp: 30 tablet, Rfl: 0    fexofenadine (ALLEGRA ALLERGY) 180 MG tablet, Take by mouth, Disp: , Rfl:     metoprolol succinate (TOPROL-XL) 25 mg 24 hr tablet, Take 1 tablet (25 mg total) by mouth daily, Disp: 90 tablet, Rfl: 3    potassium chloride (K-DUR,KLOR-CON) 20 mEq tablet, TAKE 1 TABLET FOUR TIMES A DAY (Patient taking differently: Take 20 mEq by mouth 2 (two) times a day ), Disp: 360 tablet, Rfl: 4    RABEprazole (ACIPHEX) 20 MG tablet, TAKE 1 TABLET DAILY, Disp: 90 tablet, Rfl: 4    rosuvastatin (CRESTOR) 5 mg tablet, TAKE 1 TABLET TWICE A WEEK, Disp: 26 tablet, Rfl: 4    spironolactone (ALDACTONE) 25 mg tablet, Take 1 tablet (25 mg total) by mouth daily, Disp: 90 tablet, Rfl: 3    Past Medical History:   Diagnosis Date    Abscess of chest wall     Last Assessed: 2/27/2014    Arthritis     Back pain     Cataract     Start of    Colon polyp     Coronary artery disease     Diverticulitis of colon 12/04/2008    Female bladder prolapse     Gastric reflux     Hematuria     Last Assessed: 11/18/2014     History of diverticulitis     [place] (hard of hearing)     Hypercholesteremia     Hyperkeratosis of skin 11/03/2011    Hypertension     Hypokalemia 12/13/2011    Incomplete uterovaginal prolapse 05/28/2010    Myocardial infarction ([facility] [address] )     Osteoporosis     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    Last Assessed: 10/24/2012     Seasonal allergies     Sebaceous cyst     Last Assessed: 2/8/2014    Syncope     Trigeminal neuralgia 03/20/2012    Urinary incontinence     Uterine prolapse     Vertigo 09/15/2011    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    Wears glasses     Wears hearing aid     States she rarely wears them    Wears partial dentures     Upper        Past Surgical History:   Procedure Laterality Date    APPENDECTOMY      APPENDICO-VESICOSTOMY CUTANEOUS  03/01/2010    CHOLECYSTECTOMY      Laparoscopic    COLONOSCOPY      CORONARY ANGIOPLASTY      CORONARY ANGIOPLASTY WITH STENT PLACEMENT      2 aortic stents    CORONARY ANGIOPLASTY WITH STENT PLACEMENT  2013    2 distal left anterior descending artery     ESOPHAGOGASTRODUODENOSCOPY      FRACTURE SURGERY Right     Repair right arm- titanium servando from shoulder to elbow   HYSTERECTOMY      Complete    MN CMBND ANTERPOST COLPORRAPHY W/CYSTO N/A 9/20/2016    Procedure: COLPORRHAPHY ANTERIOR POSTERIOR GRAFT;  Surgeon: Jorge Lebron MD;  [facility];  Service: UroGynecology           MN CYSTOURETHROSCOPY N/A 9/20/2016    Procedure: Marcey Prader;  Surgeon: Jorge Lebron MD;  [facility];  Service: UroGynecology           MN REVAGINAL PROLAPSE,SACROSP LIG N/A 9/20/2016    Procedure: COLPOPEXY VAGINAL EXTRAPERITONEAL  incision and drainage of vagina inclusion cyst x 4;  Surgeon: Jorge Lebron MD;  [facility];  Service: UroGynecology           MN SLING OPER STRES INCONTINENCE N/A 9/20/2016    Procedure: INSERTION PUBOVAGINAL SLING RETROPUBIC ;  Surgeon: Jorge Lebron MD;  [facility];  Service: UroGynecology              Family History   Problem Relation Age of Onset    Hypertension Mother     Heart failure Mother     Coronary artery disease Mother     Arthritis Mother     Osteoporosis Mother     Hyperlipidemia Mother     Coronary artery disease Sister         reports that she has never smoked  She has never used smokeless tobacco  She reports that she drinks about 1 0 - 2 0 standard drinks of alcohol per week   She reports that she does not use drugs       Physical Exam:    Pulse 74   Temp 98 5 °F (36 9 °C)   Ht 5' 4" (1 626 m)   Wt 62 1 kg (137 lb)   BMI 23 52 kg/m²     Gen: wn/wd, NAD plan pleasant healthy elderly female  HEENT: anicteric, MMM, no cervical LAD  CVS: RRR, no m/r/g  CHEST: CTA b/l  ABD: +BS, soft, NT,ND, no hepatosplenomegaly  EXT: no c/c/e  NEURO: aaox3  SKIN: NO rashes

## 2020-07-21 NOTE — LETTER
July 21, 2020     Ewelina Hubbard, [address]    Patient: Daryle Baptist   YOB: 1933   Date of Visit: 7/21/2020       Dear Dr Henry Oneil: Thank you for referring Alisia Thapa to me for evaluation  Below are my notes for this consultation  If you have questions, please do not hesitate to call me  I look forward to following your patient along with you  Sincerely,        Jocelyn English MD        CC: No Recipients  Jocelyn English MD  7/21/2020  2:17 PM  Sign at close encounter  [facility]  Kristie Wright 80 y o  female MRN: [id number]            Assessment & Plan:    Abel 80year-old female, initially seen due to subacute diarrhea  Colonoscopy demonstrated mild self-limited active inflammatory changes  1  Subacute diarrhea:  Likely post infectious versus medication induced colitis  -improved after 1 week of Pepto-Bismol  -patient's symptoms have resolved at this time, she can follow up as needed in the future  _____________________________________________________________        CC: Follow-up of diarrhea    HPI:  Daryle Baptist is a 80 y  o female who is here for follow-up of diarrhea  As you know this is a pleasant 40-year-old female, initially presented with loose watery diarrhea for approximately 6 weeks  Patient is doing much better  She reports that she has 1 to 2 formed bowel movements daily, no urgency, no blood, no mucus  Denies any abdominal pain  Her weight has been stable, she lost some weight initially however she seems to have leveled off at this point  She patient denies any nausea, vomiting, heartburn, dysphagia  Initially after her colonoscopy she was found to have some mild inflammatory changes, she had taken Pepto-Bismol for approximately 5 days and her symptoms resolved  Clarified with patient she is not taking Benicar        ROS:  The remainder of the ROS was negative except for the pertinent positives mentioned in HPI  Allergies: Penicillins; Versed [midazolam]; Zithromax [azithromycin];  Bee venom; Enalapril; Erythromycin; Estrogens; Ramipril; and Statins    Medications:   Current Outpatient Medications:     amLODIPine (NORVASC) 5 mg tablet, Take 1 tablet (5 mg total) by mouth daily, Disp: 90 tablet, Rfl: 3    aspirin (ASPIRIN LOW DOSE) 81 MG tablet, Take 1 tablet by mouth daily, Disp: , Rfl:     Calcium Carbonate-Vitamin D (CALCIUM 600+D HIGH POTENCY PO), Take 600 mg by mouth 2 (two) times a day, Disp: , Rfl:     Cholecalciferol (VITAMIN D) 2000 units tablet, Take 1 tablet (2,000 Units total) by mouth daily (Patient taking differently: Take 1,000 Units by mouth daily ), Disp: , Rfl:     Coenzyme Q10-Fish Oil-Vit E (CO-Q 10 OMEGA-3 FISH OIL PO), Take 1 capsule by mouth daily, Disp: , Rfl:     diphenoxylate-atropine (LOMOTIL) 2 5-0 025 mg per tablet, Take 1 tablet by mouth 2 (two) times a day as needed for diarrhea, Disp: 30 tablet, Rfl: 0    fexofenadine (ALLEGRA ALLERGY) 180 MG tablet, Take by mouth, Disp: , Rfl:     metoprolol succinate (TOPROL-XL) 25 mg 24 hr tablet, Take 1 tablet (25 mg total) by mouth daily, Disp: 90 tablet, Rfl: 3    potassium chloride (K-DUR,KLOR-CON) 20 mEq tablet, TAKE 1 TABLET FOUR TIMES A DAY (Patient taking differently: Take 20 mEq by mouth 2 (two) times a day ), Disp: 360 tablet, Rfl: 4    RABEprazole (ACIPHEX) 20 MG tablet, TAKE 1 TABLET DAILY, Disp: 90 tablet, Rfl: 4    rosuvastatin (CRESTOR) 5 mg tablet, TAKE 1 TABLET TWICE A WEEK, Disp: 26 tablet, Rfl: 4    spironolactone (ALDACTONE) 25 mg tablet, Take 1 tablet (25 mg total) by mouth daily, Disp: 90 tablet, Rfl: 3    Past Medical History:   Diagnosis Date    Abscess of chest wall     Last Assessed: 2/27/2014    Arthritis     Back pain     Cataract     Start of    Colon polyp     Coronary artery disease     Diverticulitis of colon 12/04/2008    Female bladder prolapse     Gastric reflux     Hematuria     Last Assessed: 11/18/2014     History of diverticulitis     [place] (hard of hearing)     Hypercholesteremia     Hyperkeratosis of skin 11/03/2011    Hypertension     Hypokalemia 12/13/2011    Incomplete uterovaginal prolapse 05/28/2010    Myocardial infarction ([facility] [address]     Osteoporosis     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    Last Assessed: 10/24/2012     Seasonal allergies     Sebaceous cyst     Last Assessed: 2/8/2014    Syncope     Trigeminal neuralgia 03/20/2012    Urinary incontinence     Uterine prolapse     Vertigo 09/15/2011    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    Wears glasses     Wears hearing aid     States she rarely wears them    Wears partial dentures     Upper        Past Surgical History:   Procedure Laterality Date    APPENDECTOMY      APPENDICO-VESICOSTOMY CUTANEOUS  03/01/2010    CHOLECYSTECTOMY      Laparoscopic    COLONOSCOPY      CORONARY ANGIOPLASTY      CORONARY ANGIOPLASTY WITH STENT PLACEMENT      2 aortic stents    CORONARY ANGIOPLASTY WITH STENT PLACEMENT  2013    2 distal left anterior descending artery     ESOPHAGOGASTRODUODENOSCOPY      FRACTURE SURGERY Right     Repair right arm- titanium servando from shoulder to elbow      HYSTERECTOMY      Complete    NE CMBND ANTERPOST COLPORRAPHY W/CYSTO N/A 9/20/2016    Procedure: COLPORRHAPHY ANTERIOR POSTERIOR GRAFT;  Surgeon: Gucci Briseno MD;  [facility];  Service: UroGynecology           NE CYSTOURETHROSCOPY N/A 9/20/2016    Procedure: Caroline Donis;  Surgeon: Gucci Briseno MD;  [facility];  Service: UroGynecology           NE REVAGINAL PROLAPSE,SACROSP LIG N/A 9/20/2016    Procedure: COLPOPEXY VAGINAL EXTRAPERITONEAL  incision and drainage of vagina inclusion cyst x 4;  Surgeon: Gucci Briseno MD;  [facility];  Service: UroGynecology           NE SLING OPER STRES INCONTINENCE N/A 9/20/2016    Procedure: INSERTION PUBOVAGINAL SLING RETROPUBIC ; Surgeon: Elona Osler, MD;  Location[facility];  Service: UroGynecology              Family History   Problem Relation Age of Onset    Hypertension Mother     Heart failure Mother     Coronary artery disease Mother     Arthritis Mother     Osteoporosis Mother     Hyperlipidemia Mother     Coronary artery disease Sister         reports that she has never smoked  She has never used smokeless tobacco  She reports that she drinks about 1 0 - 2 0 standard drinks of alcohol per week  She reports that she does not use drugs        Physical Exam:    Pulse 74   Temp 98 5 °F (36 9 °C)   Ht 5' 4" (1 626 m)   Wt 62 1 kg (137 lb)   BMI 23 52 kg/m²      Gen: wn/wd, NAD plan pleasant healthy elderly female  HEENT: anicteric, MMM, no cervical LAD  CVS: RRR, no m/r/g  CHEST: CTA b/l  ABD: +BS, soft, NT,ND, no hepatosplenomegaly  EXT: no c/c/e  NEURO: aaox3  SKIN: NO rashes

## 2020-10-06 ENCOUNTER — CLINICAL SUPPORT (OUTPATIENT)
Dept: FAMILY MEDICINE CLINIC | Facility: CLINIC | Age: 85
End: 2020-10-06
Payer: COMMERCIAL

## 2020-10-06 VITALS — TEMPERATURE: 97.8 F

## 2020-10-06 DIAGNOSIS — Z23 NEED FOR INFLUENZA VACCINATION: Primary | ICD-10-CM

## 2020-10-06 PROCEDURE — 99999 PR OFFICE/OUTPT VISIT,PROCEDURE ONLY: CPT

## 2020-10-06 PROCEDURE — G0008 ADMIN INFLUENZA VIRUS VAC: HCPCS | Performed by: NURSE PRACTITIONER

## 2020-10-06 PROCEDURE — 90662 IIV NO PRSV INCREASED AG IM: CPT | Performed by: NURSE PRACTITIONER

## 2020-11-10 ENCOUNTER — TELEPHONE (OUTPATIENT)
Dept: FAMILY MEDICINE CLINIC | Facility: CLINIC | Age: 85
End: 2020-11-10

## 2020-11-10 ENCOUNTER — OFFICE VISIT (OUTPATIENT)
Dept: AUDIOLOGY | Facility: CLINIC | Age: 85
End: 2020-11-10
Payer: COMMERCIAL

## 2020-11-10 DIAGNOSIS — H90.3 SENSORY HEARING LOSS, BILATERAL: Primary | ICD-10-CM

## 2020-11-10 DIAGNOSIS — H91.8X3 OTHER SPECIFIED HEARING LOSS OF BOTH EARS: Primary | ICD-10-CM

## 2020-11-10 PROCEDURE — 92567 TYMPANOMETRY: CPT | Performed by: AUDIOLOGIST

## 2020-11-10 PROCEDURE — 92557 COMPREHENSIVE HEARING TEST: CPT | Performed by: AUDIOLOGIST

## 2020-11-17 DIAGNOSIS — I10 BENIGN ESSENTIAL HYPERTENSION: ICD-10-CM

## 2020-11-17 DIAGNOSIS — I25.10 ARTERIOSCLEROSIS OF CORONARY ARTERY: ICD-10-CM

## 2020-11-17 RX ORDER — AMLODIPINE BESYLATE 5 MG/1
5 TABLET ORAL DAILY
Qty: 90 TABLET | Refills: 3 | Status: SHIPPED | OUTPATIENT
Start: 2020-11-17 | End: 2021-11-24

## 2020-12-10 ENCOUNTER — OFFICE VISIT (OUTPATIENT)
Dept: AUDIOLOGY | Facility: CLINIC | Age: 85
End: 2020-12-10
Payer: COMMERCIAL

## 2020-12-10 DIAGNOSIS — H90.3 SENSORY HEARING LOSS, BILATERAL: Primary | ICD-10-CM

## 2020-12-10 PROCEDURE — V5261 HEARING AID, DIGIT, BIN, BTE: HCPCS | Performed by: AUDIOLOGIST

## 2020-12-27 DIAGNOSIS — K21.9 GASTROESOPHAGEAL REFLUX DISEASE: ICD-10-CM

## 2020-12-28 RX ORDER — RABEPRAZOLE SODIUM 20 MG/1
TABLET, DELAYED RELEASE ORAL
Qty: 90 TABLET | Refills: 3 | Status: SHIPPED | OUTPATIENT
Start: 2020-12-28 | End: 2021-12-02

## 2021-01-05 ENCOUNTER — OFFICE VISIT (OUTPATIENT)
Dept: AUDIOLOGY | Facility: CLINIC | Age: 86
End: 2021-01-05

## 2021-01-05 DIAGNOSIS — H90.3 SENSORY HEARING LOSS, BILATERAL: Primary | ICD-10-CM

## 2021-01-05 NOTE — PROGRESS NOTES
Hearing Aid Visit:    Name:  Leena Gibson  :  1933  Age:  80 y o  Date of Evaluation: 21     Dino Wright is being seen for an initial hearing aid visit to fitting on 12/10/2020  Patient is fit with [employer] OPN S 2 R hearing aid(s)  Right serial number [id number]  Left serial number [id number]  Warranty date: 2023 (Loss/Damage and repair)  Patient reports excellent outcomes with the devices and said "She loves them"  Action:  1  Paired her Iphone to the hearing aids / showed how to use the "triple click", the Live Listen and where in the settings the aids are set up  2  Cleaned the domes and showed her the dome and wax guard maintenance  Recommendations:   1  RTO 2021 for final follow up prior to 5-6 month maintenance visit  Check auto adaptation status and go to step 3 if almost there        Willow Diaz , CCC-A, [place]# [id number], Hearing Aid Dispenser, [place]# [id number]  Clinical Audiologist

## 2021-01-18 DIAGNOSIS — I50.22 CHRONIC SYSTOLIC CONGESTIVE HEART FAILURE (HCC): ICD-10-CM

## 2021-01-18 DIAGNOSIS — E87.6 HYPOKALEMIA: ICD-10-CM

## 2021-01-18 DIAGNOSIS — I25.10 CORONARY ARTERY DISEASE INVOLVING NATIVE CORONARY ARTERY OF NATIVE HEART WITHOUT ANGINA PECTORIS: ICD-10-CM

## 2021-01-18 DIAGNOSIS — I10 BENIGN ESSENTIAL HYPERTENSION: ICD-10-CM

## 2021-01-18 RX ORDER — POTASSIUM CHLORIDE 20 MEQ/1
20 TABLET, EXTENDED RELEASE ORAL 2 TIMES DAILY
Qty: 60 TABLET | Refills: 3 | Status: SHIPPED | OUTPATIENT
Start: 2021-01-18 | End: 2021-06-09 | Stop reason: SDUPTHER

## 2021-02-01 DIAGNOSIS — E78.5 DYSLIPIDEMIA: ICD-10-CM

## 2021-02-01 RX ORDER — ROSUVASTATIN CALCIUM 5 MG/1
TABLET, COATED ORAL
Qty: 26 TABLET | Refills: 3 | Status: SHIPPED | OUTPATIENT
Start: 2021-02-01 | End: 2022-01-27

## 2021-02-10 ENCOUNTER — IMMUNIZATIONS (OUTPATIENT)
Dept: FAMILY MEDICINE CLINIC | Facility: HOSPITAL | Age: 86
End: 2021-02-10

## 2021-02-10 DIAGNOSIS — Z23 ENCOUNTER FOR IMMUNIZATION: Primary | ICD-10-CM

## 2021-02-10 PROCEDURE — 0011A SARS-COV-2 / COVID-19 MRNA VACCINE (MODERNA) 100 MCG: CPT

## 2021-02-10 PROCEDURE — 91301 SARS-COV-2 / COVID-19 MRNA VACCINE (MODERNA) 100 MCG: CPT

## 2021-03-10 ENCOUNTER — IMMUNIZATIONS (OUTPATIENT)
Dept: FAMILY MEDICINE CLINIC | Facility: HOSPITAL | Age: 86
End: 2021-03-10

## 2021-03-10 DIAGNOSIS — Z23 ENCOUNTER FOR IMMUNIZATION: Primary | ICD-10-CM

## 2021-03-10 PROCEDURE — 91301 SARS-COV-2 / COVID-19 MRNA VACCINE (MODERNA) 100 MCG: CPT

## 2021-03-10 PROCEDURE — 0012A SARS-COV-2 / COVID-19 MRNA VACCINE (MODERNA) 100 MCG: CPT

## 2021-06-04 DIAGNOSIS — I10 BENIGN ESSENTIAL HYPERTENSION: ICD-10-CM

## 2021-06-04 DIAGNOSIS — I25.10 ARTERIOSCLEROSIS OF CORONARY ARTERY: ICD-10-CM

## 2021-06-04 RX ORDER — METOPROLOL SUCCINATE 25 MG/1
TABLET, EXTENDED RELEASE ORAL
Qty: 90 TABLET | Refills: 3 | Status: SHIPPED | OUTPATIENT
Start: 2021-06-04 | End: 2022-05-13

## 2021-06-09 DIAGNOSIS — I50.22 CHRONIC SYSTOLIC CONGESTIVE HEART FAILURE (HCC): ICD-10-CM

## 2021-06-09 DIAGNOSIS — I10 BENIGN ESSENTIAL HYPERTENSION: ICD-10-CM

## 2021-06-09 DIAGNOSIS — I25.10 CORONARY ARTERY DISEASE INVOLVING NATIVE CORONARY ARTERY OF NATIVE HEART WITHOUT ANGINA PECTORIS: ICD-10-CM

## 2021-06-09 DIAGNOSIS — E87.6 HYPOKALEMIA: ICD-10-CM

## 2021-06-09 RX ORDER — POTASSIUM CHLORIDE 20 MEQ/1
20 TABLET, EXTENDED RELEASE ORAL 2 TIMES DAILY
Qty: 60 TABLET | Refills: 3 | Status: SHIPPED | OUTPATIENT
Start: 2021-06-09 | End: 2021-07-02 | Stop reason: SDUPTHER

## 2021-06-22 ENCOUNTER — OFFICE VISIT (OUTPATIENT)
Dept: AUDIOLOGY | Facility: CLINIC | Age: 86
End: 2021-06-22

## 2021-06-22 DIAGNOSIS — H90.3 SENSORY HEARING LOSS, BILATERAL: Primary | ICD-10-CM

## 2021-06-22 NOTE — PROGRESS NOTES
Hearing Aid Visit:    Name:  Berhane Herman  :  1933  Age:  80 y o  Date of Evaluation: 21     Zechariah Wright is being seen for a hearing aid visit  Patient is fit with [employer] OPN S 2 R hearing aid(s)  Right serial number [id number]  Left serial number [id number]  Warranty date: 2023 (Loss/Damage and repair)  Patient reports no current issues with the devices  She is very happy with them and has been wearing about 11 hours per day / per datalog  Action:  1  Checked settings  Went to step 3 on the left aid  No feedback noted, no complaints from the patient  She said it sounded "fine"  2  Provided one pack of domes, two packs of wax guards  Recommendations:   1  RTO 21 for a HA visit  Check when to complete an annual / 2 year audio         Willow Jorgensen , CCC-A, [place]# [id number], Hearing Aid Dispenser, [place]# [id number]  Clinical Audiologist

## 2021-07-02 DIAGNOSIS — E87.6 HYPOKALEMIA: ICD-10-CM

## 2021-07-02 DIAGNOSIS — I10 BENIGN ESSENTIAL HYPERTENSION: ICD-10-CM

## 2021-07-02 DIAGNOSIS — I50.22 CHRONIC SYSTOLIC CONGESTIVE HEART FAILURE (HCC): ICD-10-CM

## 2021-07-02 DIAGNOSIS — I25.10 CORONARY ARTERY DISEASE INVOLVING NATIVE CORONARY ARTERY OF NATIVE HEART WITHOUT ANGINA PECTORIS: ICD-10-CM

## 2021-07-02 RX ORDER — POTASSIUM CHLORIDE 20 MEQ/1
20 TABLET, EXTENDED RELEASE ORAL 2 TIMES DAILY
Qty: 60 TABLET | Refills: 3 | Status: SHIPPED | OUTPATIENT
Start: 2021-07-02 | End: 2021-09-03 | Stop reason: SDUPTHER

## 2021-07-05 DIAGNOSIS — E87.6 HYPOKALEMIA: ICD-10-CM

## 2021-07-05 RX ORDER — SPIRONOLACTONE 25 MG/1
TABLET ORAL
Qty: 90 TABLET | Refills: 3 | Status: SHIPPED | OUTPATIENT
Start: 2021-07-05 | End: 2022-07-08

## 2021-07-20 ENCOUNTER — APPOINTMENT (OUTPATIENT)
Dept: LAB | Facility: CLINIC | Age: 86
End: 2021-07-20
Payer: COMMERCIAL

## 2021-07-20 ENCOUNTER — OFFICE VISIT (OUTPATIENT)
Dept: FAMILY MEDICINE CLINIC | Facility: CLINIC | Age: 86
End: 2021-07-20
Payer: COMMERCIAL

## 2021-07-20 VITALS
TEMPERATURE: 97.7 F | RESPIRATION RATE: 16 BRPM | BODY MASS INDEX: 25 KG/M2 | HEIGHT: 64 IN | WEIGHT: 146.4 LBS | HEART RATE: 75 BPM | DIASTOLIC BLOOD PRESSURE: 90 MMHG | OXYGEN SATURATION: 98 % | SYSTOLIC BLOOD PRESSURE: 164 MMHG

## 2021-07-20 DIAGNOSIS — E87.6 HYPOKALEMIA: ICD-10-CM

## 2021-07-20 DIAGNOSIS — R55 SYNCOPE, UNSPECIFIED SYNCOPE TYPE: Primary | ICD-10-CM

## 2021-07-20 DIAGNOSIS — I50.32 CHRONIC DIASTOLIC CONGESTIVE HEART FAILURE (HCC): ICD-10-CM

## 2021-07-20 DIAGNOSIS — I10 BENIGN ESSENTIAL HYPERTENSION: ICD-10-CM

## 2021-07-20 DIAGNOSIS — Z79.899 HIGH RISK MEDICATION USE: ICD-10-CM

## 2021-07-20 DIAGNOSIS — R55 SYNCOPE, UNSPECIFIED SYNCOPE TYPE: ICD-10-CM

## 2021-07-20 LAB
ALBUMIN SERPL BCP-MCNC: 3.6 G/DL (ref 3.5–5)
ALP SERPL-CCNC: 63 U/L (ref 46–116)
ALT SERPL W P-5'-P-CCNC: 23 U/L (ref 12–78)
ANION GAP SERPL CALCULATED.3IONS-SCNC: 7 MMOL/L (ref 4–13)
AST SERPL W P-5'-P-CCNC: 17 U/L (ref 5–45)
BILIRUB SERPL-MCNC: 0.39 MG/DL (ref 0.2–1)
BUN SERPL-MCNC: 19 MG/DL (ref 5–25)
CALCIUM SERPL-MCNC: 9.7 MG/DL (ref 8.3–10.1)
CHLORIDE SERPL-SCNC: 106 MMOL/L (ref 100–108)
CO2 SERPL-SCNC: 24 MMOL/L (ref 21–32)
CREAT SERPL-MCNC: 0.73 MG/DL (ref 0.6–1.3)
ERYTHROCYTE [DISTWIDTH] IN BLOOD BY AUTOMATED COUNT: 13.8 % (ref 11.6–15.1)
GFR SERPL CREATININE-BSD FRML MDRD: 74 ML/MIN/1.73SQ M
GLUCOSE P FAST SERPL-MCNC: 82 MG/DL (ref 65–99)
HCT VFR BLD AUTO: 47.1 % (ref 34.8–46.1)
HGB BLD-MCNC: 14.5 G/DL (ref 11.5–15.4)
MAGNESIUM SERPL-MCNC: 2.2 MG/DL (ref 1.6–2.6)
MCH RBC QN AUTO: 30.1 PG (ref 26.8–34.3)
MCHC RBC AUTO-ENTMCNC: 30.8 G/DL (ref 31.4–37.4)
MCV RBC AUTO: 98 FL (ref 82–98)
PLATELET # BLD AUTO: 379 THOUSANDS/UL (ref 149–390)
PMV BLD AUTO: 10.3 FL (ref 8.9–12.7)
POTASSIUM SERPL-SCNC: 4.1 MMOL/L (ref 3.5–5.3)
PROT SERPL-MCNC: 7.6 G/DL (ref 6.4–8.2)
RBC # BLD AUTO: 4.81 MILLION/UL (ref 3.81–5.12)
SODIUM SERPL-SCNC: 137 MMOL/L (ref 136–145)
TSH SERPL DL<=0.05 MIU/L-ACNC: 1.88 UIU/ML (ref 0.36–3.74)
WBC # BLD AUTO: 9.15 THOUSAND/UL (ref 4.31–10.16)

## 2021-07-20 PROCEDURE — 85027 COMPLETE CBC AUTOMATED: CPT

## 2021-07-20 PROCEDURE — 3725F SCREEN DEPRESSION PERFORMED: CPT | Performed by: NURSE PRACTITIONER

## 2021-07-20 PROCEDURE — 80053 COMPREHEN METABOLIC PANEL: CPT

## 2021-07-20 PROCEDURE — 1160F RVW MEDS BY RX/DR IN RCRD: CPT | Performed by: NURSE PRACTITIONER

## 2021-07-20 PROCEDURE — 3288F FALL RISK ASSESSMENT DOCD: CPT | Performed by: NURSE PRACTITIONER

## 2021-07-20 PROCEDURE — 93000 ELECTROCARDIOGRAM COMPLETE: CPT | Performed by: NURSE PRACTITIONER

## 2021-07-20 PROCEDURE — 1100F PTFALLS ASSESS-DOCD GE2>/YR: CPT | Performed by: NURSE PRACTITIONER

## 2021-07-20 PROCEDURE — 1036F TOBACCO NON-USER: CPT | Performed by: NURSE PRACTITIONER

## 2021-07-20 PROCEDURE — 83735 ASSAY OF MAGNESIUM: CPT | Performed by: NURSE PRACTITIONER

## 2021-07-20 PROCEDURE — 99214 OFFICE O/P EST MOD 30 MIN: CPT | Performed by: NURSE PRACTITIONER

## 2021-07-20 PROCEDURE — 84443 ASSAY THYROID STIM HORMONE: CPT

## 2021-07-20 NOTE — PROGRESS NOTES
Subjective     Janene Garcia is a 80 y o  female who presents for evaluation of syncope  Reports one isolated episode that occurred 2 weeks ago  Occurred after checking out after grocery shopping  Standing at check out >10 min in one position  when she started walking to leave she "passed out"  Episode was witnessed  she reports that she was easily aroused by the   She hit her head and left shoulder  She declined 911, further care  She was not incontinent  She insisted upon driving herself home  she felt "normal" when she got to her car  She drove home without incident  Day of episode, pt reports "heat wave" conditions  Does not drink water frequently  Takes a diuretic  She denies similar symptoms since  She has h/o syncope, chf, htn  She is of advanced age  It has been > 1 year since she saw her cardiologist, Dr Don Thomas  It has been >1 year since lab work  Denies sxs associated with Af, chf  Denies chest pain, dizziness, confusion, palp, orthopnea, dyspnea, swelling  EKG: normal EKG, normal sinus rhythm, unchanged from previous tracings, nonspecific ST and T waves changes  The following portions of the patient's history were reviewed and updated as appropriate: allergies, current medications, past family history, past medical history, past social history, past surgical history and problem list     Review of Systems  Pertinent items are noted in HPI       Objective     Physical Exam  Vitals and nursing note reviewed  Constitutional:       General: She is not in acute distress  Appearance: Normal appearance  Comments: Frail elderly female   HENT:      Head: Normocephalic and atraumatic  Eyes:      Pupils: Pupils are equal, round, and reactive to light  Neck:      Thyroid: No thyromegaly  Vascular: Normal carotid pulses  No carotid bruit or JVD  Cardiovascular:      Rate and Rhythm: Normal rate and regular rhythm  Pulses: Normal pulses        Heart sounds: Murmur heard  Pulmonary:      Effort: Pulmonary effort is normal       Breath sounds: Normal breath sounds  Musculoskeletal:      Right lower leg: No edema  Left lower leg: No edema  Lymphadenopathy:      Cervical: No cervical adenopathy  Skin:     General: Skin is warm and dry  Coloration: Skin is not pale  Neurological:      General: No focal deficit present  Mental Status: She is alert  Mental status is at baseline  Motor: No weakness  Coordination: Coordination normal       Gait: Gait normal    Psychiatric:         Mood and Affect: Mood normal            Assessment/Plan     Probable vasovagal syncope  No evidence of overt chf, anemia, arrhythmia on exam  The case discussed with patient using patient centered shared decision making  The patient was counseled regarding instructions for management,-- risk factor reductions,-- prognosis,-- impressions,-- risks and benefits of treatment options,-- importance of compliance with treatment  I have reviewed the instructions with the patient, answering all questions to her satisfaction  ECG  Lab per orders  supportive care as advised:slow position change, adequate fluid intake  Schedule cardiology appointment today  Pt declines imaging r/t injuries sustained in fall  Declines follow up visit for recheck  Declines AWV  Pt "will call" if sxs recur or new sxs develop  Falls Plan of Care: Balance, strength, and gait training instructions were provided  fall precuations    BMI Counseling: Body mass index is 25 13 kg/m²  The BMI is above normal  Nutrition recommendations include moderation in carbohydrate intake

## 2021-07-21 ENCOUNTER — TELEPHONE (OUTPATIENT)
Dept: FAMILY MEDICINE CLINIC | Facility: CLINIC | Age: 86
End: 2021-07-21

## 2021-07-21 ENCOUNTER — TELEPHONE (OUTPATIENT)
Dept: ADMINISTRATIVE | Facility: OTHER | Age: 86
End: 2021-07-21

## 2021-07-21 NOTE — TELEPHONE ENCOUNTER
Please advise labs are acceptable  Please remind her to schedule f/u with [facility]ilogy r/t passing out/syncope   ty

## 2021-07-22 ENCOUNTER — TELEPHONE (OUTPATIENT)
Dept: OTHER | Facility: OTHER | Age: 86
End: 2021-07-22

## 2021-07-22 NOTE — TELEPHONE ENCOUNTER
Returning the office phone call to schedule a appointment with Dr Jay Jay Padilla  Please give me a call back

## 2021-07-23 ENCOUNTER — TELEPHONE (OUTPATIENT)
Dept: ENDOCRINOLOGY | Facility: CLINIC | Age: 86
End: 2021-07-23

## 2021-07-23 NOTE — TELEPHONE ENCOUNTER
Please inform patient to increase vitamin D3 supplementation to 2000 International Units daily    Katja Morgan MD

## 2021-07-23 NOTE — TELEPHONE ENCOUNTER
----- Message from Christ Horowitz MA sent at 7/23/2021  8:59 AM EDT -----  Spoke with patient and reviewed lab results    She currently takes 1000 iu's  of Vitamin D3 daily

## 2021-08-02 ENCOUNTER — VBI (OUTPATIENT)
Dept: ADMINISTRATIVE | Facility: OTHER | Age: 86
End: 2021-08-02

## 2021-08-12 ENCOUNTER — OFFICE VISIT (OUTPATIENT)
Dept: CARDIOLOGY CLINIC | Facility: CLINIC | Age: 86
End: 2021-08-12
Payer: COMMERCIAL

## 2021-08-12 VITALS
SYSTOLIC BLOOD PRESSURE: 118 MMHG | HEIGHT: 64 IN | DIASTOLIC BLOOD PRESSURE: 72 MMHG | TEMPERATURE: 98 F | OXYGEN SATURATION: 98 % | HEART RATE: 84 BPM | WEIGHT: 147 LBS | BODY MASS INDEX: 25.1 KG/M2

## 2021-08-12 DIAGNOSIS — I50.32 CHRONIC DIASTOLIC CONGESTIVE HEART FAILURE (HCC): ICD-10-CM

## 2021-08-12 DIAGNOSIS — I25.10 ARTERIOSCLEROSIS OF CORONARY ARTERY: ICD-10-CM

## 2021-08-12 DIAGNOSIS — Z78.9 STATIN INTOLERANCE: ICD-10-CM

## 2021-08-12 DIAGNOSIS — E78.5 DYSLIPIDEMIA: ICD-10-CM

## 2021-08-12 DIAGNOSIS — E78.2 MIXED HYPERLIPIDEMIA: ICD-10-CM

## 2021-08-12 DIAGNOSIS — I10 BENIGN ESSENTIAL HYPERTENSION: ICD-10-CM

## 2021-08-12 PROCEDURE — 93000 ELECTROCARDIOGRAM COMPLETE: CPT | Performed by: INTERNAL MEDICINE

## 2021-08-12 PROCEDURE — 1036F TOBACCO NON-USER: CPT | Performed by: INTERNAL MEDICINE

## 2021-08-12 PROCEDURE — 99214 OFFICE O/P EST MOD 30 MIN: CPT | Performed by: INTERNAL MEDICINE

## 2021-08-12 PROCEDURE — 1160F RVW MEDS BY RX/DR IN RCRD: CPT | Performed by: INTERNAL MEDICINE

## 2021-08-12 NOTE — PROGRESS NOTES
Progress Note - Cardiology Office  [facility]  Cristina Wright 80 y o  female MRN: [id number]  : 1933  Encounter: [id number]      Assessment:     1  Benign essential hypertension    2  Chronic diastolic congestive heart failure ([address] )    3  Mixed hyperlipidemia    4  Arteriosclerosis of coronary artery    5  Dyslipidemia    6  Statin intolerance        Discussion Summary and Plan:    1  Coronary artery disease with history of angioplasty of LAD and diagonal with a stent in  by Dr Zion Lewis and repeat catheterization in  shows patent stent and has mid RCA significant stenosis but very medium to small size artery not suitable for percutaneous techniques on medical Rx  Fortunately patient cannot take statins in high intensity   She is taking Crestor 5 mg twice a week  No symptoms of angina    3  Dyslipidemia but intolerant to statins  She is not taking Crestor twice a week  She does not want take any PSK inhibitors  Currently continue on Crestor 5 milligram  She is reluctant to increase the dose continue same dose      4  Persistent hypokalemia  May be related to possible underlying some hyperaldosteronism  She has done very well for blood pressure control with addition of Aldactone  Labs from 2021 reviewed  Potassium was 4 point    5  Benign essential hypertension  Blood pressure is acceptable  Medication changed  Currently she is going to take amlodipine, Benicar, Aldactone and Toprol XL  Continue same medication blood pressures except        All these issues discussed with patient at length all her questions answered to her satisfaction  Follow-up in 6 months  Counseling :  A description of the counseling  Patient advised to keep herself hydrated avoid similar situation where she passed out  Patient daughter was present all the time and all their questions were answered     Patient's ability to self care: Yes  Medication side effect reviewed with patient in detail and all their questions answered to their satisfaction  HPI :     Nick Carrera is a 80y o  year old female who came for follow up  Patient has Past medical history significant for coronary artery disease with remote angioplasty of LAD and diagonal by Dr Murphy Aguero in 2013 and then repeat cardiac catheterization in 2015 at [facility] shows patent stent in LAD and diagonal and has small RCA mid 80-90% stenosis which is very tortuous and not suitable for percutaneous technique  Patient has repeated history of syncopal episode and most of time it happened situation when she had a glass of wine and she is in a restaurant  She does not remember what happens and she feels fine after few minutes off it  She denies any fever or any chills any nausea and vomiting  She is otherwise very active  She cannot take statins even though her cholesterol is very high  She takes Crestor 5 mg twice a week and she is able to handle that  No fever no chills no nausea no vomiting no other significant complaint  08/12/2021     above reviewed  Patient came for follow-up  She is doing well she denies any new complaint  Blood pressure is 118/70  She denies any dizziness lightheadedness  She occasionally feel dizzy when it is too hot outside  She had history of persistent hypokalemia  And she takes Aldactone 25 mg daily and she would not like to try again she would like to take potassium supplementation  Currently she is taking amlodipine is 5 mg daily metoprolol XL 25 mg daily and along with Aldactone 25 mg daily  She has history of coronary artery disease  She has had ostial stenosis but it is very tortuous and was recommended medical Rx  She is not taking Crestor 5 mg twice a week and is doing well on that no other issues at this time  She had a blood test done in July 2021 to acceptable electrolytes are acceptable TSH was acceptable        Review of Systems Constitutional: Negative for activity change, chills, diaphoresis, fever and unexpected weight change  HENT: Negative for congestion  Eyes: Negative for discharge and redness  Respiratory: Negative for cough, chest tightness, shortness of breath and wheezing  Cardiovascular: Negative  Negative for chest pain, palpitations and leg swelling  Gastrointestinal: Negative for abdominal pain, diarrhea and nausea  Endocrine: Negative  Genitourinary: Negative for decreased urine volume and urgency  Musculoskeletal: Positive for arthralgias and back pain  Negative for gait problem  Skin: Negative for rash and wound  Allergic/Immunologic: Negative  Neurological: Negative for dizziness, seizures, syncope, weakness, light-headedness and headaches  Hematological: Negative  Psychiatric/Behavioral: Negative for agitation and confusion  The patient is nervous/anxious          Historical Information   Past Medical History:   Diagnosis Date    Abscess of chest wall     Last Assessed: 2/27/2014    Arthritis     Back pain     Cataract     Start of    Colon polyp     Coronary artery disease     Diverticulitis of colon 12/04/2008    Female bladder prolapse     Gastric reflux     Hematuria     Last Assessed: 11/18/2014     History of diverticulitis     [place] (hard of hearing)     Hypercholesteremia     Hyperkeratosis of skin 11/03/2011    Hypertension     Hypokalemia 12/13/2011    Incomplete uterovaginal prolapse 05/28/2010    Myocardial infarction ([facility] [address] )     Osteoporosis     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    Last Assessed: 10/24/2012     Seasonal allergies     Sebaceous cyst     Last Assessed: 2/8/2014    Syncope     Trigeminal neuralgia 03/20/2012    Urinary incontinence     Uterine prolapse     Vertigo 09/15/2011    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    Wears glasses     Wears hearing aid     States she rarely wears them    Wears partial dentures     Upper      Past Surgical History:   Procedure Laterality Date    APPENDECTOMY      APPENDICO-VESICOSTOMY CUTANEOUS  03/01/2010    CHOLECYSTECTOMY      Laparoscopic    COLONOSCOPY      CORONARY ANGIOPLASTY      CORONARY ANGIOPLASTY WITH STENT PLACEMENT      2 aortic stents    CORONARY ANGIOPLASTY WITH STENT PLACEMENT  2013    2 distal left anterior descending artery     ESOPHAGOGASTRODUODENOSCOPY      FRACTURE SURGERY Right     Repair right arm- titanium servando from shoulder to elbow      HYSTERECTOMY      Complete    VT CMBND ANTERPOST COLPORRAPHY W/CYSTO N/A 9/20/2016    Procedure: COLPORRHAPHY ANTERIOR POSTERIOR GRAFT;  Surgeon: Sheldon Menchaca MD;  [facility];  Service: UroGynecology           VT CYSTOURETHROSCOPY N/A 9/20/2016    Procedure: Willaim Alu;  Surgeon: Sheldon Menchaca MD;  [facility];  Service: UroGynecology           VT REVAGINAL PROLAPSE,SACROSP [address] N/A 9/20/2016    Procedure: COLPOPEXY VAGINAL EXTRAPERITONEAL  incision and drainage of vagina inclusion cyst x 4;  Surgeon: Sheldon Menchaca MD;  [facility];  Service: UroGynecology           VT SLING OPER STRES INCONTINENCE N/A 9/20/2016    Procedure: INSERTION PUBOVAGINAL SLING RETROPUBIC ;  Surgeon: Sheldon Menchaca MD;  [facility];  Service: UroGynecology            Social History     Substance and Sexual Activity   Alcohol Use Yes    Alcohol/week: 1 0 - 2 0 standard drinks    Types: 1 - 2 Glasses of wine per week    Comment: per day, per allscripts: Being a social drinker      Social History     Substance and Sexual Activity   Drug Use No     Social History     Tobacco Use   Smoking Status Never Smoker   Smokeless Tobacco Never Used     Family History:   Family History   Problem Relation Age of Onset    Hypertension Mother     Heart failure Mother     Coronary artery disease Mother     Arthritis Mother     Osteoporosis Mother     Hyperlipidemia Mother     Coronary artery disease Sister Meds/Allergies     Allergies   Allergen Reactions    Penicillins Hives     "All cillins", "and all myocillins"    Versed [Midazolam] Other (See Comments)     States she "passed out" when recovering from a procedure and was told to not use it again   Zithromax [Azithromycin] Anaphylaxis    Bee Venom      Reaction Date: 02Feb2004; Annotation - [id number]: [facility]    Enalapril      Reaction Date: [id number];     Erythromycin Dizziness     Reaction Date: [id number];     Estrogens      Reaction Date: 02Feb2004; Annotation - [id number]: [facility]    Ramipril      Reaction Date: 02Feb2004;  Annotation - [id number]: [facility]    Statins Other (See Comments)     Muscle pain       Current Outpatient Medications:     amLODIPine (NORVASC) 5 mg tablet, Take 1 tablet (5 mg total) by mouth daily, Disp: 90 tablet, Rfl: 3    aspirin (ASPIRIN LOW DOSE) 81 MG tablet, Take 1 tablet by mouth daily, Disp: , Rfl:     Cholecalciferol (VITAMIN D) 2000 units tablet, Take 1 tablet (2,000 Units total) by mouth daily (Patient taking differently: Take 1,000 Units by mouth daily ), Disp: , Rfl:     Coenzyme Q10-Fish Oil-Vit E (CO-Q 10 OMEGA-3 FISH OIL PO), Take 1 capsule by mouth daily, Disp: , Rfl:     fexofenadine (ALLEGRA ALLERGY) 180 MG tablet, Take by mouth, Disp: , Rfl:     metoprolol succinate (TOPROL-XL) 25 mg 24 hr tablet, TAKE 1 TABLET BY MOUTH EVERY DAY, Disp: 90 tablet, Rfl: 3    potassium chloride (Klor-Con M20) 20 mEq tablet, Take 1 tablet (20 mEq total) by mouth 2 (two) times a day (Patient taking differently: Take 20 mEq by mouth 2 (two) times a day Takes 4 pills per day), Disp: 60 tablet, Rfl: 3    RABEprazole (ACIPHEX) 20 MG tablet, TAKE 1 TABLET DAILY, Disp: 90 tablet, Rfl: 3    rosuvastatin (CRESTOR) 5 mg tablet, TAKE 1 TABLET TWICE A WEEK, Disp: 26 tablet, Rfl: 3    spironolactone (ALDACTONE) 25 mg tablet, TAKE 1 TABLET BY MOUTH EVERY DAY, Disp: 90 tablet, Rfl: 3    Calcium Carbonate-Vitamin D (CALCIUM 600+D HIGH POTENCY PO), Take 600 mg by mouth 2 (two) times a day (Patient not taking: Reported on 7/20/2021), Disp: , Rfl:     diphenoxylate-atropine (LOMOTIL) 2 5-0 025 mg per tablet, Take 1 tablet by mouth 2 (two) times a day as needed for diarrhea (Patient not taking: Reported on 7/20/2021), Disp: 30 tablet, Rfl: 0    Vitals: Blood pressure 118/72, pulse 84, temperature 98 °F (36 7 °C), height 5' 4" (1 626 m), weight 66 7 kg (147 lb), SpO2 98 %  Body mass index is 25 23 kg/m²  Vitals:    08/12/21 1340   Weight: 66 7 kg (147 lb)     BP Readings from Last 3 Encounters:   08/12/21 118/72   07/20/21 164/90   06/25/20 138/82         Physical Exam  Constitutional:       General: She is not in acute distress  Appearance: She is well-developed  She is not diaphoretic  HENT:      Head: Normocephalic and atraumatic  Eyes:      Pupils: Pupils are equal, round, and reactive to light  Neck:      Thyroid: No thyromegaly  Vascular: No JVD  Trachea: No tracheal deviation  Cardiovascular:      Rate and Rhythm: Normal rate and regular rhythm  Heart sounds: S1 normal and S2 normal  Heart sounds not distant  Murmur heard  Systolic (ejection) murmur is present with a grade of 2/6  No friction rub  No gallop  No S3 or S4 sounds  Comments:  S1-S2 regular with 2/6 systolic murmur  Pulmonary:      Effort: Pulmonary effort is normal  No respiratory distress  Breath sounds: Normal breath sounds  No wheezing or rales  Comments: Clear to auscultation  Chest:      Chest wall: No tenderness  Abdominal:      General: Bowel sounds are normal  There is no distension  Palpations: Abdomen is soft  Tenderness: There is no abdominal tenderness  Musculoskeletal:         General: No deformity  Cervical back: Neck supple  Skin:     General: Skin is warm and dry  Coloration: Skin is not pale  Findings: No rash     Neurological:      Mental Status: She is alert and oriented to person, place, and time  Psychiatric:         Behavior: Behavior normal          Judgment: Judgment normal        Diagnostic Studies Review Cardio:   cardiac catheterization :   Patient's cardiac catheterization from 2015 reviewed  She has patent stent seen in LAD and diagonal   Apical LAD has around 70 80% stenosis which small vessel, mid circumflex around 40-50% stenosis  RCA which is a small size artery and only gives RPDA has around 90% stenosis in the mid it is very tortuous artery not suitable for percutaneous techniques  Echo Doppler done 06/28/2018 shows low-normal LV systolic function EF around 55 percent, mild concentric left atrial hypertrophy, mild MR, mild AI, mild TR PA pressure 30 millimeters of mercury  Carotid Doppler  Carotid study done 05/20/2019 shows less than 50% stenosis bilaterally  Holter monitor done on 06/28/2018 shows normal sinus rhythm  Few PACs seen  Rare episode of PVCs  EKG:    Twelve lead EKG done at  [facility] shows normal sinus rhythm heart rate around 87 beats per minute  Nonspecific ST changes  Twelve lead EKG done on 12/05/2018 shows normal sinus rhythm heart rate 81 beats per minute  No significant ST changes  No change from old EKG  Twelve lead EKG done in our office 04/26/2019 shows normal sinus rhythm heart rate 93 beats per minute  Twelve lead EKG done in our office 06/04/2019 shows normal sinus rhythm QS in V1 to V3 most likely due to lead location  No other significant change no change from old EKG heart rate 66 beats per minute  Twelve lead EKG 12/17/2019 shows normal sinus rhythm 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  Heart rate 68 beats per minute  Twelve lead EKG 06/25/2020 shows normal sinus rhythm heart rate 61 beats per minute EKG done 06/25/2020 no change from old EKG  12 EKG done 08/12/2021 shows normal sinus with PACs heart rate 84 beats per minute  No significant change from old EKG      Cardiac testing: Results for orders placed in visit on 08/20/15   Cardiac catheterization    Narrative [address] [facility], [address] [facility]   Phone: [phone]      INVASIVE CARDIOVASCULAR LAB COMPLETE REPORT         Patient: Quintin MENDOSA   Location: Outpatient   MR number: [id number]   Account number: [de-identified]   Study date: 2015   Gender: Female   : 1933   Height: 64 2 in   Weight: 147 6 lb   BSA: 1 72 m squared   Allergies: Altace, beestings, Erythromycin Base, estrogen, penicilin, Versed,   Vasotec, Zithromax, all cillins, mycins, Vytorin 10-10, Zetia   Diagnostic Cardiologist:  Clement Goode MD   Primary Physician:  Silke Diggs MD   SUMMARY   CORONARY CIRCULATION:   Left main: Normal    LAD: The vessel was normal sized  The were no obstructive stenoses  The   previously deployed stent remains widely patent  The stent at the ostium of D1   remains widely patent  Circumflex: The vessel was normal sized  There was a   non-critical 50% plaque in mid vessel  There were no obstructive stenoses  The   major OM branches were normal  There was faint collateral flow from the   circumflex to the distal RCA  RCA: The vessel was small to medium sized and   dominant, giving rise to the PDA  There was a long, tortuous 95% stenosis in   mid vessel  [place] of severe tortuosity, the lesion is poorly suited for PCI  REPORT ELEMENT SELECTION:   Right radial access was employed  Summary: The patient has single vessel disease with a long, tortuous subtotal   stenosis in the mid RCA  Elucidating symptom status is difficult in this   patient, since she previously had very severe LAD disease and diagonal disease   with atypical symptoms as the only presentation  Medical therapy is    recommended   for the following reasons 1) the lesion has been stable angiographically for 2   years   2) the patient was able to exercise to a high level after PCI 2 years   ago with this lesion already present  3) Nuclear imaging 1 year ago showed no   ischemia in the presence of this lesion  4) There is some collateral flow from   the circumflex  5) Current symptoms are atypical and not exertional  If PCI is   required in the future because of exertional angina refractory to medication,   it is recommended that the procedure be performed via the femoral approach,    and   INVASIVE CARDIOVASCULAR LAB COMPLETE REPORT   Patient: Ruba MENDOSA   MR number: [id number]    ------ Page 2   BSA: 1 72 m squared   that the procedure be performed at the [facility] because of the   increased risk of comlications  Treatment with rosuvastatin, 2 5mg once per   week was initiated  The patient has a history of statin intolerance at low   doses  INDICATIONS:   --  Possible CAD: unstable angina  PROCEDURES PERFORMED   --  Left coronary angiography  --  Right coronary angiography  --  Outpatient  --  Coronary Catheterization (w/o LHC)  PROCEDURE: The risks and alternatives of the procedures and conscious sedation   were explained to the patient and informed consent was obtained  The patient   was brought to the cath lab and placed on the table  The planned puncture    sites   were prepped and draped in the usual sterile fashion  --  Right radial artery access  After performing an Eusebio's test to verify   adequate ulnar artery supply to the hand, the radial site was prepped  The   puncture site was infiltrated with local anesthetic  The vessel was accessed   using the modified Seldinger technique, a wire was advanced into the vessel,   and a sheath was advanced over the wire into the vessel  --  Left coronary artery angiography  A catheter was advanced over a guidewire   into the aorta and positioned in the left coronary artery ostium under   fluoroscopic guidance  Angiography was performed  --  Right coronary artery angiography   A catheter was advanced over a    guidewire   into the aorta and positioned in the right coronary artery ostium under   fluoroscopic guidance  Angiography was performed  --  Outpatient  --  Coronary Catheterization (w/o [facility])  PROCEDURE COMPLETION: The patient tolerated the procedure well and was   discharged from the cath lab  TIMING: Test started at 09:38  Test concluded at   10:03  HEMOSTASIS: The sheath was removed  The site was compressed with a   Hemoband device  Hemostasis was obtained  MEDICATIONS GIVEN: Verapamil   (Isoptin, Calan, Covera), 1 25 mg, IA, at 09:52  Fentanyl (1OOmcg/2 ml), 50   mcg, IV, at 09:42  Fentanyl (1OOmcg/2 ml), 50 mcg, IV, at 09:45  1% Lidocaine,   1 ml, subcutaneously, at 09:49  Fentanyl (1OOmcg/2 ml), 25 mcg, IV, at 09:50  Nitroglycerin (200mcg/ml), 200 mcg, at 09:52  Heparin 1000 units/ml, 4,000   units, IV, at 09:52  CONTRAST GIVEN: 70 ml Omnipaque (350mg I /ml)  RADIATION   EXPOSURE: Fluoroscopy time: 1 8 min  CORONARY VESSELS:   --  Left main: Normal    INVASIVE CARDIOVASCULAR LAB COMPLETE REPORT   Patient: Jewell MENDOSA   MR number: [id number]    ------ Page 3   BSA: 1 72 m squared   --  LAD: The vessel was normal sized  The were no obstructive stenoses  The   previously deployed stent remains widely patent  The stent at the ostium of D1   remains widely patent  --  Circumflex: The vessel was normal sized  There was    a   non-critical 50% plaque in mid vessel  There were no obstructive stenoses  The   major OM branches were normal  There was faint collateral flow from the   circumflex to the distal RCA  --  RCA: The vessel was small to medium sized    and   dominant, giving rise to the PDA  There was a long, tortuous 95% stenosis in   mid vessel  [place] of severe tortuosity, the lesion is poorly suited for PCI  NOTE: Right radial access was employed     Prepared and signed by   Sinai Garcia MD   Signed 08/20/2015 11:05:34   CC: Dr Anand Deutsch   Study diagram   Hemodynamic tables   Pressures:  Baseline   Pressures:  - HR: 70   Pressures:  - Rhythm:   Pressures:  -- Aortic Pressure (S/D/M): 157/83/116   Outputs:  Baseline   Outputs:  -- CALCULATIONS[age]   Outputs:  -- CALCULATIONS: Body Surface Area: 1 72   Outputs:  -- CALCULATIONS: Height in cm: 163 00   Outputs:  -- CALCULATIONS: Sex: Female   Outputs:  -- CALCULATIONS: Weight in k 10      Lab Review   Lab Results   Component Value Date    WBC 9 15 2021    HGB 14 5 2021    HCT 47 1 (H) 2021    MCV 98 2021    RDW 13 8 2021     2021     BMP:  Lab Results   Component Value Date     2016    K 4 1 2021     2021    CO2 24 2021    BUN 19 2021    CREATININE 0 73 2021    GLUCOSE 99 2016    GLUF 82 2021    CALCIUM 9 7 2021    EGFR 74 2021    MG 2 2 2021     LFT:  Lab Results   Component Value Date    AST 17 2021    ALT 23 2021    ALKPHOS 63 2021    PROT 7 1 2016    BILITOT 0 3 2016       Lab Results   Component Value Date    HGBA1C 5 9 2018     Lipid Profile:   Lab Results   Component Value Date    CHOL 192 2013    HDL 65 (H) 2018    LDLCALC 74 2018    TRIG 179 (H) 2018     Lab Results   Component Value Date    CHOL 192 2013     Lab Results   Component Value Date    TROPONINI 0 03 2018       Dr Brad Aguilar MD [facility]      "This note has been constructed using a voice recognition system  Therefore there may be syntax, spelling, and/or grammatical errors   Please call if you have any questions  "

## 2021-09-03 DIAGNOSIS — I50.22 CHRONIC SYSTOLIC CONGESTIVE HEART FAILURE (HCC): ICD-10-CM

## 2021-09-03 DIAGNOSIS — I10 BENIGN ESSENTIAL HYPERTENSION: ICD-10-CM

## 2021-09-03 DIAGNOSIS — E87.6 HYPOKALEMIA: ICD-10-CM

## 2021-09-03 DIAGNOSIS — I25.10 CORONARY ARTERY DISEASE INVOLVING NATIVE CORONARY ARTERY OF NATIVE HEART WITHOUT ANGINA PECTORIS: ICD-10-CM

## 2021-09-06 RX ORDER — POTASSIUM CHLORIDE 20 MEQ/1
20 TABLET, EXTENDED RELEASE ORAL 4 TIMES DAILY
Qty: 180 TABLET | Refills: 1 | Status: SHIPPED | OUTPATIENT
Start: 2021-09-06 | End: 2021-12-01

## 2021-11-24 DIAGNOSIS — I25.10 ARTERIOSCLEROSIS OF CORONARY ARTERY: ICD-10-CM

## 2021-11-24 DIAGNOSIS — I10 BENIGN ESSENTIAL HYPERTENSION: ICD-10-CM

## 2021-11-24 RX ORDER — AMLODIPINE BESYLATE 5 MG/1
TABLET ORAL
Qty: 90 TABLET | Refills: 3 | Status: SHIPPED | OUTPATIENT
Start: 2021-11-24

## 2021-12-01 DIAGNOSIS — I50.22 CHRONIC SYSTOLIC CONGESTIVE HEART FAILURE (HCC): ICD-10-CM

## 2021-12-01 DIAGNOSIS — I10 BENIGN ESSENTIAL HYPERTENSION: ICD-10-CM

## 2021-12-01 DIAGNOSIS — E87.6 HYPOKALEMIA: ICD-10-CM

## 2021-12-01 DIAGNOSIS — I25.10 CORONARY ARTERY DISEASE INVOLVING NATIVE CORONARY ARTERY OF NATIVE HEART WITHOUT ANGINA PECTORIS: ICD-10-CM

## 2021-12-01 RX ORDER — POTASSIUM CHLORIDE 1500 MG/1
TABLET, EXTENDED RELEASE ORAL
Qty: 360 TABLET | Refills: 1 | Status: SHIPPED | OUTPATIENT
Start: 2021-12-01 | End: 2022-06-20 | Stop reason: SDUPTHER

## 2021-12-02 DIAGNOSIS — K21.9 GASTROESOPHAGEAL REFLUX DISEASE: ICD-10-CM

## 2021-12-02 RX ORDER — RABEPRAZOLE SODIUM 20 MG/1
TABLET, DELAYED RELEASE ORAL
Qty: 90 TABLET | Refills: 3 | Status: SHIPPED | OUTPATIENT
Start: 2021-12-02

## 2022-01-27 DIAGNOSIS — E78.5 DYSLIPIDEMIA: ICD-10-CM

## 2022-01-27 RX ORDER — ROSUVASTATIN CALCIUM 5 MG/1
TABLET, COATED ORAL
Qty: 26 TABLET | Refills: 3 | Status: SHIPPED | OUTPATIENT
Start: 2022-01-27 | End: 2022-07-08

## 2022-02-14 ENCOUNTER — OFFICE VISIT (OUTPATIENT)
Dept: CARDIOLOGY CLINIC | Facility: CLINIC | Age: 87
End: 2022-02-14
Payer: COMMERCIAL

## 2022-02-14 VITALS
WEIGHT: 145 LBS | TEMPERATURE: 98.4 F | BODY MASS INDEX: 24.75 KG/M2 | HEART RATE: 81 BPM | HEIGHT: 64 IN | SYSTOLIC BLOOD PRESSURE: 130 MMHG | OXYGEN SATURATION: 97 % | DIASTOLIC BLOOD PRESSURE: 82 MMHG

## 2022-02-14 DIAGNOSIS — E78.5 DYSLIPIDEMIA: ICD-10-CM

## 2022-02-14 DIAGNOSIS — Z78.9 STATIN INTOLERANCE: ICD-10-CM

## 2022-02-14 DIAGNOSIS — I10 BENIGN ESSENTIAL HYPERTENSION: ICD-10-CM

## 2022-02-14 DIAGNOSIS — E87.6 HYPOKALEMIA: ICD-10-CM

## 2022-02-14 DIAGNOSIS — I25.10 ARTERIOSCLEROSIS OF CORONARY ARTERY: ICD-10-CM

## 2022-02-14 DIAGNOSIS — I50.32 CHRONIC DIASTOLIC CONGESTIVE HEART FAILURE (HCC): ICD-10-CM

## 2022-02-14 PROCEDURE — 99214 OFFICE O/P EST MOD 30 MIN: CPT | Performed by: INTERNAL MEDICINE

## 2022-02-14 PROCEDURE — 93000 ELECTROCARDIOGRAM COMPLETE: CPT | Performed by: INTERNAL MEDICINE

## 2022-02-14 RX ORDER — OLMESARTAN MEDOXOMIL 20 MG/1
20 TABLET ORAL DAILY
COMMUNITY
End: 2022-07-08

## 2022-02-14 NOTE — PROGRESS NOTES
Progress Note - Cardiology Office  [facility]  Ruben Reed Adriana 80 y o  female MRN: [id number]  : 1933  Encounter: [id number]      Assessment:     1  Arteriosclerosis of coronary artery    2  Benign essential hypertension    3  Chronic diastolic congestive heart failure ([address] )    4  Dyslipidemia    5  Statin intolerance    6  Hypokalemia        Discussion Summary and Plan:    1  Coronary artery disease with history of angioplasty of LAD and diagonal with a stent in  by Dr Candace Fatima and repeat catheterization in  shows patent stent and has mid RCA significant stenosis but very medium to small size artery not suitable for percutaneous techniques on medical Rx  Fortunately patient cannot take statins in high intensity   She taking Crestor at least 3 times a week  3   Dyslipidemia but intolerant to statins  She is not taking Crestor twice a week  She does not want take any PSK inhibitors  Currently continue on Crestor 5 milligram   She does not want increase the dose continue same Rx  Please check lab once a year      4  Persistent hypokalemia  Potassium has been better    Labs from 2021 reviewed  Potassium was acceptable  She does not want to be on Aldactone  5   Benign essential hypertension  Blood pressure is acceptable  Medication changed  Currently she is going to take amlodipine, Benicar, and Toprol XL  Continue same medications  All these issues discussed with patient at length all her questions answered to her satisfaction  Follow-up in 6 months  Counseling :  A description of the counseling  Patient advised to keep herself hydrated avoid similar situation where she passed out  Patient daughter was present all the time and all their questions were answered  Patient's ability to self care: Yes  Medication side effect reviewed with patient in detail and all their questions answered to their satisfaction      HPI : Rossana Stevens is a 80y o  year old female who came for follow up  Patient has Past medical history significant for coronary artery disease with remote angioplasty of LAD and diagonal by Dr Shawn Harris in 2013 and then repeat cardiac catheterization in 2015 at [facility] shows patent stent in LAD and diagonal and has small RCA mid 80-90% stenosis which is very tortuous and not suitable for percutaneous technique  Patient has repeated history of syncopal episode and most of time it happened situation when she had a glass of wine and she is in a restaurant  She does not remember what happens and she feels fine after few minutes off it  She denies any fever or any chills any nausea and vomiting  She is otherwise very active  She cannot take statins even though her cholesterol is very high  She takes Crestor 5 mg twice a week and she is able to handle that  No fever no chills no nausea no vomiting no other significant complaint  02/14/2022  Above reviewed  Patient came for follow-up she is doing well she has no new complaints  Blood pressure has been stable  No dizziness no lightheadedness  Occasionally she get a right-sided jaw pain but no chest pain no shortness of breath  Her medications reviewed with her  She is currently taking amlodipine 5 mg daily, metoprolol XL 25, Klor-Con 20 mg daily and Benicar 20 mg daily blood pressure has been acceptable  She could not tolerate Aldactone  She is  Compliant with her cholesterol medications  No nausea no vomiting no fever  She had a blood test done July 2021 has been acceptable  Today heart rate 81 beats per minute 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  She has RCA very tortuous and severely stenosed and is on medical therapy  She still occasionally gets dizziness it happened about a month ago  No more dizziness now        Review of Systems   Constitutional: Negative for activity change, chills, diaphoresis, fever and unexpected weight change  HENT: Negative for congestion  Eyes: Negative for discharge and redness  Respiratory: Negative for cough, chest tightness, shortness of breath and wheezing  Cardiovascular: Negative  Negative for chest pain, palpitations and leg swelling  Gastrointestinal: Negative for abdominal pain, diarrhea and nausea  Endocrine: Negative  Genitourinary: Negative for decreased urine volume and urgency  Musculoskeletal: Positive for arthralgias and back pain  Negative for gait problem  Skin: Negative for rash and wound  Allergic/Immunologic: Negative  Neurological: Negative for dizziness, seizures, syncope, weakness, light-headedness and headaches  Hematological: Negative  Psychiatric/Behavioral: Negative for agitation and confusion  The patient is nervous/anxious          Historical Information   Past Medical History:   Diagnosis Date    Abscess of chest wall     Last Assessed: 2/27/2014    Arthritis     Back pain     Cataract     Start of    Colon polyp     Coronary artery disease     Diverticulitis of colon 12/04/2008    Female bladder prolapse     Gastric reflux     Hematuria     Last Assessed: 11/18/2014     History of diverticulitis     [place] (hard of hearing)     Hypercholesteremia     Hyperkeratosis of skin 11/03/2011    Hypertension     Hypokalemia 12/13/2011    Incomplete uterovaginal prolapse 05/28/2010    Myocardial infarction ([facility] [address] )     Osteoporosis     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    Last Assessed: 10/24/2012     Seasonal allergies     Sebaceous cyst     Last Assessed: 2/8/2014    Syncope     Trigeminal neuralgia 03/20/2012    Urinary incontinence     Uterine prolapse     Vertigo 09/15/2011    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    Wears glasses     Wears hearing aid     States she rarely wears them    Wears partial dentures     Upper      Past Surgical History:   Procedure Laterality Date    APPENDECTOMY      APPENDICO-VESICOSTOMY CUTANEOUS  03/01/2010    CHOLECYSTECTOMY      Laparoscopic    COLONOSCOPY      CORONARY ANGIOPLASTY      CORONARY ANGIOPLASTY WITH STENT PLACEMENT      2 aortic stents    CORONARY ANGIOPLASTY WITH STENT PLACEMENT  2013    2 distal left anterior descending artery     ESOPHAGOGASTRODUODENOSCOPY      FRACTURE SURGERY Right     Repair right arm- titanium servando from shoulder to elbow      HYSTERECTOMY      Complete    WI CMBND ANTERPOST COLPORRAPHY W/CYSTO N/A 9/20/2016    Procedure: COLPORRHAPHY ANTERIOR POSTERIOR GRAFT;  Surgeon: Ciro Montalvo MD;  [facility];  Service: UroGynecology           WI CYSTOURETHROSCOPY N/A 9/20/2016    Procedure: Burnadette Crea;  Surgeon: Ciro Montalvo MD;  [facility];  Service: UroGynecology           WI REVAGINAL PROLAPSE,SACROSP [address] N/A 9/20/2016    Procedure: COLPOPEXY VAGINAL EXTRAPERITONEAL  incision and drainage of vagina inclusion cyst x 4;  Surgeon: Ciro Montalvo MD;  [facility];  Service: UroGynecology           WI SLING OPER STRES INCONTINENCE N/A 9/20/2016    Procedure: INSERTION PUBOVAGINAL SLING RETROPUBIC ;  Surgeon: Ciro Montalvo MD;  [facility];  Service: UroGynecology            Social History     Substance and Sexual Activity   Alcohol Use Yes    Alcohol/week: 1 0 - 2 0 standard drink    Types: 1 - 2 Glasses of wine per week    Comment: per day, per allscripts: Being a social drinker      Social History     Substance and Sexual Activity   Drug Use No     Social History     Tobacco Use   Smoking Status Never Smoker   Smokeless Tobacco Never Used     Family History:   Family History   Problem Relation Age of Onset    Hypertension Mother     Heart failure Mother     Coronary artery disease Mother     Arthritis Mother     Osteoporosis Mother     Hyperlipidemia Mother     Coronary artery disease Sister        Meds/Allergies     Allergies   Allergen Reactions    Penicillins Hives     "All cillins", "and all myocillins"    Versed [Midazolam] Other (See Comments)     States she "passed out" when recovering from a procedure and was told to not use it again   Zithromax [Azithromycin] Anaphylaxis    Bee Venom      Reaction Date: 02Feb2004; Annotation - [id number]: j    Enalapril      Reaction Date: [id number];     Erythromycin Dizziness     Reaction Date: [id number];     Estrogens      Reaction Date: 02Feb2004; Annotation - [id number]: j    Ramipril      Reaction Date: 02Feb2004;  Annotation - [id number]: [facility]    Statins Other (See Comments)     Muscle pain       Current Outpatient Medications:     amLODIPine (NORVASC) 5 mg tablet, TAKE 1 TABLET BY MOUTH EVERY DAY, Disp: 90 tablet, Rfl: 3    aspirin (ASPIRIN LOW DOSE) 81 MG tablet, Take 1 tablet by mouth daily, Disp: , Rfl:     Calcium Carbonate-Vitamin D (CALCIUM 600+D HIGH POTENCY PO), Take 600 mg by mouth 2 (two) times a day  , Disp: , Rfl:     Cholecalciferol (VITAMIN D) 2000 units tablet, Take 1 tablet (2,000 Units total) by mouth daily (Patient taking differently: Take 1,000 Units by mouth daily ), Disp: , Rfl:     Coenzyme Q10-Fish Oil-Vit E (CO-Q 10 OMEGA-3 FISH OIL PO), Take 1 capsule by mouth daily, Disp: , Rfl:     fexofenadine (ALLEGRA ALLERGY) 180 MG tablet, Take by mouth, Disp: , Rfl:     Klor-Con M20 20 MEQ tablet, TAKE 1 TABLET (20 MEQ TOTAL) BY MOUTH 4 (FOUR) TIMES A DAY, Disp: 360 tablet, Rfl: 1    metoprolol succinate (TOPROL-XL) 25 mg 24 hr tablet, TAKE 1 TABLET BY MOUTH EVERY DAY, Disp: 90 tablet, Rfl: 3    olmesartan (BENICAR) 20 mg tablet, Take 20 mg by mouth daily, Disp: , Rfl:     RABEprazole (ACIPHEX) 20 MG tablet, TAKE 1 TABLET DAILY, Disp: 90 tablet, Rfl: 3    rosuvastatin (CRESTOR) 5 mg tablet, TAKE 1 TABLET TWICE A WEEK, Disp: 26 tablet, Rfl: 3    diphenoxylate-atropine (LOMOTIL) 2 5-0 025 mg per tablet, Take 1 tablet by mouth 2 (two) times a day as needed for diarrhea (Patient not taking: Reported on 7/20/2021), Disp: 30 tablet, Rfl: 0    spironolactone (ALDACTONE) 25 mg tablet, TAKE 1 TABLET BY MOUTH EVERY DAY (Patient not taking: Reported on 2/14/2022), Disp: 90 tablet, Rfl: 3    Vitals: Blood pressure 130/82, pulse 81, temperature 98 4 °F (36 9 °C), temperature source Temporal, height 5' 4" (1 626 m), weight 65 8 kg (145 lb), SpO2 97 %  Body mass index is 24 89 kg/m²  Vitals:    02/14/22 1343   Weight: 65 8 kg (145 lb)     BP Readings from Last 3 Encounters:   02/14/22 130/82   08/12/21 118/72   07/20/21 164/90         Physical Exam  Constitutional:       General: She is not in acute distress  Appearance: She is well-developed  She is not diaphoretic  Neck:      Thyroid: No thyromegaly  Vascular: No JVD  Trachea: No tracheal deviation  Cardiovascular:      Rate and Rhythm: Normal rate and regular rhythm  Heart sounds: S1 normal and S2 normal  Heart sounds not distant  Murmur heard  Systolic (ejection) murmur is present with a grade of 2/6  No friction rub  No gallop  No S3 or S4 sounds  Pulmonary:      Effort: Pulmonary effort is normal  No respiratory distress  Breath sounds: Normal breath sounds  No wheezing or rales  Chest:      Chest wall: No tenderness  Abdominal:      General: Bowel sounds are normal  There is no distension  Palpations: Abdomen is soft  Tenderness: There is no abdominal tenderness  Musculoskeletal:         General: No deformity  Cervical back: Neck supple  Skin:     General: Skin is warm and dry  Coloration: Skin is not pale  Findings: No rash  Neurological:      Mental Status: She is alert and oriented to person, place, and time  Psychiatric:         Behavior: Behavior normal          Judgment: Judgment normal        Diagnostic Studies Review Cardio:   cardiac catheterization :   Patient's cardiac catheterization from 2015 reviewed    She has patent stent seen in LAD and diagonal   Apical LAD has around 70 80% stenosis which small vessel, mid circumflex around 40-50% stenosis  RCA which is a small size artery and only gives RPDA has around 90% stenosis in the mid it is very tortuous artery not suitable for percutaneous techniques  Echo Doppler done 06/28/2018 shows low-normal LV systolic function EF around 55 percent, mild concentric left atrial hypertrophy, mild MR, mild AI, mild TR PA pressure 30 millimeters of mercury  Carotid Doppler  Carotid study done 05/20/2019 shows less than 50% stenosis bilaterally  Holter monitor done on 06/28/2018 shows normal sinus rhythm  Few PACs seen  Rare episode of PVCs  EKG:    Twelve lead [facility] at  [facility] shows normal sinus rhythm heart rate around 87 beats per minute  Nonspecific ST changes  Twelve lead EKG done on 12/05/2018 shows normal sinus rhythm heart rate 81 beats per minute  No significant ST changes  No change from old EKG  Twelve lead EKG done in our office 04/26/2019 shows normal sinus rhythm heart rate 93 beats per minute  Twelve lead EKG done in our office 06/04/2019 shows normal sinus rhythm QS in V1 to V3 most likely due to lead location  No other significant change no change from old EKG heart rate 66 beats per minute  Twelve lead EKG 12/17/2019 shows normal sinus rhythm 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  Heart rate 68 beats per minute  Twelve lead EKG 06/25/2020 shows normal sinus rhythm heart rate 61 beats per minute EKG done 06/25/2020 no change from old EKG  12 EKG done 08/12/2021 shows normal sinus with PACs heart rate 84 beats per minute  No significant change from old EKG  Twelve lead EKG done 02/14/2022 shows normal sinus rhythm with sinus arrhythmia heart rate 59 beats per minute incomplete RBBB  Q-wave in inferior leads cannot rule out old inferior infarction  Not much changed from previous EKG      Cardiac testing:       Results for orders placed in visit on 08/20/15   Cardiac catheterization    Narrative [facility], [address] [place] [address]   Phone: [phone]      INVASIVE CARDIOVASCULAR LAB COMPLETE REPORT         Patient: Lizy MENDOSA   Location: Outpatient   MR number: [id number]   Account number: [de-identified]   Study date: 2015   Gender: Female   : 1933   Height: 64 2 in   Weight: 147 6 lb   BSA: 1 72 m squared   Allergies: Altace, beestings, Erythromycin Base, estrogen, penicilin, Versed,   Vasotec, Zithromax, all cillins, mycins, Vytorin 10-10, Zetia   Diagnostic Cardiologist:  Violet Valdez MD   Primary Physician:  Radha Witt MD   SUMMARY   CORONARY CIRCULATION:   Left main: Normal    LAD: The vessel was normal sized  The were no obstructive stenoses  The   previously deployed stent remains widely patent  The stent at the ostium of D1   remains widely patent  Circumflex: The vessel was normal sized  There was a   non-critical 50% plaque in mid vessel  There were no obstructive stenoses  The   major OM branches were normal  There was faint collateral flow from the   circumflex to the distal RCA  RCA: The vessel was small to medium sized and   dominant, giving rise to the PDA  There was a long, tortuous 95% stenosis in   mid vessel  [place] of severe tortuosity, the lesion is poorly suited for PCI  REPORT ELEMENT SELECTION:   Right radial access was employed  Summary: The patient has single vessel disease with a long, tortuous subtotal   stenosis in the mid RCA  Elucidating symptom status is difficult in this   patient, since she previously had very severe LAD disease and diagonal disease   with atypical symptoms as the only presentation  Medical therapy is    recommended   for the following reasons 1) the lesion has been stable angiographically for 2   years  2) the patient was able to exercise to a high level after PCI 2 years   ago with this lesion already present   3) Nuclear imaging 1 year ago showed no   ischemia in the presence of this lesion  4) There is some collateral flow from   the circumflex  5) Current symptoms are atypical and not exertional  If PCI is   required in the future because of exertional angina refractory to medication,   it is recommended that the procedure be performed via the femoral approach,    and   INVASIVE CARDIOVASCULAR LAB COMPLETE REPORT   Patient: Mary MENDOSA   MR number: [id number]    ------ Page 2   BSA: 1 72 m squared   that the procedure be performed at the [facility] because of the   increased risk of comlications  Treatment with rosuvastatin, 2 5mg once per   week was initiated  The patient has a history of statin intolerance at low   doses  INDICATIONS:   --  Possible CAD: unstable angina  PROCEDURES PERFORMED   --  Left coronary angiography  --  Right coronary angiography  --  Outpatient  --  Coronary Catheterization (w/o LHC)  PROCEDURE: The risks and alternatives of the procedures and conscious sedation   were explained to the patient and informed consent was obtained  The patient   was brought to the cath lab and placed on the table  The planned puncture    sites   were prepped and draped in the usual sterile fashion  --  Right radial artery access  After performing an Eusebio's test to verify   adequate ulnar artery supply to the hand, the radial site was prepped  The   puncture site was infiltrated with local anesthetic  The vessel was accessed   using the modified Seldinger technique, a wire was advanced into the vessel,   and a sheath was advanced over the wire into the vessel  --  Left coronary artery angiography  A catheter was advanced over a guidewire   into the aorta and positioned in the left coronary artery ostium under   fluoroscopic guidance  Angiography was performed  --  Right coronary artery angiography  A catheter was advanced over a    guidewire   into the aorta and positioned in the right coronary artery ostium under   fluoroscopic guidance  Angiography was performed  --  Outpatient  --  Coronary Catheterization (w/o [facility])  PROCEDURE COMPLETION: The patient tolerated the procedure well and was   discharged from the cath lab  TIMING: Test started at 09:38  Test concluded at   10:03  HEMOSTASIS: The sheath was removed  The site was compressed with a   Hemoband device  Hemostasis was obtained  MEDICATIONS GIVEN: Verapamil   (Isoptin, Calan, Covera), 1 25 mg, IA, at 09:52  Fentanyl (1OOmcg/2 ml), 50   mcg, IV, at 09:42  Fentanyl (1OOmcg/2 ml), 50 mcg, IV, at 09:45  1% Lidocaine,   1 ml, subcutaneously, at 09:49  Fentanyl (1OOmcg/2 ml), 25 mcg, IV, at 09:50  Nitroglycerin (200mcg/ml), 200 mcg, at 09:52  Heparin 1000 units/ml, 4,000   units, IV, at 09:52  CONTRAST GIVEN: 70 ml Omnipaque (350mg I /ml)  RADIATION   EXPOSURE: Fluoroscopy time: 1 8 min  CORONARY VESSELS:   --  Left main: Normal    INVASIVE CARDIOVASCULAR LAB COMPLETE REPORT   Patient: Ramya MENDOSA   MR number: [id number]    ------ Page 3   BSA: 1 72 m squared   --  LAD: The vessel was normal sized  The were no obstructive stenoses  The   previously deployed stent remains widely patent  The stent at the ostium of D1   remains widely patent  --  Circumflex: The vessel was normal sized  There was    a   non-critical 50% plaque in mid vessel  There were no obstructive stenoses  The   major OM branches were normal  There was faint collateral flow from the   circumflex to the distal RCA  --  RCA: The vessel was small to medium sized    and   dominant, giving rise to the PDA  There was a long, tortuous 95% stenosis in   mid vessel  [place] of severe tortuosity, the lesion is poorly suited for PCI  NOTE: Right radial access was employed     Prepared and signed by   Nikolay Hart MD   Signed 08/20/2015 11:05:34   CC: Dr Abby Manley   Hemodynamic tables   Pressures:  Baseline   Pressures:  - HR: 70   Pressures:  - Rhythm:   Pressures:  -- Aortic Pressure (S/D/M): 157/83/116 Outputs:  Baseline   Outputs:  -- CALCULATIONS: [age]   Outputs:  -- CALCULATIONS: Body Surface Area: 1 72   Outputs:  -- CALCULATIONS: Height in cm: 163 00   Outputs:  -- CALCULATIONS: Sex: Female   Outputs:  -- CALCULATIONS: Weight in k 10      Lab Review   Lab Results   Component Value Date    WBC 9 15 2021    HGB 14 5 2021    HCT 47 1 (H) 2021    MCV 98 2021    RDW 13 8 2021     2021     BMP:  Lab Results   Component Value Date     2016    K 4 1 2021     2021    CO2 24 2021    BUN 19 2021    CREATININE 0 73 2021    GLUCOSE 99 2016    GLUF 82 2021    CALCIUM 9 7 2021    EGFR 74 2021    MG 2 2 2021     LFT:  Lab Results   Component Value Date    AST 17 2021    ALT 23 2021    ALKPHOS 63 2021    PROT 7 1 2016    BILITOT 0 3 2016       Lab Results   Component Value Date    HGBA1C 5 9 2018     Lipid Profile:   Lab Results   Component Value Date    CHOL 192 2013    HDL 65 (H) 2018    LDLCALC 74 2018    TRIG 179 (H) 2018     Lab Results   Component Value Date    CHOL 192 2013     Lab Results   Component Value Date    TROPONINI 0 03 2018       Dr Edgard Case MD [facility]      "This note has been constructed using a voice recognition system  Therefore there may be syntax, spelling, and/or grammatical errors   Please call if you have any questions  "

## 2022-02-15 ENCOUNTER — OFFICE VISIT (OUTPATIENT)
Dept: ENDOCRINOLOGY | Facility: CLINIC | Age: 87
End: 2022-02-15
Payer: COMMERCIAL

## 2022-02-15 VITALS
SYSTOLIC BLOOD PRESSURE: 120 MMHG | WEIGHT: 145 LBS | TEMPERATURE: 97 F | HEART RATE: 65 BPM | HEIGHT: 64 IN | BODY MASS INDEX: 24.75 KG/M2 | DIASTOLIC BLOOD PRESSURE: 80 MMHG

## 2022-02-15 DIAGNOSIS — R79.89 ABNORMAL CBC: ICD-10-CM

## 2022-02-15 DIAGNOSIS — M81.0 OSTEOPOROSIS, UNSPECIFIED OSTEOPOROSIS TYPE, UNSPECIFIED PATHOLOGICAL FRACTURE PRESENCE: Primary | ICD-10-CM

## 2022-02-15 DIAGNOSIS — E55.9 VITAMIN D DEFICIENCY: ICD-10-CM

## 2022-02-15 PROCEDURE — 99214 OFFICE O/P EST MOD 30 MIN: CPT | Performed by: INTERNAL MEDICINE

## 2022-02-15 NOTE — PROGRESS NOTES
Jeane Hoyt Adriana 80 y o  female MRN: [id number]    Encounter: [id number]      Assessment/Plan     1  Osteoporosis  2  Vitamin-D deficiency   3  Abnormal CBC  - DEXA ordered  -continue vitamin-D supplementation, ensure calcium intake of 7185-6711 mg orally daily-patient will start supplementation if unable to meet requirements via dietary intake  - weight-bearing exercise as tolerated   - check labs including urinary NTX  Based on results will decide on resuming IV Reclast    CC:  Osteoporosis    History of Present Illness     HPI:  Carmen Recinos is a 80 y o  female who is here for a follow-up of osteoporosis  Also has hypertension, hyperlipidemia, CAD, CHF, vitamin-D deficiency    Last seen in 04/2020   Per my prior notes   " history of osteoporosis, DEXA scan with osteopenia, high FRAX score  Last bone density scan 09/2019  Has lost 3 in of adult height, history of arm fracture  s/p Reclast infusion 04/2018, 5/17/2019"      was supposed to have labs and then reclast in 5/2020 which she did not  Labs done in 7/2021     Missed a step few months ago and feel , no injuries  No other recent falls  No  fractures  No back pain/ bone pain   No further height loss     Taking vitamin-D 3 2000 International Units orally daily   Calcium - no supplements  Per patient   No MVI   Diet:   Milk with cereal when she has it, loves ice-cream - 1 cup, occasional cheese     All other systems were reviewed and are negative         Review of Systems    Historical Information   Past Medical History:   Diagnosis Date    Abscess of chest wall     Last Assessed: 2/27/2014    Arthritis     Back pain     Cataract     Start of    Colon polyp     Coronary artery disease     Diverticulitis of colon 12/04/2008    Female bladder prolapse     Gastric reflux     Hematuria     Last Assessed: 11/18/2014     History of diverticulitis     [place] (hard of hearing)     Hypercholesteremia     Hyperkeratosis of skin 11/03/2011    Hypertension     Hypokalemia 12/13/2011    Incomplete uterovaginal prolapse 05/28/2010    Myocardial infarction ([facility] [address] )     Osteoporosis     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    Last Assessed: 10/24/2012     Seasonal allergies     Sebaceous cyst     Last Assessed: 2/8/2014    Syncope     Trigeminal neuralgia 03/20/2012    Urinary incontinence     Uterine prolapse     Vertigo 09/15/2011    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    Wears glasses     Wears hearing aid     States she rarely wears them    Wears partial dentures     Upper      Past Surgical History:   Procedure Laterality Date    APPENDECTOMY      APPENDICO-VESICOSTOMY CUTANEOUS  03/01/2010    CHOLECYSTECTOMY      Laparoscopic    COLONOSCOPY      CORONARY ANGIOPLASTY      CORONARY ANGIOPLASTY WITH STENT PLACEMENT      2 aortic stents    CORONARY ANGIOPLASTY WITH STENT PLACEMENT  2013    2 distal left anterior descending artery     ESOPHAGOGASTRODUODENOSCOPY      FRACTURE SURGERY Right     Repair right arm- titanium servando from shoulder to elbow      HYSTERECTOMY      Complete    AL CMBND ANTERPOST COLPORRAPHY W/CYSTO N/A 9/20/2016    Procedure: COLPORRHAPHY ANTERIOR POSTERIOR GRAFT;  Surgeon: Maryann Lima MD;  [facility];  Service: UroGynecology           AL CYSTOURETHROSCOPY N/A 9/20/2016    Procedure: Annamarie Prader;  Surgeon: Maryann Lima MD;  [facility];  Service: UroGynecology           AL REVAGINAL PROLAPSE,SACROSP LIG N/A 9/20/2016    Procedure: COLPOPEXY VAGINAL EXTRAPERITONEAL  incision and drainage of vagina inclusion cyst x 4;  Surgeon: Maryann Lima MD;  [facility];  Service: UroGynecology           AL SLING OPER STRES INCONTINENCE N/A 9/20/2016    Procedure: INSERTION PUBOVAGINAL SLING RETROPUBIC ;  Surgeon: Maryann Lima MD;  [facility];  Service: UroGynecology            Social History   Social History     Substance and Sexual Activity   Alcohol Use Yes    Alcohol/week: 1 0 - 2 0 standard drink    Types: 1 - 2 Glasses of wine per week    Comment: per day, per allscripts: Being a social drinker      Social History     Substance and Sexual Activity   Drug Use No     Social History     Tobacco Use   Smoking Status Never Smoker   Smokeless Tobacco Never Used     Family History:   Family History   Problem Relation Age of Onset    Hypertension Mother     Heart failure Mother     Coronary artery disease Mother     Arthritis Mother     Osteoporosis Mother     Hyperlipidemia Mother     Coronary artery disease Sister        Meds/Allergies   Current Outpatient Medications   Medication Sig Dispense Refill    amLODIPine (NORVASC) 5 mg tablet TAKE 1 TABLET BY MOUTH EVERY DAY 90 tablet 3    Calcium Carbonate-Vitamin D (CALCIUM 600+D HIGH POTENCY PO) Take 600 mg by mouth 2 (two) times a day        Cholecalciferol (VITAMIN D) 2000 units tablet Take 1 tablet (2,000 Units total) by mouth daily (Patient taking differently: Take 1,000 Units by mouth daily )      fexofenadine (ALLEGRA ALLERGY) 180 MG tablet Take by mouth      Klor-Con M20 20 MEQ tablet TAKE 1 TABLET (20 MEQ TOTAL) BY MOUTH 4 (FOUR) TIMES A  tablet 1    metoprolol succinate (TOPROL-XL) 25 mg 24 hr tablet TAKE 1 TABLET BY MOUTH EVERY DAY 90 tablet 3    olmesartan (BENICAR) 20 mg tablet Take 20 mg by mouth daily      RABEprazole (ACIPHEX) 20 MG tablet TAKE 1 TABLET DAILY 90 tablet 3    aspirin (ASPIRIN LOW DOSE) 81 MG tablet Take 1 tablet by mouth daily (Patient not taking: Reported on 2/15/2022 )      Coenzyme Q10-Fish Oil-Vit E (CO-Q 10 OMEGA-3 FISH OIL PO) Take 1 capsule by mouth daily (Patient not taking: Reported on 2/15/2022 )      diphenoxylate-atropine (LOMOTIL) 2 5-0 025 mg per tablet Take 1 tablet by mouth 2 (two) times a day as needed for diarrhea (Patient not taking: Reported on 7/20/2021) 30 tablet 0    rosuvastatin (CRESTOR) 5 mg tablet TAKE 1 TABLET TWICE A WEEK (Patient not taking: Reported on 2/15/2022) 26 tablet 3    spironolactone (ALDACTONE) 25 mg tablet TAKE 1 TABLET BY MOUTH EVERY DAY (Patient not taking: Reported on 2/14/2022) 90 tablet 3     No current facility-administered medications for this visit  Allergies   Allergen Reactions    Penicillins Hives     "All cillins", "and all myocillins"    Versed [Midazolam] Other (See Comments)     States she "passed out" when recovering from a procedure and was told to not use it again   Zithromax [Azithromycin] Anaphylaxis    Bee Venom      Reaction Date: 02Feb2004; Annotation - [id number]: jjw    Enalapril      Reaction Date: [id number];     Erythromycin Dizziness     Reaction Date: [id number];     Estrogens      Reaction Date: 02Feb2004; Annotation - [id number]: jjw    Ramipril      Reaction Date: 02Feb2004; Annotation - [id number]: jjw    Statins Other (See Comments)     Muscle pain       Objective   Vitals: Blood pressure 120/80, pulse 65, temperature (!) 97 °F (36 1 °C), height 5' 4" (1 626 m), weight 65 8 kg (145 lb)  Physical Exam  Constitutional:       Appearance: She is well-developed  HENT:      Head: Normocephalic and atraumatic  Eyes:      Conjunctiva/sclera: Conjunctivae normal       Pupils: Pupils are equal, round, and reactive to light  Neck:      Thyroid: No thyromegaly  Cardiovascular:      Rate and Rhythm: Normal rate and regular rhythm  Heart sounds: Normal heart sounds  No murmur heard  Pulmonary:      Effort: Pulmonary effort is normal       Breath sounds: Normal breath sounds  No wheezing  Abdominal:      General: There is no distension  Palpations: Abdomen is soft  Tenderness: There is no abdominal tenderness  Musculoskeletal:         General: No tenderness  Normal range of motion  Cervical back: Normal range of motion and neck supple  Right lower leg: No edema  Left lower leg: No edema  Skin:     General: Skin is warm and dry     Neurological:      Mental Status: She is alert and oriented to person, place, and time  Psychiatric:         Behavior: Behavior normal          The history was obtained from the review of the chart, patient  Lab Results:   Lab Results   Component Value Date/Time    TSH 3RD GENERATON 1 880 07/20/2021 10:11 AM     Lab Results   Component Value Date    WBC 9 15 07/20/2021    HGB 14 5 07/20/2021    HCT 47 1 (H) 07/20/2021    MCV 98 07/20/2021     07/20/2021     Lab Results   Component Value Date    CREATININE 0 73 07/20/2021    BUN 19 07/20/2021     09/09/2016    K 4 1 07/20/2021     07/20/2021    CO2 24 07/20/2021     Lab Results   Component Value Date    HGBA1C 5 9 05/19/2018         Imaging Studies:       I have personally reviewed pertinent reports  Portions of the record may have been created with voice recognition software  Occasional wrong word or "sound a like" substitutions may have occurred due to the inherent limitations of voice recognition software  Read the chart carefully and recognize, using context, where substitutions have occurred

## 2022-02-15 NOTE — PATIENT INSTRUCTIONS
Please have labs done as ordered   Continue vitamin-D supplementation   Ensure calcium intake of at least 5672-0299 mg daily  You are being referred for a repeat bone density scan     Goals for osteoporosis   Vitamin D  Goal > 30  Calcium intake ~ 0452-0482 mg/day  Weight bearing exercises as tolerated      Follow up in 3-4 months    A Guide to Calcium-Rich Foods  We all know that milk is a great source of calcium, but you may be surprised by all the different foods you can work into your diet to reach your daily recommended amount of calcium   Use the guide below to get ideas of additional calcium-rich foods to add to your weekly shopping list   Produce Serving Size Estimated Calcium*   Phyllis greens, frozen 8 oz 360 mg   Broccoli jennifer 8 oz 200 mg   Kale, frozen 8 oz 180 mg   Soy Beans, green, boiled 8 oz 175 mg   Bok [place], cooked, boiled 8 oz 160 mg   Figs, dried 2 figs 65 mg   Broccoli, fresh, cooked 8 oz 60 mg   Oranges 1 whole 55 mg   Seafood Serving Size Estimated Calcium*   Sardines, canned with bones 3 oz 325 mg   [place], canned with bones 3 oz 180 mg   Shrimp, canned 3 oz 125 mg   Dairy Serving Size Estimated Calcium*   Ricotta, part-skim 4 oz 335 mg   Yogurt, plain, low-fat 6 oz 310 mg   Milk, skim, low-fat, whole 8 oz 300 mg   Yogurt with fruit, low-fat 6 oz 260 mg   Mozzarella, part-skim 1 oz 210 mg   Cheddar 1 oz 205 mg   Yogurt, Greek 6 oz 200 mg   American Cheese 1 oz 195 mg   Feta Cheese 4 oz 140 mg   Cottage Cheese, 2% 4 oz 105 mg   Frozen yogurt, vanilla 8 oz 105 mg   Ice Cream, vanilla 8 oz 85 mg   Parmesan 1 tbsp 55 mg   Fortified Food Serving Size Estimated Calcium*   [place] milk, rice milk or soy milk, fortified 8 oz 300 mg   Orange juice and other fruit juices, fortified 8 oz 300 mg   Tofu, prepared with calcium 4 oz 205 mg   Waffle, frozen, fortified 2 pieces 200 mg   Oatmeal, fortified 1 packet 140 mg   English muffin, fortified 1 muffin 100 mg   Cereal, fortified 8 oz 100-1,000 mg   Other Serving Size Estimated Calcium*   Mac & cheese, frozen 1 package 325 mg   Pizza, cheese, frozen 1 serving 115 mg   Pudding, chocolate, prepared with 2% milk 4 oz 160 mg   Beans, baked, canned 4 oz 160 mg   *The calcium content listed for most foods is estimated and can vary due to multiple factors  Check the food label to determine how much calcium is in a particular product

## 2022-04-11 ENCOUNTER — OFFICE VISIT (OUTPATIENT)
Dept: FAMILY MEDICINE CLINIC | Facility: CLINIC | Age: 87
End: 2022-04-11
Payer: COMMERCIAL

## 2022-04-11 VITALS
HEIGHT: 64 IN | TEMPERATURE: 97.1 F | DIASTOLIC BLOOD PRESSURE: 74 MMHG | SYSTOLIC BLOOD PRESSURE: 136 MMHG | BODY MASS INDEX: 24.59 KG/M2 | HEART RATE: 72 BPM | WEIGHT: 144 LBS | RESPIRATION RATE: 16 BRPM

## 2022-04-11 DIAGNOSIS — Z00.00 MEDICARE ANNUAL WELLNESS VISIT, SUBSEQUENT: Primary | ICD-10-CM

## 2022-04-11 PROCEDURE — G0439 PPPS, SUBSEQ VISIT: HCPCS | Performed by: NURSE PRACTITIONER

## 2022-04-11 PROCEDURE — 1123F ACP DISCUSS/DSCN MKR DOCD: CPT | Performed by: NURSE PRACTITIONER

## 2022-04-11 NOTE — PATIENT INSTRUCTIONS
Medicare Preventive Visit Patient Instructions  Thank you for completing your Welcome to Medicare Visit or Medicare Annual Wellness Visit today  Your next wellness visit will be due in one year (4/12/2023)  The screening/preventive services that you may require over the next 5-10 years are detailed below  Some tests may not apply to you based off risk factors and/or age  Screening tests ordered at today's visit but not completed yet may show as past due  Also, please note that scanned in results may not display below  Preventive Screenings:  Service Recommendations Previous Testing/Comments   Colorectal Cancer Screening  * Colonoscopy    * Fecal Occult Blood Test (FOBT)/Fecal Immunochemical Test (FIT)  * Fecal DNA/Cologuard Test  * Flexible Sigmoidoscopy Age: 54-65 years old   Colonoscopy: every 10 years (may be performed more frequently if at higher risk)  OR  FOBT/FIT: every 1 year  OR  Cologuard: every 3 years  OR  Sigmoidoscopy: every 5 years  Screening may be recommended earlier than age 48 if at higher risk for colorectal cancer  Also, an individualized decision between you and your healthcare provider will decide whether screening between the ages of 74-80 would be appropriate  Colonoscopy: 05/29/2020  FOBT/FIT: Not on file  Cologuard: Not on file  Sigmoidoscopy: Not on file          Breast Cancer Screening Age: 36 years old  Frequency: every 1-2 years  Not required if history of left and right mastectomy Mammogram: 09/12/2017        Cervical Cancer Screening Between the ages of 21-29, pap smear recommended once every 3 years  Between the ages of 33-67, can perform pap smear with HPV co-testing every 5 years     Recommendations may differ for women with a history of total hysterectomy, cervical cancer, or abnormal pap smears in past  Pap Smear: Not on file        Hepatitis C Screening Once for adults born between [facility]  More frequently in patients at high risk for Hepatitis C Hep C Antibody: 03/01/2018        Diabetes Screening 1-2 times per year if you're at risk for diabetes or have pre-diabetes Fasting glucose: 82 mg/dL   A1C: 5 9 %        Cholesterol Screening Once every 5 years if you don't have a lipid disorder  May order more often based on risk factors  Lipid panel: 11/06/2018          Other Preventive Screenings Covered by Medicare:  1  Abdominal Aortic Aneurysm (AAA) Screening: covered once if your at risk  You're considered to be at risk if you have a family history of AAA  2  Lung Cancer Screening: covers low dose CT scan once per year if you meet all of the following conditions: (1) Age 50-69; (2) No signs or symptoms of lung cancer; (3) Current smoker or have quit smoking within the last 15 years; (4) You have a tobacco smoking history of at least 30 pack years (packs per day multiplied by number of years you smoked); (5) You get a written order from a healthcare provider  3  Glaucoma Screening: covered annually if you're considered high risk: (1) You have diabetes OR (2) Family history of glaucoma OR (3)  aged 48 and older OR (3)  American aged 72 and older  3  Osteoporosis Screening: covered every 2 years if you meet one of the following conditions: (1) You're estrogen deficient and at risk for osteoporosis based off medical history and other findings; (2) Have a vertebral abnormality; (3) On glucocorticoid therapy for more than 3 months; (4) Have primary hyperparathyroidism; (5) On osteoporosis medications and need to assess response to drug therapy  · Last bone density test (DXA Scan): 09/16/2019   5  HIV Screening: covered annually if you're between the age of 15-65  Also covered annually if you are younger than 13 and older than 72 with risk factors for HIV infection  For pregnant patients, it is covered up to 3 times per pregnancy      Immunizations:  Immunization Recommendations   Influenza Vaccine Annual influenza vaccination during flu season is recommended for all persons aged >= 6 months who do not have contraindications   Pneumococcal Vaccine (Prevnar and Pneumovax)  * Prevnar = PCV13  * Pneumovax = PPSV23   Adults 25-60 years old: 1-3 doses may be recommended based on certain risk factors  Adults 72 years old: Prevnar (PCV13) vaccine recommended followed by Pneumovax (PPSV23) vaccine  If already received PPSV23 since turning 65, then PCV13 recommended at least one year after PPSV23 dose  Hepatitis B Vaccine 3 dose series if at intermediate or high risk (ex: diabetes, end stage renal disease, liver disease)   Tetanus (Td) Vaccine - COST NOT COVERED BY MEDICARE PART B Following completion of primary series, a booster dose should be given every 10 years to maintain immunity against tetanus  Td may also be given as tetanus wound prophylaxis  Tdap Vaccine - COST NOT COVERED BY MEDICARE PART B Recommended at least once for all adults  For pregnant patients, recommended with each pregnancy  Shingles Vaccine (Shingrix) - COST NOT COVERED BY MEDICARE PART B  2 shot series recommended in those aged 48 and above     Health Maintenance Due:      Topic Date Due    Hepatitis C Screening  Completed     Immunizations Due:  There are no preventive care reminders to display for this patient  Advance Directives   What are advance directives? Advance directives are legal documents that state your wishes and plans for medical care  These plans are made ahead of time in case you lose your ability to make decisions for yourself  Advance directives can apply to any medical decision, such as the treatments you want, and if you want to donate organs  What are the types of advance directives? There are many types of advance directives, and each state has rules about how to use them  You may choose a combination of any of the following:  · Living will: This is a written record of the treatment you want   You can also choose which treatments you do not want, which to limit, and which to stop at a certain time  This includes surgery, medicine, IV fluid, and tube feedings  · Durable power of  for healthcare [facility]): This is a written record that states who you want to make healthcare choices for you when you are unable to make them for yourself  This person, called a proxy, is usually a family member or a friend  You may choose more than 1 proxy  · Do not resuscitate (DNR) order:  A DNR order is used in case your heart stops beating or you stop breathing  It is a request not to have certain forms of treatment, such as CPR  A DNR order may be included in other types of advance directives  · Medical directive: This covers the care that you want if you are in a coma, near death, or unable to make decisions for yourself  You can list the treatments you want for each condition  Treatment may include pain medicine, surgery, blood transfusions, dialysis, IV or tube feedings, and a ventilator (breathing machine)  · Values history: This document has questions about your views, beliefs, and how you feel and think about life  This information can help others choose the care that you would choose  Why are advance directives important? An advance directive helps you control your care  Although spoken wishes may be used, it is better to have your wishes written down  Spoken wishes can be misunderstood, or not followed  Treatments may be given even if you do not want them  An advance directive may make it easier for your family to make difficult choices about your care  Urinary Incontinence   Urinary incontinence (UI)  is when you lose control of your bladder  UI develops because your bladder cannot store or empty urine properly  The 3 most common types of UI are stress incontinence, urge incontinence, or both  Medicines:   · May be given to help strengthen your bladder control  Report any side effects of medication to your healthcare provider    Do pelvic muscle exercises often:  Your pelvic muscles help you stop urinating  Squeeze these muscles tight for 5 seconds, then relax for 5 seconds  Gradually work up to squeezing for 10 seconds  Do 3 sets of 15 repetitions a day, or as directed  This will help strengthen your pelvic muscles and improve bladder control  Train your bladder:  Go to the bathroom at set times, such as every 2 hours, even if you do not feel the urge to go  You can also try to hold your urine when you feel the urge to go  For example, hold your urine for 5 minutes when you feel the urge to go  As that becomes easier, hold your urine for 10 minutes  Self-care:   · Keep a UI record  Write down how often you leak urine and how much you leak  Make a note of what you were doing when you leaked urine  · Drink liquids as directed  You may need to limit the amount of liquid you drink to help control your urine leakage  Do not drink any liquid right before you go to bed  Limit or do not have drinks that contain caffeine or alcohol  · Prevent constipation  Eat a variety of high-fiber foods  Good examples are high-fiber cereals, beans, vegetables, and whole-grain breads  Walking is the best way to trigger your intestines to have a bowel movement  · Exercise regularly and maintain a healthy weight  Weight loss and exercise will decrease pressure on your bladder and help you control your leakage  · Use a catheter as directed  to help empty your bladder  A catheter is a tiny, plastic tube that is put into your bladder to drain your urine  · Go to behavior therapy as directed  Behavior therapy may be used to help you learn to control your urge to urinate  © Copyright [employer] 2018 Information is for End User's use only and may not be sold, redistributed or otherwise used for commercial purposes   All illustrations and images included in CareNotes® are the copyrighted property of [employer]  or [employer]

## 2022-04-11 NOTE — PROGRESS NOTES
Assessment and Plan:     Problem List Items Addressed This Visit     None      Visit Diagnoses     Medicare annual wellness visit, subsequent    -  Primary           Preventive health issues were discussed with patient, and age appropriate screening tests were ordered as noted in patient's After Visit Summary  Personalized health advice and appropriate referrals for health education or preventive services given if needed, as noted in patient's After Visit Summary       History of Present Illness:     Patient presents for Medicare Annual Wellness visit    Patient Care Team:  Zackary Witt as PCP - General (Family Medicine)  Darwin Coley MD as PCP - Endocrinology (Endocrinology)  MD Michael Barclay DO Patsey Spangle, PA-C as Physician Assistant (Endocrinology)     Problem List:     Patient Active Problem List   Diagnosis    Arteriosclerosis of coronary artery    Benign essential hypertension    Chronic diastolic congestive heart failure ([address] )    Hyperlipidemia    Statin intolerance    Coronary artery disease    Syncope    Hypokalemia    Chest pressure    Osteoporosis    Vitamin D deficiency    Abnormal CBC      Past Medical and Surgical History:     Past Medical History:   Diagnosis Date    Abscess of chest wall     Last Assessed: 2/27/2014    Arthritis     Back pain     Cataract     Start of    Colon polyp     Coronary artery disease     Diverticulitis of colon 12/04/2008    Female bladder prolapse     Gastric reflux     Hematuria     Last Assessed: 11/18/2014     History of diverticulitis     [place] (hard of hearing)     Hypercholesteremia     Hyperkeratosis of skin 11/03/2011    Hypertension     Hypokalemia 12/13/2011    Incomplete uterovaginal prolapse 05/28/2010    Myocardial infarction [facility] [address]     Osteoporosis     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    Last Assessed: 10/24/2012     Seasonal allergies     Sebaceous cyst     Last Assessed: 2/8/2014    Syncope     Trigeminal neuralgia 03/20/2012    Urinary incontinence     Uterine prolapse     Vertigo 09/15/2011    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    Wears glasses     Wears hearing aid     States she rarely wears them    Wears partial dentures     Upper      Past Surgical History:   Procedure Laterality Date    APPENDECTOMY      APPENDICO-VESICOSTOMY CUTANEOUS  03/01/2010    CHOLECYSTECTOMY      Laparoscopic    COLONOSCOPY      CORONARY ANGIOPLASTY      CORONARY ANGIOPLASTY WITH STENT PLACEMENT      2 aortic stents    CORONARY ANGIOPLASTY WITH STENT PLACEMENT  2013    2 distal left anterior descending artery     ESOPHAGOGASTRODUODENOSCOPY      FRACTURE SURGERY Right     Repair right arm- titanium servando from shoulder to elbow   HYSTERECTOMY      Complete    HI CMBND ANTERPOST COLPORRAPHY W/CYSTO N/A 9/20/2016    Procedure: COLPORRHAPHY ANTERIOR POSTERIOR GRAFT;  Surgeon: Oscar Porter MD;  [facility]: [facility];  Service: UroGynecology           HI CYSTOURETHROSCOPY N/A 9/20/2016    Procedure: Memory Sports;  Surgeon: Oscar Porter MD;  [facility];  Service: UroGynecology           HI REVAGINAL PROLAPSE,SACROSP [address] N/A 9/20/2016    Procedure: COLPOPEXY VAGINAL EXTRAPERITONEAL  incision and drainage of vagina inclusion cyst x 4;  Surgeon: Oscar Porter MD;  [facility];  Service: UroGynecology           HI SLING OPER STRES INCONTINENCE N/A 9/20/2016    Procedure: INSERTION PUBOVAGINAL SLING RETROPUBIC ;  Surgeon: Oscar Porter MD;  [facility];  Service: UroGynecology             Family History:     Family History   Problem Relation Age of Onset    Hypertension Mother     Heart failure Mother     Coronary artery disease Mother     Arthritis Mother     Osteoporosis Mother     Hyperlipidemia Mother     Coronary artery disease Sister       Social History:     Social History     Socioeconomic History    Marital status:       Spouse name: None    Number of children: None    Years of education: None    Highest education level: None   Occupational History    None   Tobacco Use    Smoking status: Never Smoker    Smokeless tobacco: Never Used   Vaping Use    Vaping Use: Never used   Substance and Sexual Activity    Alcohol use:  Yes     Alcohol/week: 1 0 - 2 0 standard drink     Types: 1 - 2 Glasses of wine per week     Comment: per day, per allscripts: Being a social drinker     Drug use: No    Sexual activity: None   Other Topics Concern    None   Social History Narrative    Daily Coffee Consumption- 2 cups/day     Exercising Regularly      Social Determinants of Health     Financial Resource Strain: Not on file   Food Insecurity: Not on file   Transportation Needs: Not on file   Physical Activity: Not on file   Stress: Not on file   Social Connections: Not on file   Intimate Partner Violence: Not on file   Housing Stability: Not on file      Medications and Allergies:     Current Outpatient Medications   Medication Sig Dispense Refill    amLODIPine (NORVASC) 5 mg tablet TAKE 1 TABLET BY MOUTH EVERY DAY 90 tablet 3    aspirin (ASPIRIN LOW DOSE) 81 MG tablet Take 1 tablet by mouth daily (Patient not taking: Reported on 2/15/2022 )      Calcium Carbonate-Vitamin D (CALCIUM 600+D HIGH POTENCY PO) Take 600 mg by mouth 2 (two) times a day        Cholecalciferol (VITAMIN D) 2000 units tablet Take 1 tablet (2,000 Units total) by mouth daily (Patient taking differently: Take 1,000 Units by mouth daily )      Coenzyme Q10-Fish Oil-Vit E (CO-Q 10 OMEGA-3 FISH OIL PO) Take 1 capsule by mouth daily (Patient not taking: Reported on 2/15/2022 )      diphenoxylate-atropine (LOMOTIL) 2 5-0 025 mg per tablet Take 1 tablet by mouth 2 (two) times a day as needed for diarrhea (Patient not taking: Reported on 7/20/2021) 30 tablet 0    fexofenadine (ALLEGRA ALLERGY) 180 MG tablet Take by mouth      Klor-Con M20 20 MEQ tablet TAKE 1 TABLET (20 MEQ TOTAL) BY MOUTH 4 (FOUR) TIMES A  tablet 1    metoprolol succinate (TOPROL-XL) 25 mg 24 hr tablet TAKE 1 TABLET BY MOUTH EVERY DAY 90 tablet 3    olmesartan (BENICAR) 20 mg tablet Take 20 mg by mouth daily      RABEprazole (ACIPHEX) 20 MG tablet TAKE 1 TABLET DAILY 90 tablet 3    rosuvastatin (CRESTOR) 5 mg tablet TAKE 1 TABLET TWICE A WEEK (Patient not taking: Reported on 2/15/2022) 26 tablet 3    spironolactone (ALDACTONE) 25 mg tablet TAKE 1 TABLET BY MOUTH EVERY DAY (Patient not taking: Reported on 2/14/2022) 90 tablet 3     No current facility-administered medications for this visit  Allergies   Allergen Reactions    Penicillins Hives     "All cillins", "and all myocillins"    Versed [Midazolam] Other (See Comments)     States she "passed out" when recovering from a procedure and was told to not use it again   Zithromax [Azithromycin] Anaphylaxis    Bee Venom      Reaction Date: 02Feb2004; Annotation - [id number]: jjw    Enalapril      Reaction Date: [id number];     Erythromycin Dizziness     Reaction Date: [id number];     Estrogens      Reaction Date: 02Feb2004; Annotation - [id number]: jjw    Ramipril      Reaction Date: 02Feb2004;  Annotation - [id number]: jjw    Statins Other (See Comments)     Muscle pain      Immunizations:     Immunization History   Administered Date(s) Administered    COVID-19 MODERNA VACC 0 5 ML IM 02/10/2021, 03/10/2021    DTaP 5 03/21/2007    Influenza Split High Dose Preservative Free IM 10/24/2012, 10/03/2013, 10/17/2014, 10/15/2015, 11/07/2016, 10/26/2017    Influenza, high dose seasonal 0 7 mL 10/17/2018, 11/05/2019, 10/06/2020    Influenza, seasonal, injectable 10/19/1999, 12/15/2000, 11/06/2001, 11/05/2003, 10/12/2005, 10/26/2006, 10/16/2007, 10/09/2008, 08/31/2009, 10/05/2010, 09/15/2011    Pneumococcal Conjugate 13-Valent 08/26/2015    Pneumococcal Polysaccharide PPV23 02/18/2000    Tdap 12/18/2014      Health Maintenance:         Topic Date Due    Hepatitis C Screening  Completed     There are no preventive care reminders to display for this patient  Medicare Health Risk Assessment:     /74   Pulse 72   Temp (!) 97 1 °F (36 2 °C)   Resp 16   Ht 5' 4" (1 626 m)   Wt 65 3 kg (144 lb)   BMI 24 72 kg/m²      Norma Amato is here for her Subsequent Wellness visit  Last Medicare Wellness visit information reviewed, patient interviewed, no change since last AWV  Health Risk Assessment:   Patient rates overall health as good  Patient feels that their physical health rating is same  Patient is satisfied with their life  Eyesight was rated as same  Hearing was rated as same  Patient feels that their emotional and mental health rating is much better  Patients states they are never, rarely angry  Patient states they are sometimes unusually tired/fatigued  Pain experienced in the last 7 days has been none  Patient states that she has experienced no weight loss or gain in last 6 months  Depression Screening:   PHQ-2 Score: 0      Fall Risk Screening: In the past year, patient has experienced: no history of falling in past year      Urinary Incontinence Screening:   Patient has leaked urine accidently in the last six months  Home Safety:  Patient does not have trouble with stairs inside or outside of their home  Patient has working smoke alarms and has working carbon monoxide detector  Home safety hazards include: none  Nutrition:   Current diet is Regular  Medications:   Patient is currently taking over-the-counter supplements  OTC medications include: see medication list  Patient is able to manage medications  Activities of Daily Living (ADLs)/Instrumental Activities of Daily Living (IADLs):   Walk and transfer into and out of bed and chair?: Yes  Dress and groom yourself?: Yes    Bathe or shower yourself?: Yes    Feed yourself?  Yes  Do your laundry/housekeeping?: Yes  Manage your money, pay your bills and track your expenses?: Yes  Make your own meals?: Yes    Do your own shopping?: Yes    Previous Hospitalizations:   Any hospitalizations or ED visits within the last 12 months?: No      Advance Care Planning:   Living will: Yes    Durable POA for healthcare: Yes    Advanced directive: Yes    Advanced directive counseling given: Yes    End of Life Decisions reviewed with patient: Yes      Cognitive Screening:   Provider or family/friend/caregiver concerned regarding cognition?: No    PREVENTIVE SCREENINGS      Cardiovascular Screening:    General: Screening Not Indicated and History Lipid Disorder      Diabetes Screening:     General: Screening Current      Colorectal Cancer Screening:     General: Screening Not Indicated and Screening Current      Breast Cancer Screening:     General: Patient Declines and Risks and Benefits Discussed      Cervical Cancer Screening:    General: Screening Not Indicated      Osteoporosis Screening:    General: History Osteoporosis and Screening Current      Abdominal Aortic Aneurysm (AAA) Screening:        General: Screening Not Indicated      Lung Cancer Screening:     General: Screening Not Indicated      Hepatitis C Screening:    General: Screening Current    Screening, Brief Intervention, and Referral to Treatment (SBIRT)    Screening  Typical number of drinks in a day: 0  Typical number of drinks in a week: 0  Interpretation: Low risk drinking behavior  Single Item Drug Screening:  How often have you used an illegal drug (including marijuana) or a prescription medication for non-medical reasons in the past year? never    Single Item Drug Screen Score: 0  Interpretation: Negative screen for possible drug use disorder    Brief Intervention  Alcohol & drug use screenings were reviewed  No concerns regarding substance use disorder identified         Sal Banda

## 2022-05-09 DIAGNOSIS — I25.10 ARTERIOSCLEROSIS OF CORONARY ARTERY: ICD-10-CM

## 2022-05-09 DIAGNOSIS — I10 BENIGN ESSENTIAL HYPERTENSION: ICD-10-CM

## 2022-05-13 RX ORDER — METOPROLOL SUCCINATE 25 MG/1
TABLET, EXTENDED RELEASE ORAL
Qty: 90 TABLET | Refills: 3 | Status: SHIPPED | OUTPATIENT
Start: 2022-05-13

## 2022-06-05 LAB
EXT SARS-COV-2: NEGATIVE
HBA1C MFR BLD HPLC: 5.8 %

## 2022-06-13 ENCOUNTER — OFFICE VISIT (OUTPATIENT)
Dept: CARDIOLOGY CLINIC | Facility: CLINIC | Age: 87
End: 2022-06-13
Payer: COMMERCIAL

## 2022-06-13 VITALS
DIASTOLIC BLOOD PRESSURE: 78 MMHG | WEIGHT: 144 LBS | TEMPERATURE: 97 F | HEIGHT: 64 IN | BODY MASS INDEX: 24.59 KG/M2 | HEART RATE: 78 BPM | SYSTOLIC BLOOD PRESSURE: 124 MMHG | OXYGEN SATURATION: 97 %

## 2022-06-13 DIAGNOSIS — E78.2 MIXED HYPERLIPIDEMIA: ICD-10-CM

## 2022-06-13 DIAGNOSIS — E78.5 DYSLIPIDEMIA: ICD-10-CM

## 2022-06-13 DIAGNOSIS — Z78.9 STATIN INTOLERANCE: ICD-10-CM

## 2022-06-13 DIAGNOSIS — I25.10 ARTERIOSCLEROSIS OF CORONARY ARTERY: ICD-10-CM

## 2022-06-13 DIAGNOSIS — I50.32 CHRONIC DIASTOLIC CONGESTIVE HEART FAILURE (HCC): ICD-10-CM

## 2022-06-13 DIAGNOSIS — E87.6 HYPOKALEMIA: ICD-10-CM

## 2022-06-13 DIAGNOSIS — I10 BENIGN ESSENTIAL HYPERTENSION: ICD-10-CM

## 2022-06-13 PROCEDURE — 99214 OFFICE O/P EST MOD 30 MIN: CPT | Performed by: INTERNAL MEDICINE

## 2022-06-13 PROCEDURE — 1036F TOBACCO NON-USER: CPT | Performed by: INTERNAL MEDICINE

## 2022-06-13 PROCEDURE — 1160F RVW MEDS BY RX/DR IN RCRD: CPT | Performed by: INTERNAL MEDICINE

## 2022-06-13 RX ORDER — CLOPIDOGREL BISULFATE 75 MG/1
TABLET ORAL
COMMUNITY
Start: 2022-06-06 | End: 2022-07-08

## 2022-06-13 NOTE — PROGRESS NOTES
Progress Note - Cardiology Office  [facility]  Berta Del Cidt 80 y o  female MRN: [id number]  : 1933  Encounter: [id number]      Assessment:     1  Arteriosclerosis of coronary artery    2  Benign essential hypertension    3  Chronic diastolic congestive heart failure ([address] )    4  Dyslipidemia    5  Statin intolerance    6  Mixed hyperlipidemia    7  Hypokalemia        Discussion Summary and Plan:    1  Coronary artery disease with history of angioplasty of LAD and diagonal with a stent in  by Dr Addison Palencia and repeat catheterization in  shows patent stent and has mid RCA significant stenosis but very medium to small size artery not suitable for percutaneous techniques on medical Rx  Unfortunately she cannot take high intensity statin she is on Crestor  She try to take twice or thrice a week  3   Dyslipidemia but intolerant to statins  She is now taking Crestor twice a week  Unfortunately she could not take higher dose  Continue current Rx  4   Persistent hypokalemia  She is on Aldactone and potassium supplementation potassium level has been acceptable    5  Benign essential hypertension  Her blood pressure is better  She is on amlodipine 5 mg daily, metoprolol XL 25, spironolactone 25 mg daily she is not taking Benicar at this time  6  Recent history of stroke  Patient has recent history of stroke  She was started on Plavix therapy she follows with Neurology  Most likely it is elected or infarct as she has recovered  She has no previous history of atrial fibrillation  And she has been on aspirin and carotid Doppler has been negative  7  History of memory loss  Family would like to have geriatric evaluation  they will discussed with primary care team       All these issues discussed with patient at length all her questions answered to her satisfaction  Follow-up in 4- 6 months  Counseling :  A description of the counseling    Patient advised to keep herself hydrated avoid similar situation where she passed out  Patient daughter was present all the time and all their questions were answered  Patient's ability to self care: Yes  Medication side effect reviewed with patient in detail and all their questions answered to their satisfaction  HPI :     Camryn Rae is a 80y o  year old female who came for follow up  Patient has Past medical history significant for coronary artery disease with remote angioplasty of LAD and diagonal by Dr Nick Oakley in 2013 and then repeat cardiac catheterization in 2015 at [facility] shows patent stent in LAD and diagonal and has small RCA mid 80-90% stenosis which is very tortuous and not suitable for percutaneous technique  Patient has repeated history of syncopal episode and most of time it happened situation when she had a glass of wine and she is in a restaurant  She does not remember what happens and she feels fine after few minutes off it  She denies any fever or any chills any nausea and vomiting  She is otherwise very active  She cannot take statins even though her cholesterol is very high  She takes Crestor 5 mg twice a week and she is able to handle that  No fever no chills no nausea no vomiting no other significant complaint  02/14/2022  Above reviewed  Patient came for follow-up she is doing well she has no new complaints  Blood pressure has been stable  No dizziness no lightheadedness  Occasionally she get a right-sided jaw pain but no chest pain no shortness of breath  Her medications reviewed with her  She is currently taking amlodipine 5 mg daily, metoprolol XL 25, Klor-Con 20 mg daily and Benicar 20 mg daily blood pressure has been acceptable  She could not tolerate Aldactone  She is  Compliant with her cholesterol medications  No nausea no vomiting no fever  She had a blood test done July 2021 has been acceptable    Today heart rate 81 beats per minute 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  She has RCA very tortuous and severely stenosed and is on medical therapy  She still occasionally gets dizziness it happened about a month ago  No more dizziness now     06/13/2022  Above reviewed  Patient came for follow-up  Recently she was admitted to [facility] with TIA she has some slurring of her speech and weakness of her right side of the face  She thankfully recovered and she is doing well  She had a medical history significant for labile hypertension, dyslipidemia, but she cannot take high dose statins CAD with tortuous arteries who came for follow-up  We have echo report as well as EKG from [address] [facility] which was reviewed and she has a normal LV systolic function and no cardiac arrhythmia was noted  She does have history of incomplete RBBB which is not changed  RCA is very tortuous and severely disease and she is on medical therapy  She came with her daughter  She has previously Holter monitor done which shows no evidence of any significant tachy-jesse arrhythmias  She has been given Plavix for 21 days  She is no longer taking Benicar her blood pressure at home ranges from systolic of 661-371  In our office blood pressure is acceptable around 130 at this time  Today      Review of Systems   Constitutional: Negative for activity change, chills, diaphoresis, fever and unexpected weight change  HENT: Negative for congestion  Eyes: Negative for discharge and redness  Respiratory: Negative for cough, chest tightness, shortness of breath and wheezing  Cardiovascular: Negative  Negative for chest pain, palpitations and leg swelling  Gastrointestinal: Negative for abdominal pain, diarrhea and nausea  Endocrine: Negative  Genitourinary: Negative for decreased urine volume and urgency  Musculoskeletal: Positive for arthralgias and back pain  Negative for gait problem     Skin: Negative for rash and wound  Allergic/Immunologic: Negative  Neurological: Negative for dizziness, seizures, syncope, weakness, light-headedness and headaches  Hematological: Negative  Psychiatric/Behavioral: Negative for agitation and confusion  The patient is nervous/anxious  Historical Information   Past Medical History:   Diagnosis Date    Abscess of chest wall     Last Assessed: 2/27/2014    Arthritis     Back pain     Cataract     Start of    Colon polyp     Coronary artery disease     Diverticulitis of colon 12/04/2008    Female bladder prolapse     Gastric reflux     Hematuria     Last Assessed: 11/18/2014     History of diverticulitis     [place] (hard of hearing)     Hypercholesteremia     Hyperkeratosis of skin 11/03/2011    Hypertension     Hypokalemia 12/13/2011    Incomplete uterovaginal prolapse 05/28/2010    Myocardial infarction ([facility] [address]     Osteoporosis     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    Last Assessed: 10/24/2012     Seasonal allergies     Sebaceous cyst     Last Assessed: 2/8/2014    Syncope     Trigeminal neuralgia 03/20/2012    Urinary incontinence     Uterine prolapse     Vertigo 09/15/2011    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    Wears glasses     Wears hearing aid     States she rarely wears them    Wears partial dentures     Upper      Past Surgical History:   Procedure Laterality Date    APPENDECTOMY      APPENDICO-VESICOSTOMY CUTANEOUS  03/01/2010    CHOLECYSTECTOMY      Laparoscopic    COLONOSCOPY      CORONARY ANGIOPLASTY      CORONARY ANGIOPLASTY WITH STENT PLACEMENT      2 aortic stents    CORONARY ANGIOPLASTY WITH STENT PLACEMENT  2013    2 distal left anterior descending artery     ESOPHAGOGASTRODUODENOSCOPY      FRACTURE SURGERY Right     Repair right arm- titanium servando from shoulder to elbow      HYSTERECTOMY      Complete    NM CMBND ANTERPOST COLPORRAPHY W/CYSTO N/A 9/20/2016    Procedure: COLPORRHAPHY ANTERIOR POSTERIOR GRAFT;  Surgeon: Tatyana Bansal MD;  [facility];  Service: UroGynecology           TN CYSTOURETHROSCOPY N/A 9/20/2016    Procedure: Raul Gallcraig;  Surgeon: Tatyana Bansal MD;  [facility];  Service: UroGynecology           TN REVAGINAL PROLAPSE,SACROSP LIG N/A 9/20/2016    Procedure: COLPOPEXY VAGINAL EXTRAPERITONEAL  incision and drainage of vagina inclusion cyst x 4;  Surgeon: Tatyana Bansal MD;  [facility];  Service: UroGynecology           TN SLING OPER STRES INCONTINENCE N/A 9/20/2016    Procedure: INSERTION PUBOVAGINAL SLING RETROPUBIC ;  Surgeon: Tatyana Bansal MD;  [facility];  Service: UroGynecology            Social History     Substance and Sexual Activity   Alcohol Use Yes    Alcohol/week: 1 0 - 2 0 standard drink    Types: 1 - 2 Glasses of wine per week    Comment: per day, per allscripts: Being a social drinker      Social History     Substance and Sexual Activity   Drug Use No     Social History     Tobacco Use   Smoking Status Never Smoker   Smokeless Tobacco Never Used     Family History:   Family History   Problem Relation Age of Onset    Hypertension Mother     Heart failure Mother     Coronary artery disease Mother     Arthritis Mother     Osteoporosis Mother     Hyperlipidemia Mother     Coronary artery disease Sister        Meds/Allergies     Allergies   Allergen Reactions    Penicillins Hives     "All cillins", "and all myocillins"    Versed [Midazolam] Other (See Comments)     States she "passed out" when recovering from a procedure and was told to not use it again   Zithromax [Azithromycin] Anaphylaxis    Bee Venom      Reaction Date: 02Feb2004; Annotation - [id number]: jjw    Enalapril      Reaction Date: [id number];     Erythromycin Dizziness     Reaction Date: [id number];     Estrogens      Reaction Date: 02Feb2004; Annotation - [id number]: jjw    Ramipril      Reaction Date: 02Feb2004;  Annotation - [id number]: jjw    Statins Other (See Comments)     Muscle pain       Current Outpatient Medications:     amLODIPine (NORVASC) 5 mg tablet, TAKE 1 TABLET BY MOUTH EVERY DAY, Disp: 90 tablet, Rfl: 3    aspirin 81 MG tablet, Take 1 tablet by mouth daily, Disp: , Rfl:     Calcium Carbonate-Vitamin D (CALCIUM 600+D HIGH POTENCY PO), Take 600 mg by mouth 2 (two) times a day  , Disp: , Rfl:     clopidogrel (PLAVIX) 75 mg tablet, , Disp: , Rfl:     fexofenadine (ALLEGRA) 180 MG tablet, Take by mouth, Disp: , Rfl:     Klor-Con M20 20 MEQ tablet, TAKE 1 TABLET (20 MEQ TOTAL) BY MOUTH 4 (FOUR) TIMES A DAY, Disp: 360 tablet, Rfl: 1    metoprolol succinate (TOPROL-XL) 25 mg 24 hr tablet, TAKE 1 TABLET BY MOUTH EVERY DAY, Disp: 90 tablet, Rfl: 3    RABEprazole (ACIPHEX) 20 MG tablet, TAKE 1 TABLET DAILY, Disp: 90 tablet, Rfl: 3    rosuvastatin (CRESTOR) 5 mg tablet, TAKE 1 TABLET TWICE A WEEK, Disp: 26 tablet, Rfl: 3    spironolactone (ALDACTONE) 25 mg tablet, TAKE 1 TABLET BY MOUTH EVERY DAY, Disp: 90 tablet, Rfl: 3    Cholecalciferol (VITAMIN D) 2000 units tablet, Take 1 tablet (2,000 Units total) by mouth daily (Patient not taking: Reported on 6/13/2022), Disp: , Rfl:     Coenzyme Q10-Fish Oil-Vit E (CO-Q 10 OMEGA-3 FISH OIL PO), Take 1 capsule by mouth daily (Patient not taking: No sig reported), Disp: , Rfl:     diphenoxylate-atropine (LOMOTIL) 2 5-0 025 mg per tablet, Take 1 tablet by mouth 2 (two) times a day as needed for diarrhea (Patient not taking: No sig reported), Disp: 30 tablet, Rfl: 0    olmesartan (BENICAR) 20 mg tablet, Take 20 mg by mouth daily (Patient not taking: Reported on 6/13/2022), Disp: , Rfl:     Vitals: Blood pressure 124/78, pulse 78, temperature (!) 97 °F (36 1 °C), height 5' 4" (1 626 m), weight 65 3 kg (144 lb), SpO2 97 %  ?  Body mass index is 24 72 kg/m²    Vitals:    06/13/22 1415   Weight: 65 3 kg (144 lb)     BP Readings from Last 3 Encounters:   06/13/22 124/78   04/11/22 136/74   02/15/22 120/80         Physical Exam  Constitutional:       General: She is not in acute distress  Appearance: She is well-developed  She is not diaphoretic  Neck:      Thyroid: No thyromegaly  Vascular: No JVD  Trachea: No tracheal deviation  Cardiovascular:      Rate and Rhythm: Normal rate and regular rhythm  Heart sounds: S1 normal and S2 normal  Heart sounds not distant  Murmur heard  Systolic (ejection) murmur is present with a grade of 2/6  No friction rub  No gallop  No S3 or S4 sounds  Pulmonary:      Effort: Pulmonary effort is normal  No respiratory distress  Breath sounds: Normal breath sounds  No wheezing or rales  Chest:      Chest wall: No tenderness  Abdominal:      General: Bowel sounds are normal  There is no distension  Palpations: Abdomen is soft  Tenderness: There is no abdominal tenderness  Musculoskeletal:         General: No deformity  Cervical back: Neck supple  Skin:     General: Skin is warm and dry  Coloration: Skin is not pale  Findings: No rash  Neurological:      Mental Status: She is alert and oriented to person, place, and time  Psychiatric:         Behavior: Behavior normal          Judgment: Judgment normal        Diagnostic Studies Review Cardio:   cardiac catheterization :   Patient's cardiac catheterization from 2015 reviewed  She has patent stent seen in LAD and diagonal   Apical LAD has around 70 80% stenosis which small vessel, mid circumflex around 40-50% stenosis  RCA which is a small size artery and only gives RPDA has around 90% stenosis in the mid it is very tortuous artery not suitable for percutaneous techniques  Echo Doppler done 06/28/2018 shows low-normal LV systolic function EF around 55 percent, mild concentric left atrial hypertrophy, mild MR, mild AI, mild TR PA pressure 30 millimeters of mercury      Repeat echo Doppler done on June 2022 in [facility] shows EF 55% mild LVH mild AI, mild MR no change from previous echo  Carotid Doppler  Carotid study done 2019 shows less than 50% stenosis bilaterally  Holter monitor done on 2018 shows normal sinus rhythm  Few PACs seen  Rare episode of PVCs  EKG:    Twelve lead EKG done at  [facility] shows normal sinus rhythm heart rate around 87 beats per minute  Nonspecific ST changes  Twelve lead EKG done on 2018 shows normal sinus rhythm heart rate 81 beats per minute  No significant ST changes  No change from old EKG  Twelve lead EKG done in our office 2019 shows normal sinus rhythm heart rate 93 beats per minute  Twelve lead EKG done in our office 2019 shows normal sinus rhythm QS in V1 to V3 most likely due to lead location  No other significant change no change from old EKG heart rate 66 beats per minute  Twelve lead EKG 2019 shows normal sinus rhythm 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  Heart rate 68 beats per minute  Twelve lead EKG 2020 shows normal sinus rhythm heart rate 61 beats per minute EKG done 2020 no change from old EKG  12 EKG done 2021 shows normal sinus with PACs heart rate 84 beats per minute  No significant change from old EKG  Twelve lead EKG done 2022 shows normal sinus rhythm with sinus arrhythmia heart rate 59 beats per minute incomplete RBBB  Q-wave in inferior leads cannot rule out old inferior infarction  Not much changed from previous EKG      Cardiac testing:       Results for orders placed in visit on 08/20/15   Cardiac catheterization    Narrative [facility], [address] [place] [address]   Phone: [phone]      INVASIVE CARDIOVASCULAR LAB COMPLETE REPORT         Patient: Dorian MENDOSA   Location: Outpatient   MR number: [id number]   Account number: [de-identified]   Study date: 2015   Gender: Female   : 1933   Height: 64 2 in   Weight: 147 6 lb   BSA: 1 72 m squared   Allergies: Altace, beestings, Erythromycin Base, estrogen, penicilin, Versed,   Vasotec, Zithromax, all cillins, mycins, Vytorin 10-10, Zetia   Diagnostic Cardiologist:  Kay Miller MD   Primary Physician:  Dianah Duverney, MD   SUMMARY   CORONARY CIRCULATION:   Left main: Normal    LAD: The vessel was normal sized  The were no obstructive stenoses  The   previously deployed stent remains widely patent  The stent at the ostium of D1   remains widely patent  Circumflex: The vessel was normal sized  There was a   non-critical 50% plaque in mid vessel  There were no obstructive stenoses  The   major OM branches were normal  There was faint collateral flow from the   circumflex to the distal RCA  RCA: The vessel was small to medium sized and   dominant, giving rise to the PDA  There was a long, tortuous 95% stenosis in   mid vessel  [place] of severe tortuosity, the lesion is poorly suited for PCI  REPORT ELEMENT SELECTION:   Right radial access was employed  Summary: The patient has single vessel disease with a long, tortuous subtotal   stenosis in the mid RCA  Elucidating symptom status is difficult in this   patient, since she previously had very severe LAD disease and diagonal disease   with atypical symptoms as the only presentation  Medical therapy is    recommended   for the following reasons 1) the lesion has been stable angiographically for 2   years  2) the patient was able to exercise to a high level after PCI 2 years   ago with this lesion already present  3) Nuclear imaging 1 year ago showed no   ischemia in the presence of this lesion  4) There is some collateral flow from   the circumflex   5) Current symptoms are atypical and not exertional  If PCI is   required in the future because of exertional angina refractory to medication,   it is recommended that the procedure be performed via the femoral approach,    and   INVASIVE CARDIOVASCULAR LAB COMPLETE REPORT   Patient: Anne Marie MENDOSA   MR number: [id number] ------ Page 2   BSA: 1 72 m squared   that the procedure be performed at the [address] [place] [address] because of the   increased risk of comlications  Treatment with rosuvastatin, 2 5mg once per   week was initiated  The patient has a history of statin intolerance at low   doses  INDICATIONS:   --  Possible CAD: unstable angina  PROCEDURES PERFORMED   --  Left coronary angiography  --  Right coronary angiography  --  Outpatient  --  Coronary Catheterization (w/o LHC)  PROCEDURE: The risks and alternatives of the procedures and conscious sedation   were explained to the patient and informed consent was obtained  The patient   was brought to the cath lab and placed on the table  The planned puncture    sites   were prepped and draped in the usual sterile fashion  --  Right radial artery access  After performing an Eusebio's test to verify   adequate ulnar artery supply to the hand, the radial site was prepped  The   puncture site was infiltrated with local anesthetic  The vessel was accessed   using the modified Seldinger technique, a wire was advanced into the vessel,   and a sheath was advanced over the wire into the vessel  --  Left coronary artery angiography  A catheter was advanced over a guidewire   into the aorta and positioned in the left coronary artery ostium under   fluoroscopic guidance  Angiography was performed  --  Right coronary artery angiography  A catheter was advanced over a    guidewire   into the aorta and positioned in the right coronary artery ostium under   fluoroscopic guidance  Angiography was performed  --  Outpatient  --  Coronary Catheterization (w/o LHC)  PROCEDURE COMPLETION: The patient tolerated the procedure well and was   discharged from the cath lab  TIMING: Test started at 09:38  Test concluded at   10:03  HEMOSTASIS: The sheath was removed  The site was compressed with a   Hemoband device  Hemostasis was obtained   MEDICATIONS GIVEN: Verapamil   (Isoptin, Calan, Covera), 1 25 mg, IA, at 09:52  Fentanyl (1OOmcg/2 ml), 50   mcg, IV, at 09:42  Fentanyl (1OOmcg/2 ml), 50 mcg, IV, at 09:45  1% Lidocaine,   1 ml, subcutaneously, at 09:49  Fentanyl (1OOmcg/2 ml), 25 mcg, IV, at 09:50  Nitroglycerin (200mcg/ml), 200 mcg, at 09:52  Heparin 1000 units/ml, 4,000   units, IV, at 09:52  CONTRAST GIVEN: 70 ml Omnipaque (350mg I /ml)  RADIATION   EXPOSURE: Fluoroscopy time: 1 8 min  CORONARY VESSELS:   --  Left main: Normal    INVASIVE CARDIOVASCULAR LAB COMPLETE REPORT   Patient: Doug MENDOSA   MR number: [id number]    ------ Page 3   BSA: 1 72 m squared   --  LAD: The vessel was normal sized  The were no obstructive stenoses  The   previously deployed stent remains widely patent  The stent at the ostium of D1   remains widely patent  --  Circumflex: The vessel was normal sized  There was    a   non-critical 50% plaque in mid vessel  There were no obstructive stenoses  The   major OM branches were normal  There was faint collateral flow from the   circumflex to the distal RCA  --  RCA: The vessel was small to medium sized    and   dominant, giving rise to the PDA  There was a long, tortuous 95% stenosis in   mid vessel  [place] of severe tortuosity, the lesion is poorly suited for PCI  NOTE: Right radial access was employed  Prepared and signed by   Jenifer Montiel MD   Signed 2015 11:05:34   CC: Dr Raven Faith   Study diagram   Hemodynamic tables   Pressures:  Baseline   Pressures:  - HR: 70   Pressures:  - Rhythm:   Pressures:  -- Aortic Pressure (S/D/M): 157/83/116   Outputs:  Baseline   Outputs:  -- CALCULATIONS[age]   Outputs:  -- CALCULATIONS: Body Surface Area: 1 72   Outputs:  -- CALCULATIONS: Height in cm: 163 00   Outputs:  -- CALCULATIONS: Sex: Female   Outputs:  -- CALCULATIONS: Weight in k 10        Lab Review   Lab Results   Component Value Date    WBC 9 15 2021    HGB 14 5 2021    HCT 47 1 (H) 2021    MCV 98 2021 RDW 13 8 07/20/2021     07/20/2021     BMP:  Lab Results   Component Value Date     09/09/2016    K 4 1 07/20/2021     07/20/2021    CO2 24 07/20/2021    BUN 19 07/20/2021    CREATININE 0 73 07/20/2021    GLUCOSE 99 09/09/2016    GLUF 82 07/20/2021    CALCIUM 9 7 07/20/2021    EGFR 74 07/20/2021    MG 2 2 07/20/2021     LFT:  Lab Results   Component Value Date    AST 17 07/20/2021    ALT 23 07/20/2021    ALKPHOS 63 07/20/2021    PROT 7 1 07/20/2016    BILITOT 0 3 07/20/2016       Lab Results   Component Value Date    HGBA1C 5 8 06/05/2022     Lipid Profile:   Lab Results   Component Value Date    CHOL 192 12/03/2013    HDL 65 (H) 11/06/2018    LDLCALC 74 11/06/2018    TRIG 179 (H) 11/06/2018     Lab Results   Component Value Date    CHOL 192 12/03/2013     Lab Results   Component Value Date    TROPONINI 0 03 05/20/2018       Dr Bassem Courtney MD [facility]      "This note has been constructed using a voice recognition system  Therefore there may be syntax, spelling, and/or grammatical errors   Please call if you have any questions  "

## 2022-06-13 NOTE — PATIENT INSTRUCTIONS
She can take Crestor and a metoprolol XL n the evening    She can take Plavix and potassium at noon time    She has to take AcipHex, aspirin, Aldactone amlodipine in the morning

## 2022-06-20 ENCOUNTER — TELEPHONE (OUTPATIENT)
Dept: GERIATRICS | Age: 87
End: 2022-06-20

## 2022-06-20 DIAGNOSIS — I10 BENIGN ESSENTIAL HYPERTENSION: ICD-10-CM

## 2022-06-20 DIAGNOSIS — I50.22 CHRONIC SYSTOLIC CONGESTIVE HEART FAILURE (HCC): ICD-10-CM

## 2022-06-20 DIAGNOSIS — E87.6 HYPOKALEMIA: ICD-10-CM

## 2022-06-20 DIAGNOSIS — I25.10 CORONARY ARTERY DISEASE INVOLVING NATIVE CORONARY ARTERY OF NATIVE HEART WITHOUT ANGINA PECTORIS: ICD-10-CM

## 2022-06-20 NOTE — TELEPHONE ENCOUNTER
I reviewed Dr Joan Smalls (cardio's note)  Daughter attended appointment    She voiced interest in [place] having a comprehensive senior assessment  I do not do these       She can schedule with [facility] [phone]    They administer very comprehensive evals

## 2022-06-20 NOTE — TELEPHONE ENCOUNTER
Juanis Lopez [facility]  [address] [facility], [address] [facility], [facility]    [address] [place] [address],[address]  [place], [address] [facility] [place] [facility]    [phone]    Telephone Intake: Geriatric Assessment     Referral source: [facility], [address] who is scheduling/relationship to pt: daughterEhsan phone number: [phone]    Reason for referral: Patient concerns , Family member concerns and Provider concerns regarding memory concerns, behavior changes/concerns and mood concerns quick to anger  Recently had a TIA  If there are behavioral concerns, is the pt prescribed medications to manage these? None prescribed   If so, how many? na   Has the patient ever had an inpatient psychiatric hospitalization? No,   If the patient is prescribed medications for behavior management or has a history of psychiatric hospitalization, please DO NOT schedule  Please route to provider for review first    What is the goal of the visit? initial assessment and diagnosis; assess ability for patient to leave on her own, or with outside support; resources     Has the patient been seen by a Neurologist or Geriatrician? No  Maybe at the [facility]  Has the patient ever been diagnosed with dementia? No      Preferred language? English  Highest education level? High School Graduate  Does the patient wear glasses? Yes   Does the patient use hearing aids? Yes      Is there a living will/healthcare POA in place/If so, who? Yes , Mo Butt, daughter    Does the pt/caregiver have access for a virtual visit (computer/smart phone with audio/video)? Yes     Caller was informed: Please make sure the pt is accompanied by someone who knows them well / caregiver / family member to participate in this appointment  Who will accompany the pt (name and relationship)?  Mo Butt  Phone number of person accompanying pt: [phone]  If a pt plans to attend alone, please route a message to the assigned provider for approval      Office packet mailed out to: [facility] [address], [place], [address] Luis Rain    Added to wait list for sooner appointments? Yes     NOTE FOR : If the pt was recently hospitalized, please route intake to assigned provider for chart review

## 2022-06-21 ENCOUNTER — TELEPHONE (OUTPATIENT)
Dept: GERIATRICS | Age: 87
End: 2022-06-21

## 2022-06-21 RX ORDER — POTASSIUM CHLORIDE 20 MEQ/1
20 TABLET, EXTENDED RELEASE ORAL 4 TIMES DAILY
Qty: 360 TABLET | Refills: 0 | Status: SHIPPED | OUTPATIENT
Start: 2022-06-21

## 2022-06-21 NOTE — TELEPHONE ENCOUNTER
----- Message from Ehsan Lo sent at 6/20/2022 12:59 PM EDT -----  Regarding: Resources  Patient has an appointment 10/12/22 for initial assessment  In the meantime, daughter, Mishel Mina, would like information on caregiver agencies

## 2022-06-21 NOTE — TELEPHONE ENCOUNTER
LSW spoke with daughterRoland to provide resources in [place], [place] as daughter requested  Daughter is looking forward to bringing patient in sooner for Charley appt   If cancellations open up, as appointment is in October, patient is on the wait list  LSW emailed the following list to daughter:    [facility]   Phone: [phone]  For additional resources in [place] please visit: [place] [address] [facility])  Phone: [phone] for [employer] [place])  Phone: [phone]  Meals on Wheels:  [employer] on [address]: [address] [address]  Phone: [phone]    Homecare:  [place], [place]  Phone: [phone]  [address]  Phone: [phone]  [address] [place] [address],[address]  [phone]    Senior Centers & Adult Day Centers:    [facility]  Address: [address] [facility], [place]  Phone: [phone]    [facility] ([facility])  Phone: [phone]    Medicaid:  [place] Medicaid [address] [facility] [phone]    [place] [address] [employer]  Phone: [phone]

## 2022-06-27 ENCOUNTER — TELEPHONE (OUTPATIENT)
Dept: FAMILY MEDICINE CLINIC | Facility: CLINIC | Age: 87
End: 2022-06-27

## 2022-06-27 NOTE — TELEPHONE ENCOUNTER
She has an appointment with our specialist 10/2022  Chart review reveals that she wants mom seen sooner  She is on a wait list      She can try the other networks  She can get list from [facility] has Dr Campuzano Service [phone]   They can try her

## 2022-07-07 ENCOUNTER — OFFICE VISIT (OUTPATIENT)
Dept: AUDIOLOGY | Facility: CLINIC | Age: 87
End: 2022-07-07
Payer: COMMERCIAL

## 2022-07-07 DIAGNOSIS — H90.3 SENSORY HEARING LOSS, BILATERAL: Primary | ICD-10-CM

## 2022-07-07 PROCEDURE — 92567 TYMPANOMETRY: CPT | Performed by: AUDIOLOGIST

## 2022-07-07 PROCEDURE — 92557 COMPREHENSIVE HEARING TEST: CPT | Performed by: AUDIOLOGIST

## 2022-07-07 NOTE — PROGRESS NOTES
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  Recent stroke  Reason for visit: Curt Bob is being seen today at the request of Ms Tabatha DENTON for an evaluation of hearing  Patient reports minimal reduction in functioning from her stroke  Her daughter, Sarai Everett, stated that she initially had mild speech issues which quickly resolved  She has slight short term memory loss which will be evaluated by [facility] [place] next month  She recently lost her right hearing aid (under warranty)  Otoscopic Evaluation:   Right Ear: Minimum cerumen    Left Ear: Clear and healthy ear canal and tympanic membrane    Tympanometry:   Right: Type A - normal middle ear pressure and compliance  -95   Left: Type A - normal middle ear pressure and compliance    Audiogram Results:  R ear:  Mild sloping to moderate SHL with 96% word recognition score in quiet    Only shift in threshold noted is 8000 Hz, otherwise stable  L ear:  Sharply sloping, mild to severe, SHL with 84% word recognition score in quiet  *see attached audiogram    Patient is fit with [employer] OPN S 2 R hearing aid(s)  Right serial number [id number] (lost device)  Left serial number [id number]  Warranty date: 2023 (Loss/Damage and repair)  Action:  1  Cleaned and checked Left hearing aid  Changed dome, wax guard and canal lock  Was in good working order via listening check  2  Checked the   There is minimal debris in the ports  RECOMMENDATIONS:  1  RTO 22 at 10:30 for right hearing aid      Will provide [place] information at that time     Willow Hollis , CCC-A, [place]# [id number], Hearing Aid Dispenser, [place]# [id number]  Clinical Audiologist

## 2022-07-08 ENCOUNTER — OFFICE VISIT (OUTPATIENT)
Dept: FAMILY MEDICINE CLINIC | Facility: CLINIC | Age: 87
End: 2022-07-08
Payer: COMMERCIAL

## 2022-07-08 VITALS
RESPIRATION RATE: 14 BRPM | HEIGHT: 64 IN | HEART RATE: 72 BPM | DIASTOLIC BLOOD PRESSURE: 80 MMHG | BODY MASS INDEX: 24.72 KG/M2 | TEMPERATURE: 97.9 F | WEIGHT: 144.8 LBS | SYSTOLIC BLOOD PRESSURE: 120 MMHG

## 2022-07-08 DIAGNOSIS — R41.3 MEMORY LOSS: ICD-10-CM

## 2022-07-08 DIAGNOSIS — I10 BENIGN ESSENTIAL HYPERTENSION: ICD-10-CM

## 2022-07-08 DIAGNOSIS — Z86.73 H/O: CVA (CEREBROVASCULAR ACCIDENT): Primary | ICD-10-CM

## 2022-07-08 DIAGNOSIS — I25.10 CORONARY ARTERY DISEASE INVOLVING NATIVE CORONARY ARTERY OF NATIVE HEART WITHOUT ANGINA PECTORIS: ICD-10-CM

## 2022-07-08 PROBLEM — R55 SYNCOPE: Status: RESOLVED | Noted: 2018-05-19 | Resolved: 2022-07-08

## 2022-07-08 PROBLEM — R07.89 CHEST PRESSURE: Status: RESOLVED | Noted: 2018-12-05 | Resolved: 2022-07-08

## 2022-07-08 PROCEDURE — 99214 OFFICE O/P EST MOD 30 MIN: CPT | Performed by: FAMILY MEDICINE

## 2022-07-08 PROCEDURE — 1160F RVW MEDS BY RX/DR IN RCRD: CPT | Performed by: FAMILY MEDICINE

## 2022-07-08 RX ORDER — ROSUVASTATIN CALCIUM 10 MG/1
TABLET, COATED ORAL
COMMUNITY
Start: 2022-06-06 | End: 2022-07-08 | Stop reason: SDUPTHER

## 2022-07-08 RX ORDER — ROSUVASTATIN CALCIUM 10 MG/1
10 TABLET, COATED ORAL DAILY
Qty: 90 TABLET | Refills: 1 | Status: SHIPPED | OUTPATIENT
Start: 2022-07-08

## 2022-07-08 NOTE — PROGRESS NOTES
Chief Complaint   Patient presents with   [facility] f/u for stroke 6-2-22        Patient ID: Santiago Cloud is a 80 y o  female  HPI  Pt is seeing with her daughter for f/u recent hospital stay for CVA that was presented by speech difficulties that resolved by now - has mild congnitive deficit that worsening over last several months     The following portions of the patient's history were reviewed and updated as appropriate: allergies, current medications, past family history, past medical history, past social history, past surgical history and problem list     Review of Systems   Constitutional: Negative  Respiratory: Negative  Cardiovascular: Negative  Gastrointestinal: Negative  Genitourinary: Negative  Musculoskeletal: Negative  Skin: Negative  Neurological: Negative  Psychiatric/Behavioral: Positive for confusion (mild) and decreased concentration  Negative for agitation, behavioral problems, hallucinations and sleep disturbance  The patient is not nervous/anxious          Current Outpatient Medications   Medication Sig Dispense Refill    amLODIPine (NORVASC) 5 mg tablet TAKE 1 TABLET BY MOUTH EVERY DAY 90 tablet 3    aspirin 81 MG tablet Take 1 tablet by mouth daily      Calcium Carbonate-Vitamin D (CALCIUM 600+D HIGH POTENCY PO) Take 600 mg by mouth 2 (two) times a day        Cholecalciferol (VITAMIN D) 2000 units tablet Take 1 tablet (2,000 Units total) by mouth daily      Coenzyme Q10-Fish Oil-Vit E (CO-Q 10 OMEGA-3 FISH OIL PO) Take 1 capsule by mouth daily      fexofenadine (ALLEGRA) 180 MG tablet Take by mouth      metoprolol succinate (TOPROL-XL) 25 mg 24 hr tablet TAKE 1 TABLET BY MOUTH EVERY DAY 90 tablet 3    potassium chloride (Klor-Con M20) 20 mEq tablet Take 1 tablet (20 mEq total) by mouth 4 (four) times a day 360 tablet 0    RABEprazole (ACIPHEX) 20 MG tablet TAKE 1 TABLET DAILY 90 tablet 3    rosuvastatin (CRESTOR) 10 MG tablet        No current facility-administered medications for this visit  Objective:    /80 (BP Location: Left arm, Patient Position: Sitting, Cuff Size: Standard)   Pulse 72   Temp 97 9 °F (36 6 °C) (Temporal)   Resp 14   Ht 5' 4" (1 626 m)   Wt 65 7 kg (144 lb 12 8 oz)   BMI 24 85 kg/m²        Physical Exam  Constitutional:       General: She is not in acute distress  Appearance: She is not ill-appearing  Cardiovascular:      Rate and Rhythm: Normal rate and regular rhythm  Pulmonary:      Effort: Pulmonary effort is normal  No respiratory distress  Breath sounds: No wheezing, rhonchi or rales  Abdominal:      Palpations: Abdomen is soft  Tenderness: There is no abdominal tenderness  Musculoskeletal:      Right lower leg: No edema  Left lower leg: No edema  Neurological:      General: No focal deficit present  Mental Status: She is alert and oriented to person, place, and time  Cranial Nerves: No cranial nerve deficit  Labs in chart were reviewed  Assessment/Plan:         Diagnoses and all orders for this visit:    H/O: CVA (cerebrovascular accident)  -     rosuvastatin (CRESTOR) 10 MG tablet;  Take 1 tablet (10 mg total) by mouth daily    Coronary artery disease involving native coronary artery of native heart without angina pectoris    Benign essential hypertension    Memory loss              All stable  Cont meds                Sagrario Azevedo MD

## 2022-07-12 ENCOUNTER — TELEPHONE (OUTPATIENT)
Dept: CARDIOLOGY CLINIC | Facility: CLINIC | Age: 87
End: 2022-07-12

## 2022-07-12 NOTE — TELEPHONE ENCOUNTER
Africa Randolph called today is not due back with you until Oct  Wanted to let you know she had a stroke  Please advise where to put her in

## 2022-07-18 ENCOUNTER — OFFICE VISIT (OUTPATIENT)
Dept: AUDIOLOGY | Facility: CLINIC | Age: 87
End: 2022-07-18
Payer: COMMERCIAL

## 2022-07-18 DIAGNOSIS — H90.3 SENSORY HEARING LOSS, BILATERAL: Primary | ICD-10-CM

## 2022-07-18 NOTE — PROGRESS NOTES
Hearing Aid Visit:    Name:  Fabián Vivas  :  1933  Age:  80 y o  Date of Evaluation: 22     Ken Wright is being seen for a hearing aid visit  Patient is fit with [employer] OPN S 2 R hearing aid(s)  Right serial number [id number] (lost device)  Left serial number [id number]  Warranty date: 2023 (Loss/Damage and repair)        Action:      Recommendations:        Willow Blake , CCC-A, [place]# [id number], Hearing Aid Dispenser, [place]# [id number]  Clinical Audiologist

## 2022-08-04 ENCOUNTER — TELEPHONE (OUTPATIENT)
Dept: GERIATRICS | Age: 87
End: 2022-08-04

## 2022-08-04 NOTE — TELEPHONE ENCOUNTER
Patient's daughter, Paty Hernandez, returned our call to say patient is receiving care elsewhere and we should cancel all appointments

## 2022-08-04 NOTE — TELEPHONE ENCOUNTER
Called and left message for pt's daughter, Marli Shalom  Instructed Keith to return call to reschedule pt's geriatric consult and follow up as the provider is leaving the practice

## 2022-08-17 DIAGNOSIS — E87.6 HYPOKALEMIA: ICD-10-CM

## 2022-08-17 DIAGNOSIS — I25.10 CORONARY ARTERY DISEASE INVOLVING NATIVE CORONARY ARTERY OF NATIVE HEART WITHOUT ANGINA PECTORIS: ICD-10-CM

## 2022-08-17 DIAGNOSIS — I50.22 CHRONIC SYSTOLIC CONGESTIVE HEART FAILURE (HCC): ICD-10-CM

## 2022-08-17 DIAGNOSIS — I10 BENIGN ESSENTIAL HYPERTENSION: ICD-10-CM

## 2022-08-17 RX ORDER — POTASSIUM CHLORIDE 1500 MG/1
TABLET, EXTENDED RELEASE ORAL
Qty: 360 TABLET | Refills: 0 | Status: SHIPPED | OUTPATIENT
Start: 2022-08-17 | End: 2022-10-07

## 2022-09-06 DIAGNOSIS — I25.10 ARTERIOSCLEROSIS OF CORONARY ARTERY: ICD-10-CM

## 2022-09-06 DIAGNOSIS — I10 BENIGN ESSENTIAL HYPERTENSION: ICD-10-CM

## 2022-09-06 NOTE — TELEPHONE ENCOUNTER
metoprolol succinate (TOPROL-XL) 25 mg 24 hr tablet  Refill of the above needed to go to [facility]

## 2022-09-07 RX ORDER — METOPROLOL SUCCINATE 25 MG/1
25 TABLET, EXTENDED RELEASE ORAL DAILY
Qty: 90 TABLET | Refills: 3 | Status: SHIPPED | OUTPATIENT
Start: 2022-09-07

## 2022-09-19 ENCOUNTER — OFFICE VISIT (OUTPATIENT)
Dept: FAMILY MEDICINE CLINIC | Facility: CLINIC | Age: 87
End: 2022-09-19
Payer: COMMERCIAL

## 2022-09-19 VITALS
SYSTOLIC BLOOD PRESSURE: 140 MMHG | WEIGHT: 146.8 LBS | BODY MASS INDEX: 25.06 KG/M2 | HEART RATE: 76 BPM | RESPIRATION RATE: 18 BRPM | TEMPERATURE: 97.8 F | HEIGHT: 64 IN | DIASTOLIC BLOOD PRESSURE: 80 MMHG

## 2022-09-19 DIAGNOSIS — Z86.73 H/O: CVA (CEREBROVASCULAR ACCIDENT): ICD-10-CM

## 2022-09-19 DIAGNOSIS — Z23 NEED FOR INFLUENZA VACCINATION: ICD-10-CM

## 2022-09-19 DIAGNOSIS — E78.2 MIXED HYPERLIPIDEMIA: ICD-10-CM

## 2022-09-19 DIAGNOSIS — I10 BENIGN ESSENTIAL HYPERTENSION: Primary | ICD-10-CM

## 2022-09-19 PROCEDURE — 99214 OFFICE O/P EST MOD 30 MIN: CPT | Performed by: FAMILY MEDICINE

## 2022-09-19 PROCEDURE — 1160F RVW MEDS BY RX/DR IN RCRD: CPT | Performed by: FAMILY MEDICINE

## 2022-09-19 NOTE — PROGRESS NOTES
Chief Complaint   Patient presents with    Follow-up     Chronic conditions         Patient ID: Otilia Booker is a 80 y o  female  HPI  Pt is seeing for f/u HTN, HLD, h/o CVA, mild cognitive deficit -  All stable  -  Seen with her daughter     The following portions of the patient's history were reviewed and updated as appropriate: allergies, current medications, past family history, past medical history, past social history, past surgical history and problem list     Review of Systems   Constitutional: Negative  Respiratory: Negative  Cardiovascular: Negative  Gastrointestinal: Negative  Genitourinary: Negative  Musculoskeletal: Negative  Neurological: Negative  Psychiatric/Behavioral: Positive for confusion (mild -  at baseline ) and decreased concentration  Negative for agitation, behavioral problems, hallucinations and sleep disturbance  The patient is not nervous/anxious          Current Outpatient Medications   Medication Sig Dispense Refill    amLODIPine (NORVASC) 5 mg tablet TAKE 1 TABLET BY MOUTH EVERY DAY 90 tablet 3    aspirin 81 MG tablet Take 1 tablet by mouth daily      Calcium Carbonate-Vitamin D (CALCIUM 600+D HIGH POTENCY PO) Take 600 mg by mouth 2 (two) times a day        Cholecalciferol (VITAMIN D) 2000 units tablet Take 1 tablet (2,000 Units total) by mouth daily      fexofenadine (ALLEGRA) 180 MG tablet Take by mouth      Klor-Con M20 20 MEQ tablet TAKE 1 TABLET (20 MEQ TOTAL) BY MOUTH 4 (FOUR) TIMES A  tablet 0    metoprolol succinate (TOPROL-XL) 25 mg 24 hr tablet Take 1 tablet (25 mg total) by mouth daily 90 tablet 3    RABEprazole (ACIPHEX) 20 MG tablet TAKE 1 TABLET DAILY 90 tablet 3    rosuvastatin (CRESTOR) 10 MG tablet Take 1 tablet (10 mg total) by mouth daily 90 tablet 1    Coenzyme Q10-Fish Oil-Vit E (CO-Q 10 OMEGA-3 FISH OIL PO) Take 1 capsule by mouth daily (Patient not taking: Reported on 9/19/2022)       No current facility-administered medications for this visit  Objective:    /80 Comment: by MD  Pulse 76   Temp 97 8 °F (36 6 °C)   Resp 18   Ht 5' 4" (1 626 m)   Wt 66 6 kg (146 lb 12 8 oz)   BMI 25 20 kg/m²        Physical Exam  Constitutional:       General: She is not in acute distress  Appearance: She is not ill-appearing  Cardiovascular:      Rate and Rhythm: Normal rate and regular rhythm  Pulmonary:      Effort: Pulmonary effort is normal  No respiratory distress  Breath sounds: No wheezing, rhonchi or rales  Abdominal:      Palpations: Abdomen is soft  Tenderness: There is no abdominal tenderness  Musculoskeletal:      Right lower leg: No edema  Left lower leg: No edema  Neurological:      General: No focal deficit present  Mental Status: She is alert and oriented to person, place, and time  Mental status is at baseline  Cranial Nerves: No cranial nerve deficit  Labs in chart were reviewed  Assessment/Plan:         Diagnoses and all orders for this visit:    Benign essential hypertension              Cont meds   Need for influenza vaccination  -     influenza vaccine, high-dose, PF 0 7 mL (FLUZONE HIGH-DOSE)    Mixed hyperlipidemia  -     Comprehensive metabolic panel; Future  -     Lipid panel; Future    H/O: CVA (cerebrovascular accident)    Cont meds          BMI Counseling: Body mass index is 25 2 kg/m²  Discussed the patient's BMI with her  The BMI is above normal  Exercise recommendations include exercising 3-5 times per week         rto in 3 m         Jacike Simmons MD

## 2022-09-26 PROCEDURE — 90662 IIV NO PRSV INCREASED AG IM: CPT | Performed by: FAMILY MEDICINE

## 2022-09-26 PROCEDURE — G0008 ADMIN INFLUENZA VIRUS VAC: HCPCS | Performed by: FAMILY MEDICINE

## 2022-10-07 DIAGNOSIS — I10 BENIGN ESSENTIAL HYPERTENSION: ICD-10-CM

## 2022-10-07 DIAGNOSIS — I50.22 CHRONIC SYSTOLIC CONGESTIVE HEART FAILURE (HCC): ICD-10-CM

## 2022-10-07 DIAGNOSIS — I25.10 CORONARY ARTERY DISEASE INVOLVING NATIVE CORONARY ARTERY OF NATIVE HEART WITHOUT ANGINA PECTORIS: ICD-10-CM

## 2022-10-07 DIAGNOSIS — E87.6 HYPOKALEMIA: ICD-10-CM

## 2022-10-07 RX ORDER — POTASSIUM CHLORIDE 1500 MG/1
TABLET, EXTENDED RELEASE ORAL
Qty: 360 TABLET | Refills: 0 | Status: SHIPPED | OUTPATIENT
Start: 2022-10-07

## 2022-10-17 ENCOUNTER — OFFICE VISIT (OUTPATIENT)
Dept: CARDIOLOGY CLINIC | Facility: CLINIC | Age: 87
End: 2022-10-17
Payer: COMMERCIAL

## 2022-10-17 VITALS
TEMPERATURE: 97 F | DIASTOLIC BLOOD PRESSURE: 74 MMHG | BODY MASS INDEX: 25.1 KG/M2 | HEIGHT: 64 IN | HEART RATE: 76 BPM | OXYGEN SATURATION: 97 % | SYSTOLIC BLOOD PRESSURE: 120 MMHG | WEIGHT: 147 LBS

## 2022-10-17 DIAGNOSIS — I10 BENIGN ESSENTIAL HYPERTENSION: ICD-10-CM

## 2022-10-17 DIAGNOSIS — E87.6 HYPOKALEMIA: ICD-10-CM

## 2022-10-17 DIAGNOSIS — I25.10 ARTERIOSCLEROSIS OF CORONARY ARTERY: ICD-10-CM

## 2022-10-17 DIAGNOSIS — E78.5 DYSLIPIDEMIA: ICD-10-CM

## 2022-10-17 DIAGNOSIS — I50.32 CHRONIC DIASTOLIC CONGESTIVE HEART FAILURE (HCC): ICD-10-CM

## 2022-10-17 DIAGNOSIS — Z78.9 STATIN INTOLERANCE: ICD-10-CM

## 2022-10-17 PROCEDURE — 99214 OFFICE O/P EST MOD 30 MIN: CPT | Performed by: INTERNAL MEDICINE

## 2022-10-17 NOTE — PROGRESS NOTES
Progress Note - Cardiology Office  [facility]  Alanna Wright 80 y o  female MRN: [id number]  : 1933  Encounter: [id number]      Assessment:     1  Arteriosclerosis of coronary artery    2  Benign essential hypertension    3  Chronic diastolic congestive heart failure ([address] )    4  Dyslipidemia    5  Hypokalemia    6  Statin intolerance        Discussion Summary and Plan:    1  Coronary artery disease with history of angioplasty of LAD and diagonal with a stent in  by Dr Sam To and repeat catheterization in  shows patent stent and has mid RCA significant stenosis but very medium to small size artery not suitable for percutaneous techniques on medical Rx  Unfortunately she cannot take high intensity statin she is on Crestor  She try to take twice or thrice a week  Daughter is not sure that she is taking it  She has not done any blood test will get blood test done  3   Dyslipidemia but intolerant to statins  Encouraged her to take Crestor please twice a week  She has not done any blood test   Blood test has been ordered for primary care doctor  Patient courage to have blood test done  4   Persistent hypokalemia  She is on Aldactone and potassium supplementation potassium level has been acceptable  Check electrolytes she is on potassium    5  Benign essential hypertension  Her blood pressure is better  She is on amlodipine 5 mg daily, metoprolol XL 25, spironolactone 25 mg daily and she is not taking Benicar at this time  6  Recent history of stroke  Patient has recent history of stroke  She was started on Plavix therapy she follows with Neurology  Most likely it is elected or infarct as she has recovered  She has no previous history of atrial fibrillation  And she has been on aspirin and carotid Doppler has been negative  7  History of memory loss  Family would like to have geriatric evaluation    they will discussed with primary care team       Follow-up 4-6 months    Counseling :  A description of the counseling  Patient advised to keep herself hydrated avoid similar situation where she passed out  Patient daughter was present all the time and all their questions were answered  Patient's ability to self care: Yes  Medication side effect reviewed with patient in detail and all their questions answered to their satisfaction  HPI :     Samir Rasheed is a 80y o  year old female who came for follow up  Patient has Past medical history significant for coronary artery disease with remote angioplasty of LAD and diagonal by Dr Byron Pate in 2013 and then repeat cardiac catheterization in 2015 at [address] [place] [address] shows patent stent in LAD and diagonal and has small RCA mid 80-90% stenosis which is very tortuous and not suitable for percutaneous technique  Patient has repeated history of syncopal episode and most of time it happened situation when she had a glass of wine and she is in a restaurant  She does not remember what happens and she feels fine after few minutes off it  She denies any fever or any chills any nausea and vomiting  She is otherwise very active  She cannot take statins even though her cholesterol is very high  She takes Crestor 5 mg twice a week and she is able to handle that  No fever no chills no nausea no vomiting no other significant complaint  02/14/2022  Above reviewed  Patient came for follow-up she is doing well she has no new complaints  Blood pressure has been stable  No dizziness no lightheadedness  Occasionally she get a right-sided jaw pain but no chest pain no shortness of breath  Her medications reviewed with her  She is currently taking amlodipine 5 mg daily, metoprolol XL 25, Klor-Con 20 mg daily and Benicar 20 mg daily blood pressure has been acceptable  She could not tolerate Aldactone  She is  Compliant with her cholesterol medications  No nausea no vomiting no fever    She had a blood test done July 2021 has been acceptable  Today heart rate 81 beats per minute 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  She has RCA very tortuous and severely stenosed and is on medical therapy  She still occasionally gets dizziness it happened about a month ago  No more dizziness now     06/13/2022  Above reviewed  Patient came for follow-up  Recently she was admitted to [facility] with TIA she has some slurring of her speech and weakness of her right side of the face  She thankfully recovered and she is doing well  She had a medical history significant for labile hypertension, dyslipidemia, but she cannot take high dose statins CAD with tortuous arteries who came for follow-up  We have echo report as well as EKG from [address] which was reviewed and she has a normal LV systolic function and no cardiac arrhythmia was noted  She does have history of incomplete RBBB which is not changed  RCA is very tortuous and severely disease and she is on medical therapy  She came with her daughter  She has previously Holter monitor done which shows no evidence of any significant tachy-jesse arrhythmias  She has been given Plavix for 21 days  She is no longer taking Benicar her blood pressure at home ranges from systolic of 267-612  In our office blood pressure is acceptable around 130 at this time  10/17/2022  Above reviewed  Patient who has medical history significant for TIA when she was admitted to [facility], history of labile hypertension, dyslipidemia, but could not tolerate high dose of statins, CAD with very tortuous vessel so came for follow-up  She has an echo done at [facility] which shows she has normal LV systolic function and no cardiac arrhythmia was noted while she was there  She does have history of RBBB which is not changed    She has a previous cardiac catheterization which shows RCA was very tortuous and severely disease and not suitable for percutaneous technique and she was recommended medical therapy  Her vitals has been stable  Her last blood test was in July 2021  As per daughter she is getting more forgetful  She has done as active as before but she is able to do her activities  No nausea no vomiting no fever no chills no other cardiovascular issues  She may not be taking 1 of her medications regularly daughter will call us back  Review of Systems   Constitutional: Negative for activity change, chills, diaphoresis, fever and unexpected weight change  HENT: Negative for congestion  Eyes: Negative for discharge and redness  Respiratory: Negative for cough, chest tightness, shortness of breath and wheezing  Cardiovascular: Negative  Negative for chest pain, palpitations and leg swelling  Gastrointestinal: Negative for abdominal pain, diarrhea and nausea  Endocrine: Negative  Genitourinary: Negative for decreased urine volume and urgency  Musculoskeletal: Positive for arthralgias  Negative for back pain and gait problem  Skin: Negative for rash and wound  Allergic/Immunologic: Negative  Neurological: Negative for dizziness, seizures, syncope, weakness, light-headedness and headaches  Hematological: Negative  Psychiatric/Behavioral: Negative for agitation and confusion  The patient is not nervous/anxious           More forgetful       Historical Information   Past Medical History:   Diagnosis Date   • Abscess of chest wall     Last Assessed: 2/27/2014   • Arthritis    • Back pain    • Cataract     Start of   • Colon polyp    • Coronary artery disease    • Diverticulitis of colon 12/04/2008   • Female bladder prolapse    • Gastric reflux    • Hematuria     Last Assessed: 11/18/2014    • History of diverticulitis    • [place] (hard of hearing)    • Hypercholesteremia    • Hyperkeratosis of skin 11/03/2011   • Hypertension    • Hypokalemia 12/13/2011   • Incomplete uterovaginal prolapse 05/28/2010   • Myocardial infarction [facility])    • Osteoporosis    • 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    Last Assessed: 10/24/2012    • Seasonal allergies    • Sebaceous cyst     Last Assessed: 2/8/2014   • Syncope    • Trigeminal neuralgia 03/20/2012   • Urinary incontinence    • Uterine prolapse    • Vertigo 09/15/2011   • 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   • Wears glasses    • Wears hearing aid     States she rarely wears them   • Wears partial dentures     Upper      Past Surgical History:   Procedure Laterality Date   • APPENDECTOMY     • APPENDICO-VESICOSTOMY CUTANEOUS  03/01/2010   • CHOLECYSTECTOMY      Laparoscopic   • COLONOSCOPY     • CORONARY ANGIOPLASTY     • CORONARY ANGIOPLASTY WITH STENT PLACEMENT      2 aortic stents   • CORONARY ANGIOPLASTY WITH STENT PLACEMENT  2013    2 distal left anterior descending artery    • ESOPHAGOGASTRODUODENOSCOPY     • FRACTURE SURGERY Right     Repair right arm- titanium servando from shoulder to elbow     • HYSTERECTOMY      Complete   • NV CMBND ANTERPOST COLPORRAPHY W/CYSTO N/A 9/20/2016    Procedure: COLPORRHAPHY ANTERIOR POSTERIOR GRAFT;  Surgeon: Judd Bansal MD;  [facility];  Service: UroGynecology          • NV CYSTOURETHROSCOPY N/A 9/20/2016    Procedure: [facility];  Surgeon: Judd Bansal MD;  [facility];  Service: UroGynecology          • NV REVAGINAL PROLAPSE,SACROSP LIG N/A 9/20/2016    Procedure: COLPOPEXY VAGINAL EXTRAPERITONEAL  incision and drainage of vagina inclusion cyst x 4;  Surgeon: Judd Bansal MD;  [facility];  Service: UroGynecology          • NV SLING OPER STRES INCONTINENCE N/A 9/20/2016    Procedure: INSERTION PUBOVAGINAL SLING RETROPUBIC ;  Surgeon: Judd Bansal MD;  [facility];  Service: UroGynecology            Social History     Substance and Sexual Activity   Alcohol Use Yes   • Alcohol/week: 1 0 - 2 0 standard drink   • Types: 1 - 2 Glasses of wine per week    Comment: per day, per allscripts: Being a social drinker      Social History     Substance and Sexual Activity   Drug Use No     Social History     Tobacco Use   Smoking Status Never Smoker   Smokeless Tobacco Never Used     Family History:   Family History   Problem Relation Age of Onset   • Hypertension Mother    • Heart failure Mother    • Coronary artery disease Mother    • Arthritis Mother    • Osteoporosis Mother    • Hyperlipidemia Mother    • Coronary artery disease Sister        Meds/Allergies     Allergies   Allergen Reactions   • Penicillins Hives     "All cillins", "and all myocillins"   • Versed [Midazolam] Other (See Comments)     States she "passed out" when recovering from a procedure and was told to not use it again  • Zithromax [Azithromycin] Anaphylaxis   • Bee Venom      Reaction Date: 02Feb2004; Annotation - [id number]: jjw   • Enalapril      Reaction Date: [id number];    • Erythromycin Dizziness     Reaction Date: [id number];    • Estrogens      Reaction Date: 02Feb2004; Annotation - [id number]: jjw   • Ezetimibe Hives   • Ramipril      Reaction Date: 02Feb2004;  Annotation - [id number]: jjw   • Statins Other (See Comments)     Muscle pain       Current Outpatient Medications:   •  amLODIPine (NORVASC) 5 mg tablet, TAKE 1 TABLET BY MOUTH EVERY DAY, Disp: 90 tablet, Rfl: 3  •  aspirin 81 MG tablet, Take 1 tablet by mouth daily, Disp: , Rfl:   •  Calcium Carbonate-Vitamin D (CALCIUM 600+D HIGH POTENCY PO), Take 600 mg by mouth 2 (two) times a day  , Disp: , Rfl:   •  Cholecalciferol (VITAMIN D) 2000 units tablet, Take 1 tablet (2,000 Units total) by mouth daily, Disp: , Rfl:   •  fexofenadine (ALLEGRA) 180 MG tablet, Take by mouth, Disp: , Rfl:   •  Klor-Con M20 20 MEQ tablet, TAKE 1 TABLET (20 MEQ TOTAL) BY MOUTH 4 (FOUR) TIMES A DAY, Disp: 360 tablet, Rfl: 0  •  metoprolol succinate (TOPROL-XL) 25 mg 24 hr tablet, Take 1 tablet (25 mg total) by mouth daily, Disp: 90 tablet, Rfl: 3  •  RABEprazole (ACIPHEX) 20 MG tablet, TAKE 1 TABLET DAILY, Disp: 90 tablet, Rfl: 3  •  rosuvastatin (CRESTOR) 10 MG tablet, Take 1 tablet (10 mg total) by mouth daily, Disp: 90 tablet, Rfl: 1  •  Coenzyme Q10-Fish Oil-Vit E (CO-Q 10 OMEGA-3 FISH OIL PO), Take 1 capsule by mouth daily (Patient not taking: No sig reported), Disp: , Rfl:     Vitals: Blood pressure 120/74, pulse 76, temperature (!) 97 °F (36 1 °C), height 5' 4" (1 626 m), weight 66 7 kg (147 lb), SpO2 97 %  ?  Body mass index is 25 23 kg/m²  Vitals:    10/17/22 1103   Weight: 66 7 kg (147 lb)     BP Readings from Last 3 Encounters:   10/17/22 120/74   09/19/22 140/80   07/08/22 120/80         Physical Exam  Constitutional:       General: She is not in acute distress  Appearance: She is well-developed  She is not diaphoretic  Neck:      Thyroid: No thyromegaly  Vascular: No JVD  Trachea: No tracheal deviation  Comments: No carotid bruit  Cardiovascular:      Rate and Rhythm: Normal rate and regular rhythm  Heart sounds: S1 normal and S2 normal  Heart sounds not distant  Murmur heard  Systolic (ejection) murmur is present with a grade of 2/6  No friction rub  No gallop  No S3 or S4 sounds  Pulmonary:      Effort: Pulmonary effort is normal  No respiratory distress  Breath sounds: Normal breath sounds  No wheezing or rales  Comments: Bilateral air entry clear to auscultate  Chest:      Chest wall: No tenderness  Abdominal:      General: Bowel sounds are normal  There is no distension  Palpations: Abdomen is soft  Tenderness: There is no abdominal tenderness  Musculoskeletal:         General: No deformity  Cervical back: Neck supple  Comments: No lower extremity edema   Skin:     General: Skin is warm and dry  Coloration: Skin is not pale  Findings: No rash  Neurological:      Mental Status: She is alert and oriented to person, place, and time     Psychiatric:         Behavior: Behavior normal  Judgment: Judgment normal        Diagnostic Studies Review Cardio:   cardiac catheterization :   Patient's cardiac catheterization from 2015 reviewed  She has patent stent seen in LAD and diagonal   Apical LAD has around 70 80% stenosis which small vessel, mid circumflex around 40-50% stenosis  RCA which is a small size artery and only gives RPDA has around 90% stenosis in the mid it is very tortuous artery not suitable for percutaneous techniques  Echo Doppler done 06/28/2018 shows low-normal LV systolic function EF around 55 percent, mild concentric left atrial hypertrophy, mild MR, mild AI, mild TR PA pressure 30 millimeters of mercury  Repeat echo Doppler done on June 2022 in [facility] shows EF 55% mild LVH mild AI, mild MR no change from previous echo  Carotid Doppler  Carotid study done 05/20/2019 shows less than 50% stenosis bilaterally  Holter monitor done on 06/28/2018 shows normal sinus rhythm  Few PACs seen  Rare episode of PVCs  EKG:    Twelve lead EKG done at  [facility] shows normal sinus rhythm heart rate around 87 beats per minute  Nonspecific ST changes  Twelve lead EKG done on 12/05/2018 shows normal sinus rhythm heart rate 81 beats per minute  No significant ST changes  No change from old EKG  Twelve lead EKG done in our office 04/26/2019 shows normal sinus rhythm heart rate 93 beats per minute  Twelve lead EKG done in our office 06/04/2019 shows normal sinus rhythm QS in V1 to V3 most likely due to lead location  No other significant change no change from old EKG heart rate 66 beats per minute  Twelve lead EKG 12/17/2019 shows normal sinus rhythm 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  Heart rate 68 beats per minute  Twelve lead EKG 06/25/2020 shows normal sinus rhythm heart rate 61 beats per minute EKG done 06/25/2020 no change from old EKG      12 EKG done 08/12/2021 shows normal sinus with PACs heart rate 84 beats per minute  No significant change from old EKG  Twelve lead EKG done 2022 shows normal sinus rhythm with sinus arrhythmia heart rate 59 beats per minute incomplete RBBB  Q-wave in inferior leads cannot rule out old inferior infarction  Not much changed from previous EKG  Cardiac testing:       Results for orders placed in visit on 08/20/15   Cardiac catheterization    [address] [facility]   Phone: [phone]      INVASIVE CARDIOVASCULAR LAB COMPLETE REPORT         Patient: Robbie MENDOSA   Location: Outpatient   MR number: [id number]   Account number: [de-identified]   Study date: 2015   Gender: Female   : 1933   Height: 64 2 in   Weight: 147 6 lb   BSA: 1 72 m squared   Allergies: Altace, beestings, Erythromycin Base, estrogen, penicilin, Versed,   Vasotec, Zithromax, all cillins, mycins, Vytorin 10-10, Zetia   Diagnostic Cardiologist:  Nereida Brooks MD   Primary Physician:  Liss Vargas MD   SUMMARY   CORONARY CIRCULATION:   Left main: Normal    LAD: The vessel was normal sized  The were no obstructive stenoses  The   previously deployed stent remains widely patent  The stent at the ostium of D1   remains widely patent  Circumflex: The vessel was normal sized  There was a   non-critical 50% plaque in mid vessel  There were no obstructive stenoses  The   major OM branches were normal  There was faint collateral flow from the   circumflex to the distal RCA  RCA: The vessel was small to medium sized and   dominant, giving rise to the PDA  There was a long, tortuous 95% stenosis in   mid vessel  [place] of severe tortuosity, the lesion is poorly suited for PCI  REPORT ELEMENT SELECTION:   Right radial access was employed  Summary: The patient has single vessel disease with a long, tortuous subtotal   stenosis in the mid RCA   Elucidating symptom status is difficult in this   patient, since she previously had very severe LAD disease and diagonal disease   with atypical symptoms as the only presentation  Medical therapy is    recommended   for the following reasons 1) the lesion has been stable angiographically for 2   years  2) the patient was able to exercise to a high level after PCI 2 years   ago with this lesion already present  3) Nuclear imaging 1 year ago showed no   ischemia in the presence of this lesion  4) There is some collateral flow from   the circumflex  5) Current symptoms are atypical and not exertional  If PCI is   required in the future because of exertional angina refractory to medication,   it is recommended that the procedure be performed via the femoral approach,    and   INVASIVE CARDIOVASCULAR LAB COMPLETE REPORT   Patient: Lito MENDOSA   MR number: [id number]    ------ Page 2   BSA: 1 72 m squared   that the procedure be performed at the [facility] because of the   increased risk of comlications  Treatment with rosuvastatin, 2 5mg once per   week was initiated  The patient has a history of statin intolerance at low   doses  INDICATIONS:   --  Possible CAD: unstable angina  PROCEDURES PERFORMED   --  Left coronary angiography  --  Right coronary angiography  --  Outpatient  --  Coronary Catheterization (w/o LHC)  PROCEDURE: The risks and alternatives of the procedures and conscious sedation   were explained to the patient and informed consent was obtained  The patient   was brought to the cath lab and placed on the table  The planned puncture    sites   were prepped and draped in the usual sterile fashion  --  Right radial artery access  After performing an Eusebio's test to verify   adequate ulnar artery supply to the hand, the radial site was prepped  The   puncture site was infiltrated with local anesthetic  The vessel was accessed   using the modified Seldinger technique, a wire was advanced into the vessel,   and a sheath was advanced over the wire into the vessel     --  Left coronary artery angiography  A catheter was advanced over a guidewire   into the aorta and positioned in the left coronary artery ostium under   fluoroscopic guidance  Angiography was performed  --  Right coronary artery angiography  A catheter was advanced over a    guidewire   into the aorta and positioned in the right coronary artery ostium under   fluoroscopic guidance  Angiography was performed  --  Outpatient  --  Coronary Catheterization (w/o [facility])  PROCEDURE COMPLETION: The patient tolerated the procedure well and was   discharged from the cath lab  TIMING: Test started at 09:38  Test concluded at   10:03  HEMOSTASIS: The sheath was removed  The site was compressed with a   Hemoband device  Hemostasis was obtained  MEDICATIONS GIVEN: Verapamil   (Isoptin, Calan, Covera), 1 25 mg, IA, at 09:52  Fentanyl (1OOmcg/2 ml), 50   mcg, IV, at 09:42  Fentanyl (1OOmcg/2 ml), 50 mcg, IV, at 09:45  1% Lidocaine,   1 ml, subcutaneously, at 09:49  Fentanyl (1OOmcg/2 ml), 25 mcg, IV, at 09:50  Nitroglycerin (200mcg/ml), 200 mcg, at 09:52  Heparin 1000 units/ml, 4,000   units, IV, at 09:52  CONTRAST GIVEN: 70 ml Omnipaque (350mg I /ml)  RADIATION   EXPOSURE: Fluoroscopy time: 1 8 min  CORONARY VESSELS:   --  Left main: Normal    INVASIVE CARDIOVASCULAR LAB COMPLETE REPORT   Patient: Mary MENDOSA   MR number: [id number]    ------ Page 3   BSA: 1 72 m squared   --  LAD: The vessel was normal sized  The were no obstructive stenoses  The   previously deployed stent remains widely patent  The stent at the ostium of D1   remains widely patent  --  Circumflex: The vessel was normal sized  There was    a   non-critical 50% plaque in mid vessel  There were no obstructive stenoses  The   major OM branches were normal  There was faint collateral flow from the   circumflex to the distal RCA  --  RCA: The vessel was small to medium sized    and   dominant, giving rise to the PDA   There was a long, tortuous 95% stenosis in   mid vessel  [place] of severe tortuosity, the lesion is poorly suited for PCI  NOTE: Right radial access was employed  Prepared and signed by   Bill Anaya MD   Signed 2015 11:05:34   CC: Dr Baker Laser   Study diagram   Hemodynamic tables   Pressures:  Baseline   Pressures:  - HR: 70   Pressures:  - Rhythm:   Pressures:  -- Aortic Pressure (S/D/M): 157/83/116   Outputs:  Baseline   Outputs:  -- CALCULATIONS[age]   Outputs:  -- CALCULATIONS: Body Surface Area: 1 72   Outputs:  -- CALCULATIONS: Height in cm: 163 00   Outputs:  -- CALCULATIONS: Sex: Female   Outputs:  -- CALCULATIONS: Weight in k 10      Lab Review   Lab Results   Component Value Date    WBC 9 15 2021    HGB 14 5 2021    HCT 47 1 (H) 2021    MCV 98 2021    RDW 13 8 2021     2021     BMP:  Lab Results   Component Value Date     2016    K 4 1 2021     2021    CO2 24 2021    BUN 19 2021    CREATININE 0 73 2021    GLUCOSE 99 2016    GLUF 82 2021    CALCIUM 9 7 2021    EGFR 74 2021    MG 2 2 2021     LFT:  Lab Results   Component Value Date    AST 17 2021    ALT 23 2021    ALKPHOS 63 2021    PROT 7 1 2016    BILITOT 0 3 2016       Lab Results   Component Value Date    HGBA1C 5 8 2022     Lipid Profile:   Lab Results   Component Value Date    CHOL 192 2013    HDL 65 (H) 2018    LDLCALC 74 2018    TRIG 179 (H) 2018     Lab Results   Component Value Date    CHOL 192 2013     Lab Results   Component Value Date    TROPONINI 0 03 2018       Dr Kalina Witt MD [facility]      "This note has been constructed using a voice recognition system  Therefore there may be syntax, spelling, and/or grammatical errors   Please call if you have any questions  "

## 2022-10-19 ENCOUNTER — TELEMEDICINE (OUTPATIENT)
Dept: FAMILY MEDICINE CLINIC | Facility: CLINIC | Age: 87
End: 2022-10-19
Payer: COMMERCIAL

## 2022-10-19 DIAGNOSIS — U07.1 COVID-19: Primary | ICD-10-CM

## 2022-10-19 PROCEDURE — 99441 PR PHYS/QHP TELEPHONE EVALUATION 5-10 MIN: CPT | Performed by: FAMILY MEDICINE

## 2022-10-19 RX ORDER — NIRMATRELVIR AND RITONAVIR 300-100 MG
3 KIT ORAL 2 TIMES DAILY
Qty: 30 TABLET | Refills: 0 | Status: SHIPPED | OUTPATIENT
Start: 2022-10-19 | End: 2022-10-24

## 2022-10-19 NOTE — PROGRESS NOTES
COVID-19 Outpatient Progress Note    Assessment/Plan:    Problem List Items Addressed This Visit    None     Visit Diagnoses     COVID-19    -  Primary    Relevant Medications    nirmatrelvir & ritonavir (Paxlovid, 300/100,) tablet therapy pack   Was advised to hold statin       Disposition:     Patient meets criteria for PAXLOVID and they have been counseled appropriately according to EUA documentation released by the FDA  After discussion, patient agrees to treatment  [address] is an investigational medicine used to treat mild-to-moderate COVID-19 in adults and children (15years of age and older weighing at least 80 pounds (40 kg)) with positive results of direct SARS-CoV-2 viral testing, and who are at high risk for progression to severe COVID-19, including hospitalization or death  PAXLOVID is investigational because it is still being studied  There is limited information about the safety and effectiveness of using PAXLOVID to treat people with mild-to-moderate COVID-19  The FDA has authorized the emergency use of PAXLOVID for the treatment of mild-tomoderate COVID-19 in adults and children (15years of age and older weighing at least 80 pounds (40 kg)) with a positive test for the virus that causes COVID-19, and who are at high risk for progression to severe COVID-19, including hospitalization or death, under an EUA  What should I tell my healthcare provider before I take PAXLOVID? Tell your healthcare provider if you:  - Have any allergies  - Have liver or kidney disease  - Are pregnant or plan to become pregnant  - Are breastfeeding a child  - Have any serious illnesses    Tell your healthcare provider about all the medicines you take, including prescription and over-the-counter medicines, vitamins, and herbal supplements  Some medicines may interact with PAXLOVID and may cause serious side effects   Keep a list of your medicines to show your healthcare provider and pharmacist when you get a new medicine  You can ask your healthcare provider or pharmacist for a list of medicines that interact with PAXLOVID  Do not start taking a new medicine without telling your healthcare provider  Your healthcare provider can tell you if it is safe to take PAXLOVID with other medicines  Tell your healthcare provider if you are taking combined hormonal contraceptive  PAXLOVID may affect how your birth control pills work  Females who are able to become pregnant should use another effective alternative form of contraception or an additional barrier method of contraception  Talk to your healthcare provider if you have any questions about contraceptive methods that might be right for you  How do I take PAXLOVID? PAXLOVID consists of 2 medicines: nirmatrelvir and ritonavir  - Take 2 pink tablets of nirmatrelvir with 1 white tablet of ritonavir by mouth 2 times each day (in the morning and in the evening) for 5 days  For each dose, take all 3 tablets at the same time  - If you have kidney disease, talk to your healthcare provider  You may need a different dose  - Swallow the tablets whole  Do not chew, break, or crush the tablets  - Take PAXLOVID with or without food  - Do not stop taking PAXLOVID without talking to your healthcare provider, even if you feel better  - If you miss a dose of PAXLOVID within 8 hours of the time it is usually taken, take it as soon as you remember  If you miss a dose by more than 8 hours, skip the missed dose and take the next dose at your regular time  Do not take 2 doses of PAXLOVID at the same time  - If you take too much PAXLOVID, call your healthcare provider or go to the nearest hospital emergency room right away    - If you are taking a ritonavir- or cobicistat-containing medicine to treat hepatitis C or Human Immunodeficiency Virus (HIV), you should continue to take your medicine as prescribed by your healthcare provider   - Talk to your healthcare provider if you do not feel better or if you feel worse after 5 days  Who should generally not take PAXLOVID? Do not take PAXLOVID if:  You are allergic to nirmatrelvir, ritonavir, or any of the ingredients in PAXLOVID  You are taking any of the following medicines:  - Alfuzosin  - Pethidine, piroxicam, propoxyphene  - Ranolazine  - Amiodarone, dronedarone, flecainide, propafenone, quinidine  - Colchicine  - Lurasidone, pimozide, clozapine  - Dihydroergotamine, ergotamine, methylergonovine  - Lovastatin, simvastatin  - Sildenafil (Revatio®) for pulmonary arterial hypertension (PAH)  - Triazolam, oral midazolam  - Apalutamide  - Carbamazepine, phenobarbital, phenytoin  - Rifampin  - St  Jean’s Wort (hypericum perforatum)    What are the important possible side effects of PAXLOVID? Possible side effects of PAXLOVID are:  - Liver Problems  Tell your healthcare provider right away if you have any of these signs and symptoms of liver problems: loss of appetite, yellowing of your skin and the whites of eyes (jaundice), dark-colored urine, pale colored stools and itchy skin, stomach area (abdominal) pain  - Resistance to HIV Medicines  If you have untreated HIV infection, PAXLOVID may lead to some HIV medicines not working as well in the future  - Other possible side effects include: altered sense of taste, diarrhea, high blood pressure, or muscle aches    These are not all the possible side effects of PAXLOVID  Not many people have taken PAXLOVID  Serious and unexpected side effects may happen  Jose Factor is still being studied, so it is possible that all of the risks are not known at this time  What other treatment choices are there? Like Erika Gravely may allow for the emergency use of other medicines to treat people with COVID-19   Go to https://[employer]iaMD/ for information on the emergency use of other medicines that are authorized by FDA to treat people with COVID-19  Your healthcare provider may talk with you about clinical trials for which you may be eligible  It is your choice to be treated or not to be treated with PAXLOVID  Should you decide not to receive it or for your child not to receive it, it will not change your standard medical care  What if I am pregnant or breastfeeding? There is no experience treating pregnant women or breastfeeding mothers with PAXLOVID  For a mother and unborn baby, the benefit of taking PAXLOVID may be greater than the risk from the treatment  If you are pregnant, discuss your options and specific situation with your healthcare provider  It is recommended that you use effective barrier contraception or do not have sexual activity while taking PAXLOVID  If you are breastfeeding, discuss your options and specific situation with your healthcare provider  How do I report side effects with PAXLOVID? Contact your healthcare provider if you have any side effects that bother you or do not go away  Report side effects to FDA MedWatch at www fda gov/medwatch or call 6-419-GXU1002 or you can report side effects to [facility]  at the contact information provided below  Website Fax number Telephone number   [employer] [phone]     How should I store [address]? Store PAXLOVID tablets at room temperature between 68°F to 77°F (20°C to 25°C)  Full fact sheet for patients, parents, and caregivers can be found at: [employer]    I have spent 5 minutes directly with the patient  Greater than 50% of this time was spent in counseling/coordination of care regarding: risks and benefits of treatment options         Encounter provider: Zelalem Mujica MD     Provider located at: [address] [place] [phone]     Recent Visits  No visits were found meeting these conditions  Showing recent visits within past 7 days and meeting all other requirements  Today's Visits  Date Type Provider Dept   10/19/22 Telemedicine Balwinder Tijerina  [facility] today's visits and meeting all other requirements  Future Appointments  No visits were found meeting these conditions  Showing future appointments within next 150 days and meeting all other requirements     This virtual check-in was done via telephone and she agrees to proceed  Patient agrees to participate in a virtual check in via telephone or video visit instead of presenting to the office to address urgent/immediate medical needs  Patient is aware this is a billable service  She acknowledged consent and understanding of privacy and security of the video platform  The patient has agreed to participate and understands they can discontinue the visit at any time  After connecting through Telephone, the patient was identified by name and date of birth  Yaquelin Wright was informed that this was a telemedicine visit and that the exam was being conducted confidentially over secure lines  My office door was closed  No one else was in the room  Kristie Wright acknowledged consent and understanding of privacy and security of the telemedicine visit  I informed the patient that I have reviewed her record in Epic and presented the opportunity for her to ask any questions regarding the visit today  The patient agreed to participate  Verification of patient location:  Patient is located in the following state in which I hold an active license: [place]    Subjective:   Daryle Baptist is a 80 y o  female who is concerned about COVID-19  Patient's symptoms include fatigue, nasal congestion, rhinorrhea, cough and headache  Patient denies fever, chills, malaise, sore throat, anosmia, loss of taste, shortness of breath, chest tightness, abdominal pain, nausea, vomiting, diarrhea and myalgias       - Date of symptom onset: 10/16/2022      COVID-19 vaccination status: Fully vaccinated with booster    Exposure:   Contact with a person who is under investigation (PUI) for or who is positive for COVID-19 within the last 14 days?: No    Hospitalized recently for fever and/or lower respiratory symptoms?: No      Currently a healthcare worker that is involved in direct patient care?: No      Works in a special setting where the risk of COVID-19 transmission may be high? (this may include long-term care, correctional and [facility] facilities; homeless shelters; assisted-living facilities and group homes ): No      Resident in a special setting where the risk of COVID-19 transmission may be high? (this may include long-term care, correctional and [facility] facilities; homeless shelters; assisted-living facilities and group homes ): No      + home COVID test yesterday     Lab Results   Component Value Date    SARSCOV2 Negative 06/05/2022       Review of Systems   Constitutional: Positive for fatigue  Negative for chills and fever  HENT: Positive for congestion and rhinorrhea  Negative for sore throat  Respiratory: Positive for cough  Negative for chest tightness and shortness of breath  Gastrointestinal: Negative for abdominal pain, diarrhea, nausea and vomiting  Musculoskeletal: Negative for myalgias  Neurological: Positive for headaches       Current Outpatient Medications on File Prior to Visit   Medication Sig   • amLODIPine (NORVASC) 5 mg tablet TAKE 1 TABLET BY MOUTH EVERY DAY   • aspirin 81 MG tablet Take 1 tablet by mouth daily   • Calcium Carbonate-Vitamin D (CALCIUM 600+D HIGH POTENCY PO) Take 600 mg by mouth 2 (two) times a day     • Cholecalciferol (VITAMIN D) 2000 units tablet Take 1 tablet (2,000 Units total) by mouth daily   • Coenzyme Q10-Fish Oil-Vit E (CO-Q 10 OMEGA-3 FISH OIL PO) Take 1 capsule by mouth daily (Patient not taking: No sig reported)   • fexofenadine (ALLEGRA) 180 MG tablet Take by mouth   • Klor-Con M20 20 MEQ tablet TAKE 1 TABLET (20 MEQ TOTAL) BY MOUTH 4 (FOUR) TIMES A DAY   • metoprolol succinate (TOPROL-XL) 25 mg 24 hr tablet Take 1 tablet (25 mg total) by mouth daily   • RABEprazole (ACIPHEX) 20 MG tablet TAKE 1 TABLET DAILY   • rosuvastatin (CRESTOR) 10 MG tablet Take 1 tablet (10 mg total) by mouth daily       Objective: There were no vitals taken for this visit       Physical Exam  Karla Aranda MD

## 2022-11-29 DIAGNOSIS — K21.9 GASTROESOPHAGEAL REFLUX DISEASE: ICD-10-CM

## 2022-11-29 DIAGNOSIS — Z86.73 H/O: CVA (CEREBROVASCULAR ACCIDENT): ICD-10-CM

## 2022-11-29 RX ORDER — RABEPRAZOLE SODIUM 20 MG/1
20 TABLET, DELAYED RELEASE ORAL DAILY
Qty: 90 TABLET | Refills: 3 | Status: SHIPPED | OUTPATIENT
Start: 2022-11-29 | End: 2022-12-03 | Stop reason: SDUPTHER

## 2022-11-30 RX ORDER — ROSUVASTATIN CALCIUM 10 MG/1
TABLET, COATED ORAL
Qty: 90 TABLET | Refills: 1 | Status: SHIPPED | OUTPATIENT
Start: 2022-11-30

## 2022-12-03 DIAGNOSIS — K21.9 GASTROESOPHAGEAL REFLUX DISEASE: ICD-10-CM

## 2022-12-05 RX ORDER — RABEPRAZOLE SODIUM 20 MG/1
20 TABLET, DELAYED RELEASE ORAL DAILY
Qty: 90 TABLET | Refills: 3 | Status: SHIPPED | OUTPATIENT
Start: 2022-12-05

## 2023-03-08 ENCOUNTER — OFFICE VISIT (OUTPATIENT)
Dept: FAMILY MEDICINE CLINIC | Facility: CLINIC | Age: 88
End: 2023-03-08

## 2023-03-08 VITALS
SYSTOLIC BLOOD PRESSURE: 150 MMHG | TEMPERATURE: 97 F | BODY MASS INDEX: 23.6 KG/M2 | DIASTOLIC BLOOD PRESSURE: 84 MMHG | HEIGHT: 64 IN | RESPIRATION RATE: 14 BRPM | WEIGHT: 138.2 LBS | HEART RATE: 78 BPM

## 2023-03-08 DIAGNOSIS — R05.9 COUGH, UNSPECIFIED TYPE: Primary | ICD-10-CM

## 2023-03-08 DIAGNOSIS — J06.9 UPPER RESPIRATORY TRACT INFECTION, UNSPECIFIED TYPE: ICD-10-CM

## 2023-03-08 LAB
SL AMB POCT RAPID FLU A: NORMAL
SL AMB POCT RAPID FLU B: NORMAL

## 2023-03-08 RX ORDER — DOXYCYCLINE HYCLATE 100 MG/1
100 CAPSULE ORAL EVERY 12 HOURS SCHEDULED
Qty: 14 CAPSULE | Refills: 0 | Status: SHIPPED | OUTPATIENT
Start: 2023-03-08 | End: 2023-03-15

## 2023-03-08 NOTE — PROGRESS NOTES
Chief Complaint   Patient presents with   • URI     Sunday/ covid test negative today        Patient ID: Malathi Smith is a 80 y o  female  URI   This is a new problem  Episode onset: 4 days ago  The problem has been unchanged  There has been no fever  Associated symptoms include congestion, coughing, rhinorrhea and a sore throat  Pertinent negatives include no abdominal pain, chest pain, diarrhea, dysuria, ear pain, headaches, joint pain, joint swelling, nausea, neck pain, plugged ear sensation, rash, sinus pain, sneezing, swollen glands, vomiting or wheezing  She has tried nothing for the symptoms  The treatment provided no relief    took home COVID test this am -  Negative     The following portions of the patient's history were reviewed and updated as appropriate: allergies, current medications, past family history, past medical history, past social history, past surgical history and problem list     Review of Systems   HENT: Positive for congestion, hearing loss (chronic -  has hearing aids ), rhinorrhea and sore throat  Negative for ear pain, sinus pain and sneezing  Respiratory: Positive for cough  Negative for wheezing  Cardiovascular: Negative for chest pain  Gastrointestinal: Negative for abdominal pain, diarrhea, nausea and vomiting  Genitourinary: Negative for dysuria  Musculoskeletal: Negative for joint pain and neck pain  Skin: Negative for rash  Neurological: Negative for headaches         Current Outpatient Medications   Medication Sig Dispense Refill   • amLODIPine (NORVASC) 5 mg tablet TAKE 1 TABLET BY MOUTH EVERY DAY 90 tablet 3   • aspirin 81 MG tablet Take 1 tablet by mouth daily     • Calcium Carbonate-Vitamin D (CALCIUM 600+D HIGH POTENCY PO) Take 600 mg by mouth 2 (two) times a day       • Cholecalciferol (VITAMIN D) 2000 units tablet Take 1 tablet (2,000 Units total) by mouth daily     • fexofenadine (ALLEGRA) 180 MG tablet Take by mouth     • Klor-Con M20 20 MEQ tablet TAKE 1 TABLET (20 MEQ TOTAL) BY MOUTH 4 (FOUR) TIMES A  tablet 0   • metoprolol succinate (TOPROL-XL) 25 mg 24 hr tablet Take 1 tablet (25 mg total) by mouth daily 90 tablet 3   • RABEprazole (ACIPHEX) 20 MG tablet Take 1 tablet (20 mg total) by mouth daily 90 tablet 3   • rosuvastatin (CRESTOR) 10 MG tablet TAKE 1 TABLET BY MOUTH EVERY DAY 90 tablet 1   • Coenzyme Q10-Fish Oil-Vit E (CO-Q 10 OMEGA-3 FISH OIL PO) Take 1 capsule by mouth daily (Patient not taking: Reported on 9/19/2022)       No current facility-administered medications for this visit  Objective:    /84 (BP Location: Left arm, Patient Position: Sitting, Cuff Size: Standard)   Pulse 78   Temp (!) 97 °F (36 1 °C) (Temporal)   Resp 14   Ht 5' 4" (1 626 m)   Wt 62 7 kg (138 lb 3 2 oz)   BMI 23 72 kg/m²        Physical Exam  Constitutional:       Appearance: She is not ill-appearing  HENT:      Right Ear: Tympanic membrane normal       Left Ear: Tympanic membrane normal       Nose: Congestion present  No rhinorrhea  Mouth/Throat:      Pharynx: No oropharyngeal exudate or posterior oropharyngeal erythema  Pulmonary:      Effort: Pulmonary effort is normal  No respiratory distress  Breath sounds: No wheezing, rhonchi or rales  Neurological:      Mental Status: She is alert and oriented to person, place, and time  Recent Results (from the past 672 hour(s))   POCT rapid flu A and B    Collection Time: 03/08/23  4:08 PM   Result Value Ref Range    RAPID FLU A neg     RAPID FLU B neg      Assessment/Plan:         Diagnoses and all orders for this visit:    Cough, unspecified type  -     POCT rapid flu A and B  -     doxycycline hyclate (VIBRAMYCIN) 100 mg capsule; Take 1 capsule (100 mg total) by mouth every 12 (twelve) hours for 7 days    Upper respiratory tract infection, unspecified type  -     doxycycline hyclate (VIBRAMYCIN) 100 mg capsule;  Take 1 capsule (100 mg total) by mouth every 12 (twelve) hours for 7 days        rto prn                 Silvia Storng MD

## 2023-04-04 ENCOUNTER — APPOINTMENT (OUTPATIENT)
Dept: LAB | Facility: CLINIC | Age: 88
End: 2023-04-04

## 2023-04-04 DIAGNOSIS — E78.5 HYPERLIPIDEMIA, UNSPECIFIED HYPERLIPIDEMIA TYPE: ICD-10-CM

## 2023-04-04 DIAGNOSIS — I10 BENIGN ESSENTIAL HYPERTENSION: ICD-10-CM

## 2023-04-04 DIAGNOSIS — E53.8 BIOTIN-(PROPIONYL-COA-CARBOXYLASE) LIGASE DEFICIENCY: ICD-10-CM

## 2023-04-04 DIAGNOSIS — I10 ESSENTIAL HYPERTENSION, MALIGNANT: ICD-10-CM

## 2023-04-04 DIAGNOSIS — Z78.9 STATIN INTOLERANCE: ICD-10-CM

## 2023-04-04 DIAGNOSIS — I50.32 CHRONIC DIASTOLIC CONGESTIVE HEART FAILURE (HCC): ICD-10-CM

## 2023-04-04 DIAGNOSIS — E87.6 HYPOKALEMIA: ICD-10-CM

## 2023-04-04 DIAGNOSIS — E78.5 DYSLIPIDEMIA: ICD-10-CM

## 2023-04-04 DIAGNOSIS — I25.10 ARTERIOSCLEROSIS OF CORONARY ARTERY: ICD-10-CM

## 2023-04-04 LAB
TSH SERPL DL<=0.05 MIU/L-ACNC: 1.69 UIU/ML (ref 0.45–4.5)
VIT B12 SERPL-MCNC: 658 PG/ML (ref 100–900)

## 2023-04-05 ENCOUNTER — TELEPHONE (OUTPATIENT)
Dept: CARDIOLOGY CLINIC | Facility: CLINIC | Age: 88
End: 2023-04-05

## 2023-04-05 NOTE — TELEPHONE ENCOUNTER
----- Message from Shilpa Nava MD sent at 4/5/2023  9:04 AM EDT -----  Patient labs were reviewed  It shows potassium is 3 4 other labs are acceptable  Patient is on potassium she should take additional pill today continue current Rx

## 2023-05-18 ENCOUNTER — TELEPHONE (OUTPATIENT)
Dept: FAMILY MEDICINE CLINIC | Facility: CLINIC | Age: 88
End: 2023-05-18

## 2023-05-18 DIAGNOSIS — Z12.31 ENCOUNTER FOR SCREENING MAMMOGRAM FOR MALIGNANT NEOPLASM OF BREAST: Primary | ICD-10-CM

## 2023-05-31 ENCOUNTER — OFFICE VISIT (OUTPATIENT)
Dept: FAMILY MEDICINE CLINIC | Facility: CLINIC | Age: 88
End: 2023-05-31

## 2023-05-31 VITALS
HEART RATE: 92 BPM | TEMPERATURE: 97.4 F | HEIGHT: 64 IN | DIASTOLIC BLOOD PRESSURE: 80 MMHG | BODY MASS INDEX: 21 KG/M2 | SYSTOLIC BLOOD PRESSURE: 120 MMHG | WEIGHT: 123 LBS | OXYGEN SATURATION: 97 % | RESPIRATION RATE: 16 BRPM

## 2023-05-31 DIAGNOSIS — F33.9 DEPRESSION, RECURRENT (HCC): ICD-10-CM

## 2023-05-31 DIAGNOSIS — I10 BENIGN ESSENTIAL HYPERTENSION: ICD-10-CM

## 2023-05-31 DIAGNOSIS — M79.89 LEG SWELLING: Primary | ICD-10-CM

## 2023-05-31 DIAGNOSIS — I50.32 CHRONIC DIASTOLIC CONGESTIVE HEART FAILURE (HCC): ICD-10-CM

## 2023-05-31 DIAGNOSIS — I65.22 STENOSIS OF LEFT CAROTID ARTERY: ICD-10-CM

## 2023-05-31 DIAGNOSIS — R63.4 WEIGHT LOSS, UNINTENTIONAL: ICD-10-CM

## 2023-05-31 RX ORDER — LOSARTAN POTASSIUM 50 MG/1
50 TABLET ORAL DAILY
COMMUNITY
Start: 2023-05-15

## 2023-05-31 RX ORDER — DONEPEZIL HYDROCHLORIDE 5 MG/1
TABLET, FILM COATED ORAL
COMMUNITY
Start: 2023-05-15

## 2023-05-31 RX ORDER — FUROSEMIDE 20 MG/1
TABLET ORAL
Qty: 3 TABLET | Refills: 0 | Status: SHIPPED | OUTPATIENT
Start: 2023-05-31 | End: 2023-06-02 | Stop reason: SDUPTHER

## 2023-05-31 NOTE — PROGRESS NOTES
Helen Gonzalez [facility] 10/3/1933 female MRN: [id number]    [address] [place] [address]      ASSESSMENT/PLAN  Frankey Lux is a 80 y o  female presents to the office for    Diagnoses and all orders for this visit:    Leg swelling  -     furosemide (LASIX) 20 mg tablet; Take 1/2 tablet daily for 6 days  -     B-Type Natriuretic Peptide(BNP); Future  -     XR chest pa & lateral; Future  -     CBC and differential; Future  -     Comprehensive metabolic panel; Future    Stenosis of left carotid artery    Chronic diastolic congestive heart failure (HCC)    Benign essential hypertension    Weight loss, unintentional    Depression, recurrent (HCC)    Other orders  -     donepezil (ARICEPT) 5 mg tablet; TAKE 1 TABLET BY MOUTH EVERY DAY AT NIGHT  -     losartan (COZAAR) 50 mg tablet; Take 50 mg by mouth daily    Reviewed hospitalization from a year ago  Patient should be on a blood thinner  According to records  Recommend discussing with PCP given that might be an indication of why she is not on them  Patient's daughter had multiple questions that I feel that her PCP could answer more appropriately  Patient will be seeing the cardiologist Friday  Recommend all blood work to be obtained in order to rule out CHF versus acute other disease  Has had unintentional weight loss  Significant swelling on her legs  Recommend vascular surgery consult             Future Appointments   Date Time Provider Franck Soliz   6/2/2023  3:20 PM MD YURY Johnson   6/8/2023  9:20 AM Jeannette Portillo MD [facility]          SUBJECTIVE  CC: Edema (Loss of appetite, weight loss, dehydration )      HPI:  Frankey Lux is a 80 y o  female who presents for a an acute appointment    Daughter is very concerned given that she feels that there is a lot of medications that might of been missed when she was admitted in the past and is currently not on a blood thinner  Has had a significant weight loss recently  Feels like she has been dehydrated  Patient states that the swelling is most concerning does get shortness of breath at time  Does have a history of carotid artery disease as well as a history of a stroke    Patient's daughter is concerned she might be depressed  Review of Systems    Historical Information   The patient history was reviewed as follows:  Past Medical History:   Diagnosis Date   • Abscess of chest wall     Last Assessed: 2/27/2014   • Arthritis    • Back pain    • Cataract     Start of   • Colon polyp    • Coronary artery disease    • Diverticulitis of colon 12/04/2008   • Female bladder prolapse    • Gastric reflux    • Hematuria     Last Assessed: 11/18/2014    • History of diverticulitis    • Stebbins (hard of hearing)    • Hypercholesteremia    • Hyperkeratosis of skin 11/03/2011   • Hypertension    • Hypokalemia 12/13/2011   • Incomplete uterovaginal prolapse 05/28/2010   • Myocardial infarction [facility])    • Osteoporosis    • 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    Last Assessed: 10/24/2012    • Seasonal allergies    • Sebaceous cyst     Last Assessed: 2/8/2014   • Syncope    • Trigeminal neuralgia 03/20/2012   • Urinary incontinence    • Uterine prolapse    • Vertigo 09/15/2011   • 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   • Wears glasses    • Wears hearing aid     States she rarely wears them   • Wears partial dentures     Upper          Medications:     Current Outpatient Medications:   •  aspirin 81 MG tablet, Take 1 tablet by mouth daily, Disp: , Rfl:   •  donepezil (ARICEPT) 5 mg tablet, TAKE 1 TABLET BY MOUTH EVERY DAY AT NIGHT, Disp: , Rfl:   •  fexofenadine (ALLEGRA) 180 MG tablet, Take by mouth, Disp: , Rfl:   •  furosemide (LASIX) 20 mg tablet, Take 1/2 tablet daily for 6 days, Disp: 3 tablet, Rfl: 0  •  Klor-Con M20 20 MEQ tablet, TAKE 1 TABLET (20 MEQ TOTAL) BY MOUTH 4 (FOUR) TIMES A DAY, Disp: 360 tablet, Rfl: 0  •  losartan (COZAAR) "50 mg tablet, Take 50 mg by mouth daily, Disp: , Rfl:   •  metoprolol succinate (TOPROL-XL) 25 mg 24 hr tablet, Take 1 tablet (25 mg total) by mouth daily, Disp: 90 tablet, Rfl: 3  •  RABEprazole (ACIPHEX) 20 MG tablet, Take 1 tablet (20 mg total) by mouth daily, Disp: 90 tablet, Rfl: 3  •  rosuvastatin (CRESTOR) 10 MG tablet, TAKE 1 TABLET BY MOUTH EVERY DAY, Disp: 90 tablet, Rfl: 1  •  amLODIPine (NORVASC) 5 mg tablet, TAKE 1 TABLET BY MOUTH EVERY DAY (Patient not taking: Reported on 5/31/2023), Disp: 90 tablet, Rfl: 3  •  Calcium Carbonate-Vitamin D (CALCIUM 600+D HIGH POTENCY PO), Take 600 mg by mouth 2 (two) times a day   (Patient not taking: Reported on 5/31/2023), Disp: , Rfl:   •  Cholecalciferol (VITAMIN D) 2000 units tablet, Take 1 tablet (2,000 Units total) by mouth daily (Patient not taking: Reported on 5/31/2023), Disp: , Rfl:   •  Coenzyme Q10-Fish Oil-Vit E (CO-Q 10 OMEGA-3 FISH OIL PO), Take 1 capsule by mouth daily (Patient not taking: Reported on 9/19/2022), Disp: , Rfl:     Allergies   Allergen Reactions   • Penicillins Hives     \"All cillins\", \"and all myocillins\"   • Versed [Midazolam] Other (See Comments)     States she \"passed out\" when recovering from a procedure and was told to not use it again  • Zithromax [Azithromycin] Anaphylaxis   • Bee Venom      Reaction Date: 02Feb2004; Annotation - [id number]: jjw   • Enalapril      Reaction Date: [id number];    • Erythromycin Dizziness     Reaction Date: [id number];    • Estrogens      Reaction Date: 02Feb2004; Annotation - [id number]: jjw   • Ezetimibe Hives   • Ramipril      Reaction Date: 02Feb2004;  Annotation - [id number]: jjw   • Statins Other (See Comments)     Muscle pain       OBJECTIVE  Vitals:   Vitals:    05/31/23 1600   BP: 120/80   BP Location: Left arm   Patient Position: Sitting   Cuff Size: Standard   Pulse: 92   Resp: 16   Temp: (!) 97 4 °F (36 3 °C)   SpO2: 97%   Weight: 55 8 kg (123 lb)   Height: 5' 4\" (1 626 m)         Physical " Exam  Vitals reviewed  Constitutional:       Appearance: She is well-developed  HENT:      Head: Normocephalic and atraumatic  Eyes:      Conjunctiva/sclera: Conjunctivae normal       Pupils: Pupils are equal, round, and reactive to light  Cardiovascular:      Rate and Rhythm: Normal rate and regular rhythm  Heart sounds: Normal heart sounds  Pulmonary:      Effort: Pulmonary effort is normal  No respiratory distress  Breath sounds: Normal breath sounds  Musculoskeletal:         General: Normal range of motion  Cervical back: Normal range of motion and neck supple  Comments: 2+  Edema b/l to the ankle   Skin:     General: Skin is warm  Capillary Refill: Capillary refill takes less than 2 seconds  Neurological:      Mental Status: She is alert and oriented to person, place, and time                      Mikal Stern MD,   [facility]  6/1/2023

## 2023-06-01 ENCOUNTER — APPOINTMENT (OUTPATIENT)
Dept: LAB | Facility: CLINIC | Age: 88
End: 2023-06-01
Payer: COMMERCIAL

## 2023-06-01 ENCOUNTER — APPOINTMENT (OUTPATIENT)
Dept: RADIOLOGY | Facility: CLINIC | Age: 88
End: 2023-06-01
Payer: COMMERCIAL

## 2023-06-01 DIAGNOSIS — M79.89 LEG SWELLING: ICD-10-CM

## 2023-06-01 LAB
ALBUMIN SERPL BCP-MCNC: 3.1 G/DL (ref 3.5–5)
ALP SERPL-CCNC: 90 U/L (ref 46–116)
ALT SERPL W P-5'-P-CCNC: 34 U/L (ref 12–78)
ANION GAP SERPL CALCULATED.3IONS-SCNC: 4 MMOL/L (ref 4–13)
AST SERPL W P-5'-P-CCNC: 22 U/L (ref 5–45)
BASOPHILS # BLD AUTO: 0.05 THOUSANDS/ÂΜL (ref 0–0.1)
BASOPHILS NFR BLD AUTO: 1 % (ref 0–1)
BILIRUB SERPL-MCNC: 1.15 MG/DL (ref 0.2–1)
BNP SERPL-MCNC: 3093 PG/ML (ref 0–100)
BUN SERPL-MCNC: 9 MG/DL (ref 5–25)
CALCIUM ALBUM COR SERPL-MCNC: 10.2 MG/DL (ref 8.3–10.1)
CALCIUM SERPL-MCNC: 9.5 MG/DL (ref 8.3–10.1)
CHLORIDE SERPL-SCNC: 103 MMOL/L (ref 96–108)
CO2 SERPL-SCNC: 26 MMOL/L (ref 21–32)
CREAT SERPL-MCNC: 0.76 MG/DL (ref 0.6–1.3)
EOSINOPHIL # BLD AUTO: 0.13 THOUSAND/ÂΜL (ref 0–0.61)
EOSINOPHIL NFR BLD AUTO: 1 % (ref 0–6)
ERYTHROCYTE [DISTWIDTH] IN BLOOD BY AUTOMATED COUNT: 16.7 % (ref 11.6–15.1)
GFR SERPL CREATININE-BSD FRML MDRD: 69 ML/MIN/1.73SQ M
GLUCOSE P FAST SERPL-MCNC: 122 MG/DL (ref 65–99)
HCT VFR BLD AUTO: 51 % (ref 34.8–46.1)
HGB BLD-MCNC: 15.8 G/DL (ref 11.5–15.4)
IMM GRANULOCYTES # BLD AUTO: 0.05 THOUSAND/UL (ref 0–0.2)
IMM GRANULOCYTES NFR BLD AUTO: 1 % (ref 0–2)
LYMPHOCYTES # BLD AUTO: 1.68 THOUSANDS/ÂΜL (ref 0.6–4.47)
LYMPHOCYTES NFR BLD AUTO: 15 % (ref 14–44)
MCH RBC QN AUTO: 29.3 PG (ref 26.8–34.3)
MCHC RBC AUTO-ENTMCNC: 31 G/DL (ref 31.4–37.4)
MCV RBC AUTO: 94 FL (ref 82–98)
MONOCYTES # BLD AUTO: 1.16 THOUSAND/ÂΜL (ref 0.17–1.22)
MONOCYTES NFR BLD AUTO: 11 % (ref 4–12)
NEUTROPHILS # BLD AUTO: 7.86 THOUSANDS/ÂΜL (ref 1.85–7.62)
NEUTS SEG NFR BLD AUTO: 71 % (ref 43–75)
NRBC BLD AUTO-RTO: 0 /100 WBCS
PLATELET # BLD AUTO: 483 THOUSANDS/UL (ref 149–390)
PMV BLD AUTO: 10.3 FL (ref 8.9–12.7)
POTASSIUM SERPL-SCNC: 3.5 MMOL/L (ref 3.5–5.3)
PROT SERPL-MCNC: 7.7 G/DL (ref 6.4–8.4)
RBC # BLD AUTO: 5.4 MILLION/UL (ref 3.81–5.12)
SODIUM SERPL-SCNC: 133 MMOL/L (ref 135–147)
WBC # BLD AUTO: 10.93 THOUSAND/UL (ref 4.31–10.16)

## 2023-06-01 PROCEDURE — 80053 COMPREHEN METABOLIC PANEL: CPT

## 2023-06-01 PROCEDURE — 83880 ASSAY OF NATRIURETIC PEPTIDE: CPT

## 2023-06-01 PROCEDURE — 85025 COMPLETE CBC W/AUTO DIFF WBC: CPT

## 2023-06-01 PROCEDURE — 71046 X-RAY EXAM CHEST 2 VIEWS: CPT

## 2023-06-01 PROCEDURE — 36415 COLL VENOUS BLD VENIPUNCTURE: CPT

## 2023-06-02 ENCOUNTER — OFFICE VISIT (OUTPATIENT)
Dept: CARDIOLOGY CLINIC | Facility: CLINIC | Age: 88
End: 2023-06-02

## 2023-06-02 ENCOUNTER — TELEPHONE (OUTPATIENT)
Dept: FAMILY MEDICINE CLINIC | Facility: CLINIC | Age: 88
End: 2023-06-02

## 2023-06-02 VITALS
HEART RATE: 94 BPM | WEIGHT: 130.1 LBS | SYSTOLIC BLOOD PRESSURE: 120 MMHG | BODY MASS INDEX: 22.33 KG/M2 | DIASTOLIC BLOOD PRESSURE: 88 MMHG

## 2023-06-02 DIAGNOSIS — I25.10 ARTERIOSCLEROSIS OF CORONARY ARTERY: ICD-10-CM

## 2023-06-02 DIAGNOSIS — E78.5 DYSLIPIDEMIA: ICD-10-CM

## 2023-06-02 DIAGNOSIS — Z78.9 STATIN INTOLERANCE: ICD-10-CM

## 2023-06-02 DIAGNOSIS — I10 BENIGN ESSENTIAL HYPERTENSION: ICD-10-CM

## 2023-06-02 DIAGNOSIS — J90 PLEURAL EFFUSION: ICD-10-CM

## 2023-06-02 DIAGNOSIS — M79.89 LEG SWELLING: ICD-10-CM

## 2023-06-02 DIAGNOSIS — I50.32 CHRONIC DIASTOLIC CONGESTIVE HEART FAILURE (HCC): ICD-10-CM

## 2023-06-02 RX ORDER — FUROSEMIDE 20 MG/1
20 TABLET ORAL DAILY
Qty: 30 TABLET | Refills: 1 | Status: SHIPPED | OUTPATIENT
Start: 2023-06-02 | End: 2023-06-05

## 2023-06-02 RX ORDER — FUROSEMIDE 20 MG/1
TABLET ORAL
Qty: 30 TABLET | Refills: 1 | Status: SHIPPED | OUTPATIENT
Start: 2023-06-02 | End: 2023-06-02 | Stop reason: SDUPTHER

## 2023-06-02 RX ORDER — METOPROLOL SUCCINATE 50 MG/1
50 TABLET, EXTENDED RELEASE ORAL DAILY
Qty: 90 TABLET | Refills: 1 | Status: SHIPPED | OUTPATIENT
Start: 2023-06-02

## 2023-06-02 NOTE — PROGRESS NOTES
Progress Note - Cardiology Office  [facility]  Rubi Wright 80 y o  female MRN: [id number]  : 10/3/1933  Encounter: [id number]      Assessment:     1  Chronic diastolic congestive heart failure ([address] )    2  Arteriosclerosis of coronary artery    3  Benign essential hypertension    4  Dyslipidemia    5  Statin intolerance    6  Pleural effusion    7  Leg swelling        Discussion Summary and Plan:    1  Coronary artery disease with history of angioplasty of LAD and diagonal with a stent in  by Dr Leonarda Apley and repeat catheterization in  shows patent stent and has mid RCA significant stenosis but very medium to small size artery not suitable for percutaneous techniques on medical Rx  Please she is on Crestor 10 mg every other day  3   Dyslipidemia  Continue Crestor  4   Persistent hypokalemia  History of hypokalemia  She is on potassium potassium is 3 5  We may have to adjust potassium tablet if her blood work showed potassium is lower than that  5   Benign essential hypertension  Pressure acceptable  She is tachycardic we will increase metoprolol to 50 mg daily and continue losartan 50 mg  If blood pressure becomes low we will cut back the dose of losartan  6  Recent history of stroke  Patient has recent history of stroke  She was started on Plavix therapy she follows with Neurology  Most likely it is elected or infarct as she has recovered  She has no previous history of atrial fibrillation  And she has been on aspirin and carotid Doppler has been negative  7  History of memory loss  Family would like to have geriatric evaluation  they will discussed with primary care team     8   Diastolic heart failure with left pleural effusion  BNP is elevated  We will check echo Doppler  We will continue Lasix 20 mg daily  Her sodium did decrease to 133 we will get repeat labs in 1 week  Monitor input output    Avoid excessive fluid drinking based on the blood test we will further adjust the dose of diuretic  Plan  Echo Doppler  Lasix 20 mg along with potassium and check BMP in about 5 to 7 days    Please metoprolol XL to 50 mg daily  Patient has right-sided pleural effusion if continues to have pleural effusion may need pleural tap  For now we will repeat chest x-ray in 3 to 4 weeks    Continue other cardiac medication as before  Counseling :  A description of the counseling  Patient advised to keep herself hydrated avoid similar situation where she passed out  Patient daughter was present all the time and all their questions were answered  Patient's ability to self care: Yes  Medication side effect reviewed with patient in detail and all their questions answered to their satisfaction  HPI :     Bel Perera is a 80y o  year old female who came for follow up  Patient has Past medical history significant for coronary artery disease with remote angioplasty of LAD and diagonal by Dr Bill Holder in 2013 and then repeat cardiac catheterization in 2015 at [facility] shows patent stent in LAD and diagonal and has small RCA mid 80-90% stenosis which is very tortuous and not suitable for percutaneous technique  Patient has repeated history of syncopal episode and most of time it happened situation when she had a glass of wine and she is in a restaurant  She does not remember what happens and she feels fine after few minutes off it  She denies any fever or any chills any nausea and vomiting  She is otherwise very active  She cannot take statins even though her cholesterol is very high  She takes Crestor 5 mg twice a week and she is able to handle that  No fever no chills no nausea no vomiting no other significant complaint  02/14/2022  Above reviewed  Patient came for follow-up she is doing well she has no new complaints  Blood pressure has been stable  No dizziness no lightheadedness    Occasionally she get a right-sided jaw pain but no chest pain no shortness of breath  Her medications reviewed with her  She is currently taking amlodipine 5 mg daily, metoprolol XL 25, Klor-Con 20 mg daily and Benicar 20 mg daily blood pressure has been acceptable  She could not tolerate Aldactone  She is  Compliant with her cholesterol medications  No nausea no vomiting no fever  She had a blood test done July 2021 has been acceptable  Today heart rate 81 beats per minute 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  She has RCA very tortuous and severely stenosed and is on medical therapy  She still occasionally gets dizziness it happened about a month ago  No more dizziness now     06/13/2022  Above reviewed  Patient came for follow-up  Recently she was admitted to [facility] with TIA she has some slurring of her speech and weakness of her right side of the face  She thankfully recovered and she is doing well  She had a medical history significant for labile hypertension, dyslipidemia, but she cannot take high dose statins CAD with tortuous arteries who came for follow-up  We have echo report as well as EKG from [facility] which was reviewed and she has a normal LV systolic function and no cardiac arrhythmia was noted  She does have history of incomplete RBBB which is not changed  RCA is very tortuous and severely disease and she is on medical therapy  She came with her daughter  She has previously Holter monitor done which shows no evidence of any significant tachy-jesse arrhythmias  She has been given Plavix for 21 days  She is no longer taking Benicar her blood pressure at home ranges from systolic of 140-902  In our office blood pressure is acceptable around 130 at this time  10/17/2022  Above reviewed    Patient who has medical history significant for TIA when she was admitted to [facility], history of labile hypertension, dyslipidemia, but could not tolerate high dose of statins, CAD with very tortuous vessel so came for follow-up  She has an echo done at [facility] which shows she has normal LV systolic function and no cardiac arrhythmia was noted while she was there  She does have history of RBBB which is not changed  She has a previous cardiac catheterization which shows RCA was very tortuous and severely disease and not suitable for percutaneous technique and she was recommended medical therapy  Her vitals has been stable  Her last blood test was in July 2021  As per daughter she is getting more forgetful  She has done as active as before but she is able to do her activities  No nausea no vomiting no fever no chills no other cardiovascular issues  She may not be taking 1 of her medications regularly daughter will call us back  6/2/2023  Above reviewed  Patient came for follow-up  She was brought in by her daughter  Patient has not been eating and there is a decrease in appetite as per patient's daughter  She is also becoming more forgetful  She does have history of labile hypertension, dyslipidemia but could not tolerate statin, CAD with very tortuous vessels and history of diastolic heart failure  She has not seen us for about 8 to 9 months  Her previous cardiac catheter report was reviewed  Lately she was found to have right-sided pleural effusion and her BNP was elevated and she was short of breath  Patient denies any chest pain  She did lose weight  In March 2023 she was 138 currently she is 130 pounds  EKG today shows sinus rhythm with few PVCs heart rate 94 bpm   QS in V1 to V3 noted  She has some leg edema  Today she is using her compression stocking  Labs from 6/1/2023 reviewed  Her BNP was 3000  Her potassium was 3 5 she is already started on potassium and her thyroid function was 1 67 and LFTs were acceptable  Hemoglobin was stable  X-ray report reviewed            Review of Systems   Constitutional: Negative for activity change, chills, diaphoresis, fever and unexpected weight change  HENT: Negative for congestion  Eyes: Negative for discharge and redness  Respiratory: Positive for shortness of breath  Negative for cough, chest tightness and wheezing  Cardiovascular: Negative  Negative for chest pain, palpitations and leg swelling  Gastrointestinal: Negative for abdominal pain, diarrhea and nausea  Endocrine: Negative  Genitourinary: Negative for decreased urine volume and urgency  Musculoskeletal: Positive for arthralgias  Negative for back pain and gait problem  Skin: Negative for rash and wound  Allergic/Immunologic: Negative  Neurological: Negative for dizziness, seizures, syncope, weakness, light-headedness and headaches  Hematological: Negative  Psychiatric/Behavioral: Negative for agitation and confusion  The patient is nervous/anxious          Historical Information   Past Medical History:   Diagnosis Date   • Abscess of chest wall     Last Assessed: 2/27/2014   • Arthritis    • Back pain    • Cataract     Start of   • Colon polyp    • Coronary artery disease    • Diverticulitis of colon 12/04/2008   • Female bladder prolapse    • Gastric reflux    • Hematuria     Last Assessed: 11/18/2014    • History of diverticulitis    • [place] (hard of hearing)    • Hypercholesteremia    • Hyperkeratosis of skin 11/03/2011   • Hypertension    • Hypokalemia 12/13/2011   • Incomplete uterovaginal prolapse 05/28/2010   • Myocardial infarction [facility])    • Osteoporosis    • 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    Last Assessed: 10/24/2012    • Seasonal allergies    • Sebaceous cyst     Last Assessed: 2/8/2014   • Syncope    • Trigeminal neuralgia 03/20/2012   • Urinary incontinence    • Uterine prolapse    • Vertigo 09/15/2011   • 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   • Wears glasses    • Wears hearing aid     States she rarely wears them   • Wears partial dentures     Upper      Past Surgical History:   Procedure Laterality Date   • APPENDECTOMY     • APPENDICO-VESICOSTOMY CUTANEOUS  03/01/2010   • CHOLECYSTECTOMY      Laparoscopic   • COLONOSCOPY     • CORONARY ANGIOPLASTY     • CORONARY ANGIOPLASTY WITH STENT PLACEMENT      2 aortic stents   • CORONARY ANGIOPLASTY WITH STENT PLACEMENT  2013 2 distal left anterior descending artery    • ESOPHAGOGASTRODUODENOSCOPY     • FRACTURE SURGERY Right     Repair right arm- titanium servando from shoulder to elbow     • HYSTERECTOMY      Complete   • PA CMBND ANTERPOST COLPORRAPHY W/CYSTO N/A 9/20/2016    Procedure: COLPORRHAPHY ANTERIOR POSTERIOR GRAFT;  Surgeon: Roberta Quiroz MD;  [facility]: [facility];  Service: UroGynecology          • PA COLPOPEXY VAGINAL EXTRAPERITONEAL APPROACH N/A 9/20/2016    Procedure: COLPOPEXY VAGINAL EXTRAPERITONEAL  incision and drainage of vagina inclusion cyst x 4;  Surgeon: Roberta Quiroz MD;  [facility];  Service: UroGynecology          • PA CYSTOURETHROSCOPY N/A 9/20/2016    Procedure: Shirlean Peabody;  Surgeon: Roberta Qiuroz MD;  [facility];  Service: UroGynecology          • PA SLING OPERATION STRESS INCONTINENCE N/A 9/20/2016    Procedure: INSERTION PUBOVAGINAL SLING RETROPUBIC ;  Surgeon: Roberta Quiroz MD;  [facility];  Service: UroGynecology            Social History     Substance and Sexual Activity   Alcohol Use Yes   • Alcohol/week: 1 0 - 2 0 standard drink of alcohol   • Types: 1 - 2 Glasses of wine per week    Comment: per day, per allscripts: Being a social drinker      Social History     Substance and Sexual Activity   Drug Use No     Social History     Tobacco Use   Smoking Status Never   Smokeless Tobacco Never     Family History:   Family History   Problem Relation Age of Onset   • Hypertension Mother    • Heart failure Mother    • Coronary artery disease Mother    • Arthritis Mother    • Osteoporosis Mother    • Hyperlipidemia Mother    • Coronary artery disease Sister "      Meds/Allergies     Allergies   Allergen Reactions   • Penicillins Hives     \"All cillins\", \"and all myocillins\"   • Versed [Midazolam] Other (See Comments)     States she \"passed out\" when recovering from a procedure and was told to not use it again  • Zithromax [Azithromycin] Anaphylaxis   • Bee Venom      Reaction Date: 02Feb2004; Annotation - [id number]: jjw   • Enalapril      Reaction Date: [id number];    • Erythromycin Dizziness     Reaction Date: [id number];    • Estrogens      Reaction Date: 02Feb2004; Annotation - [id number]: jjw   • Ezetimibe Hives   • Ramipril      Reaction Date: 02Feb2004;  Annotation - [id number]: jjw   • Statins Other (See Comments)     Muscle pain       Current Outpatient Medications:   •  aspirin 81 MG tablet, Take 1 tablet by mouth daily, Disp: , Rfl:   •  donepezil (ARICEPT) 5 mg tablet, TAKE 1 TABLET BY MOUTH EVERY DAY AT NIGHT, Disp: , Rfl:   •  fexofenadine (ALLEGRA) 180 MG tablet, Take by mouth, Disp: , Rfl:   •  furosemide (LASIX) 20 mg tablet, Take 1 tablet (20 mg total) by mouth daily, Disp: 30 tablet, Rfl: 1  •  Klor-Con M20 20 MEQ tablet, TAKE 1 TABLET (20 MEQ TOTAL) BY MOUTH 4 (FOUR) TIMES A DAY, Disp: 360 tablet, Rfl: 0  •  losartan (COZAAR) 50 mg tablet, Take 50 mg by mouth daily, Disp: , Rfl:   •  metoprolol succinate (TOPROL-XL) 50 mg 24 hr tablet, Take 1 tablet (50 mg total) by mouth daily, Disp: 90 tablet, Rfl: 1  •  RABEprazole (ACIPHEX) 20 MG tablet, Take 1 tablet (20 mg total) by mouth daily, Disp: 90 tablet, Rfl: 3  •  rosuvastatin (CRESTOR) 10 MG tablet, TAKE 1 TABLET BY MOUTH EVERY DAY, Disp: 90 tablet, Rfl: 1  •  Calcium Carbonate-Vitamin D (CALCIUM 600+D HIGH POTENCY PO), Take 600 mg by mouth 2 (two) times a day   (Patient not taking: Reported on 5/31/2023), Disp: , Rfl:   •  Cholecalciferol (VITAMIN D) 2000 units tablet, Take 1 tablet (2,000 Units total) by mouth daily (Patient not taking: Reported on 5/31/2023), Disp: , Rfl:   •  Coenzyme Q10-Fish Oil-Vit " E (CO-Q 10 OMEGA-3 FISH OIL PO), Take 1 capsule by mouth daily (Patient not taking: Reported on 9/19/2022), Disp: , Rfl:     Vitals: Blood pressure 120/88, pulse 94, weight 59 kg (130 lb 1 6 oz)  ? Body mass index is 22 33 kg/m²  Vitals:    06/02/23 1541   Weight: 59 kg (130 lb 1 6 oz)     BP Readings from Last 3 Encounters:   06/02/23 120/88   05/31/23 120/80   03/08/23 150/84         Physical Exam  Constitutional:       General: She is not in acute distress  Appearance: She is well-developed  She is not diaphoretic  Neck:      Thyroid: No thyromegaly  Vascular: No JVD  Trachea: No tracheal deviation  Cardiovascular:      Rate and Rhythm: Normal rate and regular rhythm  Heart sounds: S1 normal and S2 normal  Heart sounds not distant  Murmur heard  Systolic (ejection) murmur is present with a grade of 2/6  No friction rub  No gallop  No S3 or S4 sounds  Comments: S1-S2 regular with a 3/6 ejection systolic murmur  Pulmonary:      Effort: Pulmonary effort is normal  No respiratory distress  Breath sounds: No wheezing or rales  Comments: Bilateral air entry with coarse breath sounds air entry decreased on the right side  Chest:      Chest wall: No tenderness  Abdominal:      General: Bowel sounds are normal  There is no distension  Palpations: Abdomen is soft  Tenderness: There is no abdominal tenderness  Musculoskeletal:         General: No deformity  Cervical back: Neck supple  Comments: Minimal lower extremity edema   Skin:     General: Skin is warm and dry  Coloration: Skin is not pale  Findings: No rash  Neurological:      Mental Status: She is alert and oriented to person, place, and time  Psychiatric:         Behavior: Behavior normal          Judgment: Judgment normal        Diagnostic Studies Review Cardio:   cardiac catheterization :   Patient's cardiac catheterization from 2015 reviewed    She has patent stent seen in LAD and diagonal   Apical LAD has around 70 80% stenosis which small vessel, mid circumflex around 40-50% stenosis  RCA which is a small size artery and only gives RPDA has around 90% stenosis in the mid it is very tortuous artery not suitable for percutaneous techniques  Echo Doppler done 06/28/2018 shows low-normal LV systolic function EF around 55 percent, mild concentric left atrial hypertrophy, mild MR, mild AI, mild TR PA pressure 30 millimeters of mercury  Repeat echo Doppler done on June 2022 in [facility] shows EF 55% mild LVH mild AI, mild MR no change from previous echo  Carotid Doppler  Carotid study done 05/20/2019 shows less than 50% stenosis bilaterally  Holter monitor done on 06/28/2018 shows normal sinus rhythm  Few PACs seen  Rare episode of PVCs  EKG:    Twelve lead EKG done at  [facility] shows normal sinus rhythm heart rate around 87 beats per minute  Nonspecific ST changes  Twelve lead EKG done on 12/05/2018 shows normal sinus rhythm heart rate 81 beats per minute  No significant ST changes  No change from old EKG  Twelve lead EKG done in our office 04/26/2019 shows normal sinus rhythm heart rate 93 beats per minute  Twelve lead EKG done in our office 06/04/2019 shows normal sinus rhythm QS in V1 to V3 most likely due to lead location  No other significant change no change from old EKG heart rate 66 beats per minute  Twelve lead EKG 12/17/2019 shows normal sinus rhythm 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  Heart rate 68 beats per minute  Twelve lead EKG 06/25/2020 shows normal sinus rhythm heart rate 61 beats per minute EKG done 06/25/2020 no change from old EKG  12 EKG done 08/12/2021 shows normal sinus with PACs heart rate 84 beats per minute  No significant change from old EKG  Twelve lead EKG done 02/14/2022 shows normal sinus rhythm with sinus arrhythmia heart rate 59 beats per minute incomplete RBBB    Q-wave in inferior leads cannot rule out old inferior infarction  Not much changed from previous EKG  Twelve-lead EKG done on 2023 shows sinus rhythm with few PVCs heart rate 94 bpm         Cardiac testing:       Results for orders placed in visit on 08/20/15   Cardiac catheterization    Narrative [address] [facility], [address] [facility]   Phone: [phone]      INVASIVE CARDIOVASCULAR LAB COMPLETE REPORT         Patient: Theresa MENDOSA   Location: Outpatient   MR number: [id number]   Account number: [de-identified]   Study date: 2015   Gender: Female   : 10/03/1933   Height: 64 2 in   Weight: 147 6 lb   BSA: 1 72 m squared   Allergies: Altace, beestings, Erythromycin Base, estrogen, penicilin, Versed,   Vasotec, Zithromax, all cillins, mycins, Vytorin 10-10, Zetia   Diagnostic Cardiologist:  Akin Mak MD   Primary Physician:  Lydia Chavez MD   SUMMARY   CORONARY CIRCULATION:   Left main: Normal    LAD: The vessel was normal sized  The were no obstructive stenoses  The   previously deployed stent remains widely patent  The stent at the ostium of D1   remains widely patent  Circumflex: The vessel was normal sized  There was a   non-critical 50% plaque in mid vessel  There were no obstructive stenoses  The   major OM branches were normal  There was faint collateral flow from the   circumflex to the distal RCA  RCA: The vessel was small to medium sized and   dominant, giving rise to the PDA  There was a long, tortuous 95% stenosis in   mid vessel  [place] of severe tortuosity, the lesion is poorly suited for PCI  REPORT ELEMENT SELECTION:   Right radial access was employed  Summary: The patient has single vessel disease with a long, tortuous subtotal   stenosis in the mid RCA  Elucidating symptom status is difficult in this   patient, since she previously had very severe LAD disease and diagonal disease   with atypical symptoms as the only presentation   Medical therapy is recommended   for the following reasons 1) the lesion has been stable angiographically for 2   years  2) the patient was able to exercise to a high level after PCI 2 years   ago with this lesion already present  3) Nuclear imaging 1 year ago showed no   ischemia in the presence of this lesion  4) There is some collateral flow from   the circumflex  5) Current symptoms are atypical and not exertional  If PCI is   required in the future because of exertional angina refractory to medication,   it is recommended that the procedure be performed via the femoral approach,    and   INVASIVE CARDIOVASCULAR LAB COMPLETE REPORT   Patient: Benoit MENDOSA   MR number: [id number]    ------ Page 2   BSA: 1 72 m squared   that the procedure be performed at the [address] [place] [address] because of the   increased risk of comlications  Treatment with rosuvastatin, 2 5mg once per   week was initiated  The patient has a history of statin intolerance at low   doses  INDICATIONS:   --  Possible CAD: unstable angina  PROCEDURES PERFORMED   --  Left coronary angiography  --  Right coronary angiography  --  Outpatient  --  Coronary Catheterization (w/o LHC)  PROCEDURE: The risks and alternatives of the procedures and conscious sedation   were explained to the patient and informed consent was obtained  The patient   was brought to the cath lab and placed on the table  The planned puncture    sites   were prepped and draped in the usual sterile fashion  --  Right radial artery access  After performing an Eusebio's test to verify   adequate ulnar artery supply to the hand, the radial site was prepped  The   puncture site was infiltrated with local anesthetic  The vessel was accessed   using the modified Seldinger technique, a wire was advanced into the vessel,   and a sheath was advanced over the wire into the vessel  --  Left coronary artery angiography   A catheter was advanced over a guidewire   into the aorta and positioned in the left coronary artery ostium under   fluoroscopic guidance  Angiography was performed  --  Right coronary artery angiography  A catheter was advanced over a    guidewire   into the aorta and positioned in the right coronary artery ostium under   fluoroscopic guidance  Angiography was performed  --  Outpatient  --  Coronary Catheterization (w/o [facility])  PROCEDURE COMPLETION: The patient tolerated the procedure well and was   discharged from the cath lab  TIMING: Test started at 09:38  Test concluded at   10:03  HEMOSTASIS: The sheath was removed  The site was compressed with a   Hemoband device  Hemostasis was obtained  MEDICATIONS GIVEN: Verapamil   (Isoptin, Calan, Covera), 1 25 mg, IA, at 09:52  Fentanyl (1OOmcg/2 ml), 50   mcg, IV, at 09:42  Fentanyl (1OOmcg/2 ml), 50 mcg, IV, at 09:45  1% Lidocaine,   1 ml, subcutaneously, at 09:49  Fentanyl (1OOmcg/2 ml), 25 mcg, IV, at 09:50  Nitroglycerin (200mcg/ml), 200 mcg, at 09:52  Heparin 1000 units/ml, 4,000   units, IV, at 09:52  CONTRAST GIVEN: 70 ml Omnipaque (350mg I /ml)  RADIATION   EXPOSURE: Fluoroscopy time: 1 8 min  CORONARY VESSELS:   --  Left main: Normal    INVASIVE CARDIOVASCULAR LAB COMPLETE REPORT   Patient: Edel MENDOSA   MR number: [id number]    ------ Page 3   BSA: 1 72 m squared   --  LAD: The vessel was normal sized  The were no obstructive stenoses  The   previously deployed stent remains widely patent  The stent at the ostium of D1   remains widely patent  --  Circumflex: The vessel was normal sized  There was    a   non-critical 50% plaque in mid vessel  There were no obstructive stenoses  The   major OM branches were normal  There was faint collateral flow from the   circumflex to the distal RCA  --  RCA: The vessel was small to medium sized    and   dominant, giving rise to the PDA  There was a long, tortuous 95% stenosis in   mid vessel  [place] of severe tortuosity, the lesion is poorly suited for PCI     NOTE: Right radial access "was employed  Prepared and signed by   Danae Parekh MD   Signed 2015 11:05:34   CC: Dr Win Neri   Study diagram   Hemodynamic tables   Pressures:  Baseline   Pressures:  - HR: 70   Pressures:  - Rhythm:   Pressures:  -- Aortic Pressure (S/D/M): 157/83/116   Outputs:  Baseline   Outputs:  -- CALCULATIONS[age]   Outputs:  -- CALCULATIONS: Body Surface Area: 1 72   Outputs:  -- CALCULATIONS: Height in cm: 163 00   Outputs:  -- CALCULATIONS: Sex: Female   Outputs:  -- CALCULATIONS: Weight in k 10      Lab Review   Lab Results   Component Value Date    HCT 51 0 (H) 2023    HGB 15 8 (H) 2023    MCV 94 2023     (H) 2023    RDW 16 7 (H) 2023    WBC 10 93 (H) 2023     BMP:  Lab Results   Component Value Date    BUN 9 2023    CALCIUM 9 5 2023     2023    CO2 26 2023    CORRECTEDCA 10 2 (H) 2023    CREATININE 0 76 2023    EGFR 69 2023    GLUCOSE 99 2016    GLUF 122 (H) 2023    K 3 5 2023    MG 2 2 2021     2016     LFT:  Lab Results   Component Value Date    ALKPHOS 90 2023    ALT 34 2023    AST 22 2023    BILITOT 0 3 2016    PROT 7 1 2016    TP 7 7 2023       Lab Results   Component Value Date    HGBA1C 5 8 2022     Lipid Profile:   Lab Results   Component Value Date    CHOL 192 2013    HDL 52 2023    LDLCALC 46 2023    TRIG 140 2023     Lab Results   Component Value Date    CHOL 192 2013     Lab Results   Component Value Date    TROPONINI 0 03 2018       Dr Griselda Mcgowan MD [facility] [place]      \"This note has been constructed using a voice recognition system  Therefore there may be syntax, spelling, and/or grammatical errors  Please call if you have any questions   \"  "

## 2023-06-02 NOTE — PATIENT INSTRUCTIONS
Echo Doppler  Lasix 20 mg along with potassium and check BMP in about 5 to 7 days    Please metoprolol XL to 50 mg daily  Patient has right-sided pleural effusion if continues to have pleural effusion may need pleural tap  For now we will repeat chest x-ray in 3 to 4 weeks    Continue other cardiac medication as before

## 2023-06-03 NOTE — TELEPHONE ENCOUNTER
Spoke to daughter about abnormal CXR and red flags   She is aware and already spoke with Cardiology over results

## 2023-06-07 ENCOUNTER — TELEPHONE (OUTPATIENT)
Dept: CARDIOLOGY CLINIC | Facility: CLINIC | Age: 88
End: 2023-06-07

## 2023-06-07 ENCOUNTER — HOSPITAL ENCOUNTER (OUTPATIENT)
Dept: NON INVASIVE DIAGNOSTICS | Facility: HOSPITAL | Age: 88
Discharge: HOME | End: 2023-06-07
Attending: INTERNAL MEDICINE
Payer: COMMERCIAL

## 2023-06-07 VITALS
HEIGHT: 64 IN | SYSTOLIC BLOOD PRESSURE: 144 MMHG | HEART RATE: 85 BPM | WEIGHT: 130 LBS | DIASTOLIC BLOOD PRESSURE: 85 MMHG | BODY MASS INDEX: 22.2 KG/M2

## 2023-06-07 DIAGNOSIS — I25.10 ARTERIOSCLEROSIS OF CORONARY ARTERY: ICD-10-CM

## 2023-06-07 DIAGNOSIS — J90 PLEURAL EFFUSION: ICD-10-CM

## 2023-06-07 DIAGNOSIS — Z78.9 STATIN INTOLERANCE: ICD-10-CM

## 2023-06-07 DIAGNOSIS — I10 BENIGN ESSENTIAL HYPERTENSION: ICD-10-CM

## 2023-06-07 DIAGNOSIS — I50.32 CHRONIC DIASTOLIC CONGESTIVE HEART FAILURE (HCC): ICD-10-CM

## 2023-06-07 DIAGNOSIS — E78.5 DYSLIPIDEMIA: ICD-10-CM

## 2023-06-07 LAB
AORTIC ROOT: 3.5 CM
AORTIC VALVE MEAN VELOCITY: 9.8 M/S
APICAL FOUR CHAMBER EJECTION FRACTION: 33 %
AV LVOT PEAK GRADIENT: 2 MMHG
AV MEAN GRADIENT: 4 MMHG
AV PEAK GRADIENT: 9 MMHG
DOP CALC AO PEAK VEL: 1.46 M/S
DOP CALC AO VTI: 22.76 CM
DOP CALC LVOT AREA: 3.14 CM2
DOP CALC LVOT DIAMETER: 2 CM
DOP CALC MV VTI: 24.03 CM
E WAVE DECELERATION TIME: 96 MS
FRACTIONAL SHORTENING: 14 % (ref 28–44)
INTERVENTRICULAR SEPTUM IN DIASTOLE (PARASTERNAL SHORT AXIS VIEW): 0.9 CM
INTERVENTRICULAR SEPTUM: 0.9 CM (ref 0.6–1.1)
LAAS-AP2: 15.9 CM2
LAAS-AP4: 23.4 CM2
LEFT ATRIUM AREA SYSTOLE SINGLE PLANE A4C: 25.2 CM2
LEFT ATRIUM SIZE: 3.9 CM
LEFT INTERNAL DIMENSION IN SYSTOLE: 4.4 CM (ref 2.1–4)
LEFT VENTRICLE DIASTOLIC VOLUME (MOD BIPLANE): 121 ML
LEFT VENTRICLE SYSTOLIC VOLUME (MOD BIPLANE): 86 ML
LEFT VENTRICULAR INTERNAL DIMENSION IN DIASTOLE: 5.1 CM (ref 3.5–6)
LEFT VENTRICULAR POSTERIOR WALL IN END DIASTOLE: 1 CM
LEFT VENTRICULAR STROKE VOLUME: 34 ML
LV EF: 29 %
LVSV (TEICH): 34 ML
MV E'TISSUE VEL-LAT: 7 CM/S
MV E'TISSUE VEL-SEP: 4 CM/S
MV EROA: 0.1 CM2
MV MEAN GRADIENT: 1 MMHG
MV PEAK A VEL: 0.81 M/S
MV PEAK E VEL: 87 CM/S
MV PEAK GRADIENT: 4 MMHG
MV STENOSIS PRESSURE HALF TIME: 28 MS
MV VALVE AREA P 1/2 METHOD: 7.86 CM2
PISA MRMAX VEL: 0.43 M/S
PISA RADIUS: 0.4 CM
RIGHT ATRIUM AREA SYSTOLE A4C: 14.5 CM2
RIGHT VENTRICLE ID DIMENSION: 3.2 CM
SL CV LEFT ATRIUM LENGTH A2C: 4.5 CM
SL CV LV EF: 30
SL CV PED ECHO LEFT VENTRICLE DIASTOLIC VOLUME (MOD BIPLANE) 2D: 122 ML
SL CV PED ECHO LEFT VENTRICLE SYSTOLIC VOLUME (MOD BIPLANE) 2D: 88 ML
TR MAX PG: 53 MMHG
TR PEAK VELOCITY: 3.7 M/S
TRICUSPID ANNULAR PLANE SYSTOLIC EXCURSION: 0.9 CM
TRICUSPID VALVE PEAK REGURGITATION VELOCITY: 3.65 M/S

## 2023-06-07 PROCEDURE — 93306 TTE W/DOPPLER COMPLETE: CPT

## 2023-06-07 PROCEDURE — 93306 TTE W/DOPPLER COMPLETE: CPT | Performed by: INTERNAL MEDICINE

## 2023-06-07 NOTE — TELEPHONE ENCOUNTER
----- Message from Rachel Ricks MD sent at 6/7/2023  4:48 PM EDT -----  Patient's echo Doppler shows that her valve function has worsened  Her EF has dropped  She should continue Lasix 20 mg daily  If she does not feel well she should go to the hospital as her EF has dropped  She need to get her blood test and I will ask my team to follow-up on her labs and sodium we may need to add additional medications like Jardiance for which she may need to be seen      Please discuss this thing with patient's daughter please call patient's daughter  Will send this message to Dr Dora Mathews also

## 2023-06-07 NOTE — TELEPHONE ENCOUNTER
Per Dr Edwards-tawana pt 's Daughter Alejandro Vigil re: Echo result  Left a v/m for Keith to return a call to the clinical staff

## 2023-06-08 ENCOUNTER — APPOINTMENT (OUTPATIENT)
Dept: LAB | Facility: CLINIC | Age: 88
End: 2023-06-08
Payer: COMMERCIAL

## 2023-06-08 ENCOUNTER — OFFICE VISIT (OUTPATIENT)
Dept: FAMILY MEDICINE CLINIC | Facility: CLINIC | Age: 88
End: 2023-06-08
Payer: COMMERCIAL

## 2023-06-08 VITALS
SYSTOLIC BLOOD PRESSURE: 150 MMHG | HEART RATE: 78 BPM | RESPIRATION RATE: 18 BRPM | TEMPERATURE: 98.2 F | WEIGHT: 125 LBS | DIASTOLIC BLOOD PRESSURE: 90 MMHG | BODY MASS INDEX: 21.34 KG/M2 | HEIGHT: 64 IN

## 2023-06-08 DIAGNOSIS — I25.10 ARTERIOSCLEROSIS OF CORONARY ARTERY: ICD-10-CM

## 2023-06-08 DIAGNOSIS — E78.5 DYSLIPIDEMIA: ICD-10-CM

## 2023-06-08 DIAGNOSIS — J90 PLEURAL EFFUSION: ICD-10-CM

## 2023-06-08 DIAGNOSIS — R63.4 WEIGHT LOSS: Primary | ICD-10-CM

## 2023-06-08 DIAGNOSIS — R06.00 DYSPNEA, UNSPECIFIED TYPE: ICD-10-CM

## 2023-06-08 DIAGNOSIS — R41.89 COGNITIVE CHANGES: ICD-10-CM

## 2023-06-08 DIAGNOSIS — I50.9 CHRONIC HEART FAILURE, UNSPECIFIED HEART FAILURE TYPE (HCC): ICD-10-CM

## 2023-06-08 DIAGNOSIS — Z78.9 STATIN INTOLERANCE: ICD-10-CM

## 2023-06-08 DIAGNOSIS — I50.32 CHRONIC DIASTOLIC CONGESTIVE HEART FAILURE (HCC): ICD-10-CM

## 2023-06-08 DIAGNOSIS — I10 BENIGN ESSENTIAL HYPERTENSION: ICD-10-CM

## 2023-06-08 LAB
ALBUMIN SERPL BCP-MCNC: 3.2 G/DL (ref 3.5–5)
ALP SERPL-CCNC: 74 U/L (ref 46–116)
ALT SERPL W P-5'-P-CCNC: 25 U/L (ref 12–78)
ANION GAP SERPL CALCULATED.3IONS-SCNC: 3 MMOL/L (ref 4–13)
AST SERPL W P-5'-P-CCNC: 25 U/L (ref 5–45)
BILIRUB SERPL-MCNC: 0.58 MG/DL (ref 0.2–1)
BUN SERPL-MCNC: 14 MG/DL (ref 5–25)
CALCIUM ALBUM COR SERPL-MCNC: 10.1 MG/DL (ref 8.3–10.1)
CALCIUM SERPL-MCNC: 9.5 MG/DL (ref 8.3–10.1)
CHLORIDE SERPL-SCNC: 106 MMOL/L (ref 96–108)
CHOLEST SERPL-MCNC: 101 MG/DL
CO2 SERPL-SCNC: 29 MMOL/L (ref 21–32)
CREAT SERPL-MCNC: 0.76 MG/DL (ref 0.6–1.3)
GFR SERPL CREATININE-BSD FRML MDRD: 69 ML/MIN/1.73SQ M
GLUCOSE P FAST SERPL-MCNC: 106 MG/DL (ref 65–99)
HDLC SERPL-MCNC: 49 MG/DL
LDLC SERPL CALC-MCNC: 27 MG/DL (ref 0–100)
NONHDLC SERPL-MCNC: 52 MG/DL
POTASSIUM SERPL-SCNC: 3.7 MMOL/L (ref 3.5–5.3)
PROT SERPL-MCNC: 7.4 G/DL (ref 6.4–8.4)
SODIUM SERPL-SCNC: 138 MMOL/L (ref 135–147)
TRIGL SERPL-MCNC: 126 MG/DL

## 2023-06-08 PROCEDURE — 99214 OFFICE O/P EST MOD 30 MIN: CPT | Performed by: FAMILY MEDICINE

## 2023-06-08 PROCEDURE — 80061 LIPID PANEL: CPT

## 2023-06-08 PROCEDURE — 36415 COLL VENOUS BLD VENIPUNCTURE: CPT

## 2023-06-08 PROCEDURE — 80053 COMPREHEN METABOLIC PANEL: CPT

## 2023-06-08 RX ORDER — MIRTAZAPINE 7.5 MG/1
7.5 TABLET, FILM COATED ORAL
Qty: 30 TABLET | Refills: 5 | Status: SHIPPED | OUTPATIENT
Start: 2023-06-08

## 2023-06-08 NOTE — PROGRESS NOTES
Chief Complaint   Patient presents with   • Weight Loss   • Anorexia   SOB  F/u legs swelling      Patient ID: Amirah Quiñonez is a 80 y o  female  HPI  Pt is seeing with her daughter for multiple issues -  Decreased appetite, loosing weight, SOB with minimal exertion - legs swelling -  Was Dx with CHF -  Started on Lasix -  Legs swelling resolved  -  SOB did not improved, CXR showed R sided pleural effusion  -  No cough -  Pt's daughter reported worsening cognitive issues -  Pt has poor inside about her health  -  ECHO done  -  EF 30 %     The following portions of the patient's history were reviewed and updated as appropriate: allergies, current medications, past family history, past medical history, past social history, past surgical history and problem list     Review of Systems   Constitutional: Positive for activity change and appetite change  Negative for fatigue  HENT: Negative  Respiratory: Positive for shortness of breath  Negative for cough, chest tightness and wheezing  Cardiovascular: Negative for chest pain, palpitations and leg swelling  Gastrointestinal: Negative for abdominal pain, constipation and nausea  Genitourinary: Negative  Musculoskeletal: Negative  Neurological: Negative for dizziness and light-headedness  Psychiatric/Behavioral: Positive for confusion (chronic ), decreased concentration and sleep disturbance  Negative for behavioral problems, dysphoric mood, hallucinations and suicidal ideas  The patient is not nervous/anxious          Current Outpatient Medications   Medication Sig Dispense Refill   • aspirin 81 MG tablet Take 1 tablet by mouth daily     • Calcium Carbonate-Vitamin D (CALCIUM 600+D HIGH POTENCY PO) Take 600 mg by mouth 2 (two) times a day     • Cholecalciferol (VITAMIN D) 2000 units tablet Take 1 tablet (2,000 Units total) by mouth daily     • Coenzyme Q10-Fish Oil-Vit E (CO-Q 10 OMEGA-3 FISH OIL PO) Take 1 capsule by mouth daily     • donepezil "(ARICEPT) 5 mg tablet TAKE 1 TABLET BY MOUTH EVERY DAY AT NIGHT     • fexofenadine (ALLEGRA) 180 MG tablet Take by mouth     • furosemide (LASIX) 20 mg tablet TAKE 1 TABLET BY MOUTH EVERY DAY 90 tablet 1   • Klor-Con M20 20 MEQ tablet TAKE 1 TABLET (20 MEQ TOTAL) BY MOUTH 4 (FOUR) TIMES A  tablet 0   • losartan (COZAAR) 50 mg tablet Take 50 mg by mouth daily     • metoprolol succinate (TOPROL-XL) 50 mg 24 hr tablet Take 1 tablet (50 mg total) by mouth daily 90 tablet 1   • RABEprazole (ACIPHEX) 20 MG tablet Take 1 tablet (20 mg total) by mouth daily (Patient taking differently: Take 20 mg by mouth 3 (three) times a week) 90 tablet 3   • rosuvastatin (CRESTOR) 10 MG tablet TAKE 1 TABLET BY MOUTH EVERY DAY 90 tablet 1     No current facility-administered medications for this visit  Objective:    /90 (BP Location: Left arm, Patient Position: Sitting, Cuff Size: Standard)   Pulse 78   Temp 98 2 °F (36 8 °C)   Resp 18   Ht 5' 4\" (1 626 m)   Wt 56 7 kg (125 lb)   BMI 21 46 kg/m²        Physical Exam  Constitutional:       General: She is not in acute distress  Appearance: She is not ill-appearing  Cardiovascular:      Rate and Rhythm: Normal rate and regular rhythm  Pulmonary:      Effort: Pulmonary effort is normal  No respiratory distress  Breath sounds: Examination of the right-middle field reveals decreased breath sounds  Examination of the right-lower field reveals decreased breath sounds  Decreased breath sounds present  No wheezing, rhonchi or rales  Abdominal:      Palpations: Abdomen is soft  Tenderness: There is no abdominal tenderness  Musculoskeletal:      Right lower leg: No edema  Left lower leg: No edema  Neurological:      General: No focal deficit present  Mental Status: She is alert  Mental status is at baseline  Motor: No weakness  Gait: Gait normal    Psychiatric:         Cognition and Memory: Cognition is impaired   She exhibits " impaired recent memory  Judgment: Judgment is not inappropriate  Labs in chart were reviewed  Assessment/Plan:         Diagnoses and all orders for this visit:    Weight loss  -     CT chest wo contrast; Future  -     mirtazapine (REMERON) 7 5 MG tablet; Take 1 tablet (7 5 mg total) by mouth daily at bedtime    Pleural effusion  -     CT chest wo contrast; Future  -     IR thoracentesis;  Future    Dyspnea, unspecified type  -     CT chest wo contrast; Future    Chronic heart failure, unspecified [facility]    Cognitive changes       F/u with cardiologist       rto in 2 wks               Alec Mosher MD

## 2023-06-08 NOTE — TELEPHONE ENCOUNTER
Spoke with Jaime Moy in between patients;   Per Jaime Moy, he agrees-pt  needs to have the Thoracentesis as heart function is weakening  Spoke with pt 's Daughter-Keith-she expressed verbal understanding

## 2023-06-08 NOTE — TELEPHONE ENCOUNTER
I spoke with Rufus Queen made aware  Pt is scheduled to have a Thoracentesis tomorrow at 1  Labs done today  Sergo Nettles will be notified;will await his response

## 2023-06-09 ENCOUNTER — HOSPITAL ENCOUNTER (OUTPATIENT)
Dept: NON INVASIVE DIAGNOSTICS | Facility: HOSPITAL | Age: 88
Discharge: HOME | End: 2023-06-09
Attending: FAMILY MEDICINE
Payer: COMMERCIAL

## 2023-06-09 ENCOUNTER — TELEPHONE (OUTPATIENT)
Dept: CARDIOLOGY CLINIC | Facility: CLINIC | Age: 88
End: 2023-06-09

## 2023-06-09 VITALS
RESPIRATION RATE: 18 BRPM | DIASTOLIC BLOOD PRESSURE: 75 MMHG | OXYGEN SATURATION: 96 % | SYSTOLIC BLOOD PRESSURE: 133 MMHG | HEART RATE: 85 BPM

## 2023-06-09 DIAGNOSIS — J90 PLEURAL EFFUSION: ICD-10-CM

## 2023-06-09 DIAGNOSIS — J90 PLEURAL EFFUSION: Primary | ICD-10-CM

## 2023-06-09 LAB
LDH FLD L TO P-CCNC: 140 U/L
PROT FLD-MCNC: 3.2 G/DL
WBC # FLD MANUAL: 2133 /UL

## 2023-06-09 PROCEDURE — 84157 ASSAY OF PROTEIN OTHER: CPT

## 2023-06-09 PROCEDURE — 32555 ASPIRATE PLEURA W/ IMAGING: CPT

## 2023-06-09 PROCEDURE — 89050 BODY FLUID CELL COUNT: CPT

## 2023-06-09 PROCEDURE — 83615 LACTATE (LD) (LDH) ENZYME: CPT

## 2023-06-09 PROCEDURE — 32555 ASPIRATE PLEURA W/ IMAGING: CPT | Performed by: INTERNAL MEDICINE

## 2023-06-09 NOTE — SEDATION DOCUMENTATION
Procedure ended, pt tolerated with some coughing, 1300 ml amver fluid removed  Bandaid to site     Pt discharged home with daughter in stable condition

## 2023-06-09 NOTE — TELEPHONE ENCOUNTER
Daughter called back : BMP results were given as instructed by the doctor   No further questions at this time

## 2023-06-09 NOTE — BRIEF OP NOTE (RAD/CATH)
INTERVENTIONAL RADIOLOGY PROCEDURE NOTE    Date: 6/9/2023    Procedure:   Procedure Summary     Date: 06/09/23 Room / Location: [address] [place] [address] Cardiac Cath Lab    Anesthesia Start:  Anesthesia Stop:     Procedure: IR THORACENTESIS Diagnosis:       Pleural effusion      (R pleural effusion)    Scheduled Providers:  Responsible Provider:     Anesthesia Type: Not recorded ASA Status: Not recorded          Preoperative diagnosis:   1  Pleural effusion         Postoperative diagnosis: Same  Surgeon: Sarah Alan MD     Assistant: None  No qualified resident was available  Blood loss: None    Specimens: Pleural fluid sent for evaluation  Findings: Ultrasound-guided thoracentesis performed  Complications: None immediate      Anesthesia: local

## 2023-06-09 NOTE — DISCHARGE INSTRUCTIONS
Thoracentesis   WHAT YOU NEED TO KNOW:   A thoracentesis is a procedure to remove extra fluid or air from between your lungs and your inner chest wall  Air or fluid buildup may make it hard for you to breathe  A thoracentesis allows your lungs to expand fully so you can breathe more easily  DISCHARGE INSTRUCTIONS:   Medicines:   Pain medicine: You may be given a prescription medicine to decrease pain  Do not wait until the pain is severe before you take your medicine  Antibiotics: This medicine helps fight or prevent an infection  Take your medicine as directed  Call your healthcare provider if you think your medicine is not helping or if you have side effects  Tell him if you are allergic to any medicine  Keep a list of the medicines, vitamins, and herbs you take  Include the amounts, and when and why you take them  Bring the list or the pill bottles to follow-up visits  Carry your medicine list with you in case of an emergency  Follow up with your healthcare provider as directed:  Write down your questions so you remember to ask them during your visits  Rest:  Rest when you feel it is needed  Slowly start to do more each day  Return to your daily activities as directed  Do not smoke: If you smoke, it is never too late to quit  Ask for information about how to stop smoking if you need help  Contact your healthcare provider if:   You have a fever  Your puncture site is red, warm, swollen, or draining pus  You have questions or concerns about your procedure, medicine, or care  If you have any questions regarding, call the IR department @ [phone]  Seek care immediately or call [phone] if:   Blood soaks through your bandage  There is blood in your spit

## 2023-06-09 NOTE — TELEPHONE ENCOUNTER
----- Message from Mason Ortega MD sent at 6/8/2023  6:04 PM EDT -----  Patient's labs reviewed  BMP is acceptable  Continue same dose of diuretics

## 2023-06-16 ENCOUNTER — RA CDI HCC (OUTPATIENT)
Dept: OTHER | Facility: HOSPITAL | Age: 88
End: 2023-06-16

## 2023-06-16 ENCOUNTER — HOSPITAL ENCOUNTER (OUTPATIENT)
Dept: RADIOLOGY | Facility: HOSPITAL | Age: 88
Discharge: HOME | End: 2023-06-16
Attending: FAMILY MEDICINE
Payer: COMMERCIAL

## 2023-06-16 DIAGNOSIS — J90 PLEURAL EFFUSION: ICD-10-CM

## 2023-06-16 DIAGNOSIS — R06.00 DYSPNEA, UNSPECIFIED TYPE: ICD-10-CM

## 2023-06-16 DIAGNOSIS — R63.4 WEIGHT LOSS: ICD-10-CM

## 2023-06-16 PROCEDURE — 71250 CT THORAX DX C-: CPT

## 2023-06-16 PROCEDURE — G1004 CDSM NDSC: HCPCS

## 2023-06-16 NOTE — PROGRESS NOTES
[facility]  coding opportunities          Chart Reviewed number of suggestions sent to Provider: 1   I11 0    Patients Insurance     Medicare Insurance: [employer]

## 2023-06-22 ENCOUNTER — OFFICE VISIT (OUTPATIENT)
Dept: FAMILY MEDICINE CLINIC | Facility: CLINIC | Age: 88
End: 2023-06-22
Payer: COMMERCIAL

## 2023-06-22 VITALS
RESPIRATION RATE: 16 BRPM | BODY MASS INDEX: 21.54 KG/M2 | TEMPERATURE: 97.7 F | HEART RATE: 80 BPM | HEIGHT: 64 IN | DIASTOLIC BLOOD PRESSURE: 88 MMHG | SYSTOLIC BLOOD PRESSURE: 144 MMHG | WEIGHT: 126.2 LBS

## 2023-06-22 DIAGNOSIS — J90 PLEURAL EFFUSION: ICD-10-CM

## 2023-06-22 DIAGNOSIS — R41.89 COGNITIVE CHANGES: ICD-10-CM

## 2023-06-22 DIAGNOSIS — I50.22 CHRONIC SYSTOLIC HEART FAILURE (HCC): Primary | ICD-10-CM

## 2023-06-22 PROCEDURE — 99214 OFFICE O/P EST MOD 30 MIN: CPT | Performed by: FAMILY MEDICINE

## 2023-06-22 NOTE — PROGRESS NOTES
Chief Complaint   Patient presents with   • Follow-up     2 week f/u        Patient ID: Marino Harper is a 80 y o  female  HPI  Pt is seeing for f/u on Remeron -  Started 2 wks ago due to poor appetite -  Pt has cognitive issues and seeing with her daughter -  Under geriatric specialist care -  On Aricept  -  Has CHF and  pleural effusion  -  Recently underwent thoracentesis - feeling better with SOB -  Last 2 days dry cough returned -  Seems to be eating better last 2 days     The following portions of the patient's history were reviewed and updated as appropriate: allergies, current medications, past family history, past medical history, past social history, past surgical history and problem list     Review of Systems   Constitutional: Negative for fatigue  HENT: Negative  Respiratory: Positive for cough  Negative for chest tightness, shortness of breath and wheezing  Cardiovascular: Negative for chest pain, palpitations and leg swelling  Gastrointestinal: Negative for abdominal pain, constipation and nausea  Genitourinary: Negative  Musculoskeletal: Negative  Neurological: Negative for dizziness and light-headedness  Psychiatric/Behavioral: Positive for confusion (chronic ) and decreased concentration  Negative for behavioral problems, dysphoric mood, hallucinations and suicidal ideas  The patient is not nervous/anxious      poor historian -  ROS as per daughter     Current Outpatient Medications   Medication Sig Dispense Refill   • aspirin 81 MG tablet Take 1 tablet by mouth daily     • Calcium Carbonate-Vitamin D (CALCIUM 600+D HIGH POTENCY PO) Take 600 mg by mouth 2 (two) times a day     • donepezil (ARICEPT) 5 mg tablet TAKE 1 TABLET BY MOUTH EVERY DAY AT NIGHT     • fexofenadine (ALLEGRA) 180 MG tablet Take by mouth     • furosemide (LASIX) 20 mg tablet TAKE 1 TABLET BY MOUTH EVERY DAY 90 tablet 1   • Klor-Con M20 20 MEQ tablet TAKE 1 TABLET (20 MEQ TOTAL) BY MOUTH 4 (FOUR) TIMES A " tablet 0   • losartan (COZAAR) 50 mg tablet Take 50 mg by mouth daily     • metoprolol succinate (TOPROL-XL) 50 mg 24 hr tablet Take 1 tablet (50 mg total) by mouth daily 90 tablet 1   • mirtazapine (REMERON) 7 5 MG tablet Take 1 tablet (7 5 mg total) by mouth daily at bedtime 30 tablet 5   • RABEprazole (ACIPHEX) 20 MG tablet Take 1 tablet (20 mg total) by mouth daily (Patient taking differently: Take 20 mg by mouth 3 (three) times a week) 90 tablet 3   • rosuvastatin (CRESTOR) 10 MG tablet TAKE 1 TABLET BY MOUTH EVERY DAY 90 tablet 1   • Cholecalciferol (VITAMIN D) 2000 units tablet Take 1 tablet (2,000 Units total) by mouth daily (Patient not taking: Reported on 6/22/2023)     • Coenzyme Q10-Fish Oil-Vit E (CO-Q 10 OMEGA-3 FISH OIL PO) Take 1 capsule by mouth daily (Patient not taking: Reported on 6/22/2023)       No current facility-administered medications for this visit  Objective:    /88 (BP Location: Left arm, Patient Position: Sitting, Cuff Size: Standard)   Pulse 80   Temp 97 7 °F (36 5 °C)   Resp 16   Ht 5' 4\" (1 626 m)   Wt 57 2 kg (126 lb 3 2 oz)   BMI 21 66 kg/m²        Physical Exam  Constitutional:       General: She is not in acute distress  Appearance: She is not ill-appearing  Cardiovascular:      Rate and Rhythm: Normal rate and regular rhythm  Pulmonary:      Effort: Pulmonary effort is normal  No respiratory distress  Breath sounds: No wheezing, rhonchi or rales  Neurological:      Mental Status: She is alert  Mental status is at baseline  Psychiatric:         Mood and Affect: Mood normal            Labs in chart were reviewed  Assessment/Plan:         Diagnoses and all orders for this visit:    Chronic systolic heart failure (HCC)  -    Will add Empagliflozin (JARDIANCE) 10 MG TABS tablet;  Take 1 tablet (10 mg total) by mouth daily    Cognitive changes    Pleural effusion        Cont other meds  F/u with cardiologist     rto in 1 m " Mikey Curling, MD

## 2023-06-23 ENCOUNTER — TELEPHONE (OUTPATIENT)
Dept: FAMILY MEDICINE CLINIC | Facility: CLINIC | Age: 88
End: 2023-06-23

## 2023-06-23 NOTE — TELEPHONE ENCOUNTER
Pl, advise pt's daughter -  I informed Dr Atiya Neumann about new med and he is [phone] [address] with that

## 2023-06-30 ENCOUNTER — OFFICE VISIT (OUTPATIENT)
Dept: URGENT CARE | Facility: CLINIC | Age: 88
End: 2023-06-30
Payer: COMMERCIAL

## 2023-06-30 VITALS
HEART RATE: 72 BPM | OXYGEN SATURATION: 95 % | DIASTOLIC BLOOD PRESSURE: 62 MMHG | WEIGHT: 123.2 LBS | BODY MASS INDEX: 21.15 KG/M2 | SYSTOLIC BLOOD PRESSURE: 108 MMHG | RESPIRATION RATE: 18 BRPM | TEMPERATURE: 97.5 F

## 2023-06-30 DIAGNOSIS — M62.838 TRAPEZIUS MUSCLE SPASM: Primary | ICD-10-CM

## 2023-06-30 PROCEDURE — 99213 OFFICE O/P EST LOW 20 MIN: CPT | Performed by: PREVENTIVE MEDICINE

## 2023-06-30 NOTE — PATIENT INSTRUCTIONS
I believe you have a trapezius spasm  2 Advil or generic ibuprofen 4 times a day  Try ice to the area 4 times a day

## 2023-06-30 NOTE — PROGRESS NOTES
[facility]        NAME: Milo Feliciano is a 80 y o  female  : 10/3/1933    MRN: [id number]  DATE: 2023  TIME: 2:52 PM    Assessment and Plan   Trapezius muscle spasm [M62 838]  1  Trapezius muscle spasm              Patient Instructions       Follow up with PCP in 3-5 days  Proceed to  ER if symptoms worsen  Chief Complaint     Chief Complaint   Patient presents with   • Neck Pain     Pt states  on going neck pain x  4 days pt states  no injury,  neck is tight  and trouble moving it to the right  Pain 5/10  Pt used Tylenol  History of Present Illness       Pain in the neck and across the superior top of the shoulder times several days  Difficulty turning her head  Somewhat improved this morning  Review of Systems   Review of Systems   Musculoskeletal: Positive for myalgias, neck pain and neck stiffness           Current Medications       Current Outpatient Medications:   •  aspirin 81 MG tablet, Take 1 tablet by mouth daily, Disp: , Rfl:   •  donepezil (ARICEPT) 5 mg tablet, TAKE 1 TABLET BY MOUTH EVERY DAY AT NIGHT, Disp: , Rfl:   •  fexofenadine (ALLEGRA) 180 MG tablet, Take by mouth, Disp: , Rfl:   •  furosemide (LASIX) 20 mg tablet, TAKE 1 TABLET BY MOUTH EVERY DAY, Disp: 90 tablet, Rfl: 1  •  Klor-Con M20 20 MEQ tablet, TAKE 1 TABLET (20 MEQ TOTAL) BY MOUTH 4 (FOUR) TIMES A DAY, Disp: 360 tablet, Rfl: 0  •  losartan (COZAAR) 50 mg tablet, Take 50 mg by mouth daily, Disp: , Rfl:   •  metoprolol succinate (TOPROL-XL) 50 mg 24 hr tablet, Take 1 tablet (50 mg total) by mouth daily, Disp: 90 tablet, Rfl: 1  •  mirtazapine (REMERON) 7 5 MG tablet, Take 1 tablet (7 5 mg total) by mouth daily at bedtime, Disp: 30 tablet, Rfl: 5  •  RABEprazole (ACIPHEX) 20 MG tablet, Take 1 tablet (20 mg total) by mouth daily (Patient taking differently: Take 20 mg by mouth 3 (three) times a week), Disp: 90 tablet, Rfl: 3  •  rosuvastatin (CRESTOR) 10 MG tablet, TAKE 1 TABLET BY MOUTH EVERY DAY, Disp: 90 tablet, Rfl: 1  •  Calcium Carbonate-Vitamin D (CALCIUM 600+D HIGH POTENCY PO), Take 600 mg by mouth 2 (two) times a day (Patient not taking: Reported on 6/30/2023), Disp: , Rfl:   •  Cholecalciferol (VITAMIN D) 2000 units tablet, Take 1 tablet (2,000 Units total) by mouth daily (Patient not taking: Reported on 6/22/2023), Disp: , Rfl:   •  Coenzyme Q10-Fish Oil-Vit E (CO-Q 10 OMEGA-3 FISH OIL PO), Take 1 capsule by mouth daily (Patient not taking: Reported on 6/22/2023), Disp: , Rfl:   •  Empagliflozin (JARDIANCE) 10 MG TABS tablet, Take 1 tablet (10 mg total) by mouth daily (Patient not taking: Reported on 6/30/2023), Disp: 30 tablet, Rfl: 5    Current Allergies     Allergies as of 06/30/2023 - Reviewed 06/30/2023   Allergen Reaction Noted   • Bee venom Anaphylaxis 02/02/2004   • Enalapril Other (See Comments) 10/28/2003   • Erythromycin Dizziness 10/28/2003   • Estrogens Other (See Comments) 02/02/2004   • Ezetimibe Hives 08/09/2022   • Penicillins Hives 09/15/2016   • Ramipril Other (See Comments) 02/02/2004   • Statins Other (See Comments) 09/15/2016   • Versed [midazolam] Other (See Comments) 09/15/2016   • Zithromax [azithromycin] Anaphylaxis 09/15/2016            The following portions of the patient's history were reviewed and updated as appropriate: allergies, current medications, past family history, past medical history, past social history, past surgical history and problem list      Past Medical History:   Diagnosis Date   • Abscess of chest wall     Last Assessed: 2/27/2014   • Arthritis    • Back pain    • Cataract     Start of   • [facility]    • Colon polyp    • Coronary artery disease    • Diverticulitis of colon 12/04/2008   • Female bladder prolapse    • Gastric reflux    • Hematuria     Last Assessed: 11/18/2014    • History of diverticulitis    • [place] (hard of hearing)    • Hypercholesteremia    • Hyperkeratosis of skin 11/03/2011   • Hypertension • Hypokalemia 12/13/2011   • Incomplete uterovaginal prolapse 05/28/2010   • Myocardial infarction [facility])    • Osteoporosis    • 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    Last Assessed: 10/24/2012    • Seasonal allergies    • Sebaceous cyst     Last Assessed: 2/8/2014   • Syncope    • Trigeminal neuralgia 03/20/2012   • Urinary incontinence    • Uterine prolapse    • Vertigo 09/15/2011   • 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   • Wears glasses    • Wears hearing aid     States she rarely wears them   • Wears partial dentures     Upper        Past Surgical History:   Procedure Laterality Date   • APPENDECTOMY     • APPENDICO-VESICOSTOMY CUTANEOUS  03/01/2010   • CHOLECYSTECTOMY      Laparoscopic   • COLONOSCOPY     • CORONARY ANGIOPLASTY     • CORONARY ANGIOPLASTY WITH STENT PLACEMENT      2 aortic stents   • CORONARY ANGIOPLASTY WITH STENT PLACEMENT  2013    2 distal left anterior descending artery    • ESOPHAGOGASTRODUODENOSCOPY     • FRACTURE SURGERY Right     Repair right arm- titanium servando from shoulder to elbow     • HYSTERECTOMY      Complete   • IR THORACENTESIS  6/9/2023   • DE CMBND ANTERPOST COLPORRAPHY W/CYSTO N/A 9/20/2016    Procedure: COLPORRHAPHY ANTERIOR POSTERIOR GRAFT;  Surgeon: Quirino Flores MD;  [facility];  Service: UroGynecology          • DE COLPOPEXY VAGINAL EXTRAPERITONEAL APPROACH N/A 9/20/2016    Procedure: COLPOPEXY VAGINAL EXTRAPERITONEAL  incision and drainage of vagina inclusion cyst x 4;  Surgeon: Quirino Flores MD;  [facility];  Service: UroGynecology          • DE CYSTOURETHROSCOPY N/A 9/20/2016    Procedure: May Zuniga;  Surgeon: Quirino Flores MD;  [facility];  Service: UroGynecology          • DE SLING OPERATION STRESS INCONTINENCE N/A 9/20/2016    Procedure: INSERTION PUBOVAGINAL SLING RETROPUBIC ;  Surgeon: Quirino Flores MD;  [facility]: [facility];  Service: UroGynecology              Family History   Problem Relation Age of Onset   • Hypertension Mother    • Heart failure Mother    • Coronary artery disease Mother    • Arthritis Mother    • Osteoporosis Mother    • Hyperlipidemia Mother    • Coronary artery disease Sister          Medications have been verified  Objective   /62   Pulse 72   Temp 97 5 °F (36 4 °C)   Resp 18   Wt 55 9 kg (123 lb 3 2 oz)   SpO2 95%   BMI 21 15 kg/m²   No LMP recorded   Patient is postmenopausal        Physical Exam     Physical Exam  Musculoskeletal:      Comments: Tenderness to palpation across the superior aspect of the right trapezius 3

## 2023-07-13 ENCOUNTER — OFFICE VISIT (OUTPATIENT)
Dept: FAMILY MEDICINE CLINIC | Facility: CLINIC | Age: 88
End: 2023-07-13
Payer: COMMERCIAL

## 2023-07-13 ENCOUNTER — TELEPHONE (OUTPATIENT)
Dept: FAMILY MEDICINE CLINIC | Facility: CLINIC | Age: 88
End: 2023-07-13

## 2023-07-13 VITALS
HEIGHT: 64 IN | SYSTOLIC BLOOD PRESSURE: 156 MMHG | BODY MASS INDEX: 20.93 KG/M2 | HEART RATE: 76 BPM | TEMPERATURE: 98.1 F | DIASTOLIC BLOOD PRESSURE: 92 MMHG | WEIGHT: 122.6 LBS | RESPIRATION RATE: 18 BRPM

## 2023-07-13 DIAGNOSIS — Z00.00 MEDICARE ANNUAL WELLNESS VISIT, SUBSEQUENT: ICD-10-CM

## 2023-07-13 DIAGNOSIS — I50.22 CHRONIC SYSTOLIC HEART FAILURE (HCC): ICD-10-CM

## 2023-07-13 DIAGNOSIS — J90 PLEURAL EFFUSION: Primary | ICD-10-CM

## 2023-07-13 DIAGNOSIS — M79.89 LEG SWELLING: ICD-10-CM

## 2023-07-13 PROCEDURE — 99214 OFFICE O/P EST MOD 30 MIN: CPT | Performed by: FAMILY MEDICINE

## 2023-07-13 PROCEDURE — G0439 PPPS, SUBSEQ VISIT: HCPCS | Performed by: FAMILY MEDICINE

## 2023-07-13 RX ORDER — FUROSEMIDE 40 MG/1
40 TABLET ORAL DAILY
Qty: 30 TABLET | Refills: 6 | Status: SHIPPED | OUTPATIENT
Start: 2023-07-13

## 2023-07-13 NOTE — TELEPHONE ENCOUNTER
I phoned pts son in law Liza Chatterjee because Dr. Mandy Hart wants the Patient to increase her lasix to 40mg daily regardless if the Patient is or isnt taking her jardiance. Pts son in law is also suppose to let the office know if the Patient is taking the jardiance. If she is not she is supposed to start it. I am waiting on a call back from Liza Chatterjee.

## 2023-07-13 NOTE — PATIENT INSTRUCTIONS
Medicare Preventive Visit Patient Instructions  Thank you for completing your Welcome to Medicare Visit or Medicare Annual Wellness Visit today. Your next wellness visit will be due in one year (7/13/2024). The screening/preventive services that you may require over the next 5-10 years are detailed below. Some tests may not apply to you based off risk factors and/or age. Screening tests ordered at today's visit but not completed yet may show as past due. Also, please note that scanned in results may not display below. Preventive Screenings:  Service Recommendations Previous Testing/Comments   Colorectal Cancer Screening  * Colonoscopy    * Fecal Occult Blood Test (FOBT)/Fecal Immunochemical Test (FIT)  * Fecal DNA/Cologuard Test  * Flexible Sigmoidoscopy Age: 43-73 years old   Colonoscopy: every 10 years (may be performed more frequently if at higher risk)  OR  FOBT/FIT: every 1 year  OR  Cologuard: every 3 years  OR  Sigmoidoscopy: every 5 years  Screening may be recommended earlier than age 39 if at higher risk for colorectal cancer. Also, an individualized decision between you and your healthcare provider will decide whether screening between the ages of 77-80 would be appropriate. Colonoscopy: 05/29/2020  FOBT/FIT: Not on file  Cologuard: Not on file  Sigmoidoscopy: Not on file          Breast Cancer Screening Age: 36 years old  Frequency: every 1-2 years  Not required if history of left and right mastectomy Mammogram: 09/12/2017        Cervical Cancer Screening Between the ages of 21-29, pap smear recommended once every 3 years. Between the ages of 32-69, can perform pap smear with HPV co-testing every 5 years.    Recommendations may differ for women with a history of total hysterectomy, cervical cancer, or abnormal pap smears in past. Pap Smear: Not on file        Hepatitis C Screening Once for adults born between [address] [place] [address]  More frequently in patients at high risk for Hepatitis C Hep C Antibody: 03/01/2018        Diabetes Screening 1-2 times per year if you're at risk for diabetes or have pre-diabetes Fasting glucose: 106 mg/dL (6/8/2023)  A1C: 5.8 (6/5/2022)      Cholesterol Screening Once every 5 years if you don't have a lipid disorder. May order more often based on risk factors. Lipid panel: 06/08/2023          Other Preventive Screenings Covered by Medicare:  1. Abdominal Aortic Aneurysm (AAA) Screening: covered once if your at risk. You're considered to be at risk if you have a family history of AAA. 2. Lung Cancer Screening: covers low dose CT scan once per year if you meet all of the following conditions: (1) Age 48-67; (2) No signs or symptoms of lung cancer; (3) Current smoker or have quit smoking within the last 15 years; (4) You have a tobacco smoking history of at least 20 pack years (packs per day multiplied by number of years you smoked); (5) You get a written order from a healthcare provider. 3. Glaucoma Screening: covered annually if you're considered high risk: (1) You have diabetes OR (2) Family history of glaucoma OR (3)  aged 48 and older OR (3)  American aged 72 and older  3. Osteoporosis Screening: covered every 2 years if you meet one of the following conditions: (1) You're estrogen deficient and at risk for osteoporosis based off medical history and other findings; (2) Have a vertebral abnormality; (3) On glucocorticoid therapy for more than 3 months; (4) Have primary hyperparathyroidism; (5) On osteoporosis medications and need to assess response to drug therapy. · Last bone density test (DXA Scan): 09/16/2019.  5. HIV Screening: covered annually if you're between the age of 15-65. Also covered annually if you are younger than 13 and older than 72 with risk factors for HIV infection. For pregnant patients, it is covered up to 3 times per pregnancy.     Immunizations:  Immunization Recommendations   Influenza Vaccine Annual influenza vaccination during flu season is recommended for all persons aged >= 6 months who do not have contraindications   Pneumococcal Vaccine   * Pneumococcal conjugate vaccine = PCV13 (Prevnar 13), PCV15 (Vaxneuvance), PCV20 (Prevnar 20)  * Pneumococcal polysaccharide vaccine = PPSV23 (Pneumovax) Adults 20-63 years old: 1-3 doses may be recommended based on certain risk factors  Adults 72 years old: 1-2 doses may be recommended based off what pneumonia vaccine you previously received   Hepatitis B Vaccine 3 dose series if at intermediate or high risk (ex: diabetes, end stage renal disease, liver disease)   Tetanus (Td) Vaccine - COST NOT COVERED BY MEDICARE PART B Following completion of primary series, a booster dose should be given every 10 years to maintain immunity against tetanus. Td may also be given as tetanus wound prophylaxis. Tdap Vaccine - COST NOT COVERED BY MEDICARE PART B Recommended at least once for all adults. For pregnant patients, recommended with each pregnancy. Shingles Vaccine (Shingrix) - COST NOT COVERED BY MEDICARE PART B  2 shot series recommended in those aged 48 and above     Health Maintenance Due:      Topic Date Due   • Hepatitis C Screening  Completed     Immunizations Due:      Topic Date Due   • COVID-19 Vaccine (5 - Moderna series) 07/08/2022   • Influenza Vaccine (1) 09/01/2023     Advance Directives   What are advance directives? Advance directives are legal documents that state your wishes and plans for medical care. These plans are made ahead of time in case you lose your ability to make decisions for yourself. Advance directives can apply to any medical decision, such as the treatments you want, and if you want to donate organs. What are the types of advance directives? There are many types of advance directives, and each state has rules about how to use them. You may choose a combination of any of the following:  · Living will: This is a written record of the treatment you want.  You can also choose which treatments you do not want, which to limit, and which to stop at a certain time. This includes surgery, medicine, IV fluid, and tube feedings. · Durable power of  for healthcare [facility]): This is a written record that states who you want to make healthcare choices for you when you are unable to make them for yourself. This person, called a proxy, is usually a family member or a friend. You may choose more than 1 proxy. · Do not resuscitate (DNR) order:  A DNR order is used in case your heart stops beating or you stop breathing. It is a request not to have certain forms of treatment, such as CPR. A DNR order may be included in other types of advance directives. · Medical directive: This covers the care that you want if you are in a coma, near death, or unable to make decisions for yourself. You can list the treatments you want for each condition. Treatment may include pain medicine, surgery, blood transfusions, dialysis, IV or tube feedings, and a ventilator (breathing machine). · Values history: This document has questions about your views, beliefs, and how you feel and think about life. This information can help others choose the care that you would choose. Why are advance directives important? An advance directive helps you control your care. Although spoken wishes may be used, it is better to have your wishes written down. Spoken wishes can be misunderstood, or not followed. Treatments may be given even if you do not want them. An advance directive may make it easier for your family to make difficult choices about your care. Urinary Incontinence   Urinary incontinence (UI)  is when you lose control of your bladder. UI develops because your bladder cannot store or empty urine properly. The 3 most common types of UI are stress incontinence, urge incontinence, or both. Medicines:   · May be given to help strengthen your bladder control.  Report any side effects of medication to your healthcare provider. Do pelvic muscle exercises often:  Your pelvic muscles help you stop urinating. Squeeze these muscles tight for 5 seconds, then relax for 5 seconds. Gradually work up to squeezing for 10 seconds. Do 3 sets of 15 repetitions a day, or as directed. This will help strengthen your pelvic muscles and improve bladder control. Train your bladder:  Go to the bathroom at set times, such as every 2 hours, even if you do not feel the urge to go. You can also try to hold your urine when you feel the urge to go. For example, hold your urine for 5 minutes when you feel the urge to go. As that becomes easier, hold your urine for 10 minutes. Self-care:   · Keep a UI record. Write down how often you leak urine and how much you leak. Make a note of what you were doing when you leaked urine. · Drink liquids as directed. You may need to limit the amount of liquid you drink to help control your urine leakage. Do not drink any liquid right before you go to bed. Limit or do not have drinks that contain caffeine or alcohol. · Prevent constipation. Eat a variety of high-fiber foods. Good examples are high-fiber cereals, beans, vegetables, and whole-grain breads. Walking is the best way to trigger your intestines to have a bowel movement. · Exercise regularly and maintain a healthy weight. Weight loss and exercise will decrease pressure on your bladder and help you control your leakage. · Use a catheter as directed  to help empty your bladder. A catheter is a tiny, plastic tube that is put into your bladder to drain your urine. · Go to behavior therapy as directed. Behavior therapy may be used to help you learn to control your urge to urinate. © Copyright [employer] 2018 Information is for End User's use only and may not be sold, redistributed or otherwise used for commercial purposes.  All illustrations and images included in CareNotes® are the copyrighted property of [employer] or [employer] [address]

## 2023-07-13 NOTE — TELEPHONE ENCOUNTER
I did speak to Julian with the instructions to increase the Lasix 40mg. Julian stated that Patient has not started Jardiance because it wasn't picked up. Pts daughter called with concerns about the Patient having thoracentesis within a month.

## 2023-07-13 NOTE — PROGRESS NOTES
Assessment and Plan:     Problem List Items Addressed This Visit        Respiratory    Pleural effusion - Primary    Relevant Orders    IR thoracentesis  Will consider pulmonologist        Cardiovascular and Mediastinum    Chronic heart failure ([address])    Relevant Medications    Will start if not taking it - Empagliflozin (JARDIANCE) 10 MG TABS tablet  Family will call with clarification   Will increase lasix to 40 mg a day    Other Visit Diagnoses     Medicare annual wellness visit, subsequent          rto in 1 m       Preventive health issues were discussed with patient, and age appropriate screening tests were ordered as noted in patient's After Visit Summary. Personalized health advice and appropriate referrals for health education or preventive services given if needed, as noted in patient's After Visit Summary. History of Present Illness:     Patient presents for a Medicare Wellness Visit    Pt is seeing for f/u Sob -  Had thoracentesis 5 wks ago -  Worsening SOB with minimal exertion now x 1-2 wks - not sure if started on Jardiance -  Seen with her son-in-law today  -  Has legs swelling  -  On Lasix -  CT chest did not show any lungs masses      Patient Care Team:  Ninoska Hahn MD as PCP - General (Family Medicine)  Abby Pham MD as PCP - Endocrinology (Endocrinology)  MD Boby Macias DO Priya D Honey Lower, PA-C as Physician Assistant (Endocrinology)     Review of Systems:     Review of Systems   Constitutional: Positive for activity change, appetite change and fatigue. Negative for chills, diaphoresis and fever. HENT: Negative. Respiratory: Positive for shortness of breath (with minimal exertion ). Negative for cough, chest tightness and wheezing. Cardiovascular: Positive for leg swelling. Negative for chest pain and palpitations. Gastrointestinal: Negative. Genitourinary: Negative. Musculoskeletal: Negative.          Problem List:     Patient Active Problem List   Diagnosis   • Arteriosclerosis of coronary artery   • Benign essential hypertension   • Chronic heart failure (HCC)   • Hyperlipidemia   • Coronary artery disease   • Hypokalemia   • Osteoporosis   • Vitamin D deficiency   • Abnormal CBC   • H/O: CVA (cerebrovascular accident)   • Depression, recurrent ([address])   • Cognitive changes   • Pleural effusion      Past Medical and Surgical History:     Past Medical History:   Diagnosis Date   • Abscess of chest wall     Last Assessed: 2/27/2014   • Arthritis    • Back pain    • Cataract     Start of   • CHF (congestive heart failure) ([address])    • Colon polyp    • Coronary artery disease    • Diverticulitis of colon 12/04/2008   • Female bladder prolapse    • Gastric reflux    • Hematuria     Last Assessed: 11/18/2014    • History of diverticulitis    • [place] (hard of hearing)    • Hypercholesteremia    • Hyperkeratosis of skin 11/03/2011   • Hypertension    • Hypokalemia 12/13/2011   • Incomplete uterovaginal prolapse 05/28/2010   • Myocardial infarction [facility])    • Osteoporosis    • 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    Last Assessed: 10/24/2012    • Seasonal allergies    • Sebaceous cyst     Last Assessed: 2/8/2014   • Syncope    • Trigeminal neuralgia 03/20/2012   • Urinary incontinence    • Uterine prolapse    • Vertigo 09/15/2011   • 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   • Wears glasses    • Wears hearing aid     States she rarely wears them   • Wears partial dentures     Upper      Past Surgical History:   Procedure Laterality Date   • APPENDECTOMY     • APPENDICO-VESICOSTOMY CUTANEOUS  03/01/2010   • CHOLECYSTECTOMY      Laparoscopic   • COLONOSCOPY     • CORONARY ANGIOPLASTY     • CORONARY ANGIOPLASTY WITH STENT PLACEMENT      2 aortic stents   • CORONARY ANGIOPLASTY WITH STENT PLACEMENT  2013    2 distal left anterior descending artery    • ESOPHAGOGASTRODUODENOSCOPY     • FRACTURE SURGERY Right     Repair right arm- titanium servando from shoulder to elbow.    • HYSTERECTOMY      Complete   • IR THORACENTESIS  6/9/2023   • ID CMBND ANTERPOST COLPORRAPHY W/CYSTO N/A 9/20/2016    Procedure: COLPORRHAPHY ANTERIOR POSTERIOR GRAFT;  Surgeon: Frida Lopes MD;  Location: [facility];  Service: UroGynecology          • ID COLPOPEXY VAGINAL EXTRAPERITONEAL APPROACH N/A 9/20/2016    Procedure: COLPOPEXY VAGINAL EXTRAPERITONEAL  incision and drainage of vagina inclusion cyst x 4;  Surgeon: Frida Lopes MD;  Location[facility];  Service: UroGynecology          • ID CYSTOURETHROSCOPY N/A 9/20/2016    Procedure: Arnulfo Barrier;  Surgeon: Frida Lopes MD;  [facility];  Service: UroGynecology          • ID SLING OPERATION STRESS INCONTINENCE N/A 9/20/2016    Procedure: INSERTION PUBOVAGINAL SLING RETROPUBIC ;  Surgeon: Frida Lopes MD;  Location[facility];  Service: UroGynecology             Family History:     Family History   Problem Relation Age of Onset   • Hypertension Mother    • Heart failure Mother    • Coronary artery disease Mother    • Arthritis Mother    • Osteoporosis Mother    • Hyperlipidemia Mother    • Coronary artery disease Sister       Social History:     Social History     Socioeconomic History   • Marital status:      Spouse name: None   • Number of children: None   • Years of education: None   • Highest education level: None   Occupational History   • None   Tobacco Use   • Smoking status: Never   • Smokeless tobacco: Never   Vaping Use   • Vaping Use: Never used   Substance and Sexual Activity   • Alcohol use:  Yes     Alcohol/week: 1.0 - 2.0 standard drink of alcohol     Types: 1 - 2 Glasses of wine per week     Comment: per day, per allscripts: Being a social drinker    • Drug use: No   • Sexual activity: None   Other Topics Concern   • None   Social History Narrative    Daily Coffee Consumption- 2 cups/day     Exercising Regularly      Social Determinants of Health     Financial Resource Strain: Low Risk  (7/13/2023)    Overall Financial Resource Strain (CARDIA)    • Difficulty of Paying Living Expenses: Not hard at all   Food Insecurity: Not on file   Transportation Needs: No Transportation Needs (7/13/2023)    PRAPARE - Transportation    • Lack of Transportation (Medical): No    • Lack of Transportation (Non-Medical):  No   Physical Activity: Not on file   Stress: Not on file   Social Connections: Not on file   Intimate Partner Violence: Not on file   Housing Stability: Not on file      Medications and Allergies:     Current Outpatient Medications   Medication Sig Dispense Refill   • aspirin 81 MG tablet Take 1 tablet by mouth daily     • donepezil (ARICEPT) 5 mg tablet TAKE 1 TABLET BY MOUTH EVERY DAY AT NIGHT     • fexofenadine (ALLEGRA) 180 MG tablet Take by mouth     • furosemide (LASIX) 20 mg tablet TAKE 1 TABLET BY MOUTH EVERY DAY 90 tablet 1   • Klor-Con M20 20 MEQ tablet TAKE 1 TABLET (20 MEQ TOTAL) BY MOUTH 4 (FOUR) TIMES A  tablet 0   • losartan (COZAAR) 50 mg tablet Take 50 mg by mouth daily     • metoprolol succinate (TOPROL-XL) 50 mg 24 hr tablet Take 1 tablet (50 mg total) by mouth daily 90 tablet 1   • mirtazapine (REMERON) 7.5 MG tablet Take 1 tablet (7.5 mg total) by mouth daily at bedtime 30 tablet 5   • RABEprazole (ACIPHEX) 20 MG tablet Take 1 tablet (20 mg total) by mouth daily (Patient taking differently: Take 20 mg by mouth 3 (three) times a week) 90 tablet 3   • rosuvastatin (CRESTOR) 10 MG tablet TAKE 1 TABLET BY MOUTH EVERY DAY 90 tablet 1   • Calcium Carbonate-Vitamin D (CALCIUM 600+D HIGH POTENCY PO) Take 600 mg by mouth 2 (two) times a day (Patient not taking: Reported on 6/30/2023)     • Cholecalciferol (VITAMIN D) 2000 units tablet Take 1 tablet (2,000 Units total) by mouth daily (Patient not taking: Reported on 6/22/2023)     • Coenzyme Q10-Fish Oil-Vit E (CO-Q 10 OMEGA-3 FISH OIL PO) Take 1 capsule by mouth daily (Patient not taking: Reported on 6/22/2023)     • Empagliflozin (JARDIANCE) 10 MG TABS tablet Take 1 tablet (10 mg total) by mouth daily (Patient not taking: Reported on 6/30/2023) 30 tablet 5     No current facility-administered medications for this visit. Allergies   Allergen Reactions   • Bee Venom Anaphylaxis     Reaction Date: 02Feb2004; Annotation - [id number]: jjw   • Enalapril Other (See Comments)     Reaction Date: [id number];   Leg  pain   • Erythromycin Dizziness     Reaction Date: [id number];    • Estrogens Other (See Comments)     Reaction Date: 02Feb2004; Annotation - [id number]: [email or web address]   • Ezetimibe Hives   • Penicillins Hives     "All cillins", "and all myocillins"   • Ramipril Other (See Comments)     Reaction Date: 02Feb2004; Annotation - [id number]: jjw   • Statins Other (See Comments)     Muscle pain   • Versed [Midazolam] Other (See Comments)     States she "passed out" when recovering from a procedure and was told to not use it again.    • Zithromax [Azithromycin] Anaphylaxis      Immunizations:     Immunization History   Administered Date(s) Administered   • COVID-19 MODERNA VACC 0.5 ML IM 02/10/2021, 03/10/2021, 11/10/2021   • COVID-19 Pfizer vac (Thad-sucrose, gray cap) 12 yr+ IM 05/13/2022   • DTaP 5 03/21/2007, 03/21/2007   • Influenza Split High Dose Preservative Free IM 10/24/2012, 10/24/2012, 10/03/2013, 10/03/2013, 10/17/2014, 10/17/2014, 10/15/2015, 10/15/2015, 11/07/2016, 11/07/2016, 10/26/2017, 10/26/2017   • Influenza, high dose seasonal 0.7 mL 10/17/2018, 11/05/2019, 10/06/2020, 11/03/2021, 09/26/2022   • Influenza, seasonal, injectable 10/19/1999, 12/15/2000, 11/06/2001, 11/05/2003, 10/12/2005, 10/26/2006, 10/16/2007, 10/09/2008, 08/31/2009, 10/05/2010, 09/15/2011   • Influenza, seasonal, injectable, preservative free 10/19/1999, 12/15/2000, 11/06/2001, 11/05/2003, 10/12/2005, 10/26/2006, 10/16/2007, 10/09/2008, 08/31/2009, 10/05/2010, 09/15/2011   • Pneumococcal Conjugate 13-Valent 08/26/2015, 08/26/2015   • Pneumococcal Polysaccharide PPV23 02/18/2000, 02/18/2000   • Tdap 12/18/2014, 12/18/2014      Health Maintenance:         Topic Date Due   • Hepatitis C Screening  Completed         Topic Date Due   • COVID-19 Vaccine (5 - Moderna series) 07/08/2022   • Influenza Vaccine (1) 09/01/2023      Medicare Screening Tests and Risk Assessments:     Arti Higgins is here for her Subsequent Wellness visit. Health Risk Assessment:   Patient rates overall health as good. Patient feels that their physical health rating is same. Patient is satisfied with their life. Eyesight was rated as same. Hearing was rated as same. Patient feels that their emotional and mental health rating is same. Patients states they are never, rarely angry. Patient states they are sometimes unusually tired/fatigued. Pain experienced in the last 7 days has been none. Patient states that she has experienced weight loss or gain in last 6 months. Fall Risk Screening: In the past year, patient has experienced: no history of falling in past year      Urinary Incontinence Screening:   Patient has leaked urine accidently in the last six months. Home Safety:  Patient does not have trouble with stairs inside or outside of their home. Patient has working smoke alarms and has working carbon monoxide detector. Home safety hazards include: none. Nutrition:   Current diet is Regular. Medications:   Patient is not currently taking any over-the-counter supplements. Patient is not able to manage medications. Activities of Daily Living (ADLs)/Instrumental Activities of Daily Living (IADLs):   Walk and transfer into and out of bed and chair?: Yes  Dress and groom yourself?: Yes    Bathe or shower yourself?: Yes    Feed yourself?  Yes  Do your laundry/housekeeping?: Yes  Manage your money, pay your bills and track your expenses?: Yes  Make your own meals?: Yes    Do your own shopping?: Yes    Previous Hospitalizations:   Any hospitalizations or ED visits within the last 12 months?: Yes    How many hospitalizations have you had in the last year?: 1-2    Advance Care Planning:   Living will: Yes    Durable POA for healthcare: Yes    Advanced directive: Yes      Cognitive Screening:   Provider or family/friend/caregiver concerned regarding cognition?: Yes    PREVENTIVE SCREENINGS      Cardiovascular Screening:    General: Screening Not Indicated and History Lipid Disorder      Diabetes Screening:     General: Screening Current      Colorectal Cancer Screening:     General: Screening Not Indicated      Breast Cancer Screening:     General: Screening Not Indicated      Cervical Cancer Screening:    General: Screening Not Indicated      Osteoporosis Screening:    General: Screening Not Indicated and History Osteoporosis      Abdominal Aortic Aneurysm (AAA) Screening:        General: Screening Not Indicated      Lung Cancer Screening:     General: Screening Not Indicated      Hepatitis C Screening:    General: Screening Current    Screening, Brief Intervention, and Referral to Treatment (SBIRT)    Screening  Typical number of drinks in a day: 0  Typical number of drinks in a week: 0  Interpretation: Low risk drinking behavior. Single Item Drug Screening:  How often have you used an illegal drug (including marijuana) or a prescription medication for non-medical reasons in the past year? never    Single Item Drug Screen Score: 0  Interpretation: Negative screen for possible drug use disorder    No results found. Physical Exam:     /92 (BP Location: Left arm, Patient Position: Sitting, Cuff Size: Standard)   Pulse 76   Temp 98.1 °F (36.7 °C)   Resp 18   Ht 5' 4" (1.626 m)   Wt 55.6 kg (122 lb 9.6 oz)   BMI 21.04 kg/m²     Physical Exam  Constitutional:       General: She is not in acute distress. Appearance: She is not ill-appearing. Cardiovascular:      Rate and Rhythm: Normal rate and regular rhythm. Pulmonary:      Effort: Tachypnea and accessory muscle usage present.       Breath sounds: Examination of the right-upper field reveals decreased breath sounds. Examination of the right-middle field reveals decreased breath sounds. Examination of the right-lower field reveals decreased breath sounds. Decreased breath sounds present. No wheezing, rhonchi or rales. Abdominal:      Palpations: Abdomen is soft. Tenderness: There is no abdominal tenderness. Musculoskeletal:      Right lower leg: Edema (+ 1 foot ) present. Left lower leg: Edema (+ 1 foot ) present. Neurological:      Mental Status: She is alert. Mental status is at baseline.    Psychiatric:         Mood and Affect: Mood normal.          Conner Carmen MD

## 2023-07-14 ENCOUNTER — OFFICE VISIT (OUTPATIENT)
Dept: CARDIOLOGY CLINIC | Facility: CLINIC | Age: 88
End: 2023-07-14
Payer: COMMERCIAL

## 2023-07-14 ENCOUNTER — HOSPITAL ENCOUNTER (OUTPATIENT)
Dept: NON INVASIVE DIAGNOSTICS | Facility: HOSPITAL | Age: 88
Discharge: HOME | End: 2023-07-14
Attending: FAMILY MEDICINE
Payer: COMMERCIAL

## 2023-07-14 VITALS
HEART RATE: 84 BPM | RESPIRATION RATE: 18 BRPM | OXYGEN SATURATION: 97 % | SYSTOLIC BLOOD PRESSURE: 158 MMHG | DIASTOLIC BLOOD PRESSURE: 81 MMHG

## 2023-07-14 VITALS
SYSTOLIC BLOOD PRESSURE: 140 MMHG | HEART RATE: 95 BPM | BODY MASS INDEX: 20.14 KG/M2 | DIASTOLIC BLOOD PRESSURE: 80 MMHG | OXYGEN SATURATION: 95 % | HEIGHT: 64 IN | WEIGHT: 118 LBS

## 2023-07-14 DIAGNOSIS — I10 BENIGN ESSENTIAL HYPERTENSION: ICD-10-CM

## 2023-07-14 DIAGNOSIS — I42.9 CARDIOMYOPATHY, UNSPECIFIED TYPE (HCC): ICD-10-CM

## 2023-07-14 DIAGNOSIS — J90 PLEURAL EFFUSION: ICD-10-CM

## 2023-07-14 DIAGNOSIS — I25.10 ARTERIOSCLEROSIS OF CORONARY ARTERY: ICD-10-CM

## 2023-07-14 DIAGNOSIS — E78.5 DYSLIPIDEMIA: ICD-10-CM

## 2023-07-14 DIAGNOSIS — I50.42 CHRONIC COMBINED SYSTOLIC AND DIASTOLIC CONGESTIVE HEART FAILURE (HCC): ICD-10-CM

## 2023-07-14 PROCEDURE — 99214 OFFICE O/P EST MOD 30 MIN: CPT | Performed by: INTERNAL MEDICINE

## 2023-07-14 PROCEDURE — 32555 ASPIRATE PLEURA W/ IMAGING: CPT | Performed by: STUDENT IN AN ORGANIZED HEALTH CARE EDUCATION/TRAINING PROGRAM

## 2023-07-14 PROCEDURE — 88112 CYTOPATH CELL ENHANCE TECH: CPT | Performed by: PATHOLOGY

## 2023-07-14 PROCEDURE — 32555 ASPIRATE PLEURA W/ IMAGING: CPT

## 2023-07-14 PROCEDURE — 88305 TISSUE EXAM BY PATHOLOGIST: CPT | Performed by: PATHOLOGY

## 2023-07-14 RX ORDER — METOPROLOL SUCCINATE 50 MG/1
50 TABLET, EXTENDED RELEASE ORAL 2 TIMES DAILY
Qty: 180 TABLET | Refills: 1 | Status: SHIPPED | OUTPATIENT
Start: 2023-07-14

## 2023-07-14 RX ORDER — LIDOCAINE WITH 8.4% SOD BICARB 0.9%(10ML)
SYRINGE (ML) INJECTION AS NEEDED
Status: COMPLETED | OUTPATIENT
Start: 2023-07-14 | End: 2023-07-14

## 2023-07-14 RX ADMIN — Medication 8 ML: at 09:13

## 2023-07-14 NOTE — PROGRESS NOTES
Progress Note - Cardiology Office  [facility]  Yung Wright 80 y.o. female MRN: [id number]  : 10/3/1933  Encounter: [id number]      Assessment:     1. Chronic combined systolic and diastolic congestive heart failure ([address])    2. Dyslipidemia    3. Benign essential hypertension    4. Arteriosclerosis of coronary artery    5. Pleural effusion    6. Cardiomyopathy, unspecified type [facility])        Discussion Summary and Plan:    1. Coronary artery disease with history of angioplasty of LAD and diagonal with a stent in  by Dr. Tatianna Mccullough and repeat catheterization in  shows patent stent and has mid RCA significant stenosis but very medium to small size artery not suitable for percutaneous techniques on medical Rx. Please she is on Crestor 10 mg every other day. Continue same medications. 3.  Cardiomyopathy. With combined systolic and diastolic heart failure. Her EF is now 30 to 35% with grade 2 diastolic dysfunction. At this time we will continue diuretics. We will add Aldactone. We will check electrolytes as ordered. And continue losartan. Patient and family want conservative Rx. She does not want to be resuscitated she is DNR and DNI. 4.  Persistent hypokalemia. Last potassium on 2023 was 3.7. Will check electrolytes in about 2 weeks. 5.  Benign essential hypertension. Rate is 90-95 beats a minute she has developed cardiomyopathy increase metoprolol to 50 twice a day continue other Rx as before. 6. Recent history of stroke. Patient has recent history of stroke. She was started on Plavix therapy she follows with Neurology. Most likely it is elected or infarct as she has recovered. She has no previous history of atrial fibrillation. And she has been on aspirin and carotid Doppler has been negative. 7. History of memory loss. Family would like to have geriatric evaluation.   they will discussed with primary care team.    8.  Combined systolic systolic and diastolic heart failure with pleural effusion. She just have a pleural tap done on the right side about 1.2 L removed. Agree with increased dose of Lasix of 40 mg by primary team and check electrolytes. Plan. Since her echo shows that she has developed cardiomyopathy EF has dropped to 30 to 35% and grade 2 diastolic dysfunction. At some point will need to add Aldactone but due to low blood pressure initially we will increase metoprolol XL to 50 mg twice a day. Continue losartan 50. Continue Lasix 40. She has been recently added Jardiance 10 mg from today. We need to monitor her electrolytes and BMP. If stable and blood pressure acceptable we will consider adding low-dose Aldactone. As far as cardiomyopathy work-up or risk of sudden cardiac death and other issues or concerns family want conservative Rx. And patient does not want to be resuscitated or intubated. Counseling :  A description of the counseling. Patient advised to keep herself hydrated avoid similar situation where she passed out. Patient daughter was present all the time and all their questions were answered. Patient's ability to self care: Yes  Medication side effect reviewed with patient in detail and all their questions answered to their satisfaction. HPI :     Monique Corea is a 80y.o. year old female who came for follow up. Patient has Past medical history significant for coronary artery disease with remote angioplasty of LAD and diagonal by Dr. Riley Gay in 2013 and then repeat cardiac catheterization in 2015 at [facility] shows patent stent in LAD and diagonal and has small RCA mid 80-90% stenosis which is very tortuous and not suitable for percutaneous technique. Patient has repeated history of syncopal episode and most of time it happened situation when she had a glass of wine and she is in a restaurant. She does not remember what happens and she feels fine after few minutes off it. She denies any fever or any chills any nausea and vomiting. She is otherwise very active. She cannot take statins even though her cholesterol is very high. She takes Crestor 5 mg twice a week and she is able to handle that. No fever no chills no nausea no vomiting no other significant complaint. .      10/17/2022. Above reviewed. Patient who has medical history significant for TIA when she was admitted to [facility], history of labile hypertension, dyslipidemia, but could not tolerate high dose of statins, CAD with very tortuous vessel so came for follow-up. She has an echo done at [facility] which shows she has normal LV systolic function and no cardiac arrhythmia was noted while she was there. She does have history of RBBB which is not changed. She has a previous cardiac catheterization which shows RCA was very tortuous and severely disease and not suitable for percutaneous technique and she was recommended medical therapy. Her vitals has been stable. Her last blood test was in July 2021. As per daughter she is getting more forgetful. She has done as active as before but she is able to do her activities. No nausea no vomiting no fever no chills no other cardiovascular issues. She may not be taking 1 of her medications regularly daughter will call us back. 6/2/2023. Above reviewed. Patient came for follow-up. She was brought in by her daughter. Patient has not been eating and there is a decrease in appetite as per patient's daughter. She is also becoming more forgetful. She does have history of labile hypertension, dyslipidemia but could not tolerate statin, CAD with very tortuous vessels and history of diastolic heart failure. She has not seen us for about 8 to 9 months. Her previous cardiac catheter report was reviewed. Lately she was found to have right-sided pleural effusion and her BNP was elevated and she was short of breath.   Patient denies any chest pain.  She did lose weight. In March 2023 she was 138 currently she is 130 pounds. EKG today shows sinus rhythm with few PVCs heart rate 94 bpm.  QS in V1 to V3 noted. She has some leg edema. Today she is using her compression stocking. Labs from 6/1/2023 reviewed. Her BNP was 3000. Her potassium was 3.5 she is already started on potassium and her thyroid function was 1.67 and LFTs were acceptable. Hemoglobin was stable. X-ray report reviewed. 7/14/2023. Above reviewed. Patient came for follow-up. She has further decrease in her appetite. Since last visit in June 2022 she has lost about 10 pounds. But she is not also eating. She has become very forgetful. She does a history of labile hypertension, dyslipidemia could not tolerate statin, CAD with very tortuous arteries and history of diastolic heart failure. Her albumin is also 3.2. As per patient's daughter she is not eating. Today she had a pleural tap done on the right side and about 1.2 L fluid was removed. She is feeling better with that otherwise she gets short of breath. Recently this was increased to 40 mg daily. Her blood test 6/8/2023 has been acceptable cholesterol profile was stable. Currently from diuretic point of view she is taking Jardiance 25 mg daily. Lasix 40 mg daily, metoprolol XL 50, losartan 50 and she is on Crestor 10 mg. Review of Systems   Constitutional: Positive for appetite change and unexpected weight change. Negative for activity change, chills, diaphoresis and fever. HENT: Negative for congestion. Eyes: Negative for discharge and redness. Respiratory: Positive for shortness of breath. Negative for cough, chest tightness and wheezing. Cardiovascular: Negative. Negative for chest pain, palpitations and leg swelling. Gastrointestinal: Negative for abdominal pain, diarrhea and nausea. Endocrine: Negative. Genitourinary: Negative for decreased urine volume and urgency.    Musculoskeletal: Positive for arthralgias. Negative for back pain and gait problem. Skin: Negative for rash and wound. Allergic/Immunologic: Negative. Neurological: Negative for dizziness, seizures, syncope, weakness, light-headedness and headaches. Hematological: Negative. Psychiatric/Behavioral: Negative for agitation and confusion. The patient is nervous/anxious. Very forgetful.        Historical Information   Past Medical History:   Diagnosis Date   • Abscess of chest wall     Last Assessed: 2/27/2014   • Arthritis    • Back pain    • Cataract     Start of   • CHF (congestive heart failure) ([facility])    • Colon polyp    • Coronary artery disease    • Diverticulitis of colon 12/04/2008   • Female bladder prolapse    • Gastric reflux    • Hematuria     Last Assessed: 11/18/2014    • History of diverticulitis    • [place] (hard of hearing)    • Hypercholesteremia    • Hyperkeratosis of skin 11/03/2011   • Hypertension    • Hypokalemia 12/13/2011   • Incomplete uterovaginal prolapse 05/28/2010   • Myocardial infarction [facility])    • Osteoporosis    • 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    Last Assessed: 10/24/2012    • Seasonal allergies    • Sebaceous cyst     Last Assessed: 2/8/2014   • Syncope    • Trigeminal neuralgia 03/20/2012   • Urinary incontinence    • Uterine prolapse    • Vertigo 09/15/2011   • 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   • Wears glasses    • Wears hearing aid     States she rarely wears them   • Wears partial dentures     Upper      Past Surgical History:   Procedure Laterality Date   • APPENDECTOMY     • APPENDICO-VESICOSTOMY CUTANEOUS  03/01/2010   • CHOLECYSTECTOMY      Laparoscopic   • COLONOSCOPY     • CORONARY ANGIOPLASTY     • CORONARY ANGIOPLASTY WITH STENT PLACEMENT      2 aortic stents   • CORONARY ANGIOPLASTY WITH STENT PLACEMENT  2013    2 distal left anterior descending artery    • ESOPHAGOGASTRODUODENOSCOPY     • FRACTURE SURGERY Right     Repair right arm- titanium servando from shoulder to elbow.   • HYSTERECTOMY      Complete   • IR THORACENTESIS  6/9/2023   • IR THORACENTESIS  7/14/2023   • LA CMBND ANTERPOST COLPORRAPHY W/CYSTO N/A 9/20/2016    Procedure: COLPORRHAPHY ANTERIOR POSTERIOR GRAFT;  Surgeon: Naatliia Mobley MD;  [facility]: [facility];  Service: UroGynecology          • LA COLPOPEXY VAGINAL EXTRAPERITONEAL APPROACH N/A 9/20/2016    Procedure: COLPOPEXY VAGINAL EXTRAPERITONEAL  incision and drainage of vagina inclusion cyst x 4;  Surgeon: Nataliia Mobley MD;  [facility];  Service: UroGynecology          • LA CYSTOURETHROSCOPY N/A 9/20/2016    Procedure: Delia Sloop;  Surgeon: Nataliia Mobley MD;  [facility];  Service: UroGynecology          • LA SLING OPERATION STRESS INCONTINENCE N/A 9/20/2016    Procedure: INSERTION PUBOVAGINAL SLING RETROPUBIC ;  Surgeon: Nataliia Mobley MD;  [facility];  Service: UroGynecology            Social History     Substance and Sexual Activity   Alcohol Use Yes   • Alcohol/week: 1.0 - 2.0 standard drink of alcohol   • Types: 1 - 2 Glasses of wine per week    Comment: per day, per allscripts: Being a social drinker      Social History     Substance and Sexual Activity   Drug Use No     Social History     Tobacco Use   Smoking Status Never   Smokeless Tobacco Never     Family History:   Family History   Problem Relation Age of Onset   • Hypertension Mother    • Heart failure Mother    • Coronary artery disease Mother    • Arthritis Mother    • Osteoporosis Mother    • Hyperlipidemia Mother    • Coronary artery disease Sister        Meds/Allergies     Allergies   Allergen Reactions   • Bee Venom Anaphylaxis     Reaction Date: 02Feb2004; Annotation - [id number]: lito   • Enalapril Other (See Comments)     Reaction Date: [id number];   Leg  pain   • Erythromycin Dizziness     Reaction Date: [id number];    • Estrogens Other (See Comments)     Reaction Date: 02Feb2004;  Annotation - [id number]: mariew   • Ezetimibe Hives   • Penicillins Hives "All cillins", "and all myocillins"   • Ramipril Other (See Comments)     Reaction Date: 02Feb2004; Annotation - [id number]: jjw   • Statins Other (See Comments)     Muscle pain   • Versed [Midazolam] Other (See Comments)     States she "passed out" when recovering from a procedure and was told to not use it again.    • Zithromax [Azithromycin] Anaphylaxis       Current Outpatient Medications:   •  aspirin 81 MG tablet, Take 1 tablet by mouth daily, Disp: , Rfl:   •  donepezil (ARICEPT) 5 mg tablet, TAKE 1 TABLET BY MOUTH EVERY DAY AT NIGHT, Disp: , Rfl:   •  Empagliflozin (JARDIANCE) 10 MG TABS tablet, Take 1 tablet (10 mg total) by mouth daily, Disp: 30 tablet, Rfl: 5  •  fexofenadine (ALLEGRA) 180 MG tablet, Take by mouth, Disp: , Rfl:   •  furosemide (LASIX) 40 mg tablet, Take 1 tablet (40 mg total) by mouth daily New dose, Disp: 30 tablet, Rfl: 6  •  Klor-Con M20 20 MEQ tablet, TAKE 1 TABLET (20 MEQ TOTAL) BY MOUTH 4 (FOUR) TIMES A DAY, Disp: 360 tablet, Rfl: 0  •  losartan (COZAAR) 50 mg tablet, Take 50 mg by mouth daily, Disp: , Rfl:   •  metoprolol succinate (TOPROL-XL) 50 mg 24 hr tablet, Take 1 tablet (50 mg total) by mouth 2 (two) times a day, Disp: 180 tablet, Rfl: 1  •  mirtazapine (REMERON) 7.5 MG tablet, Take 1 tablet (7.5 mg total) by mouth daily at bedtime, Disp: 30 tablet, Rfl: 5  •  RABEprazole (ACIPHEX) 20 MG tablet, Take 1 tablet (20 mg total) by mouth daily (Patient taking differently: Take 20 mg by mouth 3 (three) times a week), Disp: 90 tablet, Rfl: 3  •  rosuvastatin (CRESTOR) 10 MG tablet, TAKE 1 TABLET BY MOUTH EVERY DAY, Disp: 90 tablet, Rfl: 1  •  Calcium Carbonate-Vitamin D (CALCIUM 600+D HIGH POTENCY PO), Take 600 mg by mouth 2 (two) times a day (Patient not taking: Reported on 6/30/2023), Disp: , Rfl:   •  Cholecalciferol (VITAMIN D) 2000 units tablet, Take 1 tablet (2,000 Units total) by mouth daily (Patient not taking: Reported on 6/22/2023), Disp: , Rfl:   •  Coenzyme Q10-Fish Oil-Vit E (CO-Q 10 OMEGA-3 FISH OIL PO), Take 1 capsule by mouth daily (Patient not taking: Reported on 6/22/2023), Disp: , Rfl:   No current facility-administered medications for this visit. Vitals: Blood pressure 140/80, pulse 95, height 5' 4" (1.626 m), weight 53.5 kg (118 lb), SpO2 95 %. ?  Body mass index is 20.25 kg/m². Vitals:    07/14/23 1555   Weight: 53.5 kg (118 lb)     BP Readings from Last 3 Encounters:   07/14/23 140/80   07/14/23 158/81   07/13/23 156/92         Physical Exam  Constitutional:       General: She is not in acute distress. Appearance: She is well-developed. She is not diaphoretic. Neck:      Thyroid: No thyromegaly. Vascular: No JVD. Trachea: No tracheal deviation. Cardiovascular:      Rate and Rhythm: Normal rate and regular rhythm. Heart sounds: S1 normal and S2 normal. Heart sounds not distant. Murmur heard. Systolic (ejection) murmur is present with a grade of 2/6. No friction rub. No gallop. No S3 or S4 sounds. Pulmonary:      Effort: Pulmonary effort is normal. No respiratory distress. Breath sounds: Rhonchi present. No wheezing or rales. Chest:      Chest wall: No tenderness. Abdominal:      General: Bowel sounds are normal. There is no distension. Palpations: Abdomen is soft. Tenderness: There is no abdominal tenderness. Musculoskeletal:         General: No deformity. Cervical back: Neck supple. Comments: Minimal lower extremity edema   Skin:     General: Skin is warm and dry. Coloration: Skin is not pale. Findings: No rash. Neurological:      Mental Status: She is alert and oriented to person, place, and time. Psychiatric:         Behavior: Behavior normal.         Judgment: Judgment normal.       Diagnostic Studies Review Cardio:   cardiac catheterization.:   Patient's cardiac catheterization from 2015 reviewed.   She has patent stent seen in LAD and diagonal.  Apical LAD has around 70 80% stenosis which small vessel, mid circumflex around 40-50% stenosis. RCA which is a small size artery and only gives RPDA has around 90% stenosis in the mid it is very tortuous artery not suitable for percutaneous techniques. Echo Doppler done 06/28/2018 shows low-normal LV systolic function EF around 55 percent, mild concentric left atrial hypertrophy, mild MR, mild AI, mild TR PA pressure 30 millimeters of mercury. Repeat echo Doppler done on June 2022 in [facility] shows EF 55% mild LVH mild AI, mild MR no change from previous echo. Carotid Doppler. Carotid study done 05/20/2019 shows less than 50% stenosis bilaterally. Holter monitor done on 06/28/2018 shows normal sinus rhythm. Few PACs seen. Rare episode of PVCs. EKG:    Twelve lead EKG done at  [facility] shows normal sinus rhythm heart rate around 87 beats per minute. Nonspecific ST changes. Twelve lead EKG done on 12/05/2018 shows normal sinus rhythm heart rate 81 beats per minute. No significant ST changes. No change from old EKG. Twelve lead EKG done in our office 04/26/2019 shows normal sinus rhythm heart rate 93 beats per minute. Twelve lead EKG done in our office 06/04/2019 shows normal sinus rhythm QS in V1 to V3 most likely due to lead location. No other significant change no change from old EKG heart rate 66 beats per minute. Twelve lead EKG 12/17/2019 shows normal sinus rhythm 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. Heart rate 68 beats per minute. Twelve lead EKG 06/25/2020 shows normal sinus rhythm heart rate 61 beats per minute EKG done 06/25/2020 no change from old EKG. 12 EKG done 08/12/2021 shows normal sinus with PACs heart rate 84 beats per minute. No significant change from old EKG. Twelve lead EKG done 02/14/2022 shows normal sinus rhythm with sinus arrhythmia heart rate 59 beats per minute incomplete RBBB.   Q-wave in inferior leads cannot rule out old inferior infarction. Not much changed from previous EKG. Twelve-lead EKG done on 2023 shows sinus rhythm with few PVCs heart rate 94 bpm.        Cardiac testing:       Results for orders placed in visit on 08/20/15   Cardiac catheterization    Narrative [address], [address]   Phone: [phone]      INVASIVE CARDIOVASCULAR LAB COMPLETE REPORT         Patient: Rashida MENDOSA   Location: Outpatient   MR number: [id number]   Account number: [de-identified]   Study date: 2015   Gender: Female   : 10/03/1933   Height: 64.2 in   Weight: 147.6 lb   BSA: 1.72 m squared   Allergies: Altace, beestings, Erythromycin Base, estrogen, penicilin, Versed,   Vasotec, Zithromax, all cillins, mycins, Vytorin 10-10, Zetia   Diagnostic Cardiologist:  Murali Roy MD   Primary Physician:  Karime Roach MD   SUMMARY   CORONARY CIRCULATION:   Left main: Normal.   LAD: The vessel was normal sized. The were no obstructive stenoses. The   previously deployed stent remains widely patent. The stent at the ostium of D1   remains widely patent. Circumflex: The vessel was normal sized. There was a   non-critical 50% plaque in mid vessel. There were no obstructive stenoses. The   major OM branches were normal. There was faint collateral flow from the   circumflex to the distal RCA. RCA: The vessel was small to medium sized and   dominant, giving rise to the PDA. There was a long, tortuous 95% stenosis in   mid vessel. [place] of severe tortuosity, the lesion is poorly suited for PCI. REPORT ELEMENT SELECTION:   Right radial access was employed. Summary: The patient has single vessel disease with a long, tortuous subtotal   stenosis in the mid RCA. Elucidating symptom status is difficult in this   patient, since she previously had very severe LAD disease and diagonal disease   with atypical symptoms as the only presentation.  Medical therapy is    recommended   for the following reasons 1) the lesion has been stable angiographically for 2   years. 2) the patient was able to exercise to a high level after PCI 2 years   ago with this lesion already present. 3) Nuclear imaging 1 year ago showed no   ischemia in the presence of this lesion. 4) There is some collateral flow from   the circumflex. 5) Current symptoms are atypical and not exertional. If PCI is   required in the future because of exertional angina refractory to medication,   it is recommended that the procedure be performed via the femoral approach,    and   INVASIVE CARDIOVASCULAR LAB COMPLETE REPORT   Patient: Dwayne MENDOSA   MR number: [id number]    ------ Page 2   BSA: 1.72 m squared   that the procedure be performed at the [facility] because of the   increased risk of comlications. Treatment with rosuvastatin, 2.5mg once per   week was initiated. The patient has a history of statin intolerance at low   doses. INDICATIONS:   --  Possible CAD: unstable angina. PROCEDURES PERFORMED   --  Left coronary angiography. --  Right coronary angiography. --  Outpatient. --  Coronary Catheterization (w/o [facility]). PROCEDURE: The risks and alternatives of the procedures and conscious sedation   were explained to the patient and informed consent was obtained. The patient   was brought to the cath lab and placed on the table. The planned puncture    sites   were prepped and draped in the usual sterile fashion. --  Right radial artery access. After performing an Eusebio's test to verify   adequate ulnar artery supply to the hand, the radial site was prepped. The   puncture site was infiltrated with local anesthetic. The vessel was accessed   using the modified Seldinger technique, a wire was advanced into the vessel,   and a sheath was advanced over the wire into the vessel. --  Left coronary artery angiography.  A catheter was advanced over a guidewire   into the aorta and positioned in the left coronary artery ostium under fluoroscopic guidance. Angiography was performed. --  Right coronary artery angiography. A catheter was advanced over a    guidewire   into the aorta and positioned in the right coronary artery ostium under   fluoroscopic guidance. Angiography was performed. --  Outpatient. --  Coronary Catheterization (w/o [facility]). PROCEDURE COMPLETION: The patient tolerated the procedure well and was   discharged from the cath lab. TIMING: Test started at 09:38. Test concluded at   10:03. HEMOSTASIS: The sheath was removed. The site was compressed with a   Hemoband device. Hemostasis was obtained. MEDICATIONS GIVEN: Verapamil   (Isoptin, Calan, Covera), 1.25 mg, IA, at 09:52. Fentanyl (1OOmcg/2 ml), 50   mcg, IV, at 09:42. Fentanyl (1OOmcg/2 ml), 50 mcg, IV, at 09:45. 1% Lidocaine,   1 ml, subcutaneously, at 09:49. Fentanyl (1OOmcg/2 ml), 25 mcg, IV, at 09:50. Nitroglycerin (200mcg/ml), 200 mcg, at 09:52. Heparin 1000 units/ml, 4,000   units, IV, at 09:52. CONTRAST GIVEN: 70 ml Omnipaque (350mg I /ml). RADIATION   EXPOSURE: Fluoroscopy time: 1.8 min. CORONARY VESSELS:   --  Left main: Normal.   INVASIVE CARDIOVASCULAR LAB COMPLETE REPORT   Patient: Archie MENDOSA   MR number: [id number]    ------ Page 3   BSA: 1.72 m squared   --  LAD: The vessel was normal sized. The were no obstructive stenoses. The   previously deployed stent remains widely patent. The stent at the ostium of D1   remains widely patent. --  Circumflex: The vessel was normal sized. There was    a   non-critical 50% plaque in mid vessel. There were no obstructive stenoses. The   major OM branches were normal. There was faint collateral flow from the   circumflex to the distal RCA. --  RCA: The vessel was small to medium sized    and   dominant, giving rise to the PDA. There was a long, tortuous 95% stenosis in   mid vessel. [place] of severe tortuosity, the lesion is poorly suited for PCI. NOTE: Right radial access was employed.    Prepared and signed by   Meghan Drummond MD   Signed 2015 11:05:34   CC: Dr. Theodora Whiting   Study diagram   Hemodynamic tables   Pressures:  Baseline   Pressures:  - HR: 70   Pressures:  - Rhythm:   Pressures:  -- Aortic Pressure (S/D/M): 157/83/116   Outputs:  Baseline   Outputs:  -- CALCULATIONS[age]   Outputs:  -- CALCULATIONS: Body Surface Area: 1.72   Outputs:  -- CALCULATIONS: Height in cm: 163.00   Outputs:  -- CALCULATIONS: Sex: Female   Outputs:  -- CALCULATIONS: Weight in k.10    . Lab Review   Lab Results   Component Value Date    WBC 10.93 (H) 2023    HGB 15.8 (H) 2023    HCT 51.0 (H) 2023    MCV 94 2023    RDW 16.7 (H) 2023     (H) 2023     BMP:  Lab Results   Component Value Date     2016    K 3.7 2023     2023    CO2 29 2023    BUN 14 2023    CREATININE 0.76 2023    GLUCOSE 99 2016    GLUF 106 (H) 2023    CALCIUM 9.5 2023    CORRECTEDCA 10.1 2023    EGFR 69 2023    MG 2.2 2021     LFT:  Lab Results   Component Value Date    AST 25 2023    ALT 25 2023    ALKPHOS 74 2023    PROT 7.1 2016    BILITOT 0.3 2016    TP 7.4 2023       Lab Results   Component Value Date    HGBA1C 5.8 2022     Lipid Profile:   Lab Results   Component Value Date    CHOL 192 2013    HDL 49 (L) 2023    LDLCALC 27 2023    TRIG 126 2023     Lab Results   Component Value Date    CHOL 192 2013     Lab Results   Component Value Date    TROPONINI 0.03 2018       Dr. Vincenzo Mejía MD [facility] [place]      "This note has been constructed using a voice recognition system. Therefore there may be syntax, spelling, and/or grammatical errors.  Please call if you have any questions. "

## 2023-07-14 NOTE — TELEPHONE ENCOUNTER
Pl, advise pt's daughter - I will wait for cardiologist visit today and I will talk to them on Monday

## 2023-07-14 NOTE — SEDATION DOCUMENTATION
Procedure ended, pt tolerated without incident, vss, 1100 ml yellow fluid removed. Sent to lab. Band aid to site, pt discharged home with daughter, instructions given and reviewed.

## 2023-07-14 NOTE — DISCHARGE INSTRUCTIONS
Thoracentesis   WHAT YOU NEED TO KNOW:   A thoracentesis is a procedure to remove extra fluid or air from between your lungs and your inner chest wall. Air or fluid buildup may make it hard for you to breathe. A thoracentesis allows your lungs to expand fully so you can breathe more easily. DISCHARGE INSTRUCTIONS:   Medicines:   Pain medicine: You may be given a prescription medicine to decrease pain. Do not wait until the pain is severe before you take your medicine. Antibiotics: This medicine helps fight or prevent an infection. Take your medicine as directed. Call your healthcare provider if you think your medicine is not helping or if you have side effects. Tell him if you are allergic to any medicine. Keep a list of the medicines, vitamins, and herbs you take. Include the amounts, and when and why you take them. Bring the list or the pill bottles to follow-up visits. Carry your medicine list with you in case of an emergency. Follow up with your healthcare provider as directed:  Write down your questions so you remember to ask them during your visits. Rest:  Rest when you feel it is needed. Slowly start to do more each day. Return to your daily activities as directed. Do not smoke: If you smoke, it is never too late to quit. Ask for information about how to stop smoking if you need help. Contact your healthcare provider if:   You have a fever. Your puncture site is red, warm, swollen, or draining pus. You have questions or concerns about your procedure, medicine, or care. If you have any questions regarding, call the IR department @ [phone]. Seek care immediately or call 911 if:   Blood soaks through your bandage. There is blood in your spit.

## 2023-07-14 NOTE — BRIEF OP NOTE (RAD/CATH)
INTERVENTIONAL RADIOLOGY PROCEDURE NOTE    Date: 7/14/2023    Procedure:   Procedure Summary     Date: 07/14/23 Room / Location: [address] Cardiac Cath Lab    Anesthesia Start:  Anesthesia Stop:     Procedure: IR THORACENTESIS Diagnosis:       Pleural effusion      (worsening SOB)    Scheduled Providers:  Responsible Provider:     Anesthesia Type: Not recorded ASA Status: Not recorded          Preoperative diagnosis:   1. Pleural effusion         Postoperative diagnosis: Same. Surgeon: Kolton Barba MD     Assistant: None. No qualified resident was available. Blood loss: none. Specimens: 0 ml     Findings: R thoracentesis. Complications: None immediate.     Anesthesia: local

## 2023-07-18 PROCEDURE — 88112 CYTOPATH CELL ENHANCE TECH: CPT | Performed by: PATHOLOGY

## 2023-07-18 PROCEDURE — 88305 TISSUE EXAM BY PATHOLOGIST: CPT | Performed by: PATHOLOGY

## 2023-07-19 DIAGNOSIS — Z86.73 H/O: CVA (CEREBROVASCULAR ACCIDENT): ICD-10-CM

## 2023-07-19 RX ORDER — ROSUVASTATIN CALCIUM 10 MG/1
TABLET, COATED ORAL
Qty: 90 TABLET | Refills: 1 | Status: SHIPPED | OUTPATIENT
Start: 2023-07-19

## 2023-07-27 ENCOUNTER — APPOINTMENT (OUTPATIENT)
Dept: LAB | Facility: CLINIC | Age: 88
End: 2023-07-27
Payer: COMMERCIAL

## 2023-07-27 DIAGNOSIS — I50.42 CHRONIC COMBINED SYSTOLIC AND DIASTOLIC CONGESTIVE HEART FAILURE (HCC): ICD-10-CM

## 2023-07-27 DIAGNOSIS — I25.10 ARTERIOSCLEROSIS OF CORONARY ARTERY: ICD-10-CM

## 2023-07-27 DIAGNOSIS — J90 PLEURAL EFFUSION: ICD-10-CM

## 2023-07-27 DIAGNOSIS — I10 BENIGN ESSENTIAL HYPERTENSION: ICD-10-CM

## 2023-07-27 DIAGNOSIS — E78.5 DYSLIPIDEMIA: ICD-10-CM

## 2023-07-27 LAB
ANION GAP SERPL CALCULATED.3IONS-SCNC: 7 MMOL/L
BUN SERPL-MCNC: 9 MG/DL (ref 5–25)
CALCIUM SERPL-MCNC: 9.7 MG/DL (ref 8.3–10.1)
CHLORIDE SERPL-SCNC: 106 MMOL/L (ref 96–108)
CO2 SERPL-SCNC: 28 MMOL/L (ref 21–32)
CREAT SERPL-MCNC: 0.72 MG/DL (ref 0.6–1.3)
GFR SERPL CREATININE-BSD FRML MDRD: 74 ML/MIN/1.73SQ M
GLUCOSE P FAST SERPL-MCNC: 109 MG/DL (ref 65–99)
POTASSIUM SERPL-SCNC: 3.3 MMOL/L (ref 3.5–5.3)
SODIUM SERPL-SCNC: 141 MMOL/L (ref 135–147)

## 2023-07-27 PROCEDURE — 80048 BASIC METABOLIC PNL TOTAL CA: CPT

## 2023-07-27 PROCEDURE — 36415 COLL VENOUS BLD VENIPUNCTURE: CPT

## 2023-07-28 ENCOUNTER — TELEPHONE (OUTPATIENT)
Dept: CARDIOLOGY CLINIC | Facility: CLINIC | Age: 88
End: 2023-07-28

## 2023-07-28 NOTE — TELEPHONE ENCOUNTER
----- Message from Azucena Cameron MD sent at 7/28/2023  9:26 AM EDT -----  Her potassium is low.   She should take an additional potassium pill for a week

## 2023-08-02 ENCOUNTER — TELEPHONE (OUTPATIENT)
Dept: CARDIOLOGY CLINIC | Facility: CLINIC | Age: 88
End: 2023-08-02

## 2023-08-02 NOTE — TELEPHONE ENCOUNTER
----- Message from Doron Hays MD sent at 8/2/2023  8:28 AM EDT -----  Patient's potassium is low 3.3. She should take additional potassium. This call the patient and document.

## 2023-09-26 ENCOUNTER — OFFICE VISIT (OUTPATIENT)
Dept: FAMILY MEDICINE CLINIC | Facility: CLINIC | Age: 88
End: 2023-09-26
Payer: COMMERCIAL

## 2023-09-26 VITALS
OXYGEN SATURATION: 99 % | HEIGHT: 64 IN | RESPIRATION RATE: 14 BRPM | WEIGHT: 112 LBS | HEART RATE: 60 BPM | BODY MASS INDEX: 19.12 KG/M2 | SYSTOLIC BLOOD PRESSURE: 140 MMHG | TEMPERATURE: 97.7 F | DIASTOLIC BLOOD PRESSURE: 80 MMHG

## 2023-09-26 DIAGNOSIS — Z13.29 THYROID DISORDER SCREENING: ICD-10-CM

## 2023-09-26 DIAGNOSIS — R63.0 DECREASED APPETITE: ICD-10-CM

## 2023-09-26 DIAGNOSIS — I50.22 CHRONIC SYSTOLIC HEART FAILURE (HCC): ICD-10-CM

## 2023-09-26 DIAGNOSIS — I10 BENIGN ESSENTIAL HYPERTENSION: ICD-10-CM

## 2023-09-26 DIAGNOSIS — J90 PLEURAL EFFUSION: ICD-10-CM

## 2023-09-26 DIAGNOSIS — E44.1 MILD PROTEIN-CALORIE MALNUTRITION (HCC): Primary | ICD-10-CM

## 2023-09-26 DIAGNOSIS — I25.10 CORONARY ARTERY DISEASE INVOLVING NATIVE CORONARY ARTERY OF NATIVE HEART WITHOUT ANGINA PECTORIS: ICD-10-CM

## 2023-09-26 PROCEDURE — 99214 OFFICE O/P EST MOD 30 MIN: CPT | Performed by: STUDENT IN AN ORGANIZED HEALTH CARE EDUCATION/TRAINING PROGRAM

## 2023-09-26 RX ORDER — DRONABINOL 5 MG/1
5 CAPSULE ORAL
Status: CANCELLED | OUTPATIENT
Start: 2023-09-26

## 2023-09-26 NOTE — PROGRESS NOTES
Clinic Visit Note  Solis Matthews 80 y.o. female   MRN: [id number]    Assessment and Plan      Diagnoses and all orders for this visit:    Mild protein-calorie malnutrition ([address])  Decreased appetite  Recommend Ensure protein shakes x2/day, will also increase Remeron to 15 mg total, patient/family has just recently picked up Remeron 7.5 mg x 90 days, please take 2 at night to see if this helps with decreased appetite symptoms. Goal weight 110-120 LBS, no red flag symptoms on exam, continue close follow-up. Benign essential hypertension  Blood pressure appropriate on exam, no medication changes today in office    Chronic systolic heart failure [facility])  Patient following with cardiology, continue goal-directed therapy  -     Comprehensive metabolic panel; Future  -     CBC and differential; Future    Coronary artery disease involving native coronary artery of native heart without angina pectoris  Aspirin/statin/beta-blocker/ARB    Pleural effusion  Previous paracentesis, decreased breath sounds right lung, recommend follow-up x-ray to determine if pleural effusion has returned. -     XR chest pa & lateral; Future    Thyroid disorder screening  Check thyroid in the setting of decreased appetite  -     TSH, 3rd generation; Future  -     T4, free; Future      My impressions and treatment recommendations were discussed in detail with the patient who verbalized understanding and had no further questions. Discharge instructions were provided. Subjective     Chief Complaint: Follow-up visit    History of Present Illness:    Patient is a pleasant 70-year-old female coming in for follow-up visit on chronic disease management, decreased appetite.     The following portions of the patient's history were reviewed and updated as appropriate: allergies, current medications, past family history, past medical history, past social history, past surgical history and problem list.    REVIEW OF SYSTEMS:  A complete 12-point review of systems is negative other than that noted in the HPI. Review of Systems   Constitutional: Negative for diaphoresis, fatigue, fever and unexpected weight change. Respiratory: Negative for cough, shortness of breath and wheezing. Cardiovascular: Negative for chest pain and palpitations. Gastrointestinal: Negative for abdominal distention, anal bleeding, blood in stool, constipation, diarrhea, nausea and vomiting. Musculoskeletal: Negative for back pain and neck pain. Neurological: Negative for dizziness and headaches.          Current Outpatient Medications:   •  aspirin 81 MG tablet, Take 1 tablet by mouth daily, Disp: , Rfl:   •  donepezil (ARICEPT) 5 mg tablet, TAKE 1 TABLET BY MOUTH EVERY DAY AT NIGHT, Disp: , Rfl:   •  Empagliflozin (JARDIANCE) 10 MG TABS tablet, Take 1 tablet (10 mg total) by mouth daily, Disp: 30 tablet, Rfl: 5  •  furosemide (LASIX) 40 mg tablet, Take 1 tablet (40 mg total) by mouth daily New dose, Disp: 30 tablet, Rfl: 6  •  Klor-Con M20 20 MEQ tablet, TAKE 1 TABLET (20 MEQ TOTAL) BY MOUTH 4 (FOUR) TIMES A DAY, Disp: 360 tablet, Rfl: 0  •  losartan (COZAAR) 50 mg tablet, Take 50 mg by mouth daily, Disp: , Rfl:   •  metoprolol succinate (TOPROL-XL) 50 mg 24 hr tablet, Take 1 tablet (50 mg total) by mouth 2 (two) times a day, Disp: 180 tablet, Rfl: 1  •  mirtazapine (REMERON) 7.5 MG tablet, Take 1 tablet (7.5 mg total) by mouth daily at bedtime, Disp: 30 tablet, Rfl: 5  •  RABEprazole (ACIPHEX) 20 MG tablet, Take 1 tablet (20 mg total) by mouth daily (Patient taking differently: Take 20 mg by mouth 3 (three) times a week), Disp: 90 tablet, Rfl: 3  •  rosuvastatin (CRESTOR) 10 MG tablet, TAKE 1 TABLET BY MOUTH EVERY DAY, Disp: 90 tablet, Rfl: 1  •  aspirin 81 MG tablet, Take 1 tablet by mouth daily, Disp: , Rfl:   •  fexofenadine (ALLEGRA) 180 MG tablet, Take by mouth, Disp: , Rfl:   •  Klor-Con M20 20 MEQ tablet, TAKE 1 TABLET (20 MEQ TOTAL) BY MOUTH 4 (FOUR) TIMES A DAY, Disp: 360 tablet, Rfl: 0  Past Medical History:   Diagnosis Date   • Abscess of chest wall     Last Assessed: 2/27/2014   • Arthritis    • Back pain    • Cataract     Start of   • CHF (congestive heart failure) ([facility])    • Colon polyp    • Coronary artery disease    • Diverticulitis of colon 12/04/2008   • Female bladder prolapse    • Gastric reflux    • Hematuria     Last Assessed: 11/18/2014    • History of diverticulitis    • [place] (hard of hearing)    • Hypercholesteremia    • Hyperkeratosis of skin 11/03/2011   • Hypertension    • Hypokalemia 12/13/2011   • Incomplete uterovaginal prolapse 05/28/2010   • Myocardial infarction [facility])    • Osteoporosis    • 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    Last Assessed: 10/24/2012    • Seasonal allergies    • Sebaceous cyst     Last Assessed: 2/8/2014   • Syncope    • Trigeminal neuralgia 03/20/2012   • Urinary incontinence    • Uterine prolapse    • Vertigo 09/15/2011   • 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   • Wears glasses    • Wears hearing aid     States she rarely wears them   • Wears partial dentures     Upper      Past Surgical History:   Procedure Laterality Date   • APPENDECTOMY     • APPENDICO-VESICOSTOMY CUTANEOUS  03/01/2010   • CHOLECYSTECTOMY      Laparoscopic   • COLONOSCOPY     • CORONARY ANGIOPLASTY     • CORONARY ANGIOPLASTY WITH STENT PLACEMENT      2 aortic stents   • CORONARY ANGIOPLASTY WITH STENT PLACEMENT  2013    2 distal left anterior descending artery    • ESOPHAGOGASTRODUODENOSCOPY     • FRACTURE SURGERY Right     Repair right arm- titanium servando from shoulder to elbow.    • HYSTERECTOMY      Complete   • IR THORACENTESIS  6/9/2023   • IR THORACENTESIS  7/14/2023   • DE CMBND ANTERPOST COLPORRAPHY W/CYSTO N/A 9/20/2016    Procedure: COLPORRHAPHY ANTERIOR POSTERIOR GRAFT;  Surgeon: Yuan Cash MD;  Location: [facility];  Service: UroGynecology          • DE COLPOPEXY VAGINAL EXTRAPERITONEAL APPROACH N/A 9/20/2016    Procedure: COLPOPEXY VAGINAL EXTRAPERITONEAL  incision and drainage of vagina inclusion cyst x 4;  Surgeon: Nick Faria MD;  [facility];  Service: UroGynecology          • ID CYSTOURETHROSCOPY N/A 9/20/2016    Procedure: Katherineseth Bolton;  Surgeon: Nick Faria MD;  [facility];  Service: UroGynecology          • ID SLING OPERATION STRESS INCONTINENCE N/A 9/20/2016    Procedure: INSERTION PUBOVAGINAL SLING RETROPUBIC ;  Surgeon: Nick Faria MD;  [facility];  Service: UroGynecology            Social History     Socioeconomic History   • Marital status:      Spouse name: Not on file   • Number of children: Not on file   • Years of education: Not on file   • Highest education level: Not on file   Occupational History   • Not on file   Tobacco Use   • Smoking status: Never   • Smokeless tobacco: Never   Vaping Use   • Vaping Use: Never used   Substance and Sexual Activity   • Alcohol use: Yes     Alcohol/week: 1.0 - 2.0 standard drink of alcohol     Types: 1 - 2 Glasses of wine per week     Comment: per day, per allscripts: Being a social drinker    • Drug use: No   • Sexual activity: Not on file   Other Topics Concern   • Not on file   Social History Narrative    Daily Coffee Consumption- 2 cups/day     Exercising Regularly      Social Determinants of Health     Financial Resource Strain: Low Risk  (7/13/2023)    Overall Financial Resource Strain (CARDIA)    • Difficulty of Paying Living Expenses: Not hard at all   Food Insecurity: Not on file   Transportation Needs: No Transportation Needs (7/13/2023)    PRAPARE - Transportation    • Lack of Transportation (Medical): No    • Lack of Transportation (Non-Medical):  No   Physical Activity: Not on file   Stress: Not on file   Social Connections: Not on file   Intimate Partner Violence: Not on file   Housing Stability: Not on file     Family History   Problem Relation Age of Onset   • Hypertension Mother    • Heart failure Mother    • Coronary artery disease Mother    • Arthritis Mother    • Osteoporosis Mother    • Hyperlipidemia Mother    • Coronary artery disease Sister      Allergies   Allergen Reactions   • Bee Venom Anaphylaxis     Reaction Date: 02Feb2004; Annotation - [id number]: jjw   • Enalapril Other (See Comments)     Reaction Date: [id number];   Leg  pain   • Erythromycin Dizziness     Reaction Date: [id number];    • Estrogens Other (See Comments)     Reaction Date: 02Feb2004; Annotation - [id number]: jjw   • Ezetimibe Hives   • Penicillins Hives     "All cillins", "and all myocillins"   • Ramipril Other (See Comments)     Reaction Date: 02Feb2004; Annotation - [id number]: jjw   • Statins Other (See Comments)     Muscle pain   • Versed [Midazolam] Other (See Comments)     States she "passed out" when recovering from a procedure and was told to not use it again. • Zithromax [Azithromycin] Anaphylaxis       Objective     Vitals:    09/26/23 1418   BP: 140/80   BP Location: Left arm   Patient Position: Sitting   Cuff Size: Standard   Pulse: 60   Resp: 14   Temp: 97.7 °F (36.5 °C)   TempSrc: Temporal   SpO2: 99%   Weight: 50.8 kg (112 lb)   Height: 5' 4" (1.626 m)       Physical Exam:     GENERAL: NAD, pleasant   HEENT:  NC/AT, PERRL, EOMI, no scleral icterus  CARDIAC:  RRR, +S1/S2, no S3/S4 appreciated, no m/g/r  PULMONARY: Decreased breath sounds right lower lung, non-labored breathing  ABDOMEN:  Soft, NT/ND, no rebound/guarding/rigidity  Extremities:. No edema, cyanosis, or clubbing  Musculoskeletal:  Full range of motion intact in all extremities   NEUROLOGIC: Grossly intact, no focal deficits  SKIN:  No rashes or erythema noted on exposed skin  Psych: Normal affect, mood stable    ==  PLEASE NOTE:  This encounter was completed utilizing the [employer] Direct Speech Voice Recognition Software.  Grammatical errors, random word insertions, pronoun errors and incomplete sentences are occasional consequences of the system due to software limitations, ambient noise and hardware issues. These may be missed by proof reading prior to affixing electronic signature. Any questions or concerns about the content, text or information contained within the body of this dictation should be directly addressed to the physician for clarification. Please do not hesitate to call me directly if you have any any questions or concerns.     DO Clement Mckenzie [facility]   [facility]

## 2023-10-05 ENCOUNTER — APPOINTMENT (OUTPATIENT)
Dept: LAB | Facility: CLINIC | Age: 88
End: 2023-10-05
Payer: COMMERCIAL

## 2023-10-05 ENCOUNTER — APPOINTMENT (OUTPATIENT)
Dept: RADIOLOGY | Facility: CLINIC | Age: 88
End: 2023-10-05
Payer: COMMERCIAL

## 2023-10-05 DIAGNOSIS — Z13.29 THYROID DISORDER SCREENING: ICD-10-CM

## 2023-10-05 DIAGNOSIS — I50.22 CHRONIC SYSTOLIC HEART FAILURE (HCC): ICD-10-CM

## 2023-10-05 DIAGNOSIS — J90 PLEURAL EFFUSION: ICD-10-CM

## 2023-10-05 LAB
ALBUMIN SERPL BCP-MCNC: 4 G/DL (ref 3.5–5)
ALP SERPL-CCNC: 63 U/L (ref 34–104)
ALT SERPL W P-5'-P-CCNC: 19 U/L (ref 7–52)
ANION GAP SERPL CALCULATED.3IONS-SCNC: 9 MMOL/L
AST SERPL W P-5'-P-CCNC: 26 U/L (ref 13–39)
BASOPHILS # BLD AUTO: 0.06 THOUSANDS/ÂΜL (ref 0–0.1)
BASOPHILS NFR BLD AUTO: 1 % (ref 0–1)
BILIRUB SERPL-MCNC: 0.88 MG/DL (ref 0.2–1)
BUN SERPL-MCNC: 18 MG/DL (ref 5–25)
CALCIUM SERPL-MCNC: 9.5 MG/DL (ref 8.4–10.2)
CHLORIDE SERPL-SCNC: 104 MMOL/L (ref 96–108)
CO2 SERPL-SCNC: 28 MMOL/L (ref 21–32)
CREAT SERPL-MCNC: 0.66 MG/DL (ref 0.6–1.3)
EOSINOPHIL # BLD AUTO: 0.25 THOUSAND/ÂΜL (ref 0–0.61)
EOSINOPHIL NFR BLD AUTO: 3 % (ref 0–6)
ERYTHROCYTE [DISTWIDTH] IN BLOOD BY AUTOMATED COUNT: 16 % (ref 11.6–15.1)
GFR SERPL CREATININE-BSD FRML MDRD: 78 ML/MIN/1.73SQ M
GLUCOSE P FAST SERPL-MCNC: 98 MG/DL (ref 65–99)
HCT VFR BLD AUTO: 50.2 % (ref 34.8–46.1)
HGB BLD-MCNC: 15.5 G/DL (ref 11.5–15.4)
IMM GRANULOCYTES # BLD AUTO: 0.03 THOUSAND/UL (ref 0–0.2)
IMM GRANULOCYTES NFR BLD AUTO: 0 % (ref 0–2)
LYMPHOCYTES # BLD AUTO: 1.95 THOUSANDS/ÂΜL (ref 0.6–4.47)
LYMPHOCYTES NFR BLD AUTO: 22 % (ref 14–44)
MCH RBC QN AUTO: 31.1 PG (ref 26.8–34.3)
MCHC RBC AUTO-ENTMCNC: 30.9 G/DL (ref 31.4–37.4)
MCV RBC AUTO: 101 FL (ref 82–98)
MONOCYTES # BLD AUTO: 0.86 THOUSAND/ÂΜL (ref 0.17–1.22)
MONOCYTES NFR BLD AUTO: 10 % (ref 4–12)
NEUTROPHILS # BLD AUTO: 5.83 THOUSANDS/ÂΜL (ref 1.85–7.62)
NEUTS SEG NFR BLD AUTO: 64 % (ref 43–75)
NRBC BLD AUTO-RTO: 0 /100 WBCS
PLATELET # BLD AUTO: 362 THOUSANDS/UL (ref 149–390)
PMV BLD AUTO: 11.1 FL (ref 8.9–12.7)
POTASSIUM SERPL-SCNC: 3.7 MMOL/L (ref 3.5–5.3)
PROT SERPL-MCNC: 7.5 G/DL (ref 6.4–8.4)
RBC # BLD AUTO: 4.98 MILLION/UL (ref 3.81–5.12)
SODIUM SERPL-SCNC: 141 MMOL/L (ref 135–147)
T4 FREE SERPL-MCNC: 0.86 NG/DL (ref 0.61–1.12)
TSH SERPL DL<=0.05 MIU/L-ACNC: 2.52 UIU/ML (ref 0.45–4.5)
WBC # BLD AUTO: 8.98 THOUSAND/UL (ref 4.31–10.16)

## 2023-10-05 PROCEDURE — 84439 ASSAY OF FREE THYROXINE: CPT

## 2023-10-05 PROCEDURE — 36415 COLL VENOUS BLD VENIPUNCTURE: CPT

## 2023-10-05 PROCEDURE — 85025 COMPLETE CBC W/AUTO DIFF WBC: CPT

## 2023-10-05 PROCEDURE — 80053 COMPREHEN METABOLIC PANEL: CPT

## 2023-10-05 PROCEDURE — 71046 X-RAY EXAM CHEST 2 VIEWS: CPT

## 2023-10-05 PROCEDURE — 84443 ASSAY THYROID STIM HORMONE: CPT

## 2023-10-06 ENCOUNTER — OFFICE VISIT (OUTPATIENT)
Dept: CARDIOLOGY CLINIC | Facility: CLINIC | Age: 88
End: 2023-10-06
Payer: COMMERCIAL

## 2023-10-06 VITALS
WEIGHT: 115 LBS | HEIGHT: 64 IN | BODY MASS INDEX: 19.63 KG/M2 | HEART RATE: 63 BPM | SYSTOLIC BLOOD PRESSURE: 124 MMHG | OXYGEN SATURATION: 90 % | DIASTOLIC BLOOD PRESSURE: 62 MMHG

## 2023-10-06 DIAGNOSIS — M79.89 LEG SWELLING: ICD-10-CM

## 2023-10-06 DIAGNOSIS — I10 BENIGN ESSENTIAL HYPERTENSION: ICD-10-CM

## 2023-10-06 DIAGNOSIS — E78.5 DYSLIPIDEMIA: ICD-10-CM

## 2023-10-06 DIAGNOSIS — J90 PLEURAL EFFUSION: ICD-10-CM

## 2023-10-06 DIAGNOSIS — I25.10 ARTERIOSCLEROSIS OF CORONARY ARTERY: ICD-10-CM

## 2023-10-06 DIAGNOSIS — E87.6 HYPOKALEMIA: ICD-10-CM

## 2023-10-06 DIAGNOSIS — I50.42 CHRONIC COMBINED SYSTOLIC AND DIASTOLIC CONGESTIVE HEART FAILURE (HCC): ICD-10-CM

## 2023-10-06 DIAGNOSIS — I42.9 CARDIOMYOPATHY, UNSPECIFIED TYPE (HCC): ICD-10-CM

## 2023-10-06 PROCEDURE — 99214 OFFICE O/P EST MOD 30 MIN: CPT | Performed by: INTERNAL MEDICINE

## 2023-10-06 RX ORDER — SPIRONOLACTONE 25 MG/1
12.5 TABLET ORAL DAILY
Qty: 45 TABLET | Refills: 1 | Status: SHIPPED | OUTPATIENT
Start: 2023-10-06

## 2023-10-06 NOTE — PROGRESS NOTES
Progress Note - Cardiology Office  [facility]  Ana Deshpande Adriana 80 y.o. female MRN: [id number]  : 10/3/1933  Encounter: [id number]      Assessment:     1. Chronic combined systolic and diastolic congestive heart failure ([address])    2. Benign essential hypertension    3. Arteriosclerosis of coronary artery    4. Pleural effusion    5. Dyslipidemia    6. Cardiomyopathy, unspecified type (HCC)    7. Leg swelling    8. Hypokalemia        Discussion Summary and Plan:    1. Coronary artery disease with history of angioplasty of LAD and diagonal with a stent in  by Dr. Noah Garcia and repeat catheterization in  shows patent stent and has mid RCA significant stenosis but very medium to small size artery not suitable for percutaneous techniques on medical Rx. Please she is on Crestor 10 mg every other day. Due to current medications. 3.  Cardiomyopathy. With combined systolic and diastolic heart failure. Her EF is now 30 to 35% with grade 2 diastolic dysfunction. At this time we will continue diuretics. We will check electrolytes as ordered. And continue losartan. Patient and family want conservative Rx. She does not want to be resuscitated she is DNR and DNI. We will add low-dose Aldactone 12.5 mg daily. 4.  Persistent hypokalemia. Labs done 10/5/2023 potassium was acceptable BUN/creatinine stable. 5.  Benign essential hypertension. Blood pressure is acceptable with current therapy she is on metoprolol XL 50 twice a day, Lasix 40 mg daily, losartan 50 and she will be on Aldactone 12.5 mg daily. .    6. Recent history of stroke. Patient has recent history of stroke. She was started on Plavix therapy she follows with Neurology. Most likely it is elected or infarct as she has recovered. She has no previous history of atrial fibrillation. And she has been on aspirin and carotid Doppler has been negative. 7. History of memory loss.   Family would like to have geriatric evaluation. they will discussed with primary care team.    8.  Combined systolic systolic and diastolic heart failure with pleural effusion. Repeat chest x-ray shows significant improvement in pleural effusion. X-ray from 10/5/2020 reviewed. There may be minimal pleural effusion on the right side. Her echo has shown she has moderate to severe valvular disease EF 30 to 35% family want conservative Rx. Plan. Renew current Rx will add Aldactone 12.5 mg daily and repeat BMP in about 2 to 3 weeks. Counseling :  A description of the counseling. Patient advised to keep herself hydrated avoid similar situation where she passed out. Patient daughter was present all the time and all their questions were answered. Patient's ability to self care: Yes  Medication side effect reviewed with patient in detail and all their questions answered to their satisfaction. HPI :     Vivi Sal is a 80y.o. year old female who came for follow up. Patient has Past medical history significant for coronary artery disease with remote angioplasty of LAD and diagonal by Dr. Clem Abdullahi in 2013 and then repeat cardiac catheterization in 2015 at [facility] [place] shows patent stent in LAD and diagonal and has small RCA mid 80-90% stenosis which is very tortuous and not suitable for percutaneous technique. Patient has repeated history of syncopal episode and most of time it happened situation when she had a glass of wine and she is in a restaurant. She does not remember what happens and she feels fine after few minutes off it. She denies any fever or any chills any nausea and vomiting. She is otherwise very active. She cannot take statins even though her cholesterol is very high. She takes Crestor 5 mg twice a week and she is able to handle that. No fever no chills no nausea no vomiting no other significant complaint. .      10/17/2022. Above reviewed.   Patient who has medical history significant for TIA when she was admitted to [facility], history of labile hypertension, dyslipidemia, but could not tolerate high dose of statins, CAD with very tortuous vessel so came for follow-up. She has an echo done at [facility] which shows she has normal LV systolic function and no cardiac arrhythmia was noted while she was there. She does have history of RBBB which is not changed. She has a previous cardiac catheterization which shows RCA was very tortuous and severely disease and not suitable for percutaneous technique and she was recommended medical therapy. Her vitals has been stable. Her last blood test was in July 2021. As per daughter she is getting more forgetful. She has done as active as before but she is able to do her activities. No nausea no vomiting no fever no chills no other cardiovascular issues. She may not be taking 1 of her medications regularly daughter will call us back. 6/2/2023. Above reviewed. Patient came for follow-up. She was brought in by her daughter. Patient has not been eating and there is a decrease in appetite as per patient's daughter. She is also becoming more forgetful. She does have history of labile hypertension, dyslipidemia but could not tolerate statin, CAD with very tortuous vessels and history of diastolic heart failure. She has not seen us for about 8 to 9 months. Her previous cardiac catheter report was reviewed. Lately she was found to have right-sided pleural effusion and her BNP was elevated and she was short of breath. Patient denies any chest pain. She did lose weight. In March 2023 she was 138 currently she is 130 pounds. EKG today shows sinus rhythm with few PVCs heart rate 94 bpm.  QS in V1 to V3 noted. She has some leg edema. Today she is using her compression stocking. Labs from 6/1/2023 reviewed. Her BNP was 3000.   Her potassium was 3.5 she is already started on potassium and her thyroid function was 1.67 and LFTs were acceptable. Hemoglobin was stable. X-ray report reviewed. 7/14/2023. Above reviewed. Patient came for follow-up. She has further decrease in her appetite. Since last visit in June 2022 she has lost about 10 pounds. But she is not also eating. She has become very forgetful. She does a history of labile hypertension, dyslipidemia could not tolerate statin, CAD with very tortuous arteries and history of diastolic heart failure. Her albumin is also 3.2. As per patient's daughter she is not eating. Today she had a pleural tap done on the right side and about 1.2 L fluid was removed. She is feeling better with that otherwise she gets short of breath. Recently this was increased to 40 mg daily. Her blood test 6/8/2023 has been acceptable cholesterol profile was stable. Currently from diuretic point of view she is taking Jardiance 25 mg daily. Lasix 40 mg daily, metoprolol XL 50, losartan 50 and she is on Crestor 10 mg.    10/6/2023. Above reviewed. Patient came for follow-up. She was diagnosed to have moderate to severe MR mild to moderate TR and pulmonary hypertension. She has medical history significant for diastolic heart failure with valvular disease, history of pleural effusion and in July 2023 around 1.2 L fluid was removed, history of decrease in appetite and weight loss now around 115 who came for follow-up. She feels better she is breathing is better and her leg swelling has significantly improved. Her med list reviewed she is on Jardiance 25 mg daily, Lasix 40 mg daily, Metropol XL 50 and losartan 50 along with Crestor. Hemoglobin has been stable she had a blood test done just today 10/5/2023 her potassium and other electrolytes were stable. She takes her potassium 20 mg daily. Review of Systems   Constitutional: Positive for appetite change and fatigue. Negative for activity change, chills, diaphoresis, fever and unexpected weight change.    HENT: Positive for hearing loss. Negative for congestion. Eyes: Negative for discharge and redness. Respiratory: Positive for shortness of breath. Negative for cough, chest tightness and wheezing. Cardiovascular: Negative. Negative for chest pain, palpitations and leg swelling. Gastrointestinal: Negative for abdominal pain, diarrhea and nausea. Endocrine: Negative. Genitourinary: Negative for decreased urine volume and urgency. Musculoskeletal: Negative. Negative for arthralgias, back pain and gait problem. Skin: Negative for rash and wound. Allergic/Immunologic: Negative. Neurological: Negative for dizziness, seizures, syncope, weakness, light-headedness and headaches. Hematological: Negative. Psychiatric/Behavioral: Negative for agitation and confusion. The patient is not nervous/anxious.         Historical Information   Past Medical History:   Diagnosis Date   • Abscess of chest wall     Last Assessed: 2/27/2014   • Arthritis    • Back pain    • Cataract     Start of   • CHF (congestive heart failure) (HCC)    • Colon polyp    • Coronary artery disease    • Diverticulitis of colon 12/04/2008   • Female bladder prolapse    • Gastric reflux    • Hematuria     Last Assessed: 11/18/2014    • History of diverticulitis    • [place] (hard of hearing)    • Hypercholesteremia    • Hyperkeratosis of skin 11/03/2011   • Hypertension    • Hypokalemia 12/13/2011   • Incomplete uterovaginal prolapse 05/28/2010   • Myocardial infarction [facility])    • Osteoporosis    • 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    Last Assessed: 10/24/2012    • Seasonal allergies    • Sebaceous cyst     Last Assessed: 2/8/2014   • Syncope    • Trigeminal neuralgia 03/20/2012   • Urinary incontinence    • Uterine prolapse    • Vertigo 09/15/2011   • 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   • Wears glasses    • Wears hearing aid     States she rarely wears them   • Wears partial dentures     Upper      Past Surgical History:   Procedure Laterality Date   • APPENDECTOMY     • APPENDICO-VESICOSTOMY CUTANEOUS  03/01/2010   • CHOLECYSTECTOMY      Laparoscopic   • COLONOSCOPY     • CORONARY ANGIOPLASTY     • CORONARY ANGIOPLASTY WITH STENT PLACEMENT      2 aortic stents   • CORONARY ANGIOPLASTY WITH STENT PLACEMENT  2013 2 distal left anterior descending artery    • ESOPHAGOGASTRODUODENOSCOPY     • FRACTURE SURGERY Right     Repair right arm- titanium servando from shoulder to elbow.    • HYSTERECTOMY      Complete   • IR THORACENTESIS  6/9/2023   • IR THORACENTESIS  7/14/2023   • SD CMBND ANTERPOST COLPORRAPHY W/CYSTO N/A 9/20/2016    Procedure: COLPORRHAPHY ANTERIOR POSTERIOR GRAFT;  Surgeon: Jevon Ham MD;  [facility];  Service: UroGynecology          • SD COLPOPEXY VAGINAL EXTRAPERITONEAL APPROACH N/A 9/20/2016    Procedure: COLPOPEXY VAGINAL EXTRAPERITONEAL  incision and drainage of vagina inclusion cyst x 4;  Surgeon: Jevon Ham MD;  [facility];  Service: UroGynecology          • SD CYSTOURETHROSCOPY N/A 9/20/2016    Procedure: Luis Moe;  Surgeon: Jevon Ham MD;  [facility];  Service: UroGynecology          • SD SLING OPERATION STRESS INCONTINENCE N/A 9/20/2016    Procedure: INSERTION PUBOVAGINAL SLING RETROPUBIC ;  Surgeon: Jevon Ham MD;  [facility];  Service: UroGynecology            Social History     Substance and Sexual Activity   Alcohol Use Yes   • Alcohol/week: 1.0 - 2.0 standard drink of alcohol   • Types: 1 - 2 Glasses of wine per week    Comment: per day, per allscripts: Being a social drinker      Social History     Substance and Sexual Activity   Drug Use No     Social History     Tobacco Use   Smoking Status Never   Smokeless Tobacco Never     Family History:   Family History   Problem Relation Age of Onset   • Hypertension Mother    • Heart failure Mother    • Coronary artery disease Mother    • Arthritis Mother    • Osteoporosis Mother    • Hyperlipidemia Mother    • Coronary artery disease Sister        Meds/Allergies     Allergies   Allergen Reactions   • Bee Venom Anaphylaxis     Reaction Date: 02Feb2004; Annotation - [id number]: jjw   • Enalapril Other (See Comments)     Reaction Date: [id number];   Leg  pain   • Erythromycin Dizziness     Reaction Date: [id number];    • Estrogens Other (See Comments)     Reaction Date: 02Feb2004; Annotation - [id number]: jjw   • Ezetimibe Hives   • Penicillins Hives     "All cillins", "and all myocillins"   • Ramipril Other (See Comments)     Reaction Date: 02Feb2004; Annotation - [id number]: jjw   • Statins Other (See Comments)     Muscle pain   • Versed [Midazolam] Other (See Comments)     States she "passed out" when recovering from a procedure and was told to not use it again.    • Zithromax [Azithromycin] Anaphylaxis       Current Outpatient Medications:   •  aspirin 81 MG tablet, Take 1 tablet by mouth daily, Disp: , Rfl:   •  donepezil (ARICEPT) 5 mg tablet, TAKE 1 TABLET BY MOUTH EVERY DAY AT NIGHT, Disp: , Rfl:   •  Empagliflozin (JARDIANCE) 10 MG TABS tablet, Take 1 tablet (10 mg total) by mouth daily, Disp: 30 tablet, Rfl: 5  •  fexofenadine (ALLEGRA) 180 MG tablet, Take by mouth, Disp: , Rfl:   •  furosemide (LASIX) 40 mg tablet, Take 1 tablet (40 mg total) by mouth daily New dose, Disp: 30 tablet, Rfl: 6  •  Klor-Con M20 20 MEQ tablet, TAKE 1 TABLET (20 MEQ TOTAL) BY MOUTH 4 (FOUR) TIMES A DAY, Disp: 360 tablet, Rfl: 0  •  losartan (COZAAR) 50 mg tablet, Take 50 mg by mouth daily, Disp: , Rfl:   •  metoprolol succinate (TOPROL-XL) 50 mg 24 hr tablet, Take 1 tablet (50 mg total) by mouth 2 (two) times a day, Disp: 180 tablet, Rfl: 1  •  mirtazapine (REMERON) 7.5 MG tablet, Take 1 tablet (7.5 mg total) by mouth daily at bedtime, Disp: 30 tablet, Rfl: 5  •  RABEprazole (ACIPHEX) 20 MG tablet, Take 1 tablet (20 mg total) by mouth daily (Patient taking differently: Take 20 mg by mouth 3 (three) times a week), Disp: 90 tablet, Rfl: 3  • rosuvastatin (CRESTOR) 10 MG tablet, TAKE 1 TABLET BY MOUTH EVERY DAY, Disp: 90 tablet, Rfl: 1  •  spironolactone (ALDACTONE) 25 mg tablet, Take 0.5 tablets (12.5 mg total) by mouth daily, Disp: 45 tablet, Rfl: 1    Vitals: Blood pressure 124/62, pulse 63, height 5' 4" (1.626 m), weight 52.2 kg (115 lb), SpO2 90 %. ?  Body mass index is 19.74 kg/m². Vitals:    10/06/23 1119   Weight: 52.2 kg (115 lb)     BP Readings from Last 3 Encounters:   10/06/23 124/62   09/26/23 140/80   07/14/23 158/81         Physical Exam  Constitutional:       General: She is not in acute distress. Appearance: She is well-developed. She is not diaphoretic. Neck:      Thyroid: No thyromegaly. Vascular: No JVD. Trachea: No tracheal deviation. Cardiovascular:      Rate and Rhythm: Normal rate and regular rhythm. Heart sounds: S1 normal and S2 normal. Heart sounds not distant. Murmur heard. Systolic (ejection) murmur is present with a grade of 2/6. No friction rub. No gallop. No S3 or S4 sounds. Comments: S1-S2 regular with a pansystolic murmur in mitral area  Pulmonary:      Effort: Pulmonary effort is normal. No respiratory distress. Breath sounds: Normal breath sounds. No wheezing or rales. Chest:      Chest wall: No tenderness. Abdominal:      General: Bowel sounds are normal. There is no distension. Palpations: Abdomen is soft. Tenderness: There is no abdominal tenderness. Musculoskeletal:         General: No deformity. Cervical back: Neck supple. Skin:     General: Skin is warm and dry. Coloration: Skin is not pale. Findings: No rash. Neurological:      Mental Status: She is alert and oriented to person, place, and time. Psychiatric:         Behavior: Behavior normal.         Judgment: Judgment normal.       Diagnostic Studies Review Cardio:   cardiac catheterization.:   Patient's cardiac catheterization from 2015 reviewed.   She has patent stent seen in LAD and diagonal.  Apical LAD has around 70 80% stenosis which small vessel, mid circumflex around 40-50% stenosis. RCA which is a small size artery and only gives RPDA has around 90% stenosis in the mid it is very tortuous artery not suitable for percutaneous techniques. Echo Doppler done 06/28/2018 shows low-normal LV systolic function EF around 55 percent, mild concentric left atrial hypertrophy, mild MR, mild AI, mild TR PA pressure 30 millimeters of mercury. Repeat echo Doppler done on June 2022 in [facility] shows EF 55% mild LVH mild AI, mild MR no change from previous echo. Echo Doppler. Echo Doppler done 6/7/2023 shows severe LV dysfunction. Grade 2 diastolic dysfunction, left atrium moderately dilated, mild to moderate MR and moderate to severe TR with PA pressure 50 to 55 mmHg. Carotid Doppler. Carotid study done 05/20/2019 shows less than 50% stenosis bilaterally. Holter monitor done on 06/28/2018 shows normal sinus rhythm. Few PACs seen. Rare episode of PVCs. EKG:    Twelve lead E[facility] done at  [facility] shows normal sinus rhythm heart rate around 87 beats per minute. Nonspecific ST changes. Twelve lead EKG done on 12/05/2018 shows normal sinus rhythm heart rate 81 beats per minute. No significant ST changes. No change from old EKG. Twelve lead EKG done in our office 04/26/2019 shows normal sinus rhythm heart rate 93 beats per minute. Twelve lead EKG done in our office 06/04/2019 shows normal sinus rhythm QS in V1 to V3 most likely due to lead location. No other significant change no change from old EKG heart rate 66 beats per minute. Twelve lead EKG 12/17/2019 shows normal sinus rhythm 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. Heart rate 68 beats per minute. Twelve lead EKG 06/25/2020 shows normal sinus rhythm heart rate 61 beats per minute EKG done 06/25/2020 no change from old EKG.     12 EKG done 08/12/2021 shows normal sinus with PACs heart rate 84 beats per minute. No significant change from old EKG. Twelve lead EKG done 2022 shows normal sinus rhythm with sinus arrhythmia heart rate 59 beats per minute incomplete RBBB. Q-wave in inferior leads cannot rule out old inferior infarction. Not much changed from previous EKG. Twelve-lead EKG done on 2023 shows sinus rhythm with few PVCs heart rate 94 bpm.        Cardiac testing:       Results for orders placed in visit on 08/20/15   Cardiac catheterization    Narrative [address] [facility] [address], [address] [facility]   Phone: [phone]      INVASIVE CARDIOVASCULAR LAB COMPLETE REPORT         Patient: Ana MENDOSA   Location: Outpatient   MR number: [id number]   Account number: [de-identified]   Study date: 2015   Gender: Female   : 10/03/1933   Height: 64.2 in   Weight: 147.6 lb   BSA: 1.72 m squared   Allergies: Altace, beestings, Erythromycin Base, estrogen, penicilin, Versed,   Vasotec, Zithromax, all cillins, mycins, Vytorin 10-10, Zetia   Diagnostic Cardiologist:  Cristi Goodpasture, MD   Primary Physician:  Brigida Lofton MD   SUMMARY   CORONARY CIRCULATION:   Left main: Normal.   LAD: The vessel was normal sized. The were no obstructive stenoses. The   previously deployed stent remains widely patent. The stent at the ostium of D1   remains widely patent. Circumflex: The vessel was normal sized. There was a   non-critical 50% plaque in mid vessel. There were no obstructive stenoses. The   major OM branches were normal. There was faint collateral flow from the   circumflex to the distal RCA. RCA: The vessel was small to medium sized and   dominant, giving rise to the PDA. There was a long, tortuous 95% stenosis in   mid vessel. [place] of severe tortuosity, the lesion is poorly suited for PCI. REPORT ELEMENT SELECTION:   Right radial access was employed.    Summary: The patient has single vessel disease with a long, tortuous subtotal   stenosis in the mid RCA. Elucidating symptom status is difficult in this   patient, since she previously had very severe LAD disease and diagonal disease   with atypical symptoms as the only presentation. Medical therapy is    recommended   for the following reasons 1) the lesion has been stable angiographically for 2   years. 2) the patient was able to exercise to a high level after PCI 2 years   ago with this lesion already present. 3) Nuclear imaging 1 year ago showed no   ischemia in the presence of this lesion. 4) There is some collateral flow from   the circumflex. 5) Current symptoms are atypical and not exertional. If PCI is   required in the future because of exertional angina refractory to medication,   it is recommended that the procedure be performed via the femoral approach,    and   INVASIVE CARDIOVASCULAR LAB COMPLETE REPORT   Patient: Alfa MENDOSA   MR number: [id number]    ------ Page 2   BSA: 1.72 m squared   that the procedure be performed at the [facility] because of the   increased risk of comlications. Treatment with rosuvastatin, 2.5mg once per   week was initiated. The patient has a history of statin intolerance at low   doses. INDICATIONS:   --  Possible CAD: unstable angina. PROCEDURES PERFORMED   --  Left coronary angiography. --  Right coronary angiography. --  Outpatient. --  Coronary Catheterization (w/o [facility]). PROCEDURE: The risks and alternatives of the procedures and conscious sedation   were explained to the patient and informed consent was obtained. The patient   was brought to the cath lab and placed on the table. The planned puncture    sites   were prepped and draped in the usual sterile fashion. --  Right radial artery access. After performing an Eusebio's test to verify   adequate ulnar artery supply to the hand, the radial site was prepped. The   puncture site was infiltrated with local anesthetic.  The vessel was accessed   using the modified Seldinger technique, a wire was advanced into the vessel,   and a sheath was advanced over the wire into the vessel. --  Left coronary artery angiography. A catheter was advanced over a guidewire   into the aorta and positioned in the left coronary artery ostium under   fluoroscopic guidance. Angiography was performed. --  Right coronary artery angiography. A catheter was advanced over a    guidewire   into the aorta and positioned in the right coronary artery ostium under   fluoroscopic guidance. Angiography was performed. --  Outpatient. --  Coronary Catheterization (w/o [facility]). PROCEDURE COMPLETION: The patient tolerated the procedure well and was   discharged from the cath lab. TIMING: Test started at 09:38. Test concluded at   10:03. HEMOSTASIS: The sheath was removed. The site was compressed with a   Hemoband device. Hemostasis was obtained. MEDICATIONS GIVEN: Verapamil   (Isoptin, Calan, Covera), 1.25 mg, IA, at 09:52. Fentanyl (1OOmcg/2 ml), 50   mcg, IV, at 09:42. Fentanyl (1OOmcg/2 ml), 50 mcg, IV, at 09:45. 1% Lidocaine,   1 ml, subcutaneously, at 09:49. Fentanyl (1OOmcg/2 ml), 25 mcg, IV, at 09:50. Nitroglycerin (200mcg/ml), 200 mcg, at 09:52. Heparin 1000 units/ml, 4,000   units, IV, at 09:52. CONTRAST GIVEN: 70 ml Omnipaque (350mg I /ml). RADIATION   EXPOSURE: Fluoroscopy time: 1.8 min. CORONARY VESSELS:   --  Left main: Normal.   INVASIVE CARDIOVASCULAR LAB COMPLETE REPORT   Patient: Sandra MENDOSA   MR number: [id number]    ------ Page 3   BSA: 1.72 m squared   --  LAD: The vessel was normal sized. The were no obstructive stenoses. The   previously deployed stent remains widely patent. The stent at the ostium of D1   remains widely patent. --  Circumflex: The vessel was normal sized. There was    a   non-critical 50% plaque in mid vessel. There were no obstructive stenoses. The   major OM branches were normal. There was faint collateral flow from the   circumflex to the distal RCA.  --  RCA: The vessel was small to medium sized    and   dominant, giving rise to the PDA. There was a long, tortuous 95% stenosis in   mid vessel. [place] of severe tortuosity, the lesion is poorly suited for PCI. NOTE: Right radial access was employed. Prepared and signed by   Ankur Foss MD   Signed 2015 11:05:34   CC: Dr. Chuck Kemp   Study diagram   Hemodynamic tables   Pressures:  Baseline   Pressures:  - HR: 70   Pressures:  - Rhythm:   Pressures:  -- Aortic Pressure (S/D/M): 157/83/116   Outputs:  Baseline   Outputs:  -- CALCULATIONS[age]   Outputs:  -- CALCULATIONS: Body Surface Area: 1.72   Outputs:  -- CALCULATIONS: Height in cm: 163.00   Outputs:  -- CALCULATIONS: Sex: Female   Outputs:  -- CALCULATIONS: Weight in k.10    . Lab Review   Lab Results   Component Value Date    WBC 8.98 10/05/2023    HGB 15.5 (H) 10/05/2023    HCT 50.2 (H) 10/05/2023     (H) 10/05/2023    RDW 16.0 (H) 10/05/2023     10/05/2023     BMP:  Lab Results   Component Value Date     2016    K 3.7 10/05/2023     10/05/2023    CO2 28 10/05/2023    BUN 18 10/05/2023    CREATININE 0.66 10/05/2023    GLUCOSE 99 2016    GLUF 98 10/05/2023    CALCIUM 9.5 10/05/2023    CORRECTEDCA 10.1 2023    EGFR 78 10/05/2023    MG 2.2 2021     LFT:  Lab Results   Component Value Date    AST 26 10/05/2023    ALT 19 10/05/2023    ALKPHOS 63 10/05/2023    PROT 7.1 2016    BILITOT 0.3 2016    TP 7.5 10/05/2023       Lab Results   Component Value Date    HGBA1C 5.8 2022     Lipid Profile:   Lab Results   Component Value Date    CHOL 192 2013    HDL 49 (L) 2023    LDLCALC 27 2023    TRIG 126 2023     Lab Results   Component Value Date    CHOL 192 2013     Lab Results   Component Value Date    TROPONINI 0.03 2018       Dr. Frankey Rivers, MD [facility]      "This note has been constructed using a voice recognition system. Therefore there may be syntax, spelling, and/or grammatical errors.  Please call if you have any questions. "

## 2023-10-16 ENCOUNTER — TELEPHONE (OUTPATIENT)
Dept: FAMILY MEDICINE CLINIC | Facility: CLINIC | Age: 88
End: 2023-10-16

## 2023-10-16 NOTE — TELEPHONE ENCOUNTER
----- Message from Lexy Mcclelland DO sent at 10/16/2023  7:10 AM EDT -----  Patient needs follow-up in office to make sure resolution of symptoms, follow-up with primary care physician.

## 2023-10-17 ENCOUNTER — RA CDI HCC (OUTPATIENT)
Dept: OTHER | Facility: HOSPITAL | Age: 88
End: 2023-10-17

## 2023-10-17 NOTE — PROGRESS NOTES
[address] [facility] coding opportunities        I11.0  Chart Reviewed number of suggestions sent to Provider: 1     Patients Insurance     Medicare Insurance: [id number]

## 2023-10-18 ENCOUNTER — OFFICE VISIT (OUTPATIENT)
Dept: FAMILY MEDICINE CLINIC | Facility: CLINIC | Age: 88
End: 2023-10-18

## 2023-10-18 VITALS
WEIGHT: 115 LBS | HEART RATE: 62 BPM | RESPIRATION RATE: 14 BRPM | SYSTOLIC BLOOD PRESSURE: 140 MMHG | TEMPERATURE: 97.5 F | DIASTOLIC BLOOD PRESSURE: 80 MMHG | HEIGHT: 64 IN | BODY MASS INDEX: 19.63 KG/M2 | OXYGEN SATURATION: 86 %

## 2023-10-18 DIAGNOSIS — I50.22 CHRONIC SYSTOLIC HEART FAILURE (HCC): ICD-10-CM

## 2023-10-18 DIAGNOSIS — R63.4 WEIGHT LOSS: ICD-10-CM

## 2023-10-18 DIAGNOSIS — R06.02 SOB (SHORTNESS OF BREATH) ON EXERTION: Primary | ICD-10-CM

## 2023-10-18 DIAGNOSIS — R41.89 COGNITIVE CHANGES: ICD-10-CM

## 2023-10-18 DIAGNOSIS — R09.02 HYPOXIA: ICD-10-CM

## 2023-10-18 DIAGNOSIS — Z23 ENCOUNTER FOR IMMUNIZATION: ICD-10-CM

## 2023-10-18 RX ORDER — MIRTAZAPINE 15 MG/1
15 TABLET, FILM COATED ORAL
Qty: 90 TABLET | Refills: 1 | Status: SHIPPED | OUTPATIENT
Start: 2023-10-18 | End: 2024-04-15

## 2023-10-18 NOTE — PROGRESS NOTES
Chief Complaint   Patient presents with   • Shortness of Breath        Patient ID: Catracho Lei is a 80 y.o. female. Shortness of Breath  The current episode started more than 1 month ago. The problem occurs intermittently. The problem is unchanged. The problem is moderate. Associated symptoms include fatigue. Pertinent negatives include no chest pain, chest pressure, coughing, dizziness, hoarseness of voice, leg swelling, orthopnea, palpitations, rhinorrhea, sore throat, stridor, sweats or wheezing. The symptoms are aggravated by activity. Past treatments include one or more prescription drugs. The treatment provided no relief. The following portions of the patient's history were reviewed and updated as appropriate: allergies, current medications, past family history, past medical history, past social history, past surgical history and problem list.    Review of Systems   Constitutional:  Positive for appetite change (eating "few bites") and fatigue. HENT: Negative. Negative for hoarse voice, rhinorrhea and sore throat. Respiratory:  Positive for shortness of breath. Negative for cough, wheezing and stridor. Cardiovascular:  Negative for chest pain, palpitations, orthopnea and leg swelling. Neurological:  Negative for dizziness. Psychiatric/Behavioral:  Positive for decreased concentration. Negative for behavioral problems.     Poor historian due to mild cognitive issues     Current Outpatient Medications   Medication Sig Dispense Refill   • aspirin 81 MG tablet Take 1 tablet by mouth daily     • donepezil (ARICEPT) 5 mg tablet TAKE 1 TABLET BY MOUTH EVERY DAY AT NIGHT     • Empagliflozin (JARDIANCE) 10 MG TABS tablet Take 1 tablet (10 mg total) by mouth daily 30 tablet 5   • fexofenadine (ALLEGRA) 180 MG tablet Take by mouth     • furosemide (LASIX) 40 mg tablet Take 1 tablet (40 mg total) by mouth daily New dose 30 tablet 6   • Klor-Con M20 20 MEQ tablet TAKE 1 TABLET (20 MEQ TOTAL) BY MOUTH 4 (FOUR) TIMES A  tablet 0   • losartan (COZAAR) 50 mg tablet Take 50 mg by mouth daily     • metoprolol succinate (TOPROL-XL) 50 mg 24 hr tablet Take 1 tablet (50 mg total) by mouth 2 (two) times a day 180 tablet 1   • mirtazapine (REMERON) 7.5 MG tablet Take 1 tablet (7.5 mg total) by mouth daily at bedtime 30 tablet 5   • RABEprazole (ACIPHEX) 20 MG tablet Take 1 tablet (20 mg total) by mouth daily (Patient taking differently: Take 20 mg by mouth 3 (three) times a week) 90 tablet 3   • rosuvastatin (CRESTOR) 10 MG tablet TAKE 1 TABLET BY MOUTH EVERY DAY 90 tablet 1   • spironolactone (ALDACTONE) 25 mg tablet Take 0.5 tablets (12.5 mg total) by mouth daily 45 tablet 1     No current facility-administered medications for this visit. Objective:    /80 (BP Location: Left arm, Patient Position: Sitting, Cuff Size: Standard)   Pulse 62   Temp 97.5 °F (36.4 °C) (Temporal)   Resp 14   Ht 5' 4" (1.626 m)   Wt 52.2 kg (115 lb)   SpO2 (!) 86% Comment: on RA  BMI 19.74 kg/m²   Increased Pulse Ox to 96 with O2 via NC at 2L/Min      Physical Exam  Constitutional:       General: She is not in acute distress. Appearance: She is not ill-appearing. Cardiovascular:      Rate and Rhythm: Normal rate and regular rhythm. Pulmonary:      Effort: Pulmonary effort is normal. No respiratory distress. Breath sounds: No decreased breath sounds, wheezing, rhonchi or rales. Neurological:      Mental Status: She is alert. Mental status is at baseline. Psychiatric:         Mood and Affect: Mood and affect normal.         Behavior: Behavior normal.         Cognition and Memory: Memory is impaired.                  Assessment/Plan:         Diagnoses and all orders for this visit:    SOB (shortness of breath) on exertion   Due to SOB   Cont meds  Encounter for immunization  -     FLUZONE HIGH-DOSE: influenza vaccine, high-dose, preservative-free 0.7 mL    Chronic systolic heart failure ([address])    Cognitive changes    Weight loss  -   will increase med form 7.5 mg to   mirtazapine (REMERON) 15 mg tablet;  Take 1 tablet (15 mg total) by mouth daily at bedtime    Hypoxia  -     Home Oxygen with Portability          Rto in 2 wks                   Matt Victoria MD

## 2023-10-19 ENCOUNTER — TELEPHONE (OUTPATIENT)
Age: 88
End: 2023-10-19

## 2023-10-19 NOTE — TELEPHONE ENCOUNTER
Santiago Haley from [facility] called requesting face to race notes from 10/18 office visit. If provider is requesting nursing staff to check patients oxygen level at night a script needs to be written also TY. Please fax to [phone]

## 2023-10-19 NOTE — TELEPHONE ENCOUNTER
Faxed order to [facility]. Issac is requesting Systane Zaditor eye drops - was using twice per day in both for redness and discoloration. She states she was given this med before. Patient right eye now has red/yellow in sclera. Â    If agreeable, patient uses [facility] in [place].    Ok to send medication per MD

## 2023-10-19 NOTE — TELEPHONE ENCOUNTER
Leni calling back stating that they need the whole note faxed for patient . Please send .   Kodi Padilla  Call back [phone]  Fax [phone]

## 2023-10-19 NOTE — TELEPHONE ENCOUNTER
Janna Ponce from [facility] called requesting chart notes for oxygen order.  Would like faxed to [phone]

## 2023-10-20 ENCOUNTER — TELEPHONE (OUTPATIENT)
Age: 88
End: 2023-10-20

## 2023-10-20 NOTE — TELEPHONE ENCOUNTER
Received call from TOMMIE from [facility]. She are in receipt of faxed copies of orders but they are not "electronically signed " by provider. Needs signed order for O2 to set patient up and for reimbursement from insurance. Please send signed orders over. Thank you!

## 2023-10-23 NOTE — TELEPHONE ENCOUNTER
Satish Schmidt from [facility] called and states receiving the notes, however they are not electronically signed. Need notes to be electronically signed in order to get patient her oxygen order.  Questions can be reached at [phone] Option #1 and fax   # [phone]

## 2023-10-30 ENCOUNTER — APPOINTMENT (OUTPATIENT)
Dept: LAB | Facility: CLINIC | Age: 88
End: 2023-10-30
Payer: COMMERCIAL

## 2023-10-30 DIAGNOSIS — J90 PLEURAL EFFUSION: ICD-10-CM

## 2023-10-30 DIAGNOSIS — I42.9 CARDIOMYOPATHY, UNSPECIFIED TYPE (HCC): ICD-10-CM

## 2023-10-30 DIAGNOSIS — I10 BENIGN ESSENTIAL HYPERTENSION: ICD-10-CM

## 2023-10-30 DIAGNOSIS — M79.89 LEG SWELLING: ICD-10-CM

## 2023-10-30 DIAGNOSIS — E78.5 DYSLIPIDEMIA: ICD-10-CM

## 2023-10-30 DIAGNOSIS — E87.6 HYPOKALEMIA: ICD-10-CM

## 2023-10-30 DIAGNOSIS — I50.42 CHRONIC COMBINED SYSTOLIC AND DIASTOLIC CONGESTIVE HEART FAILURE (HCC): ICD-10-CM

## 2023-10-30 DIAGNOSIS — I25.10 ARTERIOSCLEROSIS OF CORONARY ARTERY: ICD-10-CM

## 2023-10-30 LAB
ANION GAP SERPL CALCULATED.3IONS-SCNC: 5 MMOL/L
BUN SERPL-MCNC: 14 MG/DL (ref 5–25)
CALCIUM SERPL-MCNC: 9.3 MG/DL (ref 8.4–10.2)
CHLORIDE SERPL-SCNC: 103 MMOL/L (ref 96–108)
CO2 SERPL-SCNC: 30 MMOL/L (ref 21–32)
CREAT SERPL-MCNC: 0.61 MG/DL (ref 0.6–1.3)
GFR SERPL CREATININE-BSD FRML MDRD: 80 ML/MIN/1.73SQ M
GLUCOSE P FAST SERPL-MCNC: 97 MG/DL (ref 65–99)
POTASSIUM SERPL-SCNC: 3.9 MMOL/L (ref 3.5–5.3)
SODIUM SERPL-SCNC: 138 MMOL/L (ref 135–147)

## 2023-10-30 PROCEDURE — 80048 BASIC METABOLIC PNL TOTAL CA: CPT

## 2023-10-30 PROCEDURE — 36415 COLL VENOUS BLD VENIPUNCTURE: CPT

## 2023-10-31 ENCOUNTER — TELEPHONE (OUTPATIENT)
Dept: CARDIOLOGY CLINIC | Facility: CLINIC | Age: 88
End: 2023-10-31

## 2023-10-31 NOTE — TELEPHONE ENCOUNTER
----- Message from [address] sent at 10/31/2023 10:45 AM EDT -----  Please let patient know that her BMP is completely within normal limits.

## 2023-11-01 ENCOUNTER — OFFICE VISIT (OUTPATIENT)
Dept: FAMILY MEDICINE CLINIC | Facility: CLINIC | Age: 88
End: 2023-11-01
Payer: COMMERCIAL

## 2023-11-01 VITALS
BODY MASS INDEX: 19.6 KG/M2 | SYSTOLIC BLOOD PRESSURE: 156 MMHG | HEART RATE: 76 BPM | TEMPERATURE: 97.9 F | WEIGHT: 114.2 LBS | DIASTOLIC BLOOD PRESSURE: 84 MMHG

## 2023-11-01 DIAGNOSIS — I50.22 CHRONIC SYSTOLIC HEART FAILURE (HCC): ICD-10-CM

## 2023-11-01 DIAGNOSIS — R06.02 SOB (SHORTNESS OF BREATH) ON EXERTION: ICD-10-CM

## 2023-11-01 DIAGNOSIS — F03.B3 MODERATE DEMENTIA WITH MOOD DISTURBANCE, UNSPECIFIED DEMENTIA TYPE (HCC): Primary | ICD-10-CM

## 2023-11-01 PROBLEM — R41.89 COGNITIVE CHANGES: Status: RESOLVED | Noted: 2023-06-08 | Resolved: 2023-11-01

## 2023-11-01 PROCEDURE — 99214 OFFICE O/P EST MOD 30 MIN: CPT | Performed by: FAMILY MEDICINE

## 2023-11-01 RX ORDER — QUETIAPINE FUMARATE 25 MG/1
25 TABLET, FILM COATED ORAL
Qty: 30 TABLET | Refills: 2 | Status: SHIPPED | OUTPATIENT
Start: 2023-11-01 | End: 2024-01-30

## 2023-11-01 NOTE — PROGRESS NOTES
Chief Complaint   Patient presents with   • Follow-up     Recheck daughter says she has no appetite, daughter says she can get her to shower , short term memory is fading , shortness of breath when she gets up in the morning         Patient ID: Jo Miller is a 80 y.o. female. HPI  Pt is seeing for f/u Dementia symptoms, decreased appetite, CHF, hypoxia -  not using O2 as directed -  Remeron dose was increased to 15 mg 2 wks ago -  no changes yet     The following portions of the patient's history were reviewed and updated as appropriate: allergies, current medications, past family history, past medical history, past social history, past surgical history and problem list.    Review of Systems   Constitutional:  Positive for appetite change (eating "few bites") and fatigue. HENT: Negative. Negative for rhinorrhea and sore throat. Respiratory:  Positive for shortness of breath (only in am). Negative for cough, wheezing and stridor. Cardiovascular:  Negative for chest pain, palpitations and leg swelling. Neurological:  Negative for dizziness. Psychiatric/Behavioral:  Positive for confusion (in pm as per daughter), decreased concentration and dysphoric mood. Negative for behavioral problems and hallucinations.     Pt is poor historian     Current Outpatient Medications   Medication Sig Dispense Refill   • aspirin 81 MG tablet Take 1 tablet by mouth daily     • donepezil (ARICEPT) 5 mg tablet TAKE 1 TABLET BY MOUTH EVERY DAY AT NIGHT     • Empagliflozin (JARDIANCE) 10 MG TABS tablet Take 1 tablet (10 mg total) by mouth daily 30 tablet 5   • fexofenadine (ALLEGRA) 180 MG tablet Take by mouth     • furosemide (LASIX) 40 mg tablet Take 1 tablet (40 mg total) by mouth daily New dose 30 tablet 6   • Klor-Con M20 20 MEQ tablet TAKE 1 TABLET (20 MEQ TOTAL) BY MOUTH 4 (FOUR) TIMES A  tablet 0   • losartan (COZAAR) 50 mg tablet Take 50 mg by mouth daily     • metoprolol succinate (TOPROL-XL) 50 mg 24 hr tablet Take 1 tablet (50 mg total) by mouth 2 (two) times a day 180 tablet 1   • mirtazapine (REMERON) 15 mg tablet Take 1 tablet (15 mg total) by mouth daily at bedtime 90 tablet 1   • RABEprazole (ACIPHEX) 20 MG tablet Take 1 tablet (20 mg total) by mouth daily (Patient taking differently: Take 20 mg by mouth 3 (three) times a week) 90 tablet 3   • rosuvastatin (CRESTOR) 10 MG tablet TAKE 1 TABLET BY MOUTH EVERY DAY 90 tablet 1   • spironolactone (ALDACTONE) 25 mg tablet Take 0.5 tablets (12.5 mg total) by mouth daily 45 tablet 1     No current facility-administered medications for this visit. Objective:    /84 (BP Location: Left arm, Patient Position: Sitting, Cuff Size: Standard)   Pulse 76   Temp 97.9 °F (36.6 °C)   Wt 51.8 kg (114 lb 3.2 oz)   BMI 19.60 kg/m²        Physical Exam  Constitutional:       General: She is not in acute distress. Appearance: She is not ill-appearing. Cardiovascular:      Rate and Rhythm: Normal rate and regular rhythm. Pulmonary:      Effort: Pulmonary effort is normal. No respiratory distress. Breath sounds: No decreased breath sounds, wheezing, rhonchi or rales. Neurological:      Mental Status: She is alert. Mental status is at baseline. Psychiatric:         Mood and Affect: Mood and affect normal.         Behavior: Behavior normal. Behavior is cooperative. Cognition and Memory: Memory is impaired. Labs in chart were reviewed. Assessment/Plan:         Diagnoses and all orders for this visit:    Moderate dementia with mood disturbance, unspecified dementia type ([address])  -    will add  QUEtiapine (SEROquel) 25 mg tablet;  Take 1 tablet (25 mg total) by mouth daily at bedtime    Chronic systolic heart failure (HCC)   Cont meds  Cont home O2 as needed   SOB (shortness of breath) on exertion        Rto in 1 m                     Brooke Schrader MD

## 2023-11-08 DIAGNOSIS — I10 BENIGN ESSENTIAL HYPERTENSION: ICD-10-CM

## 2023-11-08 DIAGNOSIS — I25.10 ARTERIOSCLEROSIS OF CORONARY ARTERY: ICD-10-CM

## 2023-11-08 RX ORDER — METOPROLOL SUCCINATE 50 MG/1
50 TABLET, EXTENDED RELEASE ORAL 2 TIMES DAILY
Qty: 180 TABLET | Refills: 1 | Status: SHIPPED | OUTPATIENT
Start: 2023-11-08

## 2023-11-21 ENCOUNTER — RA CDI HCC (OUTPATIENT)
Dept: OTHER | Facility: HOSPITAL | Age: 88
End: 2023-11-21

## 2023-12-01 ENCOUNTER — OFFICE VISIT (OUTPATIENT)
Dept: FAMILY MEDICINE CLINIC | Facility: CLINIC | Age: 88
End: 2023-12-01
Payer: COMMERCIAL

## 2023-12-01 VITALS
SYSTOLIC BLOOD PRESSURE: 140 MMHG | HEIGHT: 64 IN | HEART RATE: 72 BPM | TEMPERATURE: 98.2 F | BODY MASS INDEX: 19.36 KG/M2 | RESPIRATION RATE: 14 BRPM | WEIGHT: 113.4 LBS | DIASTOLIC BLOOD PRESSURE: 80 MMHG

## 2023-12-01 DIAGNOSIS — R63.4 WEIGHT LOSS: ICD-10-CM

## 2023-12-01 PROCEDURE — 99213 OFFICE O/P EST LOW 20 MIN: CPT | Performed by: FAMILY MEDICINE

## 2023-12-01 RX ORDER — MIRTAZAPINE 30 MG/1
30 TABLET, FILM COATED ORAL
Qty: 90 TABLET | Refills: 0 | Status: SHIPPED | OUTPATIENT
Start: 2023-12-01 | End: 2024-02-29

## 2023-12-01 NOTE — PROGRESS NOTES
Chief Complaint   Patient presents with   • Follow-up   Dementia, weight loss      Patient ID: Trisha Yang is a 80 y.o. female. HPI  Pt is seeing with her son-in-law for f/u Dementia/weight loss -  sleeping a lot during day time -  no issues with night sleep -  baseline confusion - cont to have poor appetite -  lost 1 lb since last OV 1 m ago -  on Remeron 15 mg     The following portions of the patient's history were reviewed and updated as appropriate: allergies, current medications, past family history, past medical history, past social history, past surgical history and problem list.    Review of Systems   Unable to perform ROS: Dementia   Constitutional:  Negative for activity change, appetite change (still very poor appetite) and fatigue. Respiratory: Negative. Cardiovascular: Negative. Gastrointestinal: Negative. Psychiatric/Behavioral:  Positive for confusion.         Current Outpatient Medications   Medication Sig Dispense Refill   • aspirin 81 MG tablet Take 1 tablet by mouth daily     • donepezil (ARICEPT) 5 mg tablet TAKE 1 TABLET BY MOUTH EVERY DAY AT NIGHT     • Empagliflozin (JARDIANCE) 10 MG TABS tablet Take 1 tablet (10 mg total) by mouth daily 30 tablet 5   • fexofenadine (ALLEGRA) 180 MG tablet Take by mouth     • furosemide (LASIX) 40 mg tablet Take 1 tablet (40 mg total) by mouth daily New dose 30 tablet 6   • Klor-Con M20 20 MEQ tablet TAKE 1 TABLET (20 MEQ TOTAL) BY MOUTH 4 (FOUR) TIMES A  tablet 0   • losartan (COZAAR) 50 mg tablet Take 50 mg by mouth daily     • metoprolol succinate (TOPROL-XL) 50 mg 24 hr tablet TAKE 1 TABLET BY MOUTH TWICE A  tablet 1   • mirtazapine (REMERON) 15 mg tablet Take 1 tablet (15 mg total) by mouth daily at bedtime 90 tablet 1   • QUEtiapine (SEROquel) 25 mg tablet Take 1 tablet (25 mg total) by mouth daily at bedtime 30 tablet 2   • RABEprazole (ACIPHEX) 20 MG tablet Take 1 tablet (20 mg total) by mouth daily (Patient taking differently: Take 20 mg by mouth 3 (three) times a week) 90 tablet 3   • rosuvastatin (CRESTOR) 10 MG tablet TAKE 1 TABLET BY MOUTH EVERY DAY 90 tablet 1   • spironolactone (ALDACTONE) 25 mg tablet Take 0.5 tablets (12.5 mg total) by mouth daily 45 tablet 1     No current facility-administered medications for this visit. Objective:    /80 (BP Location: Left arm, Patient Position: Sitting, Cuff Size: Standard)   Pulse 72   Temp 98.2 °F (36.8 °C) (Temporal)   Resp 14   Ht 5' 4" (1.626 m)   Wt 51.4 kg (113 lb 6.4 oz)   BMI 19.47 kg/m²        Physical Exam  Constitutional:       General: She is not in acute distress. Appearance: She is not ill-appearing. Cardiovascular:      Rate and Rhythm: Normal rate. Pulmonary:      Effort: Pulmonary effort is normal. No respiratory distress. Musculoskeletal:      Right lower leg: No edema. Left lower leg: No edema. Neurological:      Mental Status: She is alert. Mental status is at baseline. Motor: No weakness. Gait: Gait normal.   Psychiatric:         Mood and Affect: Mood normal.           Labs in chart were reviewed. Assessment/Plan:         Diagnoses and all orders for this visit:    Weight loss  -    will increase med from 15 mg to mirtazapine (REMERON) 30 mg tablet;  Take 1 tablet (30 mg total) by mouth daily at bedtime    Will add milk shakes       Will d/c Seroquel and Aricept      Rto in 1 m                 Shine Baez MD

## 2023-12-27 ENCOUNTER — HOSPITAL ENCOUNTER (INPATIENT)
Facility: HOSPITAL | Age: 88
LOS: 1 days | Discharge: HOME | DRG: 291 | End: 2023-12-29
Attending: EMERGENCY MEDICINE | Admitting: FAMILY MEDICINE
Payer: COMMERCIAL

## 2023-12-27 ENCOUNTER — RA CDI HCC (OUTPATIENT)
Dept: OTHER | Facility: HOSPITAL | Age: 88
End: 2023-12-27

## 2023-12-27 ENCOUNTER — APPOINTMENT (OUTPATIENT)
Dept: RADIOLOGY | Facility: HOSPITAL | Age: 88
DRG: 291 | End: 2023-12-27
Payer: COMMERCIAL

## 2023-12-27 ENCOUNTER — APPOINTMENT (EMERGENCY)
Dept: RADIOLOGY | Facility: HOSPITAL | Age: 88
DRG: 291 | End: 2023-12-27
Payer: COMMERCIAL

## 2023-12-27 ENCOUNTER — TELEPHONE (OUTPATIENT)
Age: 88
End: 2023-12-27

## 2023-12-27 DIAGNOSIS — I50.43 ACUTE ON CHRONIC COMBINED SYSTOLIC AND DIASTOLIC CONGESTIVE HEART FAILURE (HCC): ICD-10-CM

## 2023-12-27 DIAGNOSIS — E83.42 HYPOMAGNESEMIA: ICD-10-CM

## 2023-12-27 DIAGNOSIS — E44.1 MILD PROTEIN-CALORIE MALNUTRITION (HCC): ICD-10-CM

## 2023-12-27 DIAGNOSIS — R79.89 ELEVATED TROPONIN: Primary | ICD-10-CM

## 2023-12-27 DIAGNOSIS — R79.89 ELEVATED LFTS: ICD-10-CM

## 2023-12-27 DIAGNOSIS — J90 PLEURAL EFFUSION: ICD-10-CM

## 2023-12-27 DIAGNOSIS — K21.9 GASTROESOPHAGEAL REFLUX DISEASE: ICD-10-CM

## 2023-12-27 PROBLEM — R06.02 SOB (SHORTNESS OF BREATH): Status: ACTIVE | Noted: 2023-12-27

## 2023-12-27 LAB
2HR DELTA HS TROPONIN: -10 NG/L
4HR DELTA HS TROPONIN: -14 NG/L
ALBUMIN SERPL BCP-MCNC: 3.9 G/DL (ref 3.5–5)
ALP SERPL-CCNC: 136 U/L (ref 34–104)
ALT SERPL W P-5'-P-CCNC: 163 U/L (ref 7–52)
ANION GAP SERPL CALCULATED.3IONS-SCNC: 16 MMOL/L
APTT PPP: 26 SECONDS (ref 23–37)
AST SERPL W P-5'-P-CCNC: 143 U/L (ref 13–39)
BACTERIA UR QL AUTO: NORMAL /HPF
BASOPHILS # BLD AUTO: 0.06 THOUSANDS/ÂΜL (ref 0–0.1)
BASOPHILS NFR BLD AUTO: 1 % (ref 0–1)
BILIRUB SERPL-MCNC: 0.89 MG/DL (ref 0.2–1)
BILIRUB UR QL STRIP: NEGATIVE
BNP SERPL-MCNC: >4700 PG/ML (ref 0–100)
BUN SERPL-MCNC: 26 MG/DL (ref 5–25)
CALCIUM SERPL-MCNC: 9.8 MG/DL (ref 8.4–10.2)
CARDIAC TROPONIN I PNL SERPL HS: 74 NG/L
CARDIAC TROPONIN I PNL SERPL HS: 78 NG/L
CARDIAC TROPONIN I PNL SERPL HS: 88 NG/L
CHLORIDE SERPL-SCNC: 104 MMOL/L (ref 96–108)
CLARITY UR: CLEAR
CO2 SERPL-SCNC: 21 MMOL/L (ref 21–32)
COLOR UR: YELLOW
CREAT SERPL-MCNC: 0.68 MG/DL (ref 0.6–1.3)
EOSINOPHIL # BLD AUTO: 0.07 THOUSAND/ÂΜL (ref 0–0.61)
EOSINOPHIL NFR BLD AUTO: 1 % (ref 0–6)
ERYTHROCYTE [DISTWIDTH] IN BLOOD BY AUTOMATED COUNT: 16.5 % (ref 11.6–15.1)
FLUAV RNA RESP QL NAA+PROBE: NEGATIVE
FLUBV RNA RESP QL NAA+PROBE: NEGATIVE
GFR SERPL CREATININE-BSD FRML MDRD: 77 ML/MIN/1.73SQ M
GLUCOSE SERPL-MCNC: 143 MG/DL (ref 65–140)
GLUCOSE UR STRIP-MCNC: ABNORMAL MG/DL
HCT VFR BLD AUTO: 42.9 % (ref 34.8–46.1)
HGB BLD-MCNC: 14.1 G/DL (ref 11.5–15.4)
HGB UR QL STRIP.AUTO: ABNORMAL
IMM GRANULOCYTES # BLD AUTO: 0.06 THOUSAND/UL (ref 0–0.2)
IMM GRANULOCYTES NFR BLD AUTO: 1 % (ref 0–2)
INR PPP: 1.21 (ref 0.84–1.19)
KETONES UR STRIP-MCNC: NEGATIVE MG/DL
LEUKOCYTE ESTERASE UR QL STRIP: ABNORMAL
LIPASE SERPL-CCNC: 29 U/L (ref 11–82)
LYMPHOCYTES # BLD AUTO: 1.6 THOUSANDS/ÂΜL (ref 0.6–4.47)
LYMPHOCYTES NFR BLD AUTO: 15 % (ref 14–44)
MCH RBC QN AUTO: 31.9 PG (ref 26.8–34.3)
MCHC RBC AUTO-ENTMCNC: 32.9 G/DL (ref 31.4–37.4)
MCV RBC AUTO: 97 FL (ref 82–98)
MONOCYTES # BLD AUTO: 1.04 THOUSAND/ÂΜL (ref 0.17–1.22)
MONOCYTES NFR BLD AUTO: 10 % (ref 4–12)
NEUTROPHILS # BLD AUTO: 7.84 THOUSANDS/ÂΜL (ref 1.85–7.62)
NEUTS SEG NFR BLD AUTO: 72 % (ref 43–75)
NITRITE UR QL STRIP: NEGATIVE
NON-SQ EPI CELLS URNS QL MICRO: NORMAL /HPF
NRBC BLD AUTO-RTO: 0 /100 WBCS
PH UR STRIP.AUTO: 5.5 [PH]
PLATELET # BLD AUTO: 376 THOUSANDS/UL (ref 149–390)
PMV BLD AUTO: 10 FL (ref 8.9–12.7)
POTASSIUM SERPL-SCNC: 4.9 MMOL/L (ref 3.5–5.3)
PROCALCITONIN SERPL-MCNC: 0.1 NG/ML
PROT SERPL-MCNC: 7.7 G/DL (ref 6.4–8.4)
PROT UR STRIP-MCNC: ABNORMAL MG/DL
PROTHROMBIN TIME: 15.5 SECONDS (ref 11.6–14.5)
RBC # BLD AUTO: 4.42 MILLION/UL (ref 3.81–5.12)
RBC #/AREA URNS AUTO: NORMAL /HPF
RSV RNA RESP QL NAA+PROBE: NEGATIVE
SARS-COV-2 RNA RESP QL NAA+PROBE: NEGATIVE
SODIUM SERPL-SCNC: 141 MMOL/L (ref 135–147)
SP GR UR STRIP.AUTO: 1.02 (ref 1–1.03)
TSH SERPL DL<=0.05 MIU/L-ACNC: 1.74 UIU/ML (ref 0.45–4.5)
UROBILINOGEN UR QL STRIP.AUTO: 0.2 E.U./DL
WBC # BLD AUTO: 10.67 THOUSAND/UL (ref 4.31–10.16)
WBC #/AREA URNS AUTO: NORMAL /HPF

## 2023-12-27 PROCEDURE — 80053 COMPREHEN METABOLIC PANEL: CPT | Performed by: PHYSICIAN ASSISTANT

## 2023-12-27 PROCEDURE — 99285 EMERGENCY DEPT VISIT HI MDM: CPT | Performed by: PHYSICIAN ASSISTANT

## 2023-12-27 PROCEDURE — 76705 ECHO EXAM OF ABDOMEN: CPT

## 2023-12-27 PROCEDURE — 84484 ASSAY OF TROPONIN QUANT: CPT | Performed by: PHYSICIAN ASSISTANT

## 2023-12-27 PROCEDURE — 85730 THROMBOPLASTIN TIME PARTIAL: CPT | Performed by: PHYSICIAN ASSISTANT

## 2023-12-27 PROCEDURE — 83690 ASSAY OF LIPASE: CPT | Performed by: PHYSICIAN ASSISTANT

## 2023-12-27 PROCEDURE — 71045 X-RAY EXAM CHEST 1 VIEW: CPT

## 2023-12-27 PROCEDURE — 99215 OFFICE O/P EST HI 40 MIN: CPT | Performed by: INTERNAL MEDICINE

## 2023-12-27 PROCEDURE — 81001 URINALYSIS AUTO W/SCOPE: CPT | Performed by: PHYSICIAN ASSISTANT

## 2023-12-27 PROCEDURE — 85610 PROTHROMBIN TIME: CPT | Performed by: PHYSICIAN ASSISTANT

## 2023-12-27 PROCEDURE — 0241U HB NFCT DS VIR RESP RNA 4 TRGT: CPT | Performed by: PHYSICIAN ASSISTANT

## 2023-12-27 PROCEDURE — 99223 1ST HOSP IP/OBS HIGH 75: CPT | Performed by: FAMILY MEDICINE

## 2023-12-27 PROCEDURE — 84443 ASSAY THYROID STIM HORMONE: CPT | Performed by: PHYSICIAN ASSISTANT

## 2023-12-27 PROCEDURE — 93005 ELECTROCARDIOGRAM TRACING: CPT

## 2023-12-27 PROCEDURE — 99285 EMERGENCY DEPT VISIT HI MDM: CPT

## 2023-12-27 PROCEDURE — 36415 COLL VENOUS BLD VENIPUNCTURE: CPT | Performed by: PHYSICIAN ASSISTANT

## 2023-12-27 PROCEDURE — 0W993ZZ DRAINAGE OF RIGHT PLEURAL CAVITY, PERCUTANEOUS APPROACH: ICD-10-PCS | Performed by: RADIOLOGY

## 2023-12-27 PROCEDURE — 83880 ASSAY OF NATRIURETIC PEPTIDE: CPT | Performed by: PHYSICIAN ASSISTANT

## 2023-12-27 PROCEDURE — 85025 COMPLETE CBC W/AUTO DIFF WBC: CPT | Performed by: PHYSICIAN ASSISTANT

## 2023-12-27 PROCEDURE — 84145 PROCALCITONIN (PCT): CPT | Performed by: PHYSICIAN ASSISTANT

## 2023-12-27 RX ORDER — MIRTAZAPINE 15 MG/1
30 TABLET, FILM COATED ORAL
Status: DISCONTINUED | OUTPATIENT
Start: 2023-12-27 | End: 2023-12-27

## 2023-12-27 RX ORDER — ACETAMINOPHEN 325 MG/1
650 TABLET ORAL EVERY 6 HOURS PRN
Status: DISCONTINUED | OUTPATIENT
Start: 2023-12-27 | End: 2023-12-29 | Stop reason: HOSPADM

## 2023-12-27 RX ORDER — METOPROLOL SUCCINATE 50 MG/1
50 TABLET, EXTENDED RELEASE ORAL 2 TIMES DAILY
Status: DISCONTINUED | OUTPATIENT
Start: 2023-12-27 | End: 2023-12-29 | Stop reason: HOSPADM

## 2023-12-27 RX ORDER — FUROSEMIDE 10 MG/ML
50 INJECTION INTRAMUSCULAR; INTRAVENOUS
Status: DISCONTINUED | OUTPATIENT
Start: 2023-12-27 | End: 2023-12-29

## 2023-12-27 RX ORDER — ASPIRIN 81 MG/1
81 TABLET, CHEWABLE ORAL DAILY
Status: DISCONTINUED | OUTPATIENT
Start: 2023-12-28 | End: 2023-12-29 | Stop reason: HOSPADM

## 2023-12-27 RX ORDER — MIRTAZAPINE 15 MG/1
15 TABLET, FILM COATED ORAL
Status: DISCONTINUED | OUTPATIENT
Start: 2023-12-27 | End: 2023-12-28

## 2023-12-27 RX ORDER — ONDANSETRON 2 MG/ML
4 INJECTION INTRAMUSCULAR; INTRAVENOUS EVERY 6 HOURS PRN
Status: DISCONTINUED | OUTPATIENT
Start: 2023-12-27 | End: 2023-12-29 | Stop reason: HOSPADM

## 2023-12-27 RX ORDER — FUROSEMIDE 10 MG/ML
40 INJECTION INTRAMUSCULAR; INTRAVENOUS DAILY
Status: DISCONTINUED | OUTPATIENT
Start: 2023-12-27 | End: 2023-12-27

## 2023-12-27 RX ORDER — PRAVASTATIN SODIUM 80 MG/1
80 TABLET ORAL
Status: DISCONTINUED | OUTPATIENT
Start: 2023-12-27 | End: 2023-12-28

## 2023-12-27 RX ORDER — SPIRONOLACTONE 25 MG/1
12.5 TABLET ORAL DAILY
Status: DISCONTINUED | OUTPATIENT
Start: 2023-12-28 | End: 2023-12-29 | Stop reason: HOSPADM

## 2023-12-27 RX ORDER — MAGNESIUM HYDROXIDE/ALUMINUM HYDROXICE/SIMETHICONE 120; 1200; 1200 MG/30ML; MG/30ML; MG/30ML
30 SUSPENSION ORAL EVERY 6 HOURS PRN
Status: DISCONTINUED | OUTPATIENT
Start: 2023-12-27 | End: 2023-12-29 | Stop reason: HOSPADM

## 2023-12-27 RX ORDER — HEPARIN SODIUM 5000 [USP'U]/ML
5000 INJECTION, SOLUTION INTRAVENOUS; SUBCUTANEOUS EVERY 8 HOURS SCHEDULED
Status: DISCONTINUED | OUTPATIENT
Start: 2023-12-27 | End: 2023-12-29 | Stop reason: HOSPADM

## 2023-12-27 RX ADMIN — HEPARIN SODIUM 5000 UNITS: 5000 INJECTION INTRAVENOUS; SUBCUTANEOUS at 17:53

## 2023-12-27 RX ADMIN — MIRTAZAPINE 15 MG: 15 TABLET, FILM COATED ORAL at 22:27

## 2023-12-27 RX ADMIN — METOPROLOL SUCCINATE 50 MG: 50 TABLET, EXTENDED RELEASE ORAL at 18:13

## 2023-12-27 RX ADMIN — FUROSEMIDE 50 MG: 10 INJECTION, SOLUTION INTRAMUSCULAR; INTRAVENOUS at 17:53

## 2023-12-27 RX ADMIN — PRAVASTATIN SODIUM 80 MG: 80 TABLET ORAL at 18:02

## 2023-12-27 NOTE — TELEPHONE ENCOUNTER
LMTRC  
Left detailed message for Keith pt daughter pt needs evaluation at ER V.O Dr Knapp  
Pt in ER  
The patient daughter call to ask if the ER is aware that her mother have to go there. She have some question .     Try to connect the patient with office no answer . Try to connect the patient with the [facility] no responded.     Please contact    
Verified Medical Communication Consent form is current for daughter Robin Scheier. Patient's daughter called and stated she has had a tougher time breathing then usual with back pain. Symptoms started on 12-. Patient has been on oxygen since October 2023 and uses it as needed. She has had fluid taking out of her lungs twice this year. She wants to know if she should come in for an appointment or if she should go to an ER. Please advise.   
Discharged

## 2023-12-27 NOTE — ASSESSMENT & PLAN NOTE
Presented with SOB and cough past 2-3 days, associated left arm pain. Uses supplemental O2 at home PRN, unsure how many liters but has needed to use more recently.   CXR: Stable cardiomegaly, right-sided pleural effusion, trace left pleural effusion, right lung base consolidation.  Does not meet SIRS. COVID/RSV/flu negative, UA not suggestive of UTI  Currently stable on room air  Check procal, hold off on antibiotics  IR consult for thoracentesis  Respiratory protocol, maintain SpO2 >90%

## 2023-12-27 NOTE — ASSESSMENT & PLAN NOTE
LFTs elevated on admission - , , alk phos 136. Denies abdominal pain, nausea/vomiting.  Possibly 2/2 volume overload with hepatic involvement, ? related to recent increase in Remeron dose  Reduce Remeron 30->15mg qHS for appetite   Check RUQ US for hepatic congestion  Continue statin for now, monitor LFTs in a.m.

## 2023-12-27 NOTE — ASSESSMENT & PLAN NOTE
Malnutrition Findings:                           BMI Findings:        Body mass index is 20.02 kg/m².   Progressively worsening poor appetite/oral intake  Managed on Remeron, recently increased dose by PCP  Nutrition consult

## 2023-12-27 NOTE — PROGRESS NOTES
I11.0 FOR 2024  HCC coding opportunities          Chart Reviewed number of suggestions sent to Provider: 1     Patients Insurance     Medicare Insurance: [employer]

## 2023-12-27 NOTE — CONSULTS
Consultation - Cardiology   [facility]     Yaquelin Wright 89 y.o. female MRN: [id number]  : 10/3/1933  Unit/Bed#: ED 09 Encounter: [id number]      Assessment & Plan   Acute on chronic combined systolic and diastolic heart failure.  - Presented on 23 for evaluation for worsening shortness of breath over the past 3 days.  - 23 CXR: Stable cardiomegaly and right pleural effusion and trace left pleural effusion and right lung base consolidation.   - Pending IR thoracentesis.   - 23 BNP: >4700.   - 23 TTE: LVEF 30-35%. Systolic function is severely reduced. There is severe global hypokinesis with regional variation. Diastolic function is moderately abnormal, consistent with grade II (pseudonormal) relaxation.  Left atrium moderately dilated.  Right atrium mildly dilated.  Aortic valve with mild regurgitation.  Aortic valve with mild stenosis.  Moderate to severe mitral valve regurgitation.  Mild to moderate tricuspid valve regurgitation.  Pulmonary artery pressure around 50 to 55 mmHg.  - 22 TTE: LVEF 55-60%.  No regional wall motion abnormalities appreciated.  There is grade 1 diastolic dysfunction.  Normal right ventricle size and function.  Mild to moderate mitral valve regurgitation and mild aortic valve regurgitation.  Mild tricuspid valve regurgitation.  Mildly elevated pulmonary artery pressure, 39 mmHg.  - Continue Toprol-XL 50 mg twice daily and Aldactone 12.5 mg daily.  - Currently on Jardiance 10 mg daily.  Continue at this time.  - Currently on Lasix 40 mg IV daily.  On review of home medications appears patient is on Lasix 40 mg daily.  As per heart failure admission protocol patient should be on Lasix 50 mg IV twice daily.  - Strict I/Os.   - Daily weight, standing scale.   - Monitor electrolytes.  Replete potassium above 4 and magnesium above 2.  - CHF education.     Elevated troponin.  - 23 hs troponin: 88 (0hrs).   - Continue to trend  troponin.  - 12/27/23 EKG: Sinus rhythm, 62 bpm. Septal infarct, cited on or before 5/28/2013.  ST abnormality in lateral leads.  - Likely nonischemic myocardial injury secondary to acute on chronic systolic and diastolic heart failure.    Valvular heart disease.  - Mild aortic valve regurgitation.  Mild aortic valve stenosis.  Moderate to severe mitral regurgitation.  Mild to moderate tricuspid valve regurgitation.  Pulmonic valve with trace regurgitation.  - 6/07/23 TTE:   Aortic Valve The aortic valve is trileaflet. The leaflets are moderately thickened. The leaflets are mildly calcified. The leaflets exhibit normal mobility. There is mild regurgitation. There is mild stenosis.   Mitral Valve There is moderate to severe regurgitation with a centrally directed jet. There is no evidence of stenosis.   Tricuspid Valve There is mild to moderate regurgitation.  Pulmonary artery pressure 50 to 55 mmHg. There is no evidence of stenosis.   Pulmonic Valve There is trace regurgitation. There is no evidence of stenosis.     Coronary artery disease.  - s/p PCI of LAD and diagonal (2013).  - Stable. Patient denies experiencing chest pain.  - Currently on pravastatin 80 mg daily and aspirin 81 mg daily.  Continue at this time.    Hypertension.  - SBP since presentation 151-160.  - Currently on Toprol-XL 50 mg twice daily and Aldactone 12.5 mg daily.  - Continue to monitor BP.     CVA.  - CVA June 2022.  - Currently on pravastatin 80 mg daily and aspirin 81 mg daily.    Dementia.  - Care per primary team.     Summary of Recommendations:        Thank you for your consultation.    Physician Requesting Consult: Laina Flores DO    Reason for Consult / Principal Problem: Acute on chronic combined systolic and diastolic heart failure, elevated troponin.     Inpatient consult to Cardiology  Consult performed by: Estela Florence PA-C  Consult ordered by: Estela Florence PA-C      HPI: Yaquelin Wright is a 89 y.o. female with PMHx of  chronic combined systolic and diastolic heart failure, valvular heart disease, CAD s/p PCI of LAD and diagonal (2013), HTN, CVA (6/2022), dementia who presented on 12/27/23 for evaluation for worsening shortness of breath over the past 3 days.     Daughter is at bedside and reports that she has noted that her mother has had increased shortness of breath over the past several days both at rest and worse with exertion.  She denies shortness of breath with exertion is associated with chest pain, palpitations, recent weight gain, lower extremity swelling, lightheadedness, dizziness, headache, nausea or vomiting.  Patient's daughter also reports that the patient has had decreased appetite over the past several days.  Daughter denies decrease urine output in recent days.     Review of Systems   Constitutional:  Positive for activity change and appetite change. Negative for chills, diaphoresis, fatigue, fever and unexpected weight change.   Respiratory:  Positive for shortness of breath. Negative for cough, chest tightness and wheezing.    Cardiovascular:  Negative for chest pain, palpitations and leg swelling.   Gastrointestinal:  Negative for abdominal distention, abdominal pain, constipation, diarrhea, rectal pain and vomiting.   Skin: Negative.    Neurological:  Negative for dizziness, syncope, light-headedness, numbness and headaches.       Historical Information   Past Medical History:   Diagnosis Date   • Abscess of chest wall     Last Assessed: 2/27/2014   • Arthritis    • Back pain    • Cataract     Start of   • CHF (congestive heart failure) (HCC)    • Colon polyp    • Coronary artery disease    • Diverticulitis of colon 12/04/2008   • Female bladder prolapse    • Gastric reflux    • Hematuria     Last Assessed: 11/18/2014    • History of diverticulitis    • [place] (hard of hearing)    • Hypercholesteremia    • Hyperkeratosis of skin 11/03/2011   • Hypertension    • Hypokalemia 12/13/2011   • Incomplete uterovaginal  prolapse 05/28/2010   • Myocardial infarction (HCC)    • Osteoporosis    • 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    Last Assessed: 10/24/2012    • Seasonal allergies    • Sebaceous cyst     Last Assessed: 2/8/2014   • Syncope    • Trigeminal neuralgia 03/20/2012   • Urinary incontinence    • Uterine prolapse    • Vertigo 09/15/2011   • 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   • Wears glasses    • Wears hearing aid     States she rarely wears them   • Wears partial dentures     Upper      Past Surgical History:   Procedure Laterality Date   • APPENDECTOMY     • APPENDICO-VESICOSTOMY CUTANEOUS  03/01/2010   • CHOLECYSTECTOMY      Laparoscopic   • COLONOSCOPY     • CORONARY ANGIOPLASTY     • CORONARY ANGIOPLASTY WITH STENT PLACEMENT      2 aortic stents   • CORONARY ANGIOPLASTY WITH STENT PLACEMENT  2013    2 distal left anterior descending artery    • ESOPHAGOGASTRODUODENOSCOPY     • FRACTURE SURGERY Right     Repair right arm- titanium servando from shoulder to elbow.   • HYSTERECTOMY      Complete   • IR THORACENTESIS  6/9/2023   • IR THORACENTESIS  7/14/2023   • IL CMBND ANTERPOST COLPORRAPHY W/CYSTO N/A 9/20/2016    Procedure: COLPORRHAPHY ANTERIOR POSTERIOR GRAFT;  Surgeon: Zach Abdi MD;  Location: [facility];  Service: UroGynecology          • IL COLPOPEXY VAGINAL EXTRAPERITONEAL APPROACH N/A 9/20/2016    Procedure: COLPOPEXY VAGINAL EXTRAPERITONEAL  incision and drainage of vagina inclusion cyst x 4;  Surgeon: Zach Abdi MD;  Location: [facility];  Service: UroGynecology          • IL CYSTOURETHROSCOPY N/A 9/20/2016    Procedure: CYSTOSCOPY;  Surgeon: Zach Abdi MD;  Location[facility];  Service: UroGynecology          • IL SLING OPERATION STRESS INCONTINENCE N/A 9/20/2016    Procedure: INSERTION PUBOVAGINAL SLING RETROPUBIC ;  Surgeon: Zach Abdi MD;  Location: [facility];  Service: UroGynecology            Social History     Substance and Sexual Activity   Alcohol Use Not Currently   •  "Alcohol/week: 1.0 standard drink of alcohol   • Types: 1 Cans of beer per week    Comment: once a day on most days of the week     Social History     Substance and Sexual Activity   Drug Use No     Social History     Tobacco Use   Smoking Status Never   Smokeless Tobacco Never     Family History:   Family History   Problem Relation Age of Onset   • Hypertension Mother    • Heart failure Mother    • Coronary artery disease Mother    • Arthritis Mother    • Osteoporosis Mother    • Hyperlipidemia Mother    • Coronary artery disease Sister        Meds/Allergies    PTA meds:  (Not in a hospital admission)     Allergies   Allergen Reactions   • Bee Venom Anaphylaxis     Reaction Date: 02Feb2004; Annotation - [id number]: lito   • Enalapril Other (See Comments)     Reaction Date: 28Oct2003;   Leg  pain   • Erythromycin Dizziness     Reaction Date: 28Oct2003;    • Estrogens Other (See Comments)     Reaction Date: 02Feb2004; Annotation - [id number]: lito   • Ezetimibe Hives   • Penicillins Hives     \"All cillins\", \"and all myocillins\"   • Ramipril Other (See Comments)     Reaction Date: 02Feb2004; Annotation - [id number]: lito   • Statins Other (See Comments)     Muscle pain   • Versed [Midazolam] Other (See Comments)     States she \"passed out\" when recovering from a procedure and was told to not use it again.   • Zithromax [Azithromycin] Anaphylaxis     No current facility-administered medications for this encounter.    Current Outpatient Medications:   •  aspirin 81 MG tablet, Take 1 tablet by mouth daily, Disp: , Rfl:   •  Empagliflozin (JARDIANCE) 10 MG TABS tablet, Take 1 tablet (10 mg total) by mouth daily, Disp: 30 tablet, Rfl: 5  •  fexofenadine (ALLEGRA) 180 MG tablet, Take by mouth (Patient not taking: Reported on 12/27/2023), Disp: , Rfl:   •  furosemide (LASIX) 40 mg tablet, Take 1 tablet (40 mg total) by mouth daily New dose (Patient not taking: Reported on 12/27/2023), Disp: 30 tablet, Rfl: 6  •  Klor-Con M20 20 MEQ " tablet, TAKE 1 TABLET (20 MEQ TOTAL) BY MOUTH 4 (FOUR) TIMES A DAY, Disp: 360 tablet, Rfl: 0  •  losartan (COZAAR) 50 mg tablet, Take 50 mg by mouth daily (Patient not taking: Reported on 12/27/2023), Disp: , Rfl:   •  metoprolol succinate (TOPROL-XL) 50 mg 24 hr tablet, TAKE 1 TABLET BY MOUTH TWICE A DAY, Disp: 180 tablet, Rfl: 1  •  mirtazapine (REMERON) 30 mg tablet, Take 1 tablet (30 mg total) by mouth daily at bedtime, Disp: 90 tablet, Rfl: 0  •  RABEprazole (ACIPHEX) 20 MG tablet, Take 1 tablet (20 mg total) by mouth daily (Patient taking differently: Take 20 mg by mouth 3 (three) times a week), Disp: 90 tablet, Rfl: 3  •  rosuvastatin (CRESTOR) 10 MG tablet, TAKE 1 TABLET BY MOUTH EVERY DAY, Disp: 90 tablet, Rfl: 1  •  spironolactone (ALDACTONE) 25 mg tablet, Take 0.5 tablets (12.5 mg total) by mouth daily, Disp: 45 tablet, Rfl: 1    VTE Pharmacologic Prophylaxis: none.     Objective:   Vitals: Blood pressure 160/74, pulse 60, temperature 97.7 °F (36.5 °C), temperature source Oral, resp. rate 20, weight 52.9 kg (116 lb 10 oz), SpO2 96%.  Body mass index is 20.02 kg/m².  Wt Readings from Last 3 Encounters:   12/27/23 52.9 kg (116 lb 10 oz)   12/01/23 51.4 kg (113 lb 6.4 oz)   11/01/23 51.8 kg (114 lb 3.2 oz)     BP Readings from Last 3 Encounters:   12/27/23 160/74   12/01/23 140/80   11/01/23 156/84     Orthostatic Blood Pressures      Flowsheet Row Most Recent Value   Blood Pressure 160/74 filed at 12/27/2023 1521   Patient Position - Orthostatic VS Sitting filed at 12/27/2023 1343          No intake or output data in the 24 hours ending 12/27/23 1602    Invasive Devices       Peripheral Intravenous Line  Duration             Peripheral IV 12/27/23 Left Antecubital <1 day                      Physical Exam:   Physical Exam  Vitals reviewed.   Constitutional:       General: She is not in acute distress.  Cardiovascular:      Rate and Rhythm: Normal rate and regular rhythm.      Pulses: Normal pulses.       "Heart sounds: Murmur heard.   Pulmonary:      Effort: Pulmonary effort is normal. No respiratory distress.      Breath sounds: Decreased breath sounds and rales present.   Abdominal:      General: Abdomen is flat. There is no distension.      Palpations: Abdomen is soft.      Tenderness: There is no abdominal tenderness.   Musculoskeletal:      Right lower leg: No edema.      Left lower leg: No edema.   Skin:     General: Skin is warm and dry.   Neurological:      Mental Status: She is alert.     Labs:   Troponins:      CBC with diff:   Results from last 7 days   Lab Units 12/27/23  1428   WBC Thousand/uL 10.67*   HEMOGLOBIN g/dL 14.1   HEMATOCRIT % 42.9   MCV fL 97   PLATELETS Thousands/uL 376   RBC Million/uL 4.42   MCH pg 31.9   MCHC g/dL 32.9   RDW % 16.5*   MPV fL 10.0   NRBC AUTO /100 WBCs 0       CMP:   Results from last 7 days   Lab Units 12/27/23  1428   SODIUM mmol/L 141   POTASSIUM mmol/L 4.9   CHLORIDE mmol/L 104   CO2 mmol/L 21   ANION GAP mmol/L 16   BUN mg/dL 26*   CREATININE mg/dL 0.68   CALCIUM mg/dL 9.8   AST U/L 143*   ALT U/L 163*   ALK PHOS U/L 136*   TOTAL PROTEIN g/dL 7.7   ALBUMIN g/dL 3.9   TOTAL BILIRUBIN mg/dL 0.89   EGFR ml/min/1.73sq m 77   GLUCOSE RANDOM mg/dL 143*       Magnesium:     Coags:   Results from last 7 days   Lab Units 12/27/23  1428   PTT seconds 26   INR  1.21*     TSH:      No components found for: \"TSH3\"  Lipid Profile:     Lipid Profile:   Lab Results   Component Value Date    CHOLESTEROL 101 06/08/2023    HDL 49 (L) 06/08/2023    LDLCALC 27 06/08/2023    TRIG 126 06/08/2023     Hgb A1c:     NT-proBNP: No results for input(s): \"NTBNP\" in the last 72 hours.     Imaging & Testing     Cardiac testing:   Echo complete with contrast if indicated  Result date: 6/07/23   Left Ventricle Left ventricular cavity size is mildly dilated. Wall thickness is normal. The left ventricular ejection fraction is 30 to 35 % by visual estimation. Systolic function is severely reduced.  There " is severe global hypokinesis with regional variation. Diastolic function is moderately abnormal, consistent with grade II (pseudonormal) relaxation.   Right Ventricle Right ventricular cavity size is normal. Systolic function is mildly to moderately reduced. Wall thickness is normal.   Left Atrium The atrium is moderately dilated.   Right Atrium The atrium is mildly dilated.   Aortic Valve The aortic valve is trileaflet. The leaflets are moderately thickened. The leaflets are mildly calcified. The leaflets exhibit normal mobility. There is mild regurgitation. There is mild stenosis.   Mitral Valve There is moderate to severe regurgitation with a centrally directed jet. There is no evidence of stenosis.   Tricuspid Valve There is mild to moderate regurgitation.  Pulmonary artery pressure 50 to 55 mmHg. There is no evidence of stenosis.   Pulmonic Valve There is trace regurgitation. There is no evidence of stenosis.   Ascending Aorta The aortic root is normal in size.   IVC/SVC The inferior vena cava is upper normal in size.  It has around 50% collapse with inspiration.   Pericardium There is no pericardial effusion. The pericardium is normal in appearance.       Results for orders placed during the hospital encounter of 18    Echo complete with contrast if indicated    Narrative  [address] [address]  [phone]    Transthoracic Echocardiogram  2D, M-mode, Doppler, and Color Doppler    Study date:  2018    Patient: YAHAIRA MENDOSA  MR number: [id number]  Account number: [id number]  : 03-Oct-1933  Age: 84 years  Gender: Female  Status: Routine  Location: Echo lab  Height: 64 in  Weight: 140.8 lb  BP: 126/ 80 mmHg    Indications: Syncope    Diagnoses: R55. - Syncope and collapse    Sonographer:  KENAN Ellis  Primary Physician:  Aria Clark MD  Referring Physician:  Zen Edwards MD  Group:  [facility]  Interpreting Physician:  Zen Edwards MD    SUMMARY    LEFT VENTRICLE:  Systolic function was at the lower limits of normal. Ejection fraction was estimated in the range of 50 % to 55 % to be 55 %.  Although no diagnostic regional wall motion abnormality was identified, this possibility cannot be completely excluded on the basis of this study.  Wall thickness was mildly increased.  There was mild concentric hypertrophy.    MITRAL VALVE:  There was mild annular calcification.  There was mild regurgitation.    AORTIC VALVE:  There was mild regurgitation.    TRICUSPID VALVE:  There was mild regurgitation.  Estimated peak PA pressure was 30 mmHg.    HISTORY: PRIOR HISTORY: HTN, CAD, Hyperlipidemia, Hypokalemia    PROCEDURE: The procedure was performed in the echo lab. This was a routine study. The transthoracic approach was used. The study included complete 2D imaging, M-mode, complete spectral Doppler, and color Doppler. The heart rate was 70 bpm,  at the start of the study. Image quality was adequate.    LEFT VENTRICLE: Size was normal. Systolic function was at the lower limits of normal. Ejection fraction was estimated in the range of 50 % to 55 % to be 55 %. Although no diagnostic regional wall motion abnormality was identified, this  possibility cannot be completely excluded on the basis of this study. Wall thickness was mildly increased. There was mild concentric hypertrophy. DOPPLER: Left ventricular diastolic function parameters were normal for the patient's age.    RIGHT VENTRICLE: The size was normal. Systolic function was normal.    LEFT ATRIUM: Size was at the upper limits of normal.    RIGHT ATRIUM: Size was at the upper limits of normal.    MITRAL VALVE: There was mild annular calcification. There was normal leaflet separation. DOPPLER: There was no evidence for stenosis. There was mild regurgitation.    AORTIC VALVE: The valve was trileaflet. Leaflets exhibited mildly increased thickness,  mild calcification, and normal cuspal separation. DOPPLER: There was no evidence for stenosis. There was mild regurgitation.    TRICUSPID VALVE: DOPPLER: There was mild regurgitation. Estimated peak PA pressure was 30 mmHg.    PULMONIC VALVE: DOPPLER: There was mild regurgitation.    PERICARDIUM: There was no thickening or calcification. There was no pericardial effusion.    AORTA: The root exhibited normal size.    SYSTEMIC VEINS: IVC: The inferior vena cava was normal in size. Respirophasic changes were normal.    SYSTEM MEASUREMENT TABLES    2D mode  AoR Diam 2D: 3.3 cm  LA Diam (2D): 3.5 cm  LA/Ao (2D): 1.06  FS (2D Teich): 28 %  IVSd (2D): 1.21 cm  LVDEV: 114 cm³  LVESV: 52.6 cm³  LVIDd(2D): 4.93 cm  LVISd (2D): 3.55 cm  LVPWd (2D): 1.17 cm  SV (Teich): 61.4 cm³    Apical four chamber  LVEF A4C: 55 %    Unspecified Scan Mode  MV Peak A Ki: 1010 mm/s  MV Peak E Ki. Mean: 605 mm/s  MVA (PHT): 3.61 cm squared  PHT: 61 ms  Max P mm[Hg]  V Max: 2390 mm/s  Vmax: 2270 mm/s  RA Area: 14.5 cm squared  RA Volume: 34.9 cm³  TAPSE: 1.9 cm    [employer] Accredited Echocardiography Laboratory    Prepared and electronically signed by    Zen Edwards MD  Signed 2018 17:27:28        Imaging: I have personally reviewed pertinent reports.    XR chest 1 view portable    Result Date: 2023    Impression: Stable cardiomegaly and right pleural effusion and trace left pleural effusion and right lung base consolidation.      EKG/ Monitor: Personally reviewed.     23 EKG: Sinus rhythm, 62 bpm.  Septal infarct, cited on or before 2013.  ST abnormality in lateral leads.     Code Status: Prior  Advance Directive and Living Will:      POLST:        Estela Florence PA-C

## 2023-12-27 NOTE — ASSESSMENT & PLAN NOTE
Wt Readings from Last 3 Encounters:   12/27/23 52.9 kg (116 lb 10 oz)   12/01/23 51.4 kg (113 lb 6.4 oz)   11/01/23 51.8 kg (114 lb 3.2 oz)     BNP >4,700, although clinically appears euvolemic. Reports compliance with torsemide, spironolactone.  Echo (6/2023): EF 30-35%, G2DD. Moderate-severe MV regurgitation.  Troponin elevated: 88. EKG with NSR, ? ST abnormality  Cardiology consult, monitor on telemetry  IV Lasix 50 mg BID, continue spironolactone  Check updated Echo  Daily weights, I/Os, 1.5 L fluid restriction

## 2023-12-27 NOTE — H&P
[facility]  H&P  Name: Yaquelin Wright 89 y.o. female I MRN: [id number]  Unit/Bed#: ED 09 I Date of Admission: 12/27/2023   Date of Service: 12/27/2023 I Hospital Day: 0      Assessment/Plan   * SOB (shortness of breath)  Assessment & Plan  Presented with SOB and cough past 2-3 days, associated left arm pain. Uses supplemental O2 at home PRN, unsure how many liters but has needed to use more recently.   CXR: Stable cardiomegaly, right-sided pleural effusion, trace left pleural effusion, right lung base consolidation.  Does not meet SIRS. COVID/RSV/flu negative, UA not suggestive of UTI  Currently stable on room air  Check procal, hold off on antibiotics  IR consult for thoracentesis  Respiratory protocol, maintain SpO2 >90%    Acute on chronic combined systolic (congestive) and diastolic (congestive) heart failure (HCC)  Assessment & Plan  Wt Readings from Last 3 Encounters:   12/27/23 52.9 kg (116 lb 10 oz)   12/01/23 51.4 kg (113 lb 6.4 oz)   11/01/23 51.8 kg (114 lb 3.2 oz)     BNP >4,700, although clinically appears euvolemic. Reports compliance with torsemide, spironolactone.  Echo (6/2023): EF 30-35%, G2DD. Moderate-severe MV regurgitation.  Troponin elevated: 88. EKG with NSR, ? ST abnormality  Cardiology consult, monitor on telemetry  IV Lasix 50 mg BID, continue spironolactone  Check updated Echo  Daily weights, I/Os, 1.5 L fluid restriction    Elevated LFTs  Assessment & Plan  LFTs elevated on admission - , , alk phos 136. Denies abdominal pain, nausea/vomiting.  Possibly 2/2 volume overload with hepatic involvement, ? related to recent increase in Remeron dose  Reduce Remeron 30->15mg qHS for appetite   Check RUQ US for hepatic congestion  Continue statin for now, monitor LFTs in a.m.    Mild protein-calorie malnutrition (HCC)  Assessment & Plan  Malnutrition Findings:                           BMI Findings:        Body mass index is 20.02 kg/m².   Progressively  worsening poor appetite/oral intake  Managed on Remeron, recently increased dose by PCP  Nutrition consult    H/O: CVA (cerebrovascular accident)  Assessment & Plan  History of CVA, residual deficits with impaired cognition and forgetfulness  Continue ASA, statin    Coronary artery disease  Assessment & Plan  Continue ASA, statin, Toprol-XL    Benign essential hypertension  Assessment & Plan  SBP 160s in ED  Continue Toprol-XL, Aldactone  IV diuresis as above           VTE Pharmacologic Prophylaxis: VTE Score: 4 Moderate Risk (Score 3-4) - Pharmacological DVT Prophylaxis Ordered: heparin.  Code Status: Level 3 - DNAR and DNI   Discussion with family: Updated  (daughter) at bedside.    Anticipated Length of Stay: Patient will be admitted on an observation basis with an anticipated length of stay of less than 2 midnights secondary to acute CHF, SOB.    Total Time Spent on Date of Encounter in care of patient: 55 mins. This time was spent on one or more of the following: performing physical exam; counseling and coordination of care; obtaining or reviewing history; documenting in the medical record; reviewing/ordering tests, medications or procedures; communicating with other healthcare professionals and discussing with patient's family/caregivers.    Chief Complaint: SOB    History of Present Illness:  Yaquelin Wright is a 89 y.o. female with a PMH of chronic combined systolic and diastolic CHF, CAD, HTN, history of CVA, who presents with persistent SOB and cough for the past 2-3 days. Patient's daughter at bedside, patient poor historian at baseline. Reports needing to use supplemental home O2 more than usual, uses unknown amount of liters as needed at baseline. Patient also notes worsening left arm pain, which has been ongoing for the past year but exacerbated with SOB.  Patient's daughter expresses urine with patient's poor appetite, only having 2 ensures a day since symptom onset. Denies recent  infections, antibiotic use, steroid use, surgeries, travel, injury/trauma, sick contacts.    Review of Systems:  Review of Systems   Constitutional:  Positive for appetite change. Negative for activity change, chills, diaphoresis, fatigue, fever and unexpected weight change.   HENT:  Negative for congestion and trouble swallowing.    Respiratory:  Positive for cough and shortness of breath. Negative for chest tightness and wheezing.    Cardiovascular:  Negative for chest pain, palpitations and leg swelling.   Gastrointestinal:  Negative for abdominal pain, constipation, diarrhea, nausea and vomiting.   Genitourinary:  Negative for difficulty urinating, dysuria, frequency, hematuria and urgency.   Musculoskeletal:  Negative for arthralgias, gait problem and myalgias.   Skin:  Negative for wound.   Neurological:  Negative for dizziness, syncope, weakness, light-headedness and headaches.       Past Medical and Surgical History:   Past Medical History:   Diagnosis Date    Abscess of chest wall     Last Assessed: 2/27/2014    Arthritis     Back pain     Cataract     Start of    CHF (congestive heart failure) ([facility])     Colon polyp     Coronary artery disease     Diverticulitis of colon 12/04/2008    Female bladder prolapse     Gastric reflux     Hematuria     Last Assessed: 11/18/2014     History of diverticulitis     [place] (hard of hearing)     Hypercholesteremia     Hyperkeratosis of skin 11/03/2011    Hypertension     Hypokalemia 12/13/2011    Incomplete uterovaginal prolapse 05/28/2010    Myocardial infarction ([facility])     Osteoporosis     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    Last Assessed: 10/24/2012     Seasonal allergies     Sebaceous cyst     Last Assessed: 2/8/2014    Syncope     Trigeminal neuralgia 03/20/2012    Urinary incontinence     Uterine prolapse     Vertigo 09/15/2011    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    Wears glasses     Wears hearing aid     States she rarely wears them    Wears partial dentures      Upper        Past Surgical History:   Procedure Laterality Date    APPENDECTOMY      APPENDICO-VESICOSTOMY CUTANEOUS  03/01/2010    CHOLECYSTECTOMY      Laparoscopic    COLONOSCOPY      CORONARY ANGIOPLASTY      CORONARY ANGIOPLASTY WITH STENT PLACEMENT      2 aortic stents    CORONARY ANGIOPLASTY WITH STENT PLACEMENT  2013    2 distal left anterior descending artery     ESOPHAGOGASTRODUODENOSCOPY      FRACTURE SURGERY Right     Repair right arm- titanium servando from shoulder to elbow.    HYSTERECTOMY      Complete    IR THORACENTESIS  6/9/2023    IR THORACENTESIS  7/14/2023    CA CMBND ANTERPOST COLPORRAPHY W/CYSTO N/A 9/20/2016    Procedure: COLPORRHAPHY ANTERIOR POSTERIOR GRAFT;  Surgeon: Zach Abdi MD;  Location: [facility];  Service: UroGynecology           CA COLPOPEXY VAGINAL EXTRAPERITONEAL APPROACH N/A 9/20/2016    Procedure: COLPOPEXY VAGINAL EXTRAPERITONEAL  incision and drainage of vagina inclusion cyst x 4;  Surgeon: Zach Abdi MD;  Location[facility];  Service: UroGynecology           CA CYSTOURETHROSCOPY N/A 9/20/2016    Procedure: CYSTOSCOPY;  Surgeon: Zach Abdi MD;  Location[facility];  Service: UroGynecology           CA SLING OPERATION STRESS INCONTINENCE N/A 9/20/2016    Procedure: INSERTION PUBOVAGINAL SLING RETROPUBIC ;  Surgeon: Zach Abdi MD;  Location: [facility];  Service: UroGynecology              Meds/Allergies:  Prior to Admission medications    Medication Sig Start Date End Date Taking? Authorizing Provider   aspirin 81 MG tablet Take 1 tablet by mouth daily 1/9/15   Historical Provider, MD   Empagliflozin (JARDIANCE) 10 MG TABS tablet Take 1 tablet (10 mg total) by mouth daily 7/13/23 1/9/24  Aria Clark MD   fexofenadine (ALLEGRA) 180 MG tablet Take by mouth  Patient not taking: Reported on 12/27/2023 8/31/09   Historical Provider, MD   furosemide (LASIX) 40 mg tablet Take 1 tablet (40 mg total) by mouth daily New dose  Patient not taking: Reported  "on 12/27/2023 7/13/23   Aria Clark MD   Klor-Con M20 20 MEQ tablet TAKE 1 TABLET (20 MEQ TOTAL) BY MOUTH 4 (FOUR) TIMES A DAY 10/7/22   Aria Clark MD   losartan (COZAAR) 50 mg tablet Take 50 mg by mouth daily  Patient not taking: Reported on 12/27/2023 5/15/23   Historical Provider, MD   metoprolol succinate (TOPROL-XL) 50 mg 24 hr tablet TAKE 1 TABLET BY MOUTH TWICE A DAY 11/8/23   Zen Edwards MD   mirtazapine (REMERON) 30 mg tablet Take 1 tablet (30 mg total) by mouth daily at bedtime 12/1/23 2/29/24  Aria Clark MD   RABEprazole (ACIPHEX) 20 MG tablet Take 1 tablet (20 mg total) by mouth daily  Patient taking differently: Take 20 mg by mouth 3 (three) times a week 12/5/22   Aria Clark MD   rosuvastatin (CRESTOR) 10 MG tablet TAKE 1 TABLET BY MOUTH EVERY DAY 7/19/23   Aria Clark MD   spironolactone (ALDACTONE) 25 mg tablet Take 0.5 tablets (12.5 mg total) by mouth daily 10/6/23   Zen Edwards MD     I have reviewed home medications with patient family member.    Allergies:   Allergies   Allergen Reactions    Bee Venom Anaphylaxis     Reaction Date: 02Feb2004; Annotation - [id number]: jben    Enalapril Other (See Comments)     Reaction Date: 28Oct2003;   Leg  pain    Erythromycin Dizziness     Reaction Date: 28Oct2003;     Estrogens Other (See Comments)     Reaction Date: 02Feb2004; Annotation - [id number]: jjw    Ezetimibe Hives    Penicillins Hives     \"All cillins\", \"and all myocillins\"    Ramipril Other (See Comments)     Reaction Date: 02Feb2004; Annotation - [id number]: jjensw    Statins Other (See Comments)     Muscle pain    Versed [Midazolam] Other (See Comments)     States she \"passed out\" when recovering from a procedure and was told to not use it again.    Zithromax [Azithromycin] Anaphylaxis       Social History:  Marital Status:    Occupation: Retired  Patient Pre-hospital Living Situation: Home, Alone  Patient Pre-hospital Level of Mobility: " walks  Patient Pre-hospital Diet Restrictions: None  Substance Use History:   Social History     Substance and Sexual Activity   Alcohol Use Not Currently    Alcohol/week: 1.0 standard drink of alcohol    Types: 1 Cans of beer per week    Comment: once a day on most days of the week     Social History     Tobacco Use   Smoking Status Never   Smokeless Tobacco Never     Social History     Substance and Sexual Activity   Drug Use No       Family History:  Family History   Problem Relation Age of Onset    Hypertension Mother     Heart failure Mother     Coronary artery disease Mother     Arthritis Mother     Osteoporosis Mother     Hyperlipidemia Mother     Coronary artery disease Sister        Physical Exam:     Vitals:   Blood Pressure: 160/74 (12/27/23 1521)  Pulse: 60 (12/27/23 1521)  Temperature: 97.7 °F (36.5 °C) (12/27/23 1521)  Temp Source: Oral (12/27/23 1521)  Respirations: 20 (12/27/23 1521)  Weight - Scale: 52.9 kg (116 lb 10 oz) (12/27/23 1343)  SpO2: 96 % (12/27/23 1521)    Physical Exam  Constitutional:       General: She is not in acute distress.     Appearance: She is not ill-appearing, toxic-appearing or diaphoretic.   HENT:      Head: Normocephalic and atraumatic.      Mouth/Throat:      Mouth: Mucous membranes are moist.      Pharynx: Oropharynx is clear.   Cardiovascular:      Rate and Rhythm: Normal rate and regular rhythm.      Pulses: Normal pulses.      Heart sounds: Normal heart sounds.   Pulmonary:      Effort: Pulmonary effort is normal. No respiratory distress.      Comments: Decreased breath sounds in bilateral bases  Abdominal:      General: Bowel sounds are normal. There is no distension.      Palpations: Abdomen is soft.      Tenderness: There is no abdominal tenderness.   Musculoskeletal:         General: No swelling or tenderness.   Skin:     General: Skin is warm.   Neurological:      General: No focal deficit present.      Mental Status: She is alert.   Psychiatric:         Mood and  Affect: Mood normal.         Behavior: Behavior normal.          Additional Data:     Lab Results:  Results from last 7 days   Lab Units 12/27/23  1428   WBC Thousand/uL 10.67*   HEMOGLOBIN g/dL 14.1   HEMATOCRIT % 42.9   PLATELETS Thousands/uL 376   NEUTROS PCT % 72   LYMPHS PCT % 15   MONOS PCT % 10   EOS PCT % 1     Results from last 7 days   Lab Units 12/27/23  1428   SODIUM mmol/L 141   POTASSIUM mmol/L 4.9   CHLORIDE mmol/L 104   CO2 mmol/L 21   BUN mg/dL 26*   CREATININE mg/dL 0.68   ANION GAP mmol/L 16   CALCIUM mg/dL 9.8   ALBUMIN g/dL 3.9   TOTAL BILIRUBIN mg/dL 0.89   ALK PHOS U/L 136*   ALT U/L 163*   AST U/L 143*   GLUCOSE RANDOM mg/dL 143*     Results from last 7 days   Lab Units 12/27/23  1428   INR  1.21*                   Lines/Drains:  Invasive Devices       Peripheral Intravenous Line  Duration             Peripheral IV 12/27/23 Left Antecubital <1 day                        Imaging: Reviewed radiology reports from this admission including: chest xray  XR chest 1 view portable   ED Interpretation by Bailey Maldonado PA-C (12/27 1501)   Right sided pleural effusion noted       Final Result by Remigio Glez MD (12/27 1533)      Stable cardiomegaly and right pleural effusion and trace left pleural effusion and right lung base consolidation                  Workstation performed: [id number]         IR IN-Patient Thoracentesis    (Results Pending)   US right upper quadrant    (Results Pending)       EKG and Other Studies Reviewed on Admission:   EKG: NSR. HR 63, Qtc 462.    ** Please Note: This note has been constructed using a voice recognition system. **

## 2023-12-27 NOTE — ED PROVIDER NOTES
"History  Chief Complaint   Patient presents with    Shortness of Breath     Intermittent L arm pain for several weeks. And gradual worsening of her shortness of breath. Sent by Dr Knapp,, using more oxygen lately     89 y/o female, EF 30% as of 6/7/23, presenting with SOB and cough over the past 2-3 days.  Relays that this is occurred before in the past.  Here with daughter who states that patient has short-term memory issues, has been taking her medication as prescribed, has as needed oxygen at home and has been placed on Lasix which has been helping her chronic shortness of breath.  Has had effusions that have needed to be drained before in the past.  Daughter relays that this feels somewhat similar to past effusions.  Patient reports that she \"just does not feel well\".  Has an occasional productive cough with decreased appetite and some nausea however no vomiting.  Has had ongoing left-sided arm pain which is not new according to the patient and daughter.  Denies fevers, vomiting, diarrhea, chest or abdominal pain, sore throat or headaches.  Differential includes but is not limited to electrolyte abnormality, SHASHANK, arrhythmia, pneumonia, effusion, viral illness, CHF exacerbation, etc.        Prior to Admission Medications   Prescriptions Last Dose Informant Patient Reported? Taking?   Empagliflozin (JARDIANCE) 10 MG TABS tablet  Self No No   Sig: Take 1 tablet (10 mg total) by mouth daily   Klor-Con M20 20 MEQ tablet  Self No No   Sig: TAKE 1 TABLET (20 MEQ TOTAL) BY MOUTH 4 (FOUR) TIMES A DAY   RABEprazole (ACIPHEX) 20 MG tablet  Self No No   Sig: Take 1 tablet (20 mg total) by mouth daily   Patient taking differently: Take 20 mg by mouth 3 (three) times a week   aspirin 81 MG tablet  Self Yes No   Sig: Take 1 tablet by mouth daily   fexofenadine (ALLEGRA) 180 MG tablet Not Taking Self Yes No   Sig: Take by mouth   Patient not taking: Reported on 12/27/2023   furosemide (LASIX) 40 mg tablet  Self No No   Sig: " Take 1 tablet (40 mg total) by mouth daily New dose   losartan (COZAAR) 50 mg tablet Not Taking Self Yes No   Sig: Take 50 mg by mouth daily   Patient not taking: Reported on 12/27/2023   metoprolol succinate (TOPROL-XL) 50 mg 24 hr tablet   No No   Sig: TAKE 1 TABLET BY MOUTH TWICE A DAY   mirtazapine (REMERON) 30 mg tablet   No No   Sig: Take 1 tablet (30 mg total) by mouth daily at bedtime   rosuvastatin (CRESTOR) 10 MG tablet  Self No No   Sig: TAKE 1 TABLET BY MOUTH EVERY DAY   spironolactone (ALDACTONE) 25 mg tablet   No No   Sig: Take 0.5 tablets (12.5 mg total) by mouth daily      Facility-Administered Medications: None       Past Medical History:   Diagnosis Date    Abscess of chest wall     Last Assessed: 2/27/2014    Arthritis     Back pain     Cataract     Start of    CHF (congestive heart failure) (HCC)     Colon polyp     Coronary artery disease     Diverticulitis of colon 12/04/2008    Female bladder prolapse     Gastric reflux     Hematuria     Last Assessed: 11/18/2014     History of diverticulitis     [place] (hard of hearing)     Hypercholesteremia     Hyperkeratosis of skin 11/03/2011    Hypertension     Hypokalemia 12/13/2011    Incomplete uterovaginal prolapse 05/28/2010    Myocardial infarction (HCC)     Osteoporosis     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    Last Assessed: 10/24/2012     Seasonal allergies     Sebaceous cyst     Last Assessed: 2/8/2014    Syncope     Trigeminal neuralgia 03/20/2012    Urinary incontinence     Uterine prolapse     Vertigo 09/15/2011    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    Wears glasses     Wears hearing aid     States she rarely wears them    Wears partial dentures     Upper        Past Surgical History:   Procedure Laterality Date    APPENDECTOMY      APPENDICO-VESICOSTOMY CUTANEOUS  03/01/2010    CHOLECYSTECTOMY      Laparoscopic    COLONOSCOPY      CORONARY ANGIOPLASTY      CORONARY ANGIOPLASTY WITH STENT PLACEMENT      2 aortic stents    CORONARY ANGIOPLASTY  WITH STENT PLACEMENT  2013    2 distal left anterior descending artery     ESOPHAGOGASTRODUODENOSCOPY      FRACTURE SURGERY Right     Repair right arm- titanium servando from shoulder to elbow.    HYSTERECTOMY      Complete    IR THORACENTESIS  6/9/2023    IR THORACENTESIS  7/14/2023    MN CMBND ANTERPOST COLPORRAPHY W/CYSTO N/A 9/20/2016    Procedure: COLPORRHAPHY ANTERIOR POSTERIOR GRAFT;  Surgeon: Zach Abdi MD;  Location: [facility];  Service: UroGynecology           MN COLPOPEXY VAGINAL EXTRAPERITONEAL APPROACH N/A 9/20/2016    Procedure: COLPOPEXY VAGINAL EXTRAPERITONEAL  incision and drainage of vagina inclusion cyst x 4;  Surgeon: Zach Abdi MD;  Location: [facility];  Service: UroGynecology           MN CYSTOURETHROSCOPY N/A 9/20/2016    Procedure: CYSTOSCOPY;  Surgeon: Zach Abdi MD;  Location: [facility];  Service: UroGynecology           MN SLING OPERATION STRESS INCONTINENCE N/A 9/20/2016    Procedure: INSERTION PUBOVAGINAL SLING RETROPUBIC ;  Surgeon: Zach Abdi MD;  Location: [facility];  Service: UroGynecology              Family History   Problem Relation Age of Onset    Hypertension Mother     Heart failure Mother     Coronary artery disease Mother     Arthritis Mother     Osteoporosis Mother     Hyperlipidemia Mother     Coronary artery disease Sister      I have reviewed and agree with the history as documented.    E-Cigarette/Vaping    E-Cigarette Use Never User      E-Cigarette/Vaping Substances    Nicotine No     THC No     CBD No     Flavoring No     Other No     Unknown No      Social History     Tobacco Use    Smoking status: Never    Smokeless tobacco: Never   Vaping Use    Vaping status: Never Used   Substance Use Topics    Alcohol use: Not Currently     Alcohol/week: 1.0 standard drink of alcohol     Types: 1 Cans of beer per week     Comment: once a day on most days of the week    Drug use: No       Review of Systems   Constitutional: Negative.  Negative for chills,  fatigue and fever.   HENT: Negative.  Negative for congestion, postnasal drip, rhinorrhea and sore throat.    Eyes: Negative.    Respiratory:  Positive for cough and shortness of breath. Negative for apnea, choking, chest tightness, wheezing and stridor.    Cardiovascular: Negative.    Gastrointestinal: Negative.  Negative for abdominal pain, diarrhea, nausea and vomiting.   Endocrine: Negative.    Genitourinary: Negative.    Musculoskeletal: Negative.    Skin: Negative.    Neurological: Negative.    Hematological: Negative.    Psychiatric/Behavioral: Negative.     All other systems reviewed and are negative.      Physical Exam  Physical Exam  Vitals and nursing note reviewed.   Constitutional:       General: She is not in acute distress.     Appearance: Normal appearance. She is well-developed and normal weight. She is not ill-appearing, toxic-appearing or diaphoretic.   HENT:      Head: Normocephalic and atraumatic.      Right Ear: Tympanic membrane, ear canal and external ear normal.      Left Ear: Tympanic membrane, ear canal and external ear normal.      Nose: Nose normal.      Mouth/Throat:      Mouth: Mucous membranes are moist.      Pharynx: Oropharynx is clear. No oropharyngeal exudate or posterior oropharyngeal erythema.   Eyes:      General: No scleral icterus.        Right eye: No discharge.         Left eye: No discharge.      Conjunctiva/sclera: Conjunctivae normal.      Pupils: Pupils are equal, round, and reactive to light.   Cardiovascular:      Rate and Rhythm: Normal rate and regular rhythm.      Pulses: Normal pulses.      Heart sounds: Normal heart sounds. No murmur heard.     No friction rub. No gallop.   Pulmonary:      Effort: Pulmonary effort is normal. No respiratory distress.      Breath sounds: Normal breath sounds. No stridor. No wheezing, rhonchi or rales.      Comments: Patient speaking full sentences without difficulty, clear breath sounds within all lung fields  Chest:      Chest  wall: No tenderness.   Abdominal:      General: Bowel sounds are normal. There is no distension.      Palpations: Abdomen is soft. There is no mass.      Tenderness: There is no abdominal tenderness. There is no guarding or rebound.      Hernia: No hernia is present.   Musculoskeletal:      Cervical back: Normal range of motion and neck supple.      Comments: No calf swelling or asymmetries, no tenderness no edema noted on exam   Lymphadenopathy:      Cervical: No cervical adenopathy.   Skin:     General: Skin is warm and dry.      Capillary Refill: Capillary refill takes less than 2 seconds.      Coloration: Skin is not pale.      Findings: No erythema or rash.   Neurological:      General: No focal deficit present.      Mental Status: She is alert and oriented to person, place, and time. Mental status is at baseline.   Psychiatric:         Thought Content: Thought content normal.         Judgment: Judgment normal.         Vital Signs  ED Triage Vitals   Temperature Pulse Respirations Blood Pressure SpO2   12/27/23 1347 12/27/23 1343 12/27/23 1343 12/27/23 1343 12/27/23 1343   97.5 °F (36.4 °C) 67 (!) 24 151/70 94 %      Temp Source Heart Rate Source Patient Position - Orthostatic VS BP Location FiO2 (%)   12/27/23 1347 12/27/23 1343 12/27/23 1343 12/27/23 1343 --   Tympanic Monitor Sitting Right arm       Pain Score       12/27/23 1343       No Pain           Vitals:    12/27/23 1343 12/27/23 1521   BP: 151/70 160/74   Pulse: 67 60   Patient Position - Orthostatic VS: Sitting          Visual Acuity      ED Medications  Medications   aspirin chewable tablet 81 mg (has no administration in time range)   Empagliflozin (JARDIANCE) tablet 10 mg (has no administration in time range)   metoprolol succinate (TOPROL-XL) 24 hr tablet 50 mg (has no administration in time range)   pravastatin (PRAVACHOL) tablet 80 mg (has no administration in time range)   spironolactone (ALDACTONE) tablet 12.5 mg (has no administration in  time range)   acetaminophen (TYLENOL) tablet 650 mg (has no administration in time range)   ondansetron (ZOFRAN) injection 4 mg (has no administration in time range)   aluminum-magnesium hydroxide-simethicone (MAALOX) oral suspension 30 mL (has no administration in time range)   heparin (porcine) subcutaneous injection 5,000 Units (5,000 Units Subcutaneous Given 12/27/23 1753)   mirtazapine (REMERON) tablet 15 mg (has no administration in time range)   furosemide (LASIX) injection 50 mg (50 mg Intravenous Given 12/27/23 1753)       Diagnostic Studies  Results Reviewed       Procedure Component Value Units Date/Time    Lipase [[id number]]  (Normal) Collected: 12/27/23 1428    Lab Status: Final result Specimen: Blood from Arm, Left Updated: 12/27/23 1636     Lipase 29 u/L     Procalcitonin [[id number]]     Lab Status: No result Specimen: Blood     TSH, 3rd generation [[id number]]     Lab Status: No result Specimen: Blood     HS Troponin I 4hr [[id number]]     Lab Status: No result Specimen: Blood     B-Type Natriuretic Peptide(BNP) [[id number]]  (Abnormal) Collected: 12/27/23 1428    Lab Status: Final result Specimen: Blood from Arm, Left Updated: 12/27/23 1542     BNP >4,700 pg/mL     FLU/RSV/COVID - if FLU/RSV clinically relevant [[id number]]  (Normal) Collected: 12/27/23 1428    Lab Status: Final result Specimen: Nares from Nose Updated: 12/27/23 1514     SARS-CoV-2 Negative     INFLUENZA A PCR Negative     INFLUENZA B PCR Negative     RSV PCR Negative    Narrative:      FOR PEDIATRIC PATIENTS - copy/paste COVID Guidelines URL to browser: [email or web address]    SARS-CoV-2 assay is a Nucleic Acid Amplification assay intended for the  qualitative detection of nucleic acid from SARS-CoV-2 in nasopharyngeal  swabs. Results are for the presumptive identification of SARS-CoV-2 RNA.    Positive results are indicative of infection with SARS-CoV-2, the virus  causing  COVID-19, but do not rule out bacterial infection or co-infection  with other viruses. Laboratories within the [place] and its  territories are required to report all positive results to the appropriate  public health authorities. Negative results do not preclude SARS-CoV-2  infection and should not be used as the sole basis for treatment or other  patient management decisions. Negative results must be combined with  clinical observations, patient history, and epidemiological information.  This test has not been FDA cleared or approved.    This test has been authorized by FDA under an Emergency Use Authorization  (EUA). This test is only authorized for the duration of time the  declaration that circumstances exist justifying the authorization of the  emergency use of an in vitro diagnostic tests for detection of SARS-CoV-2  virus and/or diagnosis of COVID-19 infection under section 564(b)(1) of  the Act, 21 U.S.C. 360bbb-3(b)(1), unless the authorization is terminated  or revoked sooner. The test has been validated but independent review by FDA  and CLIA is pending.    Test performed using [employer] Gene[employer]pert: This RT-PCR assay targets N2,  a region unique to SARS-CoV-2. A conserved region in the E-gene was chosen  for pan-Sarbecovirus detection which includes SARS-CoV-2.    According to CMS-2020-01-R, this platform meets the definition of high-throughput technology.    HS Troponin 0hr (reflex protocol) [[id number]]  (Abnormal) Collected: 12/27/23 1428    Lab Status: Final result Specimen: Blood from Arm, Left Updated: 12/27/23 1501     hs TnI 0hr 88 ng/L     HS Troponin I 2hr [[id number]]     Lab Status: No result Specimen: Blood     Urine Microscopic [[id number]]  (Normal) Collected: 12/27/23 1438    Lab Status: Final result Specimen: Urine, Clean Catch Updated: 12/27/23 1457     RBC, UA 2-4 /hpf      WBC, UA 2-4 /hpf      Epithelial Cells Occasional /hpf      Bacteria, UA Occasional /hpf     Comprehensive  metabolic panel [[id number]]  (Abnormal) Collected: 12/27/23 1428    Lab Status: Final result Specimen: Blood from Arm, Left Updated: 12/27/23 1456     Sodium 141 mmol/L      Potassium 4.9 mmol/L      Chloride 104 mmol/L      CO2 21 mmol/L      ANION GAP 16 mmol/L      BUN 26 mg/dL      Creatinine 0.68 mg/dL      Glucose 143 mg/dL      Calcium 9.8 mg/dL       U/L       U/L      Alkaline Phosphatase 136 U/L      Total Protein 7.7 g/dL      Albumin 3.9 g/dL      Total Bilirubin 0.89 mg/dL      eGFR 77 ml/min/1.73sq m     Narrative:      National Kidney Disease Foundation guidelines for Chronic Kidney Disease (CKD):     Stage 1 with normal or high GFR (GFR > 90 mL/min/1.73 square meters)    Stage 2 Mild CKD (GFR = 60-89 mL/min/1.73 square meters)    Stage 3A Moderate CKD (GFR = 45-59 mL/min/1.73 square meters)    Stage 3B Moderate CKD (GFR = 30-44 mL/min/1.73 square meters)    Stage 4 Severe CKD (GFR = 15-29 mL/min/1.73 square meters)    Stage 5 End Stage CKD (GFR <15 mL/min/1.73 square meters)  Note: GFR calculation is accurate only with a steady state creatinine    Protime-INR [[id number]]  (Abnormal) Collected: 12/27/23 1428    Lab Status: Final result Specimen: Blood from Arm, Left Updated: 12/27/23 1454     Protime 15.5 seconds      INR 1.21    APTT [[id number]]  (Normal) Collected: 12/27/23 1428    Lab Status: Final result Specimen: Blood from Arm, Left Updated: 12/27/23 1454     PTT 26 seconds     UA w Reflex to Microscopic w Reflex to Culture [[id number]]  (Abnormal) Collected: 12/27/23 1438    Lab Status: Final result Specimen: Urine, Clean Catch Updated: 12/27/23 1448     Color, UA Yellow     Clarity, UA Clear     Specific Gravity, UA 1.020     pH, UA 5.5     Leukocytes, UA Elevated glucose may cause decreased leukocyte values. See urine microscopic for UWBC result     Nitrite, UA Negative     Protein, UA 30 (1+) mg/dl      Glucose, UA >=1000 (1%) mg/dl      Ketones, UA Negative mg/dl       Urobilinogen, UA 0.2 E.U./dl      Bilirubin, UA Negative     Occult Blood, UA Trace-Intact    CBC and differential [[id number]]  (Abnormal) Collected: 12/27/23 1428    Lab Status: Final result Specimen: Blood from Arm, Left Updated: 12/27/23 1438     WBC 10.67 Thousand/uL      RBC 4.42 Million/uL      Hemoglobin 14.1 g/dL      Hematocrit 42.9 %      MCV 97 fL      MCH 31.9 pg      MCHC 32.9 g/dL      RDW 16.5 %      MPV 10.0 fL      Platelets 376 Thousands/uL      nRBC 0 /100 WBCs      Neutrophils Relative 72 %      Immat GRANS % 1 %      Lymphocytes Relative 15 %      Monocytes Relative 10 %      Eosinophils Relative 1 %      Basophils Relative 1 %      Neutrophils Absolute 7.84 Thousands/µL      Immature Grans Absolute 0.06 Thousand/uL      Lymphocytes Absolute 1.60 Thousands/µL      Monocytes Absolute 1.04 Thousand/µL      Eosinophils Absolute 0.07 Thousand/µL      Basophils Absolute 0.06 Thousands/µL                    XR chest 1 view portable   ED Interpretation by Bailey Maldonado PA-C (12/27 1501)   Right sided pleural effusion noted       Final Result by Remigio Glez MD (12/27 1533)      Stable cardiomegaly and right pleural effusion and trace left pleural effusion and right lung base consolidation                  Workstation performed: [id number]         IR IN-Patient Thoracentesis    (Results Pending)   US right upper quadrant    (Results Pending)              Procedures  ECG 12 Lead Documentation Only    Date/Time: 12/27/2023 3:47 PM    Performed by: Bailey Maldonado PA-C  Authorized by: Bailey Maldonado PA-C    Indications / Diagnosis:  Left arm pain  ECG reviewed by me, the ED Provider: yes    Patient location:  ED  Interpretation:     Interpretation: normal    Rate:     ECG rate:  63    ECG rate assessment: normal    Rhythm:     Rhythm: sinus rhythm    Ectopy:     Ectopy: none    QRS:     QRS axis:  Normal    QRS intervals:  Normal  Conduction:     Conduction: normal    ST segments:      ST segments:  Normal  T waves:     T waves: non-specific             ED Course  ED Course as of 12/27/23 1759   Wed Dec 27, 2023   1459 Messaged IR   1501 XR chest 1 view portable  Right sided pleural effusion. More severe compared to last chest xray.    1501 AST(!): 143  Abdomen nontender. No abdominal pain. Pending viral swab.    1503 Discussed case with Dr. Enriquez, they may be able to perform IR drainage today, if not tomorrow.  Given elevated troponin at 88 accompanied with left arm pain we will admit patient for further observation.   1510 IR consult placed. Patient and daughter updated. Agreeable with admission. Reaching out to cardiology and [facility]   1533 Messaged cardiology Dr. Harmon    1758 RN aware of repeat troponin needed and overdue              HEART Risk Score      Flowsheet Row Most Recent Value   Heart Score Risk Calculator    History 0 Filed at: 12/27/2023 1547   ECG 0 Filed at: 12/27/2023 1547   Age 2 Filed at: 12/27/2023 1547   Risk Factors 2 Filed at: 12/27/2023 1547   Troponin 2 Filed at: 12/27/2023 1547   HEART Score 6 Filed at: 12/27/2023 1547                                        Medical Decision Making  Patient resting comfortably no distress.  Patient has incidental slightly elevated LFTs.  Right upper quadrant completely nontender, negative for COVID.  Patient has right-sided pleural effusion, has had this before in the past requiring thoracentesis.  Given elevated BNP above 4700 accompanied with shortness of breath and right-sided pleural effusion I reached out to IR who requests consult and will perform thoracentesis either today or tomorrow.  Patient also has elevated troponin at 88 without any EKG changes.  Patient sees Dr. Edwards, family and patient requesting we reach out to the on-call for Dr. Edwards's office.  I sent a message out to Dr. Harmon who is on-call for cardiology today. Will admit to telemetry.    Amount and/or Complexity of Data Reviewed  Labs: ordered. Decision-making  details documented in ED Course.  Radiology: ordered and independent interpretation performed. Decision-making details documented in ED Course.    Risk  Decision regarding hospitalization.             Disposition  Final diagnoses:   Elevated troponin   Pleural effusion   Elevated LFTs     Time reflects when diagnosis was documented in both MDM as applicable and the Disposition within this note       Time User Action Codes Description Comment    12/27/2023  3:28 PM Bailey Maldonado Add [R79.89] Elevated troponin     12/27/2023  3:28 PM Bailey Maldonado Add [J90] Pleural effusion     12/27/2023  3:28 PM Bailey Maldonado Add [R79.89] Elevated LFTs     12/27/2023  4:02 PM Estela Florence Add [I50.43] Acute on chronic combined systolic and diastolic congestive heart failure (HCC)     12/27/2023  4:44 PM Gely Samano Add [E44.1] Mild protein-calorie malnutrition (HCC)           ED Disposition       ED Disposition   Admit    Condition   Stable    Date/Time   Wed Dec 27, 2023 1528    Comment   Case was discussed with Dr. Flores and the patient's admission status was agreed to be Admission Status: observation status to the service of Dr. Flores .               Follow-up Information    None         Patient's Medications   Discharge Prescriptions    No medications on file       No discharge procedures on file.    PDMP Review         Value Time User    PDMP Reviewed  Yes 9/26/2023  2:29 PM Samir John DO            ED Provider  Electronically Signed by             Bailey Maldonado PA-C  12/27/23 1365       Bailey Maldonado PA-C  12/27/23 5066

## 2023-12-28 ENCOUNTER — APPOINTMENT (OUTPATIENT)
Dept: NON INVASIVE DIAGNOSTICS | Facility: HOSPITAL | Age: 88
DRG: 291 | End: 2023-12-28
Payer: COMMERCIAL

## 2023-12-28 LAB
ALBUMIN SERPL BCP-MCNC: 3.4 G/DL (ref 3.5–5)
ALP SERPL-CCNC: 113 U/L (ref 34–104)
ALT SERPL W P-5'-P-CCNC: 153 U/L (ref 7–52)
ANION GAP SERPL CALCULATED.3IONS-SCNC: 11 MMOL/L
AORTIC ROOT: 3.2 CM
APPEARANCE FLD: CLEAR
AST SERPL W P-5'-P-CCNC: 134 U/L (ref 13–39)
ATRIAL RATE: 63 BPM
ATRIAL RATE: 64 BPM
AV REGURGITATION PRESSURE HALF TIME: 540 MS
BASOPHILS # BLD AUTO: 0.08 THOUSANDS/ÂΜL (ref 0–0.1)
BASOPHILS NFR BLD AUTO: 1 % (ref 0–1)
BILIRUB SERPL-MCNC: 0.79 MG/DL (ref 0.2–1)
BUN SERPL-MCNC: 26 MG/DL (ref 5–25)
CALCIUM ALBUM COR SERPL-MCNC: 9.4 MG/DL (ref 8.3–10.1)
CALCIUM SERPL-MCNC: 8.9 MG/DL (ref 8.4–10.2)
CHLORIDE SERPL-SCNC: 107 MMOL/L (ref 96–108)
CO2 SERPL-SCNC: 22 MMOL/L (ref 21–32)
COLOR FLD: NORMAL
CREAT SERPL-MCNC: 0.74 MG/DL (ref 0.6–1.3)
DOP CALC LVOT AREA: 3.14 CM2
DOP CALC LVOT DIAMETER: 2 CM
E WAVE DECELERATION TIME: 174 MS
E/A RATIO: 1.33
EOSINOPHIL # BLD AUTO: 0.36 THOUSAND/ÂΜL (ref 0–0.61)
EOSINOPHIL NFR BLD AUTO: 4 % (ref 0–6)
ERYTHROCYTE [DISTWIDTH] IN BLOOD BY AUTOMATED COUNT: 16.5 % (ref 11.6–15.1)
FRACTIONAL SHORTENING: 15 (ref 28–44)
GFR SERPL CREATININE-BSD FRML MDRD: 71 ML/MIN/1.73SQ M
GLUCOSE P FAST SERPL-MCNC: 100 MG/DL (ref 65–99)
GLUCOSE SERPL-MCNC: 100 MG/DL (ref 65–140)
HCT VFR BLD AUTO: 39.3 % (ref 34.8–46.1)
HGB BLD-MCNC: 13 G/DL (ref 11.5–15.4)
HISTIOCYTES NFR FLD: 33 %
IMM GRANULOCYTES # BLD AUTO: 0.06 THOUSAND/UL (ref 0–0.2)
IMM GRANULOCYTES NFR BLD AUTO: 1 % (ref 0–2)
INR PPP: 1.2 (ref 0.84–1.19)
INTERVENTRICULAR SEPTUM IN DIASTOLE (PARASTERNAL SHORT AXIS VIEW): 1.2 CM
INTERVENTRICULAR SEPTUM: 1.2 CM (ref 0.6–1.1)
LAAS-AP2: 25.8 CM2
LAAS-AP4: 26.8 CM2
LDH FLD L TO P-CCNC: 69 U/L
LEFT ATRIUM SIZE: 4.3 CM
LEFT ATRIUM VOLUME (MOD BIPLANE): 77 ML
LEFT ATRIUM VOLUME INDEX (MOD BIPLANE): 49.7 ML/M2
LEFT INTERNAL DIMENSION IN SYSTOLE: 5.1 CM (ref 2.1–4)
LEFT VENTRICULAR INTERNAL DIMENSION IN DIASTOLE: 6 CM (ref 3.5–6)
LEFT VENTRICULAR POSTERIOR WALL IN END DIASTOLE: 1.2 CM
LEFT VENTRICULAR STROKE VOLUME: 54 ML
LVSV (TEICH): 54 ML
LYMPHOCYTES # BLD AUTO: 2.03 THOUSANDS/ÂΜL (ref 0.6–4.47)
LYMPHOCYTES NFR BLD AUTO: 21 % (ref 14–44)
LYMPHOCYTES NFR BLD AUTO: 63 %
MAGNESIUM SERPL-MCNC: 1.8 MG/DL (ref 1.9–2.7)
MCH RBC QN AUTO: 32.3 PG (ref 26.8–34.3)
MCHC RBC AUTO-ENTMCNC: 33.1 G/DL (ref 31.4–37.4)
MCV RBC AUTO: 98 FL (ref 82–98)
MONOCYTES # BLD AUTO: 1.07 THOUSAND/ÂΜL (ref 0.17–1.22)
MONOCYTES NFR BLD AUTO: 11 % (ref 4–12)
MV E'TISSUE VEL-LAT: 3 CM/S
MV E'TISSUE VEL-SEP: 2 CM/S
MV PEAK A VEL: 0.52 M/S
MV PEAK E VEL: 69 CM/S
MV STENOSIS PRESSURE HALF TIME: 50 MS
MV VALVE AREA P 1/2 METHOD: 4.4
NEUTROPHILS # BLD AUTO: 5.99 THOUSANDS/ÂΜL (ref 1.85–7.62)
NEUTS SEG NFR BLD AUTO: 4 %
NEUTS SEG NFR BLD AUTO: 62 % (ref 43–75)
NRBC BLD AUTO-RTO: 0 /100 WBCS
P AXIS: 20 DEGREES
P AXIS: 68 DEGREES
PLATELET # BLD AUTO: 322 THOUSANDS/UL (ref 149–390)
PMV BLD AUTO: 10.2 FL (ref 8.9–12.7)
POTASSIUM SERPL-SCNC: 3.6 MMOL/L (ref 3.5–5.3)
PR INTERVAL: 196 MS
PR INTERVAL: 198 MS
PROCALCITONIN SERPL-MCNC: 0.11 NG/ML
PROT FLD-MCNC: <3 G/DL
PROT SERPL-MCNC: 6.5 G/DL (ref 6.4–8.4)
PROTHROMBIN TIME: 15.4 SECONDS (ref 11.6–14.5)
QRS AXIS: -14 DEGREES
QRS AXIS: -27 DEGREES
QRSD INTERVAL: 84 MS
QRSD INTERVAL: 84 MS
QT INTERVAL: 448 MS
QT INTERVAL: 452 MS
QTC INTERVAL: 462 MS
QTC INTERVAL: 462 MS
RBC # BLD AUTO: 4.03 MILLION/UL (ref 3.81–5.12)
RIGHT VENTRICLE ID DIMENSION: 3.9 CM
SITE: NORMAL
SL CV AV DECELERATION TIME RETROGRADE: 1862 MS
SL CV AV PEAK GRADIENT RETROGRADE: 65 MMHG
SL CV LEFT ATRIUM LENGTH A2C: 6.6 CM
SL CV PED ECHO LEFT VENTRICLE DIASTOLIC VOLUME (MOD BIPLANE) 2D: 180 ML
SL CV PED ECHO LEFT VENTRICLE SYSTOLIC VOLUME (MOD BIPLANE) 2D: 127 ML
SODIUM SERPL-SCNC: 140 MMOL/L (ref 135–147)
T WAVE AXIS: 141 DEGREES
T WAVE AXIS: 150 DEGREES
TOTAL CELLS COUNTED SPEC: 100
TR MAX PG: 63 MMHG
TR PEAK VELOCITY: 4 M/S
TRICUSPID ANNULAR PLANE SYSTOLIC EXCURSION: 1.7 CM
TRICUSPID VALVE PEAK REGURGITATION VELOCITY: 3.97 M/S
VENTRICULAR RATE: 63 BPM
VENTRICULAR RATE: 64 BPM
WBC # BLD AUTO: 9.59 THOUSAND/UL (ref 4.31–10.16)
WBC # FLD MANUAL: 733 /UL

## 2023-12-28 PROCEDURE — 99233 SBSQ HOSP IP/OBS HIGH 50: CPT | Performed by: INTERNAL MEDICINE

## 2023-12-28 PROCEDURE — 99232 SBSQ HOSP IP/OBS MODERATE 35: CPT | Performed by: FAMILY MEDICINE

## 2023-12-28 PROCEDURE — 83615 LACTATE (LD) (LDH) ENZYME: CPT | Performed by: PHYSICIAN ASSISTANT

## 2023-12-28 PROCEDURE — 84157 ASSAY OF PROTEIN OTHER: CPT | Performed by: PHYSICIAN ASSISTANT

## 2023-12-28 PROCEDURE — 87205 SMEAR GRAM STAIN: CPT | Performed by: PHYSICIAN ASSISTANT

## 2023-12-28 PROCEDURE — 80053 COMPREHEN METABOLIC PANEL: CPT | Performed by: PHYSICIAN ASSISTANT

## 2023-12-28 PROCEDURE — 32555 ASPIRATE PLEURA W/ IMAGING: CPT | Performed by: RADIOLOGY

## 2023-12-28 PROCEDURE — 93306 TTE W/DOPPLER COMPLETE: CPT

## 2023-12-28 PROCEDURE — 87070 CULTURE OTHR SPECIMN AEROBIC: CPT | Performed by: PHYSICIAN ASSISTANT

## 2023-12-28 PROCEDURE — 83735 ASSAY OF MAGNESIUM: CPT | Performed by: PHYSICIAN ASSISTANT

## 2023-12-28 PROCEDURE — 89051 BODY FLUID CELL COUNT: CPT | Performed by: PHYSICIAN ASSISTANT

## 2023-12-28 PROCEDURE — 32555 ASPIRATE PLEURA W/ IMAGING: CPT

## 2023-12-28 PROCEDURE — 85025 COMPLETE CBC W/AUTO DIFF WBC: CPT | Performed by: PHYSICIAN ASSISTANT

## 2023-12-28 PROCEDURE — 93306 TTE W/DOPPLER COMPLETE: CPT | Performed by: INTERNAL MEDICINE

## 2023-12-28 PROCEDURE — 84145 PROCALCITONIN (PCT): CPT | Performed by: PHYSICIAN ASSISTANT

## 2023-12-28 PROCEDURE — 85610 PROTHROMBIN TIME: CPT | Performed by: PHYSICIAN ASSISTANT

## 2023-12-28 RX ORDER — LANOLIN ALCOHOL/MO/W.PET/CERES
400 CREAM (GRAM) TOPICAL ONCE
Status: COMPLETED | OUTPATIENT
Start: 2023-12-28 | End: 2023-12-28

## 2023-12-28 RX ORDER — LIDOCAINE WITH 8.4% SOD BICARB 0.9%(10ML)
SYRINGE (ML) INJECTION AS NEEDED
Status: COMPLETED | OUTPATIENT
Start: 2023-12-28 | End: 2023-12-28

## 2023-12-28 RX ORDER — MIRTAZAPINE 15 MG/1
30 TABLET, FILM COATED ORAL
Status: DISCONTINUED | OUTPATIENT
Start: 2023-12-28 | End: 2023-12-29 | Stop reason: HOSPADM

## 2023-12-28 RX ADMIN — SPIRONOLACTONE 12.5 MG: 25 TABLET, FILM COATED ORAL at 09:47

## 2023-12-28 RX ADMIN — HEPARIN SODIUM 5000 UNITS: 5000 INJECTION INTRAVENOUS; SUBCUTANEOUS at 21:55

## 2023-12-28 RX ADMIN — HEPARIN SODIUM 5000 UNITS: 5000 INJECTION INTRAVENOUS; SUBCUTANEOUS at 05:34

## 2023-12-28 RX ADMIN — EMPAGLIFLOZIN 10 MG: 10 TABLET, FILM COATED ORAL at 10:00

## 2023-12-28 RX ADMIN — METOPROLOL SUCCINATE 50 MG: 50 TABLET, EXTENDED RELEASE ORAL at 09:48

## 2023-12-28 RX ADMIN — Medication 400 MG: at 09:47

## 2023-12-28 RX ADMIN — ASPIRIN 81 MG CHEWABLE TABLET 81 MG: 81 TABLET CHEWABLE at 09:47

## 2023-12-28 RX ADMIN — LIDOCAINE HYDROCHLORIDE 4 ML: 10 INJECTION, SOLUTION INFILTRATION; PERINEURAL at 11:57

## 2023-12-28 RX ADMIN — FUROSEMIDE 50 MG: 10 INJECTION, SOLUTION INTRAMUSCULAR; INTRAVENOUS at 15:39

## 2023-12-28 RX ADMIN — MIRTAZAPINE 30 MG: 15 TABLET, FILM COATED ORAL at 21:55

## 2023-12-28 RX ADMIN — FUROSEMIDE 50 MG: 10 INJECTION, SOLUTION INTRAMUSCULAR; INTRAVENOUS at 10:50

## 2023-12-28 RX ADMIN — METOPROLOL SUCCINATE 50 MG: 50 TABLET, EXTENDED RELEASE ORAL at 17:53

## 2023-12-28 RX ADMIN — HEPARIN SODIUM 5000 UNITS: 5000 INJECTION INTRAVENOUS; SUBCUTANEOUS at 14:28

## 2023-12-28 NOTE — PROGRESS NOTES
[facility]  Progress Note  Name: Yaquelin Wright I  MRN: [id number]  Unit/Bed#: 3 [address] Date of Admission: 12/27/2023   Date of Service: 12/28/2023 I Hospital Day: 0    Assessment/Plan   * Acute on chronic combined systolic (congestive) and diastolic (congestive) heart failure (HCC)  Assessment & Plan  Wt Readings from Last 3 Encounters:   12/28/23 52.6 kg (116 lb)   12/01/23 51.4 kg (113 lb 6.4 oz)   11/01/23 51.8 kg (114 lb 3.2 oz)     BNP >4,700. Reports compliance with torsemide, spironolactone.  Echo (6/2023): EF 30-35%, G2DD. Moderate-severe MV regurgitation.  Troponin elevated: 88.  Cardiology consult, monitor on telemetry  Continue IV Lasix 50 mg BID, continue spironolactone  Echo showed EF of 25 to 30% with grade 2 diastolic dysfunction  Daily weights, I/Os, 1.5 L fluid restriction    Elevated LFTs  Assessment & Plan  LFTs elevated on admission - , , alk phos 136. Denies abdominal pain, nausea/vomiting.  Possibly about a congestion, patient also on statin  Right upper quadrant ultrasound normal  Repeat LFTs in a.m.    Mild protein-calorie malnutrition (HCC)  Assessment & Plan  Malnutrition Findings:      BMI Findings:        Body mass index is 19.91 kg/m².     Progressively worsening poor appetite/oral intake  Managed on Remeron, recently increased dose by PCP  Nutrition consult    H/O: CVA (cerebrovascular accident)  Assessment & Plan  History of CVA, residual deficits with impaired cognition and forgetfulness  Continue ASA, statin.    Coronary artery disease  Assessment & Plan  Continue ASA, Toprol-XL.  Statin discontinued    Benign essential hypertension  Assessment & Plan  Continue Toprol-XL, Aldactone  IV diuresis as above               VTE Pharmacologic Prophylaxis: VTE Score: 4 Moderate Risk (Score 3-4) - Pharmacological DVT Prophylaxis Ordered: heparin.    Mobility:   Basic Mobility Inpatient Raw Score: 23  -M Goal: 7: Walk 25 feet or  more  JH-HLM Achieved: 8: Walk 250 feet ot more  HLM Goal NOT achieved. Continue with multidisciplinary rounding and encourage appropriate mobility to improve upon HLM goals.    Patient Centered Rounds: I performed bedside rounds with nursing staff today.   Discussions with Specialists or Other Care Team Provider: yes - cardio    Education and Discussions with Family / Patient: Attempted to update  (daughter) via phone. Left voicemail.     Total Time Spent on Date of Encounter in care of patient: This time was spent on one or more of the following: performing physical exam; counseling and coordination of care; obtaining or reviewing history; documenting in the medical record; reviewing/ordering tests, medications or procedures; communicating with other healthcare professionals and discussing with patient's family/caregivers.    Current Length of Stay: 0 day(s)  Current Patient Status: Inpatient   Certification Statement: The patient will continue to require additional inpatient hospital stay due to CHF exacerbation requiring IV Lasix  Discharge Plan: Anticipate discharge in 24-48 hrs to discharge location to be determined pending rehab evaluations.    Code Status: Level 3 - DNAR and DNI    Subjective:     Patient reports intermittent shortness of breath which is no different than when she came in.  No further left arm pain    Objective:     Vitals:   Temp (24hrs), Av.2 °F (36.8 °C), Min:97.5 °F (36.4 °C), Max:98.9 °F (37.2 °C)    Temp:  [97.5 °F (36.4 °C)-98.9 °F (37.2 °C)] 97.5 °F (36.4 °C)  HR:  [58-67] 63  Resp:  [16-20] 16  BP: (136-176)/() 138/72  SpO2:  [90 %-96 %] 92 %  Body mass index is 19.91 kg/m².     Input and Output Summary (last 24 hours):   No intake or output data in the 24 hours ending 23 1844    Physical Exam:   Physical Exam     Additional Data:     Labs:  Results from last 7 days   Lab Units 23  0450   WBC Thousand/uL 9.59   HEMOGLOBIN g/dL 13.0   HEMATOCRIT %  39.3   PLATELETS Thousands/uL 322   NEUTROS PCT % 62   LYMPHS PCT % 21   MONOS PCT % 11   EOS PCT % 4     Results from last 7 days   Lab Units 12/28/23  0450   SODIUM mmol/L 140   POTASSIUM mmol/L 3.6   CHLORIDE mmol/L 107   CO2 mmol/L 22   BUN mg/dL 26*   CREATININE mg/dL 0.74   ANION GAP mmol/L 11   CALCIUM mg/dL 8.9   ALBUMIN g/dL 3.4*   TOTAL BILIRUBIN mg/dL 0.79   ALK PHOS U/L 113*   ALT U/L 153*   AST U/L 134*   GLUCOSE RANDOM mg/dL 100     Results from last 7 days   Lab Units 12/28/23  0450   INR  1.20*             Results from last 7 days   Lab Units 12/28/23  0450 12/27/23  1818   PROCALCITONIN ng/ml 0.11 0.10       Lines/Drains:  Invasive Devices       Peripheral Intravenous Line  Duration             Peripheral IV 12/27/23 Left Antecubital 1 day                      Imaging: Reviewed radiology reports from this admission including: ultrasound(s)    Recent Cultures (last 7 days):         Last 24 Hours Medication List:   Current Facility-Administered Medications   Medication Dose Route Frequency Provider Last Rate    acetaminophen  650 mg Oral Q6H PRN Gely Samano PA-C      aluminum-magnesium hydroxide-simethicone  30 mL Oral Q6H PRN Gely Samano PA-C      aspirin  81 mg Oral Daily Gely Samano PA-C      Empagliflozin  10 mg Oral Daily Gely Samano PA-C      furosemide  50 mg Intravenous BID (diuretic) Gely Samano PA-C      heparin (porcine)  5,000 Units Subcutaneous Q8H SHABBIR Gely Samano PA-C      metoprolol succinate  50 mg Oral BID Gely Samano PA-C      mirtazapine  30 mg Oral HS Maria Fernanda Esposito DO      ondansetron  4 mg Intravenous Q6H PRN Gely Samano PA-C      spironolactone  12.5 mg Oral Daily Gely Samano PA-C          Today, Patient Was Seen By: Maria Fernanda Esposito DO    **Please Note: This note may have been constructed using a voice recognition system.**

## 2023-12-28 NOTE — UTILIZATION REVIEW
Initial Clinical Review    Admission: Date/Time/Statement:   Admission Orders (From admission, onward)       Ordered        12/27/23 1528  Place in Observation  Once                          Orders Placed This Encounter   Procedures    Place in Observation     Standing Status:   Standing     Number of Occurrences:   1     Order Specific Question:   Level of Care     Answer:   Med Surg [16]     ED Arrival Information       Expected   -    Arrival   12/27/2023 13:32    Acuity   Emergent              Means of arrival   Walk-In    Escorted by   Family Member    Service   Hospitalist    Admission type   Emergency              Arrival complaint   sob/pain left arm             Chief Complaint   Patient presents with    Shortness of Breath     Intermittent L arm pain for several weeks. And gradual worsening of her shortness of breath. Sent by Dr Knapp,, using more oxygen lately       Initial Presentation: 89 y.o. female presents to ED from home with cough and persistent SOB for past  2-3  days. Has required home  O2  more  frequently  than usual,  unsure  liter baseline.  Has worsening left arm pain, ongoing for past year, worse with SOB.  Daughter states patient only  drinks 2  ensures daily since symptom onset.  PMH  is  CHF, CAD,  HTN, CVA.  Labs  reveal elevated  LFT's,  BNP   >  4700.  CXR shows  CM, pleural effusion.  COVID/FLU/RSV negative.  U/A  not indicative of  UTI.  Admit  Observation with  SOB, Acute/chronic   CHF, Elevated LFT's  and plan is   IV  lasix, fluid restrict,  monitor labs, U/S  RUQ, cardiology consult, tele,  2 DE, daily weight, IR  consult for thoracentesis  ansd monitor  O2 sats.      Cardiology consult  Thoracentesis pending.  Wait  TTE.   Continue current  cardiac meds.  Monitor  weight, electrolytes  and I & O.  Elevated troponin likely  nonischemic  myocardial injury secondary to  CHF/fluid overload. No O2  requirement.    Date:  12/29    Day 3: Has surpassed a 2nd midnight with active  treatments and services, which include     D/C  to SNF  respite care.       ED Triage Vitals   Temperature Pulse Respirations Blood Pressure SpO2   12/27/23 1347 12/27/23 1343 12/27/23 1343 12/27/23 1343 12/27/23 1343   97.5 °F (36.4 °C) 67 (!) 24 151/70 94 %      Temp Source Heart Rate Source Patient Position - Orthostatic VS BP Location FiO2 (%)   12/27/23 1347 12/27/23 1343 12/27/23 1343 12/27/23 1343 --   Tympanic Monitor Sitting Right arm       Pain Score       12/27/23 1343       No Pain          Wt Readings from Last 1 Encounters:   12/28/23 52.6 kg (116 lb)     Additional Vital Signs:   8.9 °F (37.2 °C) 67 20 174/71 Abnormal  102 96 % None (Room air) Lying    12/28/23 0426 -- -- -- 144/78 -- -- -- --   12/28/23 0310 98.2 °F (36.8 °C) 64 16 176/77 Abnormal  111 91 % None (Room air) Lying   12/27/23 2159 98 °F (36.7 °C) 59 20 163/74 107 90 % None (Room air) Lying   12/27/23 2115 -- 58 -- 147/117 Abnormal  126 92 % -- --   12/27/23 2000 -- 62 -- -- -- 90 % -- --   12/27/23 1811 97.9 °F (36.6 °C) 64 20 169/75 -- 96 % -- --   12/27/23 1524 -- -- -- -- -- -- None (Room air) --   12/27/23 1521 97.7 °F (36.5 °C) 60 20 160/74 -- 96 % -- --   12/27/23 1347 97.5 °F (36.4 °C) -- -- -- -- -- -- --   12/27/23 1343 -- 67 24 Abnormal  151/70 -- 94 % None (Room air) Sitting     Pertinent Labs/Diagnostic Test Results:   EKG  NSR   ?  ST abnormality  US right upper quadrant   Final Result by Tomy Dial MD (12/27 1948)      Normal.      Workstation performed: [id number]         XR chest 1 view portable   ED Interpretation by Bailey Maldonado PA-C (12/27 150)   Right sided pleural effusion noted       Final Result by Remigio Glez MD (12/27 2313)      Stable cardiomegaly and right pleural effusion and trace left pleural effusion and right lung base consolidation                  Workstation performed: [id number]         IR IN-Patient Thoracentesis    (Results Pending)     Results from last 7 days   Lab  Units 12/27/23  1428   SARS-COV-2  Negative     Results from last 7 days   Lab Units 12/28/23  0450 12/27/23  1428   WBC Thousand/uL 9.59 10.67*   HEMOGLOBIN g/dL 13.0 14.1   HEMATOCRIT % 39.3 42.9   PLATELETS Thousands/uL 322 376   NEUTROS ABS Thousands/µL 5.99 7.84*         Results from last 7 days   Lab Units 12/28/23  0450 12/27/23  1428   SODIUM mmol/L 140 141   POTASSIUM mmol/L 3.6 4.9   CHLORIDE mmol/L 107 104   CO2 mmol/L 22 21   ANION GAP mmol/L 11 16   BUN mg/dL 26* 26*   CREATININE mg/dL 0.74 0.68   EGFR ml/min/1.73sq m 71 77   CALCIUM mg/dL 8.9 9.8   MAGNESIUM mg/dL 1.8*  --      Results from last 7 days   Lab Units 12/28/23  0450 12/27/23  1428   AST U/L 134* 143*   ALT U/L 153* 163*   ALK PHOS U/L 113* 136*   TOTAL PROTEIN g/dL 6.5 7.7   ALBUMIN g/dL 3.4* 3.9   TOTAL BILIRUBIN mg/dL 0.79 0.89         Results from last 7 days   Lab Units 12/28/23  0450 12/27/23  1428   GLUCOSE RANDOM mg/dL 100 143*               Results from last 7 days   Lab Units 12/27/23 2015 12/27/23  1805 12/27/23  1428   HS TNI 0HR ng/L  --   --  88*   HS TNI 2HR ng/L  --  78*  --    HSTNI D2 ng/L  --  -10  --    HS TNI 4HR ng/L 74*  --   --    HSTNI D4 ng/L -14  --   --          Results from last 7 days   Lab Units 12/28/23  0450 12/27/23  1428   PROTIME seconds 15.4* 15.5*   INR  1.20* 1.21*   PTT seconds  --  26     Results from last 7 days   Lab Units 12/27/23  1818   TSH 3RD GENERATON uIU/mL 1.741     Results from last 7 days   Lab Units 12/28/23 0450 12/27/23  1818   PROCALCITONIN ng/ml 0.11 0.10                 Results from last 7 days   Lab Units 12/27/23  1428   BNP pg/mL >4,700*                     Results from last 7 days   Lab Units 12/27/23  1428   LIPASE u/L 29                 Results from last 7 days   Lab Units 12/27/23  1438   CLARITY UA  Clear   COLOR UA  Yellow   SPEC GRAV UA  1.020   PH UA  5.5   GLUCOSE UA mg/dl >=1000 (1%)*   KETONES UA mg/dl Negative   BLOOD UA  Trace-Intact*   PROTEIN UA mg/dl 30 (1+)*    NITRITE UA  Negative   BILIRUBIN UA  Negative   UROBILINOGEN UA E.U./dl 0.2   LEUKOCYTES UA  Elevated glucose may cause decreased leukocyte values. See urine microscopic for UWBC result*   WBC UA /hpf 2-4   RBC UA /hpf 2-4   BACTERIA UA /hpf Occasional   EPITHELIAL CELLS WET PREP /hpf Occasional     Results from last 7 days   Lab Units 12/27/23  1428   INFLUENZA A PCR  Negative   INFLUENZA B PCR  Negative   RSV PCR  Negative         ED Treatment:   Medication Administration from 12/27/2023 1331 to 12/27/2023 2145         Date/Time Order Dose Route Action Comments     12/27/2023 1813 EST metoprolol succinate (TOPROL-XL) 24 hr tablet 50 mg 50 mg Oral Given --     12/27/2023 1802 EST pravastatin (PRAVACHOL) tablet 80 mg 80 mg Oral Given --     12/27/2023 1753 EST heparin (porcine) subcutaneous injection 5,000 Units 5,000 Units Subcutaneous Given --     12/27/2023 1725 EST furosemide (LASIX) injection 40 mg 40 mg Intravenous Not Given --     12/27/2023 1753 EST furosemide (LASIX) injection 50 mg 50 mg Intravenous Given --            Present on Admission:   Acute on chronic combined systolic (congestive) and diastolic (congestive) heart failure (HCC)   Coronary artery disease   Benign essential hypertension   Mild protein-calorie malnutrition (HCC)      Admitting Diagnosis: Shortness of breath [R06.02]  Pleural effusion [J90]  Elevated troponin [R79.89]  Elevated LFTs [R79.89]  Acute on chronic combined systolic and diastolic congestive heart failure (HCC) [I50.43]  Mild protein-calorie malnutrition (HCC) [E44.1]  Age/Sex: 89 y.o. female  Admission Orders:  Scheduled Medications:  aspirin, 81 mg, Oral, Daily  Empagliflozin, 10 mg, Oral, Daily  furosemide, 50 mg, Intravenous, BID (diuretic)  heparin (porcine), 5,000 Units, Subcutaneous, Q8H SHABBIR  metoprolol succinate, 50 mg, Oral, BID  mirtazapine, 15 mg, Oral, HS  spironolactone, 12.5 mg, Oral, Daily      Continuous IV Infusions:     PRN Meds:  acetaminophen, 650 mg,  Oral, Q6H PRN  aluminum-magnesium hydroxide-simethicone, 30 mL, Oral, Q6H PRN  ondansetron, 4 mg, Intravenous, Q6H PRN        IP CONSULT TO CARDIOLOGY  IP CONSULT TO NUTRITION SERVICES    Network Utilization Review Department  ATTENTION: Please call with any questions or concerns to [phone] and carefully listen to the prompts so that you are directed to the right person. All voicemails are confidential.   For Discharge needs, contact Care Management DC Support Team at [phone] opt. [phone]  Send all requests for admission clinical reviews, approved or denied determinations and any other requests to dedicated fax number below belonging to the campus where the patient is receiving treatment. List of dedicated fax numbers for the Facilities:  FACILITY NAME UR FAX NUMBER   ADMISSION DENIALS (Administrative/Medical Necessity) [phone]   DISCHARGE SUPPORT TEAM (NETWORK) [phone]   PARENT CHILD HEALTH (Maternity/NICU/Pediatrics) [phone]   [facility] [phone]   [facility] [phone]   [facility] [phone]   [facility] [phone]   [facility] [phone]   [facility] [phone]   General acute hospital [phone]   [facility] [phone]   [facility] [phone]   [facility] [phone]   [facility] [phone]

## 2023-12-28 NOTE — BRIEF OP NOTE (RAD/CATH)
INTERVENTIONAL RADIOLOGY PROCEDURE NOTE    Date: 12/28/2023    Procedure:   Procedure Summary       Date:  Room / Location:     Anesthesia Start:  Anesthesia Stop:     Procedure:  Diagnosis:     Scheduled Providers:  Responsible Provider:     Anesthesia Type: Not recorded ASA Status: Not recorded            Preoperative diagnosis:   1. Elevated troponin    2. Pleural effusion    3. Elevated LFTs    4. Acute on chronic combined systolic and diastolic congestive heart failure (HCC)    5. Mild protein-calorie malnutrition (HCC)         Postoperative diagnosis: Same.    Surgeon: Tomy Enriquez MD     Assistant: None. No qualified resident was available.    Blood loss: None    Specimens: Pleural fluid     Findings: Right effusion, thoracentesis performed.    Complications: None immediate.    Anesthesia: local

## 2023-12-28 NOTE — ASSESSMENT & PLAN NOTE
Wt Readings from Last 3 Encounters:   12/28/23 52.6 kg (116 lb)   12/01/23 51.4 kg (113 lb 6.4 oz)   11/01/23 51.8 kg (114 lb 3.2 oz)     BNP >4,700. Reports compliance with torsemide, spironolactone.  Echo (6/2023): EF 30-35%, G2DD. Moderate-severe MV regurgitation.  Troponin elevated: 88.  Cardiology consult, monitor on telemetry  Continue IV Lasix 50 mg BID, continue spironolactone  Echo showed EF of 25 to 30% with grade 2 diastolic dysfunction  Daily weights, I/Os, 1.5 L fluid restriction

## 2023-12-28 NOTE — PROGRESS NOTES
This am patient expressed that she is missing one hearing aid. Patient was in room 9 in ED reached out to ED nurse she stated she would look into into. Hearing aide was found in the ED and brought to 3N to patient.  Patients daughter took them home to be charged.  As of now pt is Hard of hearing and has NO HEARING AIDES.

## 2023-12-28 NOTE — PLAN OF CARE
Problem: PAIN - ADULT  Goal: Verbalizes/displays adequate comfort level or baseline comfort level  Description: Interventions:  - Encourage patient to monitor pain and request assistance  - Assess pain using appropriate pain scale  - Administer analgesics based on type and severity of pain and evaluate response  - Implement non-pharmacological measures as appropriate and evaluate response  - Consider cultural and social influences on pain and pain management  - Notify physician/advanced practitioner if interventions unsuccessful or patient reports new pain  Outcome: Progressing     Problem: INFECTION - ADULT  Goal: Absence or prevention of progression during hospitalization  Description: INTERVENTIONS:  - Assess and monitor for signs and symptoms of infection  - Monitor lab/diagnostic results  - Monitor all insertion sites, i.e. indwelling lines, tubes, and drains  - Monitor endotracheal if appropriate and nasal secretions for changes in amount and color  - [place] appropriate cooling/warming therapies per order  - Administer medications as ordered  - Instruct and encourage patient and family to use good hand hygiene technique  - Identify and instruct in appropriate isolation precautions for identified infection/condition  Outcome: Progressing  Goal: Absence of fever/infection during neutropenic period  Description: INTERVENTIONS:  - Monitor WBC    Outcome: Progressing     Problem: SAFETY ADULT  Goal: Patient will remain free of falls  Description: INTERVENTIONS:  - Educate patient/family on patient safety including physical limitations  - Instruct patient to call for assistance with activity   - Consult OT/PT to assist with strengthening/mobility   - Keep Call bell within reach  - Keep bed low and locked with side rails adjusted as appropriate  - Keep care items and personal belongings within reach  - Initiate and maintain comfort rounds  - Make Fall Risk Sign visible to staff  - Offer Toileting every 2 Hours,  in advance of need  - Initiate/Maintain bed alarm  - Apply yellow socks and bracelet for high fall risk patients  - Consider moving patient to room near nurses station  Outcome: Progressing  Goal: Maintain or return to baseline ADL function  Description: INTERVENTIONS:  -  Assess patient's ability to carry out ADLs; assess patient's baseline for ADL function and identify physical deficits which impact ability to perform ADLs (bathing, care of mouth/teeth, toileting, grooming, dressing, etc.)  - Assess/evaluate cause of self-care deficits   - Assess range of motion  - Assess patient's mobility; develop plan if impaired  - Assess patient's need for assistive devices and provide as appropriate  - Encourage maximum independence but intervene and supervise when necessary  - Involve family in performance of ADLs  - Assess for home care needs following discharge   - Consider OT consult to assist with ADL evaluation and planning for discharge  - Provide patient education as appropriate  Outcome: Progressing  Goal: Maintains/Returns to pre admission functional level  Description: INTERVENTIONS:  - Perform AM-PAC 6 Click Basic Mobility/ Daily Activity assessment daily.  - Set and communicate daily mobility goal to care team and patient/family/caregiver.   - Collaborate with rehabilitation services on mobility goals if consulted  - Perform Range of Motion 3 times a day.  - Out of bed for toileting  - Record patient progress and toleration of activity level   Outcome: Progressing     Problem: DISCHARGE PLANNING  Goal: Discharge to home or other facility with appropriate resources  Description: INTERVENTIONS:  - Identify barriers to discharge w/patient and caregiver  - Arrange for needed discharge resources and transportation as appropriate  - Identify discharge learning needs (meds, wound care, etc.)  - Arrange for interpretive services to assist at discharge as needed  - Refer to Case Management Department for coordinating  discharge planning if the patient needs post-hospital services based on physician/advanced practitioner order or complex needs related to functional status, cognitive ability, or social support system  Outcome: Progressing     Problem: CARDIOVASCULAR - ADULT  Goal: Maintains optimal cardiac output and hemodynamic stability  Description: INTERVENTIONS:  - Monitor I/O, vital signs and rhythm  - Monitor for S/S and trends of decreased cardiac output  - Administer and titrate ordered vasoactive medications to optimize hemodynamic stability  - Assess quality of pulses, skin color and temperature  - Assess for signs of decreased coronary artery perfusion  - Instruct patient to report change in severity of symptoms  Outcome: Progressing  Goal: Absence of cardiac dysrhythmias or at baseline rhythm  Description: INTERVENTIONS:  - Continuous cardiac monitoring, vital signs, obtain 12 lead EKG if ordered  - Administer antiarrhythmic and heart rate control medications as ordered  - Monitor electrolytes and administer replacement therapy as ordered  Outcome: Progressing     Problem: RESPIRATORY - ADULT  Goal: Achieves optimal ventilation and oxygenation  Description: INTERVENTIONS:  - Assess for changes in respiratory status  - Assess for changes in mentation and behavior  - Position to facilitate oxygenation and minimize respiratory effort  - Oxygen administered by appropriate delivery if ordered  - Initiate smoking cessation education as indicated  - Encourage broncho-pulmonary hygiene including cough, deep breathe, Incentive Spirometry  - Assess the need for suctioning and aspirate as needed  - Assess and instruct to report SOB or any respiratory difficulty  - Respiratory Therapy support as indicated  Outcome: Progressing

## 2023-12-28 NOTE — ASSESSMENT & PLAN NOTE
Malnutrition Findings:      BMI Findings:        Body mass index is 19.91 kg/m².     Progressively worsening poor appetite/oral intake  Managed on Remeron, recently increased dose by PCP  Nutrition consult

## 2023-12-28 NOTE — CASE MANAGEMENT
Case Management Progress Note    Patient name Yaquelin Wright  Location [facility]-* MRN [id number]  : 10/3/1933 Date 2023       LOS (days): 0  Geometric Mean LOS (GMLOS) (days):   Days to GMLOS:        OBJECTIVE:        Current admission status: Observation  Preferred Pharmacy:   [id number] - [place], [place] - [address]  [address] [address]  Phone: [phone] Fax: [phone]    [facility] - [address]  [address] [facility] [address]  Phone: [phone] Fax: [phone]    Primary Care Provider: Aria Clark MD    Primary Insurance: [employer]  Secondary Insurance: [employer]    PROGRESS NOTE:    CM called patient's daughter Keith and left a second message requesting a return phone call.

## 2023-12-28 NOTE — PROGRESS NOTES
Progress Note - Cardiology   [facility]     Yaquelin Wright 89 y.o. female MRN: [id number]  : 10/3/1933  Unit/Bed#: [address] [place] [address] Encounter: [id number]    Assessment and Plan:   Acute on chronic combined systolic and diastolic heart failure.  - Presented on 23 for evaluation for worsening shortness of breath over the past 3 days.  - 23 CXR: Stable cardiomegaly and right pleural effusion and trace left pleural effusion and right lung base consolidation.   - Pending IR thoracentesis.   - 23 BNP: >4700.   - 23 TTE: LVEF 30-35%. Systolic function is severely reduced. There is severe global hypokinesis with regional variation. Diastolic function is moderately abnormal, consistent with grade II (pseudonormal) relaxation.  Left atrium moderately dilated.  Right atrium mildly dilated.  Aortic valve with mild regurgitation.  Aortic valve with mild stenosis.  Moderate to severe mitral valve regurgitation.  Mild to moderate tricuspid valve regurgitation.  Pulmonary artery pressure around 50 to 55 mmHg.  - 22 TTE: LVEF 55-60%.  No regional wall motion abnormalities appreciated.  There is grade 1 diastolic dysfunction.  Normal right ventricle size and function.  Mild to moderate mitral valve regurgitation and mild aortic valve regurgitation.  Mild tricuspid valve regurgitation.  Mildly elevated pulmonary artery pressure, 39 mmHg.  - Repeat TTE pending.  - Continue Toprol-XL 50 mg twice daily and Aldactone 12.5 mg daily.  - Currently on Jardiance 10 mg daily.  Continue at this time.  - Continue Lasix 50 mg IV twice daily.  - Strict I/Os.   - Daily weight, standing scale.   - Monitor electrolytes.  Replete potassium above 4 and magnesium above 2.  - CHF education.      Elevated troponin.  - 23 hs troponin: 88 (0hrs), 78 (2hrs), 74 (4hrs).   - 23 EKG: Sinus rhythm, 62 bpm. Septal infarct, cited on or before 2013.  ST abnormality in lateral leads.  -  Non-ischemic myocardial injury secondary to acute on chronic systolic and diastolic heart failure.     Valvular heart disease.  - Mild aortic valve regurgitation.  Mild aortic valve stenosis.  Moderate to severe mitral regurgitation.  Mild to moderate tricuspid valve regurgitation.  Pulmonic valve with trace regurgitation.  - 6/07/23 TTE:   Aortic Valve The aortic valve is trileaflet. The leaflets are moderately thickened. The leaflets are mildly calcified. The leaflets exhibit normal mobility. There is mild regurgitation. There is mild stenosis.   Mitral Valve There is moderate to severe regurgitation with a centrally directed jet. There is no evidence of stenosis.   Tricuspid Valve There is mild to moderate regurgitation.  Pulmonary artery pressure 50 to 55 mmHg. There is no evidence of stenosis.   Pulmonic Valve There is trace regurgitation. There is no evidence of stenosis.   - Repeat TTE pending.    Coronary artery disease.  - s/p PCI of LAD and diagonal (2013).  - Stable. Patient denies experiencing chest pain.  - Currently on pravastatin 80 mg daily and aspirin 81 mg daily.  Hold pravastatin 80 mg daily in setting of transaminitis.     Hypertension.  - SBP for 24 hours 144-176.   - Currently on Toprol-XL 50 mg twice daily and Aldactone 12.5 mg daily.  - Currently on 650 mg IV twice daily in setting of acute on chronic combined systolic and diastolic heart failure.   - Continue to monitor BP.       Transaminitis.  - 12/27/23 (admission) ALT: 163, AST: 143.  - 12/28/23 ALT: 153, AST: 134.   - Elevation may be secondary to acute on chronic combined systolic and diastolic heart failure.   - Continue to trend ALT and AST.     7. CVA.  - CVA June 2022.  - Currently on pravastatin 80 mg daily and aspirin 81 mg daily. Hold pravastatin 80 mg daily in setting of transaminitis.     8. Dementia.  - Care per primary team.     Subjective / Objective:         No acute events.  Patient reports feeling well this morning and  "states she is eager to be discharged.  She offers no other complaints.  She denies experiencing chest pain, palpitations, shortness of breath, lightheadedness, dizziness, headache, nausea, or vomiting.       Pending IR evaluation for thoracentesis.    Vitals: Blood pressure (!) 174/71, pulse 67, temperature 98.9 °F (37.2 °C), temperature source Oral, resp. rate 20, height 5' 4\" (1.626 m), weight 52.6 kg (116 lb), SpO2 96%.  Vitals:    12/27/23 2159 12/28/23 0600   Weight: 52.6 kg (116 lb) 52.6 kg (116 lb)     Body mass index is 19.91 kg/m².  BP Readings from Last 3 Encounters:   12/28/23 (!) 174/71   12/01/23 140/80   11/01/23 156/84     Orthostatic Blood Pressures      Flowsheet Row Most Recent Value   Blood Pressure 174/71 filed at 12/28/2023 0800   Patient Position - Orthostatic VS Lying filed at 12/28/2023 0800          I/O         12/26 0701  12/27 0700 12/27 0701  12/28 0700 12/28 0701  12/29 0700           Unmeasured Urine Occurrence  1 x           Invasive Devices       Peripheral Intravenous Line  Duration             Peripheral IV 12/27/23 Left Antecubital <1 day                    No intake or output data in the 24 hours ending 12/28/23 0835      Physical Exam:   Physical Exam  Vitals reviewed.   Constitutional:       General: She is not in acute distress.  Cardiovascular:      Rate and Rhythm: Normal rate and regular rhythm.      Pulses: Normal pulses.      Heart sounds: Murmur heard.   Pulmonary:      Effort: Pulmonary effort is normal. No respiratory distress.      Breath sounds: Decreased breath sounds present.   Abdominal:      General: Abdomen is flat. There is no distension.      Palpations: Abdomen is soft.      Tenderness: There is no abdominal tenderness.   Musculoskeletal:      Right lower leg: No edema.      Left lower leg: No edema.   Skin:     General: Skin is warm and dry.   Neurological:      Mental Status: She is alert and oriented to person, place, and time.       Medications/ Allergies: " "    Current Facility-Administered Medications   Medication Dose Route Frequency Provider Last Rate    acetaminophen  650 mg Oral Q6H PRN Gely Samano PA-C      aluminum-magnesium hydroxide-simethicone  30 mL Oral Q6H PRN Gely Samano PA-C      aspirin  81 mg Oral Daily Gely Samano PA-C      Empagliflozin  10 mg Oral Daily Gely Samano PA-C      furosemide  50 mg Intravenous BID (diuretic) Gely Samano PA-C      heparin (porcine)  5,000 Units Subcutaneous Q8H SHABBIR Gely Samano PA-C      metoprolol succinate  50 mg Oral BID Gely Samano PA-C      mirtazapine  15 mg Oral HS Gely Samano PA-C      ondansetron  4 mg Intravenous Q6H PRN Gely Samano PA-C      pravastatin  80 mg Oral Daily With Dinner Gely Samano PA-C      spironolactone  12.5 mg Oral Daily Gely Samano PA-C       acetaminophen, 650 mg, Q6H PRN  aluminum-magnesium hydroxide-simethicone, 30 mL, Q6H PRN  ondansetron, 4 mg, Q6H PRN      Allergies   Allergen Reactions    Bee Venom Anaphylaxis     Reaction Date: 02Feb2004; Annotation - [id number]: lito    Enalapril Other (See Comments)     Reaction Date: 28Oct2003;   Leg  pain    Erythromycin Dizziness     Reaction Date: 28Oct2003;     Estrogens Other (See Comments)     Reaction Date: 02Feb2004; Annotation - [id number]: mariew    Ezetimibe Hives    Penicillins Hives     \"All cillins\", \"and all myocillins\"    Ramipril Other (See Comments)     Reaction Date: 02Feb2004; Annotation - [id number]: jjensw    Statins Other (See Comments)     Muscle pain    Versed [Midazolam] Other (See Comments)     States she \"passed out\" when recovering from a procedure and was told to not use it again.    Zithromax [Azithromycin] Anaphylaxis       VTE Pharmacologic Prophylaxis:   Heparin    Labs:   Troponins:  Results from last 7 days   Lab Units 12/27/23 2015 12/27/23  1805   HSTNI D2 ng/L  --  -10   HSTNI D4 ng/L -14  --          CBC with diff:  Results from last 7 days   Lab Units 12/28/23  6210 " "12/27/23  1428   WBC Thousand/uL 9.59 10.67*   HEMOGLOBIN g/dL 13.0 14.1   HEMATOCRIT % 39.3 42.9   MCV fL 98 97   PLATELETS Thousands/uL 322 376   RBC Million/uL 4.03 4.42   MCH pg 32.3 31.9   MCHC g/dL 33.1 32.9   RDW % 16.5* 16.5*   MPV fL 10.2 10.0   NRBC AUTO /100 WBCs 0 0       CMP:  Results from last 7 days   Lab Units 12/28/23  0450 12/27/23  1428   SODIUM mmol/L 140 141   POTASSIUM mmol/L 3.6 4.9   CHLORIDE mmol/L 107 104   CO2 mmol/L 22 21   ANION GAP mmol/L 11 16   BUN mg/dL 26* 26*   CREATININE mg/dL 0.74 0.68   GLUCOSE FASTING mg/dL 100*  --    CALCIUM mg/dL 8.9 9.8   AST U/L 134* 143*   ALT U/L 153* 163*   ALK PHOS U/L 113* 136*   TOTAL PROTEIN g/dL 6.5 7.7   ALBUMIN g/dL 3.4* 3.9   TOTAL BILIRUBIN mg/dL 0.79 0.89   EGFR ml/min/1.73sq m 71 77       Magnesium:  Results from last 7 days   Lab Units 12/28/23  0450   MAGNESIUM mg/dL 1.8*     Coags:  Results from last 7 days   Lab Units 12/28/23  0450 12/27/23  1428   PTT seconds  --  26   INR  1.20* 1.21*     TSH:  Results from last 7 days   Lab Units 12/27/23  1818   TSH 3RD GENERATON uIU/mL 1.741     No components found for: \"TSH3\"  Lipid Profile:    Hgb A1c:    NT-proBNP: No results for input(s): \"NTBNP\" in the last 72 hours.     Imaging & Testing   I have personally reviewed pertinent reports.    US right upper quadrant    Result Date: 12/27/2023    Impression: Normal.     XR chest 1 view portable    Result Date: 12/27/2023    Impression: Stable cardiomegaly and right pleural effusion and trace left pleural effusion and right lung base consolidation.       EKG / Monitor: Personally reviewed.      Telemetry reviewed: currently sinus rhythm, 68 bpm.     Cardiac testing:   Echo complete with contrast if indicated  Result date: 6/07/23   Left Ventricle Left ventricular cavity size is mildly dilated. Wall thickness is normal. The left ventricular ejection fraction is 30 to 35 % by visual estimation. Systolic function is severely reduced.  There is severe " global hypokinesis with regional variation. Diastolic function is moderately abnormal, consistent with grade II (pseudonormal) relaxation.   Right Ventricle Right ventricular cavity size is normal. Systolic function is mildly to moderately reduced. Wall thickness is normal.   Left Atrium The atrium is moderately dilated.   Right Atrium The atrium is mildly dilated.   Aortic Valve The aortic valve is trileaflet. The leaflets are moderately thickened. The leaflets are mildly calcified. The leaflets exhibit normal mobility. There is mild regurgitation. There is mild stenosis.   Mitral Valve There is moderate to severe regurgitation with a centrally directed jet. There is no evidence of stenosis.   Tricuspid Valve There is mild to moderate regurgitation.  Pulmonary artery pressure 50 to 55 mmHg. There is no evidence of stenosis.   Pulmonic Valve There is trace regurgitation. There is no evidence of stenosis.   Ascending Aorta The aortic root is normal in size.   IVC/SVC The inferior vena cava is upper normal in size.  It has around 50% collapse with inspiration.   Pericardium There is no pericardial effusion. The pericardium is normal in appearance.         Results for orders placed during the hospital encounter of 18     Echo complete with contrast if indicated     Narrative  [address] [address]  [phone]     Transthoracic Echocardiogram  2D, M-mode, Doppler, and Color Doppler     Study date:  2018     Patient: YAHAIRA MENDOSA  MR number: [id number]  Account number: [id number]  : 03-Oct-1933  Age: 84 years  Gender: Female  Status: Routine  Location: Echo lab  Height: 64 in  Weight: 140.8 lb  BP: 126/ 80 mmHg     Indications: Syncope     Diagnoses: R55. - Syncope and collapse     Sonographer:  KENAN Ellis  Primary Physician:  Aria Clark MD  Referring Physician:  Zen Edwards MD  Group:  [facility]  Interpreting Physician:  Zen Edwards MD     SUMMARY     LEFT VENTRICLE:  Systolic function was at the lower limits of normal. Ejection fraction was estimated in the range of 50 % to 55 % to be 55 %.  Although no diagnostic regional wall motion abnormality was identified, this possibility cannot be completely excluded on the basis of this study.  Wall thickness was mildly increased.  There was mild concentric hypertrophy.     MITRAL VALVE:  There was mild annular calcification.  There was mild regurgitation.     AORTIC VALVE:  There was mild regurgitation.     TRICUSPID VALVE:  There was mild regurgitation.  Estimated peak PA pressure was 30 mmHg.     HISTORY: PRIOR HISTORY: HTN, CAD, Hyperlipidemia, Hypokalemia     PROCEDURE: The procedure was performed in the echo lab. This was a routine study. The transthoracic approach was used. The study included complete 2D imaging, M-mode, complete spectral Doppler, and color Doppler. The heart rate was 70 bpm,  at the start of the study. Image quality was adequate.     LEFT VENTRICLE: Size was normal. Systolic function was at the lower limits of normal. Ejection fraction was estimated in the range of 50 % to 55 % to be 55 %. Although no diagnostic regional wall motion abnormality was identified, this  possibility cannot be completely excluded on the basis of this study. Wall thickness was mildly increased. There was mild concentric hypertrophy. DOPPLER: Left ventricular diastolic function parameters were normal for the patient's age.     RIGHT VENTRICLE: The size was normal. Systolic function was normal.     LEFT ATRIUM: Size was at the upper limits of normal.     RIGHT ATRIUM: Size was at the upper limits of normal.     MITRAL VALVE: There was mild annular calcification. There was normal leaflet separation. DOPPLER: There was no evidence for stenosis. There was mild regurgitation.     AORTIC VALVE: The valve was trileaflet. Leaflets exhibited mildly  increased thickness, mild calcification, and normal cuspal separation. DOPPLER: There was no evidence for stenosis. There was mild regurgitation.     TRICUSPID VALVE: DOPPLER: There was mild regurgitation. Estimated peak PA pressure was 30 mmHg.     PULMONIC VALVE: DOPPLER: There was mild regurgitation.     PERICARDIUM: There was no thickening or calcification. There was no pericardial effusion.     AORTA: The root exhibited normal size.     SYSTEMIC VEINS: IVC: The inferior vena cava was normal in size. Respirophasic changes were normal.     SYSTEM MEASUREMENT TABLES     2D mode  AoR Diam 2D: 3.3 cm  LA Diam (2D): 3.5 cm  LA/Ao (2D): 1.06  FS (2D Teich): 28 %  IVSd (2D): 1.21 cm  LVDEV: 114 cm³  LVESV: 52.6 cm³  LVIDd(2D): 4.93 cm  LVISd (2D): 3.55 cm  LVPWd (2D): 1.17 cm  SV (Teich): 61.4 cm³     Apical four chamber  LVEF A4C: 55 %     Unspecified Scan Mode  MV Peak A Ki: 1010 mm/s  MV Peak E Ki. Mean: 605 mm/s  MVA (PHT): 3.61 cm squared  PHT: 61 ms  Max P mm[Hg]  V Max: 2390 mm/s  Vmax: 2270 mm/s  RA Area: 14.5 cm squared  RA Volume: 34.9 cm³  TAPSE: 1.9 cm     [employer] Accredited Echocardiography Laboratory     Prepared and electronically signed by     Zen Edwards MD  Signed 2018 17:27:28           Estela Florence PA-C

## 2023-12-28 NOTE — CASE MANAGEMENT
Case Management Progress Note    Patient name Yaquelin Wright  Location [address] [place] [address] [place] [address]-* MRN [id number]  : 10/3/1933 Date 2023       LOS (days): 0  Geometric Mean LOS (GMLOS) (days):   Days to GMLOS:        OBJECTIVE:        Current admission status: Observation  Preferred Pharmacy:   [id number] - [place], [place] - [address] [address]  [address] [facility] [address]  Phone: [phone] Fax: [phone]    [facility] - [place], [place] - [facility] [facility]  [address] [facility] [address]  Phone: [phone] Fax: [phone]    Primary Care Provider: Aria Clark MD    Primary Insurance: [employer]  Secondary Insurance: [employer]    PROGRESS NOTE:      CM received voicemail from patient's daughter Keith requesting a call back. Keith stated on her message that she was interested in knowing if her mother could go to short term rehab to help gain strength. CM returned Indira' call and left a message making her aware her question has been given to patient's physician and the PT/OT staff. CM will update her again once patient has been evaluated. CM also stated on message left for Keith that the anticipated discharge timeline was today/tomorrow.     CM sent TT to Physician and 3N PT/OT to make aware of family's request for PT/OT evaluation and rehab placement.

## 2023-12-28 NOTE — ASSESSMENT & PLAN NOTE
History of CVA, residual deficits with impaired cognition and forgetfulness  Continue ASA, statin.

## 2023-12-28 NOTE — ASSESSMENT & PLAN NOTE
LFTs elevated on admission - , , alk phos 136. Denies abdominal pain, nausea/vomiting.  Possibly about a congestion, patient also on statin  Right upper quadrant ultrasound normal  Repeat LFTs in a.m.

## 2023-12-29 ENCOUNTER — TRANSITIONAL CARE MANAGEMENT (OUTPATIENT)
Dept: FAMILY MEDICINE CLINIC | Facility: CLINIC | Age: 88
End: 2023-12-29

## 2023-12-29 ENCOUNTER — NURSE TRIAGE (OUTPATIENT)
Dept: OTHER | Facility: OTHER | Age: 88
End: 2023-12-29

## 2023-12-29 VITALS
TEMPERATURE: 98 F | RESPIRATION RATE: 16 BRPM | HEART RATE: 76 BPM | BODY MASS INDEX: 18.93 KG/M2 | HEIGHT: 64 IN | SYSTOLIC BLOOD PRESSURE: 151 MMHG | DIASTOLIC BLOOD PRESSURE: 72 MMHG | WEIGHT: 110.9 LBS | OXYGEN SATURATION: 93 %

## 2023-12-29 DIAGNOSIS — I50.42 CHRONIC COMBINED SYSTOLIC AND DIASTOLIC CONGESTIVE HEART FAILURE (HCC): ICD-10-CM

## 2023-12-29 LAB
ALBUMIN SERPL BCP-MCNC: 3.2 G/DL (ref 3.5–5)
ALP SERPL-CCNC: 101 U/L (ref 34–104)
ALT SERPL W P-5'-P-CCNC: 125 U/L (ref 7–52)
ANION GAP SERPL CALCULATED.3IONS-SCNC: 10 MMOL/L
AST SERPL W P-5'-P-CCNC: 93 U/L (ref 13–39)
BILIRUB SERPL-MCNC: 0.8 MG/DL (ref 0.2–1)
BUN SERPL-MCNC: 26 MG/DL (ref 5–25)
CALCIUM ALBUM COR SERPL-MCNC: 9.2 MG/DL (ref 8.3–10.1)
CALCIUM SERPL-MCNC: 8.6 MG/DL (ref 8.4–10.2)
CHLORIDE SERPL-SCNC: 102 MMOL/L (ref 96–108)
CO2 SERPL-SCNC: 28 MMOL/L (ref 21–32)
CREAT SERPL-MCNC: 0.73 MG/DL (ref 0.6–1.3)
GFR SERPL CREATININE-BSD FRML MDRD: 72 ML/MIN/1.73SQ M
GLUCOSE SERPL-MCNC: 75 MG/DL (ref 65–140)
MAGNESIUM SERPL-MCNC: 1.7 MG/DL (ref 1.9–2.7)
POTASSIUM SERPL-SCNC: 2.8 MMOL/L (ref 3.5–5.3)
PROT SERPL-MCNC: 6.3 G/DL (ref 6.4–8.4)
SODIUM SERPL-SCNC: 140 MMOL/L (ref 135–147)

## 2023-12-29 PROCEDURE — 99239 HOSP IP/OBS DSCHRG MGMT >30: CPT | Performed by: NURSE PRACTITIONER

## 2023-12-29 PROCEDURE — 99233 SBSQ HOSP IP/OBS HIGH 50: CPT | Performed by: INTERNAL MEDICINE

## 2023-12-29 PROCEDURE — 97166 OT EVAL MOD COMPLEX 45 MIN: CPT

## 2023-12-29 PROCEDURE — 80053 COMPREHEN METABOLIC PANEL: CPT | Performed by: FAMILY MEDICINE

## 2023-12-29 PROCEDURE — 83735 ASSAY OF MAGNESIUM: CPT | Performed by: FAMILY MEDICINE

## 2023-12-29 RX ORDER — RABEPRAZOLE SODIUM 20 MG/1
20 TABLET, DELAYED RELEASE ORAL 3 TIMES WEEKLY
Start: 2023-12-29

## 2023-12-29 RX ORDER — POTASSIUM CHLORIDE 20 MEQ/1
40 TABLET, EXTENDED RELEASE ORAL ONCE
Status: COMPLETED | OUTPATIENT
Start: 2023-12-29 | End: 2023-12-29

## 2023-12-29 RX ORDER — FUROSEMIDE 40 MG/5ML
40 SOLUTION ORAL DAILY
Status: DISCONTINUED | OUTPATIENT
Start: 2023-12-29 | End: 2023-12-29

## 2023-12-29 RX ORDER — FUROSEMIDE 10 MG/ML
40 SOLUTION ORAL DAILY
Status: DISCONTINUED | OUTPATIENT
Start: 2023-12-30 | End: 2023-12-29

## 2023-12-29 RX ORDER — POTASSIUM CHLORIDE 14.9 MG/ML
20 INJECTION INTRAVENOUS ONCE
Status: DISCONTINUED | OUTPATIENT
Start: 2023-12-29 | End: 2023-12-29

## 2023-12-29 RX ORDER — POTASSIUM CHLORIDE 29.8 MG/ML
40 INJECTION INTRAVENOUS ONCE
Status: DISCONTINUED | OUTPATIENT
Start: 2023-12-29 | End: 2023-12-29

## 2023-12-29 RX ORDER — MAGNESIUM SULFATE HEPTAHYDRATE 40 MG/ML
2 INJECTION, SOLUTION INTRAVENOUS ONCE
Status: COMPLETED | OUTPATIENT
Start: 2023-12-29 | End: 2023-12-29

## 2023-12-29 RX ORDER — SPIRONOLACTONE 25 MG/1
12.5 TABLET ORAL DAILY
Qty: 4 TABLET | Refills: 0 | Status: SHIPPED | OUTPATIENT
Start: 2023-12-29 | End: 2024-01-02 | Stop reason: SDUPTHER

## 2023-12-29 RX ORDER — FUROSEMIDE 40 MG/1
40 TABLET ORAL DAILY
Status: DISCONTINUED | OUTPATIENT
Start: 2023-12-29 | End: 2023-12-29 | Stop reason: HOSPADM

## 2023-12-29 RX ORDER — POTASSIUM CHLORIDE 14.9 MG/ML
20 INJECTION INTRAVENOUS ONCE
Status: COMPLETED | OUTPATIENT
Start: 2023-12-29 | End: 2023-12-29

## 2023-12-29 RX ADMIN — EMPAGLIFLOZIN 10 MG: 10 TABLET, FILM COATED ORAL at 08:49

## 2023-12-29 RX ADMIN — HEPARIN SODIUM 5000 UNITS: 5000 INJECTION INTRAVENOUS; SUBCUTANEOUS at 05:55

## 2023-12-29 RX ADMIN — POTASSIUM CHLORIDE 40 MEQ: 1500 TABLET, EXTENDED RELEASE ORAL at 11:26

## 2023-12-29 RX ADMIN — MAGNESIUM SULFATE HEPTAHYDRATE 2 G: 40 INJECTION, SOLUTION INTRAVENOUS at 10:14

## 2023-12-29 RX ADMIN — ASPIRIN 81 MG CHEWABLE TABLET 81 MG: 81 TABLET CHEWABLE at 08:48

## 2023-12-29 RX ADMIN — FUROSEMIDE 40 MG: 40 TABLET ORAL at 11:26

## 2023-12-29 RX ADMIN — POTASSIUM CHLORIDE 20 MEQ: 14.9 INJECTION, SOLUTION INTRAVENOUS at 10:43

## 2023-12-29 RX ADMIN — SPIRONOLACTONE 12.5 MG: 25 TABLET, FILM COATED ORAL at 08:48

## 2023-12-29 RX ADMIN — POTASSIUM CHLORIDE 40 MEQ: 1500 TABLET, EXTENDED RELEASE ORAL at 08:48

## 2023-12-29 RX ADMIN — POTASSIUM CHLORIDE 20 MEQ: 14.9 INJECTION, SOLUTION INTRAVENOUS at 08:00

## 2023-12-29 RX ADMIN — METOPROLOL SUCCINATE 50 MG: 50 TABLET, EXTENDED RELEASE ORAL at 08:49

## 2023-12-29 NOTE — NURSING NOTE
Pt discharged to home, with daughter and grand-daughter.  IV removed.  Medications went over with daughter.  Pt walked out with PCA and family.

## 2023-12-29 NOTE — TELEPHONE ENCOUNTER
"Reason for Disposition  • [1] Caller requesting a prescription renewal (no refills left), no triage required, AND [2] triager able to renew prescription per department policy    Answer Assessment - Initial Assessment Questions  1. NAME of MEDICATION: \"What medicine are you calling about?\"      Aldactone  2. QUESTION: \"What is your question?\" (e.g., medication refill, side effect)      Pt was in the hospital and the hospital cannot find her medications which were supposed to be locked up. She does not have any Aldactone to take  3. PRESCRIBING HCP: \"Who prescribed it?\" Reason: if prescribed by specialist, call should be referred to that group.      Dr Edwards  4. SYMPTOMS: \"Do you have any symptoms?\"      Not yet    Protocols used: Medication Question Call-ADULT-    "

## 2023-12-29 NOTE — ASSESSMENT & PLAN NOTE
LFTs elevated on admission - , , alk phos 136. Denies abdominal pain, nausea/vomiting.  Possibly about a congestion, patient also on statin; held at this time  Right upper quadrant ultrasound normal  Repeat LFTs in a.m. improving   Repeat CMP in one week  Follow up PCP in one to two weeks post discharge

## 2023-12-29 NOTE — ASSESSMENT & PLAN NOTE
Continue Toprol-XL, Aldactone  Patient had received IV lasix during hospitalization  Transition to PTA oral lasix  Follow up outpatient PCP one to two weeks post discharge

## 2023-12-29 NOTE — CASE MANAGEMENT
Case Management Assessment & Discharge Planning Note    Patient name Yaquelin Wright  Location [address] [place] [address]/[address] [place] [address] MRN [id number]  : 10/3/1933 Date 2023       Current Admission Date: 2023  Current Admission Diagnosis:Acute on chronic combined systolic (congestive) and diastolic (congestive) heart failure (HCC)   Patient Active Problem List    Diagnosis Date Noted    Elevated LFTs 2023    Moderate dementia with mood disturbance (HCC) 2023    Hypoxia 10/18/2023    Mild protein-calorie malnutrition (HCC) 2023    Pleural effusion 2023    Depression, recurrent (HCC) 2023    H/O: CVA (cerebrovascular accident) 2022    Abnormal CBC 02/15/2022    Osteoporosis 2019    Vitamin D deficiency 2019    Hypokalemia 2018    Coronary artery disease     Arteriosclerosis of coronary artery 2013    Acute on chronic combined systolic (congestive) and diastolic (congestive) heart failure (HCC) 2013    Hyperlipidemia 10/28/2013    Benign essential hypertension 2012      LOS (days): 1  Geometric Mean LOS (GMLOS) (days):   Days to GMLOS:     OBJECTIVE:    Risk of Unplanned Readmission Score: 13.6         Current admission status: Inpatient       Preferred Pharmacy:   [id number] - [place], [place] - [address] [address] [address] [facility] [address]  Phone: [phone] Fax: [phone]    [facility] [address] [address]  Phone: [phone] Fax: [phone]    Primary Care Provider: Aria Clark MD    Primary Insurance: [employer]  Secondary Insurance: [employer]    ASSESSMENT:  Active Health Care Proxies    There are no active Health Care Proxies on file.       Readmission Root Cause  30 Day Readmission: No    Patient Information  Admitted from:: Home  Mental Status: Alert  During Assessment patient was accompanied by:  Daughter  Assessment information provided by:: Daughter  Primary Caregiver: Child  Caregiver's Name:: Daughter Keith  Caregiver's Relationship to Patient:: Family Member  Caregiver's Telephone Number:: [phone]  Support Systems: Children  [facility] of Residence: [place]  What city do you live in?: [place]  Living Arrangements: Lives Alone    Activities of Daily Living Prior to Admission  Functional Status: Independent  Completes ADLs independently?: Yes (Patient can complete ADL's independently but daughter states pt often chooses not to shower etc.)  Ambulates independently?: Yes  Does patient use assisted devices?: No      Patient Information Continued  Income Source: Pension/senior care  Does patient have prescription coverage?: Yes    Means of Transportation  Means of Transport to Appts:: Family transport      Housing Stability: Not on file   Food Insecurity: Not on file   Transportation Needs: No Transportation Needs (7/13/2023)    PRAPARE - Transportation     Lack of Transportation (Medical): No     Lack of Transportation (Non-Medical): No   Utilities: Not on file       DISCHARGE DETAILS:    Discharge planning discussed with:: Patient's Daughter Keith  Freedom of Choice: Yes  Comments - Freedom of Choice: CM discussed discharge plans with patient's daughter Keith. Keith shared concerns for her mother to return home to living alone. Patient has been having some memory issues, issues remembering medications and keeping up with ADL's. Keith is working to arrange 24 hour in-home care for her mother and would like her mother to to to [facility] for respite stay until in-home care can be secured. Keith consented to have referral sent for respite care.  CM contacted family/caregiver?: Yes  Were Treatment Team discharge recommendations reviewed with patient/caregiver?: Yes  Did patient/caregiver verbalize understanding of patient care needs?: Yes  Were patient/caregiver advised of the risks associated with not  following Treatment Team discharge recommendations?: Yes    Contacts  Patient Contacts: Terell Parker  Relationship to Patient:: Family  Contact Method: In Person  Reason/Outcome: Discharge Planning    Other Referral/Resources/Interventions Provided:  Referral Comments: CM sent respite referral in [place] to [facility].       Discharge Destination Plan:: Other (Respite stay at [facility].)      CM and NP met with patient's daughter Keith who shared her concerns about patient returning home to living home alone. Although Keith is very much involved in caring on a daily basis for the patient and has patient sleep at her home at night, 24 hour in-home care is needed. Keith stated patient has memory issues, difficulty keeping up with ADL's, poor nutrition/not eating enough, and needs someone to administer her medications. Keith in working on securing 24 hour in-home care at her mother's home and in the mean time would like the patient to go to [facility] for a respite stay in the meantime. The respite stay at [facility] can provide a safe discharge plan and peace of mind for the patient's family.     CM called and spoke with Tami SOLIMAN at [facility] to confirm patient can be accepted today for respite stay. Tami confirmed they can accept patient and confirmed CM should place referral in [facility].     [facility], made facility aware patient is being discharged from hospital today, and made facility aware that patient's daughter Keith will provide transportation to their facility.     CM met with patient bedside to confirm plan for respite stay at [facility] and explain the benefits of the stay. CM reviewed with patient the purpose of the respite stay and the goal of returning home with care in place for her to be safe.

## 2023-12-29 NOTE — PLAN OF CARE
Problem: PAIN - ADULT  Goal: Verbalizes/displays adequate comfort level or baseline comfort level  Description: Interventions:  - Encourage patient to monitor pain and request assistance  - Assess pain using appropriate pain scale  - Administer analgesics based on type and severity of pain and evaluate response  - Implement non-pharmacological measures as appropriate and evaluate response  - Consider cultural and social influences on pain and pain management  - Notify physician/advanced practitioner if interventions unsuccessful or patient reports new pain  Outcome: Progressing     Problem: INFECTION - ADULT  Goal: Absence or prevention of progression during hospitalization  Description: INTERVENTIONS:  - Assess and monitor for signs and symptoms of infection  - Monitor lab/diagnostic results  - Monitor all insertion sites, i.e. indwelling lines, tubes, and drains  - Monitor endotracheal if appropriate and nasal secretions for changes in amount and color  - [place] appropriate cooling/warming therapies per order  - Administer medications as ordered  - Instruct and encourage patient and family to use good hand hygiene technique  - Identify and instruct in appropriate isolation precautions for identified infection/condition  Outcome: Progressing  Goal: Absence of fever/infection during neutropenic period  Description: INTERVENTIONS:  - Monitor WBC    Outcome: Progressing     Problem: SAFETY ADULT  Goal: Patient will remain free of falls  Description: INTERVENTIONS:  - Educate patient/family on patient safety including physical limitations  - Instruct patient to call for assistance with activity   - Consult OT/PT to assist with strengthening/mobility   - Keep Call bell within reach  - Keep bed low and locked with side rails adjusted as appropriate  - Keep care items and personal belongings within reach  - Initiate and maintain comfort rounds  - Make Fall Risk Sign visible to staff  - Offer Toileting every 2 Hours,  in advance of need  - Initiate/Maintain bed alarm  - Apply yellow socks and bracelet for high fall risk patients  - Consider moving patient to room near nurses station  Outcome: Progressing  Goal: Maintain or return to baseline ADL function  Description: INTERVENTIONS:  -  Assess patient's ability to carry out ADLs; assess patient's baseline for ADL function and identify physical deficits which impact ability to perform ADLs (bathing, care of mouth/teeth, toileting, grooming, dressing, etc.)  - Assess/evaluate cause of self-care deficits   - Assess range of motion  - Assess patient's mobility; develop plan if impaired  - Assess patient's need for assistive devices and provide as appropriate  - Encourage maximum independence but intervene and supervise when necessary  - Involve family in performance of ADLs  - Assess for home care needs following discharge   - Consider OT consult to assist with ADL evaluation and planning for discharge  - Provide patient education as appropriate  Outcome: Progressing  Goal: Maintains/Returns to pre admission functional level  Description: INTERVENTIONS:  - Perform AM-PAC 6 Click Basic Mobility/ Daily Activity assessment daily.  - Set and communicate daily mobility goal to care team and patient/family/caregiver.   - Collaborate with rehabilitation services on mobility goals if consulted  - Perform Range of Motion 3 times a day.  - Out of bed for toileting  - Record patient progress and toleration of activity level   Outcome: Progressing     Problem: DISCHARGE PLANNING  Goal: Discharge to home or other facility with appropriate resources  Description: INTERVENTIONS:  - Identify barriers to discharge w/patient and caregiver  - Arrange for needed discharge resources and transportation as appropriate  - Identify discharge learning needs (meds, wound care, etc.)  - Arrange for interpretive services to assist at discharge as needed  - Refer to Case Management Department for coordinating  discharge planning if the patient needs post-hospital services based on physician/advanced practitioner order or complex needs related to functional status, cognitive ability, or social support system  Outcome: Progressing     Problem: Knowledge Deficit  Goal: Patient/family/caregiver demonstrates understanding of disease process, treatment plan, medications, and discharge instructions  Description: Complete learning assessment and assess knowledge base.  Interventions:  - Provide teaching at level of understanding  - Provide teaching via preferred learning methods  Outcome: Progressing     Problem: CARDIOVASCULAR - ADULT  Goal: Maintains optimal cardiac output and hemodynamic stability  Description: INTERVENTIONS:  - Monitor I/O, vital signs and rhythm  - Monitor for S/S and trends of decreased cardiac output  - Administer and titrate ordered vasoactive medications to optimize hemodynamic stability  - Assess quality of pulses, skin color and temperature  - Assess for signs of decreased coronary artery perfusion  - Instruct patient to report change in severity of symptoms  Outcome: Progressing  Goal: Absence of cardiac dysrhythmias or at baseline rhythm  Description: INTERVENTIONS:  - Continuous cardiac monitoring, vital signs, obtain 12 lead EKG if ordered  - Administer antiarrhythmic and heart rate control medications as ordered  - Monitor electrolytes and administer replacement therapy as ordered  Outcome: Progressing     Problem: RESPIRATORY - ADULT  Goal: Achieves optimal ventilation and oxygenation  Description: INTERVENTIONS:  - Assess for changes in respiratory status  - Assess for changes in mentation and behavior  - Position to facilitate oxygenation and minimize respiratory effort  - Oxygen administered by appropriate delivery if ordered  - Initiate smoking cessation education as indicated  - Encourage broncho-pulmonary hygiene including cough, deep breathe, Incentive Spirometry  - Assess the need for  suctioning and aspirate as needed  - Assess and instruct to report SOB or any respiratory difficulty  - Respiratory Therapy support as indicated  Outcome: Progressing     Problem: Nutrition/Hydration-ADULT  Goal: Nutrient/Hydration intake appropriate for improving, restoring or maintaining nutritional needs  Description: Monitor and assess patient's nutrition/hydration status for malnutrition. Collaborate with interdisciplinary team and initiate plan and interventions as ordered.  Monitor patient's weight and dietary intake as ordered or per policy. Utilize nutrition screening tool and intervene as necessary. Determine patient's food preferences and provide high-protein, high-caloric foods as appropriate.     INTERVENTIONS:  - Monitor oral intake, urinary output, labs, and treatment plans  - Assess nutrition and hydration status and recommend course of action  - Evaluate amount of meals eaten  - Assist patient with eating if necessary   - Allow adequate time for meals  - Recommend/ encourage appropriate diets, oral nutritional supplements, and vitamin/mineral supplements  - Order, calculate, and assess calorie counts as needed  - Assess need for intravenous fluids  - Provide specific nutrition/hydration education as appropriate  - Include patient/family/caregiver in decisions related to nutrition  Outcome: Progressing      None

## 2023-12-29 NOTE — PLAN OF CARE
Problem: Knowledge Deficit  Goal: Patient/family/caregiver demonstrates understanding of disease process, treatment plan, medications, and discharge instructions  Description: Complete learning assessment and assess knowledge base.  Interventions:  - Provide teaching at level of understanding  - Provide teaching via preferred learning methods  12/29/2023 1352 by Sherly Olvera RN  Outcome: Completed  12/29/2023 1352 by Sherly Olvera RN  Outcome: Progressing     Problem: CARDIOVASCULAR - ADULT  Goal: Maintains optimal cardiac output and hemodynamic stability  Description: INTERVENTIONS:  - Monitor I/O, vital signs and rhythm  - Monitor for S/S and trends of decreased cardiac output  - Administer and titrate ordered vasoactive medications to optimize hemodynamic stability  - Assess quality of pulses, skin color and temperature  - Assess for signs of decreased coronary artery perfusion  - Instruct patient to report change in severity of symptoms  12/29/2023 1352 by Sherly Olvera RN  Outcome: Completed  12/29/2023 1352 by Sherly Olvera RN  Outcome: Progressing  Goal: Absence of cardiac dysrhythmias or at baseline rhythm  Description: INTERVENTIONS:  - Continuous cardiac monitoring, vital signs, obtain 12 lead EKG if ordered  - Administer antiarrhythmic and heart rate control medications as ordered  - Monitor electrolytes and administer replacement therapy as ordered  12/29/2023 1352 by Sherly Olvera RN  Outcome: Completed  12/29/2023 1352 by Sherly Olvera RN  Outcome: Progressing     Problem: RESPIRATORY - ADULT  Goal: Achieves optimal ventilation and oxygenation  Description: INTERVENTIONS:  - Assess for changes in respiratory status  - Assess for changes in mentation and behavior  - Position to facilitate oxygenation and minimize respiratory effort  - Oxygen administered by appropriate delivery if ordered  - Initiate smoking cessation education as indicated  - Encourage broncho-pulmonary hygiene including  cough, deep breathe, Incentive Spirometry  - Assess the need for suctioning and aspirate as needed  - Assess and instruct to report SOB or any respiratory difficulty  - Respiratory Therapy support as indicated  12/29/2023 1352 by Sherly Olvera RN  Outcome: Completed  12/29/2023 1352 by Sherly Olvera RN  Outcome: Progressing     Problem: Nutrition/Hydration-ADULT  Goal: Nutrient/Hydration intake appropriate for improving, restoring or maintaining nutritional needs  Description: Monitor and assess patient's nutrition/hydration status for malnutrition. Collaborate with interdisciplinary team and initiate plan and interventions as ordered.  Monitor patient's weight and dietary intake as ordered or per policy. Utilize nutrition screening tool and intervene as necessary. Determine patient's food preferences and provide high-protein, high-caloric foods as appropriate.     INTERVENTIONS:  - Monitor oral intake, urinary output, labs, and treatment plans  - Assess nutrition and hydration status and recommend course of action  - Evaluate amount of meals eaten  - Assist patient with eating if necessary   - Allow adequate time for meals  - Recommend/ encourage appropriate diets, oral nutritional supplements, and vitamin/mineral supplements  - Order, calculate, and assess calorie counts as needed  - Recommend, monitor, and adjust tube feedings and TPN/PPN based on assessed needs  - Assess need for intravenous fluids  - Provide specific nutrition/hydration education as appropriate  - Include patient/family/caregiver in decisions related to nutrition  12/29/2023 1352 by Sherly Olvera RN  Outcome: Completed  12/29/2023 1352 by Sherly Olvera RN  Outcome: Progressing     Problem: PAIN - ADULT  Goal: Verbalizes/displays adequate comfort level or baseline comfort level  Description: Interventions:  - Encourage patient to monitor pain and request assistance  - Assess pain using appropriate pain scale  - Administer analgesics based on  type and severity of pain and evaluate response  - Implement non-pharmacological measures as appropriate and evaluate response  - Consider cultural and social influences on pain and pain management  - Notify physician/advanced practitioner if interventions unsuccessful or patient reports new pain  12/29/2023 1352 by Sherly Olvera RN  Outcome: Completed  12/29/2023 1352 by Sherly Olvera RN  Outcome: Progressing     Problem: INFECTION - ADULT  Goal: Absence or prevention of progression during hospitalization  Description: INTERVENTIONS:  - Assess and monitor for signs and symptoms of infection  - Monitor lab/diagnostic results  - Monitor all insertion sites, i.e. indwelling lines, tubes, and drains  - Monitor endotracheal if appropriate and nasal secretions for changes in amount and color  - [place] appropriate cooling/warming therapies per order  - Administer medications as ordered  - Instruct and encourage patient and family to use good hand hygiene technique  - Identify and instruct in appropriate isolation precautions for identified infection/condition  12/29/2023 1352 by Sherly Olvera RN  Outcome: Completed  12/29/2023 1352 by Sherly Olvera RN  Outcome: Progressing  Goal: Absence of fever/infection during neutropenic period  Description: INTERVENTIONS:  - Monitor WBC    12/29/2023 1352 by Sherly Olvera RN  Outcome: Completed  12/29/2023 1352 by Sherly Olvera RN  Outcome: Progressing

## 2023-12-29 NOTE — CASE MANAGEMENT
Case Management Discharge Planning Note    Patient name Yaquelin Wright  [address] [place] [address]-* MRN [id number]  : 10/3/1933 Date 2023       Current Admission Date: 2023  Current Admission Diagnosis:Acute on chronic combined systolic (congestive) and diastolic (congestive) heart failure (HCC)   Patient Active Problem List    Diagnosis Date Noted    Elevated LFTs 2023    Moderate dementia with mood disturbance (HCC) 2023    Hypoxia 10/18/2023    Mild protein-calorie malnutrition (HCC) 2023    Pleural effusion 2023    Depression, recurrent (HCC) 2023    H/O: CVA (cerebrovascular accident) 2022    Abnormal CBC 02/15/2022    Osteoporosis 2019    Vitamin D deficiency 2019    Hypokalemia 2018    Coronary artery disease     Arteriosclerosis of coronary artery 2013    Acute on chronic combined systolic (congestive) and diastolic (congestive) heart failure (HCC) 2013    Hyperlipidemia 10/28/2013    Benign essential hypertension 2012      LOS (days): 1  Geometric Mean LOS (GMLOS) (days):   Days to GMLOS:     OBJECTIVE:  Risk of Unplanned Readmission Score: 13.6     Current admission status: Inpatient   Preferred Pharmacy:   [facility]/pharmacy #[id number] - [place], [place] - [address]  [address] [address]  Phone: [phone] Fax: [phone]    [facility] - [address]  [address] [address]  Phone: [phone] Fax: [phone]    Primary Care Provider: Aria Clark MD    Primary Insurance: [email or web address]  Secondary Insurance: [employer]    DISCHARGE DETAILS:      Other Referral/Resources/Interventions Provided:  Referral Comments: SURAJ Vazquez at [facility].    Transport at Discharge : Family    ETA of Transport (Date): 23    Transfer Mode: Caregiver  Accompanied by: Family member

## 2023-12-29 NOTE — DISCHARGE SUMMARY
[facility]  Discharge- Yaquelin Del Cidt 10/3/1933, 89 y.o. female MRN: [id number]  Unit/Bed#: [address] [place] [address] Encounter: [id number]  Primary Care Provider: Aria Clark MD   Date and time admitted to hospital: 12/27/2023  2:01 PM    * Acute on chronic combined systolic (congestive) and diastolic (congestive) heart failure (HCC)  Assessment & Plan  Wt Readings from Last 3 Encounters:   12/29/23 50.3 kg (110 lb 14.4 oz)   12/01/23 51.4 kg (113 lb 6.4 oz)   11/01/23 51.8 kg (114 lb 3.2 oz)     BNP >4,700. Reports compliance with torsemide, spironolactone.  Echo (6/2023): EF 30-35%, G2DD. Moderate-severe MV regurgitation.  Troponin elevated: 88.  Cardiology consulted; transitioned to oral lasix PTA on discharge    continue spironolactone  Echo showed EF of 25 to 30% with grade 2 diastolic dysfunction  Patient underwent thoracentesis on 12/28 with IR removing 750 ml fluid   Patient reports significant improvement in her breathing  PT/OT eval done, no needs identified  Spoke with daughter Keith, indicated they would like to pursue respite care for patient as she lives alone, has short term memory issues, and needs assistance with taking her medications  Case management aware; family will be making arrangements  Patient will be discharged to home today, follow up PCP in one to two weeks post discharge    Elevated LFTs  Assessment & Plan  LFTs elevated on admission - , , alk phos 136. Denies abdominal pain, nausea/vomiting.  Possibly about a congestion, patient also on statin; held at this time  Right upper quadrant ultrasound normal  Repeat LFTs in a.m. improving   Repeat CMP in one week  Follow up PCP in one to two weeks post discharge     Mild protein-calorie malnutrition (HCC)  Assessment & Plan  Malnutrition Findings:      BMI Findings:        Body mass index is 19.04 kg/m².     Progressively worsening poor appetite/oral intake  Managed on Remeron, recently increased dose  by PCP  Nutrition consulted; continue supplements  Offer small frequent meals  Discharge to home; plan as outlined above     Coronary artery disease  Assessment & Plan  Continue ASA, Toprol-XL.  Statin discontinued  Repeat CMP in one week     Benign essential hypertension  Assessment & Plan  Continue Toprol-XL, Aldactone  Patient had received IV lasix during hospitalization  Transition to PTA oral lasix  Follow up outpatient PCP one to two weeks post discharge     H/O: CVA (cerebrovascular accident)  Assessment & Plan  History of CVA, residual deficits with impaired cognition and forgetfulness  Continue ASA, statin on hold 2/2 elevated LFTs (which are improving)        Medical Problems       Resolved Problems  Date Reviewed: 12/29/2023   None       Discharging Physician / Practitioner: BERTIN Pike  PCP: Aria Clark MD  Admission Date:   Admission Orders (From admission, onward)       Ordered        12/28/23 1814  Inpatient Admission  Once            12/27/23 1528  Place in Observation  Once                          Discharge Date: 12/29/23    Consultations During Hospital Stay:  cardiology    Procedures Performed:   Thoracentesis 12/28  Echocardiogram done 12/28 shows severe global hypokinesis with regional variations, estimated EF 25 to 30%.  Wall motion normal, diastolic function moderately abnormal consistent with grade 2 relaxation.  Systolic function mildly to moderately reduced, left atrium severely dilated, right atrium moderately dilated mild regurgitation aortic valve, moderate to severe regurgitation mitral valve, mild to moderate tricuspid valve regurgitation as per cardiology report.    Significant Findings / Test Results:   Chest x-ray performed 12/27 shows stable cardiomegaly and right pleural effusion, trace left pleural effusion and right lung base consolidation as per radiology report.  Right upper quadrant ultrasound performed 12/27 normal as per radiology  report.    Incidental Findings:   None        Test Results Pending at Discharge (will require follow up):   None      Outpatient Tests Requested:  CMP and Mag in one week    Complications:  None     Reason for Admission: Worsening shortness of breath    Hospital Course:   Yaquelin Wright is a 89 y.o. female patient past medical history of chronic combined systolic and diastolic CHF, CAD, HTN, history of CVA who originally presented to the hospital on 12/27/2023 due to shortness of breath and cough for the past 2 to 3 days.  Patient also reported worsening left arm pain which has been ongoing for the past year but exacerbated with shortness of breath.  Chest x-ray was performed which showed right pleural effusion.  Cardiology was consulted, received IV diuretic during hospitalization and transition to prior to admission oral Lasix on discharge.  She was evaluated by interventional radiology, underwent right thoracentesis yielding 750 mL yellow fluid.  Patient reported feeling improved after procedure.  O2 sat stable on room air.  Elevated LFTs were also noted on admission, denied any abdominal pain or nausea or vomiting.  Right upper quadrant ultrasound was performed which was negative, LFTs improving prior to discharge.  Will get repeat CMP in 1 week.  PT/OT evaluation was performed, did not identify any rehab needs.  Time spent speaking with patient's daughter Keith, she indicated that the patient has very poor memory, needs assistance with medications, needs prompting to eat and bathe.  They are looking to place patient in respite care as they are organizing 24/7 live-in care for the patient in her own home.  The patient will be discharged to home today, family is making arrangements for respite care at [facility].  Patient is advised to follow-up with her PCP 1 to 2 weeks postdischarge.        Please see above list of diagnoses and related plan for additional information.     Condition at  "Discharge: good    Discharge Day Visit / Exam:   Subjective:    Patient seen sitting up on edge of bed resting comfortably.  She had just ambulated into the bathroom with no assistance, gait noted to be steady.  Patient reports fair appetite for breakfast no nausea or vomiting.  She is hopeful to be going home today.  Feels that her breathing has improved and is back at her baseline.    Vitals: Blood Pressure: 151/72 (12/29/23 0849)  Pulse: 76 (12/29/23 0849)  Temperature: 97.8 °F (36.6 °C) (12/28/23 2300)  Temp Source: Oral (12/28/23 2300)  Respirations: 17 (12/28/23 2300)  Height: 5' 4\" (162.6 cm) (12/28/23 1000)  Weight - Scale: 50.3 kg (110 lb 14.4 oz) (12/29/23 0558)  SpO2: 93 % (12/28/23 2300)    Exam:   Physical Exam  Vitals and nursing note reviewed.   Constitutional:       Appearance: Normal appearance.   HENT:      Head: Normocephalic.      Nose: Nose normal.      Mouth/Throat:      Mouth: Mucous membranes are moist.   Eyes:      Extraocular Movements: Extraocular movements intact.      Conjunctiva/sclera: Conjunctivae normal.      Pupils: Pupils are equal, round, and reactive to light.   Cardiovascular:      Rate and Rhythm: Normal rate.      Heart sounds: Murmur heard.   Pulmonary:      Effort: Pulmonary effort is normal.      Breath sounds: Normal breath sounds. No wheezing or rhonchi.   Abdominal:      General: Bowel sounds are normal. There is no distension.      Palpations: Abdomen is soft.      Tenderness: There is no abdominal tenderness.   Genitourinary:     Comments: Voiding spontaneously  Musculoskeletal:         General: Normal range of motion.      Cervical back: Normal range of motion.   Skin:     General: Skin is warm and dry.      Capillary Refill: Capillary refill takes less than 2 seconds.   Neurological:      General: No focal deficit present.      Mental Status: She is alert and oriented to person, place, and time.   Psychiatric:         Mood and Affect: Mood normal.         Behavior: " Behavior normal.         Thought Content: Thought content normal.          Discussion with Family: Updated  (daughter) at bedside.    Discharge instructions/Information to patient and family:   See after visit summary for information provided to patient and family.      Provisions for Follow-Up Care:  See after visit summary for information related to follow-up care and any pertinent home health orders.      Mobility at time of Discharge:   Basic Mobility Inpatient Raw Score: 23  JH-HLM Goal: 7: Walk 25 feet or more  JH-HLM Achieved: 7: Walk 25 feet or more  HLM Goal achieved. Continue to encourage appropriate mobility.     Disposition:   Other: He indicated that they are pursuing respite care at the [facility] and then transition to living care 24/7 in patient's home.    Planned Readmission: no     Discharge Statement:  I spent greater than 45 minutes discharging the patient. This time was spent on the day of discharge. I had direct contact with the patient on the day of discharge. Greater than 50% of the total time was spent examining patient, answering all patient questions, arranging and discussing plan of care with patient as well as directly providing post-discharge instructions.  Additional time then spent on discharge activities.    Discharge Medications:  See after visit summary for reconciled discharge medications provided to patient and/or family.      **Please Note: This note may have been constructed using a voice recognition system**

## 2023-12-29 NOTE — TELEPHONE ENCOUNTER
Pt's daughter called stating pt was hospitalized and then discharged today, and while at the hospital, medications were lost.  Daughter states medications were supposed to be locked up but pt was discharged without medications as they could not be located.  Daughter was not able to find any Aldactone at home for pt and is requesting emergency refill until this can be straightened out.  Advised daughter emergency 7-day supply was sent to [facility] in [place], [place] and she will have to contact office on Tuesday for further quantities.  Daughter verbalized understanding.

## 2023-12-29 NOTE — OCCUPATIONAL THERAPY NOTE
"OT EVALUATION       12/29/23 1009   OT Last Visit   OT Visit Date 12/29/23   Note Type   Note type Evaluation   Pain Assessment   Pain Assessment Tool 0-10   Pain Score No Pain   Restrictions/Precautions   Other Precautions Fall Risk   Home Living   Type of Home House   Home Layout One level  (2 ROSA MARIA)   Bathroom Shower/Tub Walk-in shower   Bathroom Toilet Raised   Bathroom Equipment Grab bars in shower;Shower chair   Prior Function   Level of [place] Independent with ADLs;Independent with functional mobility;Independent with IADLS   Lives With Alone   Receives Help From Family   Lifestyle   Intrinsic Gratification hunting and fishing since she was a kid   Subjective   Subjective \"i'm hoping to go home today, I feel good\"   ADL   Eating Assistance 7  Independent   Grooming Assistance 5  Supervision/Setup   Grooming Deficit Wash/dry hands  (standing at sink)   UB Bathing Assistance 7  Independent   LB Bathing Assistance 5  Supervision/Setup   UB Dressing Assistance 7  Independent   LB Dressing Assistance 5  Supervision/Setup   LB Dressing Deficit Don/doff L sock   Toileting Assistance  5  Supervision/Setup   Toileting Deficit Clothing management up;Clothing management down;Perineal hygiene  (urination on toilet)   Bed Mobility   Supine to Sit 7  Independent   Sit to Supine 7  Independent   Transfers   Sit to Stand 5  Supervision   Stand to Sit 5  Supervision   Functional Mobility   Functional Mobility 5  Supervision   Additional Comments short household distance to and from bathroom   Balance   Static Sitting Good   Dynamic Sitting Good   Static Standing Fair +   Dynamic Standing Fair   Activity Tolerance   Activity Tolerance Patient tolerated treatment well   Nurse Made Aware yes   RUE Assessment   RUE Assessment WFL   LUE Assessment   LUE Assessment WFL   Cognition   Overall Cognitive Status WFL   Arousal/Participation Cooperative   Attention Within functional limits   Orientation Level Oriented X4   Following " Commands Follows all commands and directions without difficulty   Assessment   Limitation Decreased ADL status;Decreased UE strength;Decreased Safe judgement during ADL;Decreased endurance;Decreased self-care trans;Decreased high-level ADLs  (decreased balance and mobility)   Prognosis Good   Assessment Patient evaluated by Occupational Therapy.  Patient admitted with Acute on chronic combined systolic (congestive) and diastolic (congestive) heart failure (HCC).  The patients occupational profile, medical and therapy history includes a extensive additional review of physical, cognitive, or psychosocial history related to current functional performance.  Comorbidities affecting functional mobility and ADLS include: arthritis, CAD, CHF, hypertension, MI, and vertigo.  Prior to admission, patient was independent with functional mobility without assistive device, independent with ADLS, and independent with IADLS.  The evaluation identifies the following performance deficits: weakness, impaired balance, decreased endurance, increased fall risk, new onset of impairment of functional mobility, decreased ADLS, decreased IADLS, decreased activity tolerance, and decreased strength, that result in activity limitations and/or participation restrictions. This evaluation requires clinical decision making of moderate complexity, because the patient may present with comorbidities that affect occupational performance and required minimal or moderate modification of tasks or assistance with the consideration of several treatment options.  The Barthel Index was used as a functional outcome tool presenting with a score of Barthel Index Score: 60, indicating moderate limitations of functional mobility and ADLS.  The patient's raw score on the AM-PAC Daily Activity Inpatient Short Form is 21. A raw score of greater than or equal to 19 suggests the patient may benefit from discharge to home. Please refer to the recommendation of the  Occupational Therapist for safe discharge planning.  Patient will benefit from skilled Occupational Therapy services to address above deficits and facilitate a safe return to prior level of function.   Goals   Patient Goals to go home   STG Time Frame   (1-7 days)   Short Term Goal  Goals established to promote Patient Goals: to go home:  Grooming: independent standing at sink; Bathing: independent; Lower Body Dressing: independent; Toileting: independent; Patient will increase ambulatory standard toilet transfer to independent with no assistive device to increase performance and safety with ADLS and functional mobility; Patient will increase standing tolerance to 5 minutes during ADL task to decrease assistance level and decrease fall risk; Patient will increase functional mobility to and from bathroom with no assistive device independently to increase performance with ADLS and to use a toilet; Patient will tolerate 5 minutes of UE ROM/strengthening to increase general activity tolerance and performance in ADLS/IADLS; Patient will improve functional activity tolerance to 10 minutes of sustained functional tasks to increase participation in basic self-care and decrease assistance level;   Patient will increase dynamic standing balance to fair+ to improve postural stability and decrease fall risk during standing ADLS and transfers.   LTG Time Frame   (8-14 days)   Long Term Goal Patient will increase standing tolerance to 10 minutes during ADL task to decrease assistance level and decrease fall risk; Patient will tolerate 10 minutes of UE ROM/strengthening to increase general activity tolerance and performance in ADLS/IADLS; Patient will improve functional activity tolerance to 20 minutes of sustained functional tasks to increase participation in basic self-care and decrease assistance level;   Patient will increase dynamic standing balance to good to improve postural stability and decrease fall risk during standing  ADLS and transfers.  Pt will score >/= 24/24 on AM-PAC Daily Activity Inpatient scale to promote safe independence with ADLs and functional mobility; Pt will score >/= 90/100 on Barthel Index in order to decrease caregiver assistance needed and increase ability to perform ADLs and functional mobility.   Plan   Treatment Interventions ADL retraining;Functional transfer training;Endurance training;Equipment evaluation/education;Patient/family training;Activityengagement;Compensatory technique education   Goal Expiration Date 01/12/24   OT Frequency 2-3x/wk   Discharge Recommendation   Rehab Resource Intensity Level, OT No post-acute rehabilitation needs   AM-PAC Daily Activity Inpatient   Lower Body Dressing 3   Bathing 3   Toileting 3   Upper Body Dressing 4   Grooming 4   Eating 4   Daily Activity Raw Score 21   Daily Activity Standardized Score (Calc for Raw Score >=11) 44.27   AM-PAC Applied Cognition Inpatient   Following a Speech/Presentation 4   Understanding Ordinary Conversation 4   Taking Medications 4   Remembering Where Things Are Placed or Put Away 4   Remembering List of 4-5 Errands 3   Taking Care of Complicated Tasks 3   Applied Cognition Raw Score 22   Applied Cognition Standardized Score 47.83   Barthel Index   Feeding 10   Bathing 0   Grooming Score 5   Dressing Score 5   Bladder Score 10   Bowels Score 10   Toilet Use Score 5   Transfers (Bed/Chair) Score 10   Mobility (Level Surface) Score 0   Stairs Score 5   Barthel Index Score 60   Licensure   [place] License Number  Nicole Underwood MS OTR/L [id number]

## 2023-12-29 NOTE — ASSESSMENT & PLAN NOTE
Malnutrition Findings:      BMI Findings:        Body mass index is 19.04 kg/m².     Progressively worsening poor appetite/oral intake  Managed on Remeron, recently increased dose by PCP  Nutrition consulted; continue supplements  Offer small frequent meals  Discharge to home; plan as outlined above

## 2023-12-29 NOTE — PHYSICAL THERAPY NOTE
PHYSICAL THERAPY     12/29/23 1051   Note Type   Note type Screen   Additional Comments patient demonstrating independent transfers and gait for functional household distances of 100+feet. no skilled PT needs at this time. DC PT. (see full eval by OT for details)   Licensure   [place] License Number  Tami May PT [id number]

## 2023-12-29 NOTE — ASSESSMENT & PLAN NOTE
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

## 2023-12-29 NOTE — PLAN OF CARE
Problem: PAIN - ADULT  Goal: Verbalizes/displays adequate comfort level or baseline comfort level  Description: Interventions:  - Encourage patient to monitor pain and request assistance  - Assess pain using appropriate pain scale  - Administer analgesics based on type and severity of pain and evaluate response  - Implement non-pharmacological measures as appropriate and evaluate response  - Consider cultural and social influences on pain and pain management  - Notify physician/advanced practitioner if interventions unsuccessful or patient reports new pain  Outcome: Progressing     Problem: INFECTION - ADULT  Goal: Absence or prevention of progression during hospitalization  Description: INTERVENTIONS:  - Assess and monitor for signs and symptoms of infection  - Monitor lab/diagnostic results  - Monitor all insertion sites, i.e. indwelling lines, tubes, and drains  - Monitor endotracheal if appropriate and nasal secretions for changes in amount and color  - [place] appropriate cooling/warming therapies per order  - Administer medications as ordered  - Instruct and encourage patient and family to use good hand hygiene technique  - Identify and instruct in appropriate isolation precautions for identified infection/condition  Outcome: Progressing  Goal: Absence of fever/infection during neutropenic period  Description: INTERVENTIONS:  - Monitor WBC    Outcome: Progressing     Problem: SAFETY ADULT  Goal: Patient will remain free of falls  Description: INTERVENTIONS:  - Educate patient/family on patient safety including physical limitations  - Instruct patient to call for assistance with activity   - Consult OT/PT to assist with strengthening/mobility   - Keep Call bell within reach  - Keep bed low and locked with side rails adjusted as appropriate  - Keep care items and personal belongings within reach  - Initiate and maintain comfort rounds  - Make Fall Risk Sign visible to staff  - Offer Toileting every 2 Hours,  in advance of need  - Initiate/Maintain bed alarm  - Apply yellow socks and bracelet for high fall risk patients  - Consider moving patient to room near nurses station  Outcome: Progressing  Goal: Maintain or return to baseline ADL function  Description: INTERVENTIONS:  -  Assess patient's ability to carry out ADLs; assess patient's baseline for ADL function and identify physical deficits which impact ability to perform ADLs (bathing, care of mouth/teeth, toileting, grooming, dressing, etc.)  - Assess/evaluate cause of self-care deficits   - Assess range of motion  - Assess patient's mobility; develop plan if impaired  - Assess patient's need for assistive devices and provide as appropriate  - Encourage maximum independence but intervene and supervise when necessary  - Involve family in performance of ADLs  - Assess for home care needs following discharge   - Consider OT consult to assist with ADL evaluation and planning for discharge  - Provide patient education as appropriate  Outcome: Progressing  Goal: Maintains/Returns to pre admission functional level  Description: INTERVENTIONS:  - Perform AM-PAC 6 Click Basic Mobility/ Daily Activity assessment daily.  - Set and communicate daily mobility goal to care team and patient/family/caregiver.   - Collaborate with rehabilitation services on mobility goals if consulted  - Perform Range of Motion 3 times a day.  - Out of bed for toileting  - Record patient progress and toleration of activity level   Outcome: Progressing     Problem: DISCHARGE PLANNING  Goal: Discharge to home or other facility with appropriate resources  Description: INTERVENTIONS:  - Identify barriers to discharge w/patient and caregiver  - Arrange for needed discharge resources and transportation as appropriate  - Identify discharge learning needs (meds, wound care, etc.)  - Arrange for interpretive services to assist at discharge as needed  - Refer to Case Management Department for coordinating  discharge planning if the patient needs post-hospital services based on physician/advanced practitioner order or complex needs related to functional status, cognitive ability, or social support system  Outcome: Progressing     Problem: Knowledge Deficit  Goal: Patient/family/caregiver demonstrates understanding of disease process, treatment plan, medications, and discharge instructions  Description: Complete learning assessment and assess knowledge base.  Interventions:  - Provide teaching at level of understanding  - Provide teaching via preferred learning methods  Outcome: Progressing     Problem: CARDIOVASCULAR - ADULT  Goal: Maintains optimal cardiac output and hemodynamic stability  Description: INTERVENTIONS:  - Monitor I/O, vital signs and rhythm  - Monitor for S/S and trends of decreased cardiac output  - Administer and titrate ordered vasoactive medications to optimize hemodynamic stability  - Assess quality of pulses, skin color and temperature  - Assess for signs of decreased coronary artery perfusion  - Instruct patient to report change in severity of symptoms  Outcome: Progressing  Goal: Absence of cardiac dysrhythmias or at baseline rhythm  Description: INTERVENTIONS:  - Continuous cardiac monitoring, vital signs, obtain 12 lead EKG if ordered  - Administer antiarrhythmic and heart rate control medications as ordered  - Monitor electrolytes and administer replacement therapy as ordered  Outcome: Progressing     Problem: RESPIRATORY - ADULT  Goal: Achieves optimal ventilation and oxygenation  Description: INTERVENTIONS:  - Assess for changes in respiratory status  - Assess for changes in mentation and behavior  - Position to facilitate oxygenation and minimize respiratory effort  - Oxygen administered by appropriate delivery if ordered  - Initiate smoking cessation education as indicated  - Encourage broncho-pulmonary hygiene including cough, deep breathe, Incentive Spirometry  - Assess the need for  suctioning and aspirate as needed  - Assess and instruct to report SOB or any respiratory difficulty  - Respiratory Therapy support as indicated  Outcome: Progressing     Problem: Nutrition/Hydration-ADULT  Goal: Nutrient/Hydration intake appropriate for improving, restoring or maintaining nutritional needs  Description: Monitor and assess patient's nutrition/hydration status for malnutrition. Collaborate with interdisciplinary team and initiate plan and interventions as ordered.  Monitor patient's weight and dietary intake as ordered or per policy. Utilize nutrition screening tool and intervene as necessary. Determine patient's food preferences and provide high-protein, high-caloric foods as appropriate.     INTERVENTIONS:  - Monitor oral intake, urinary output, labs, and treatment plans  - Assess nutrition and hydration status and recommend course of action  - Evaluate amount of meals eaten  - Assist patient with eating if necessary   - Allow adequate time for meals  - Recommend/ encourage appropriate diets, oral nutritional supplements, and vitamin/mineral supplements  - Order, calculate, and assess calorie counts as needed  - Recommend, monitor, and adjust tube feedings and TPN/PPN based on assessed needs  - Assess need for intravenous fluids  - Provide specific nutrition/hydration education as appropriate  - Include patient/family/caregiver in decisions related to nutrition  Outcome: Progressing

## 2023-12-29 NOTE — PROGRESS NOTES
CHF handout provided and reviewed with patient. Patient and granddaughter verbalized understanding.

## 2023-12-29 NOTE — CASE MANAGEMENT
Case Management Progress Note    Patient name Yaquelin Wright  Location 3 [place] 309/[address] [place] 309-* MRN [id number]  : 10/3/1933 Date 2023       LOS (days): 1  Geometric Mean LOS (GMLOS) (days):   Days to GMLOS:        OBJECTIVE:        Current admission status: Inpatient  Preferred Pharmacy:   [id number] - [place], [place] - [address]  [address] [address]  Phone: [phone] Fax: [phone]    [facility] - [place], [place] - [address]  [address] [address]  Phone: [phone] Fax: [phone]    Primary Care Provider: Aria Clark MD    Primary Insurance: [employer]  Secondary Insurance: [employer]    PROGRESS NOTE:      Patient's daughter Keith called and stated that she believes patient's medications were left at the hospital. Keith recalled giving a bag of patient's medications to the ED nurse. Keith isn't sure if she was given the medication back or not. CM made [facility] nursing aware, nursing called ED to check and ED confirmed the patient's medications are not there. [facility] nursing also confirmed that patient's medications were not brought to the floor when patient arrived from the ED.     CM called Keith back to make her aware the patient's medications were not found in the ED and none were brought to the floor when she was assigned a room.

## 2023-12-29 NOTE — PROGRESS NOTES
Progress Note - Cardiology   [facility]     Yaquelin Wright 89 y.o. female MRN: [id number]  : 10/3/1933  Unit/Bed#: [address] [place] [address] Encounter: [id number]    Assessment and Plan:   Acute on chronic combined systolic and diastolic heart failure.  - Presented on 23 for evaluation for worsening shortness of breath over the past 3 days.  - 23 CXR: Stable cardiomegaly and right pleural effusion and trace left pleural effusion and right lung base consolidation.   - Pending IR thoracentesis.   - 23 BNP: >4700.   - 23 TTE: LVEF 25-30%. There is severe global hypokinesis with regional variations. Diastolic function is moderately abnormal, consistent with grade II (pseudonormal) relaxation.  Right ventricle systolic function is mildly to moderately reduced.  Left atrium is severely dilated.  Right atrium moderately dilated.  Aortic valve with mild regurgitation.  Moderate to severe mitral valve regurgitation.  Tricuspid valve regurgitation.  Right ventricle systolic pressure severely elevated at 78 mmHg.  Trace pulmonic valve regurgitation.   - 23 TTE: LVEF 30-35%. Systolic function is severely reduced. There is severe global hypokinesis with regional variation. Diastolic function is moderately abnormal, consistent with grade II (pseudonormal) relaxation.  Left atrium moderately dilated.  Right atrium mildly dilated.  Aortic valve with mild regurgitation.  Aortic valve with mild stenosis.  Moderate to severe mitral valve regurgitation.  Mild to moderate tricuspid valve regurgitation.  Pulmonary artery pressure around 50 to 55 mmHg.  - 22 TTE: LVEF 55-60%.  No regional wall motion abnormalities appreciated.  There is grade 1 diastolic dysfunction.  Normal right ventricle size and function.  Mild to moderate mitral valve regurgitation and mild aortic valve regurgitation.  Mild tricuspid valve regurgitation.  Mildly elevated pulmonary artery pressure, 39 mmHg.  -  12/28/23 IR thoracentesis: 750 mL drained from right chest.  - Continue Toprol-XL 50 mg twice daily and Aldactone 12.5 mg daily.  - Currently on Jardiance 10 mg daily.  Continue at this time.  - Transition from IV Lasix to Lasix 40 mg oral daily.  - Strict I/Os.   - Daily weight, standing scale.   - Monitor electrolytes.  Replete potassium above 4 and magnesium above 2.  - CHF education.   - No further cardiology workup at this time.  Recommend patient follow-up with outpatient cardiology within 7 to 10 days of discharge.  - Recommend repeat BMP within 5 to 7 days of discharge.     Elevated troponin.  - 12/27/23 hs troponin: 88 (0hrs), 78 (2hrs), 74 (4hrs).   - 12/27/23 EKG: Sinus rhythm, 62 bpm. Septal infarct, cited on or before 5/28/2013.  ST abnormality in lateral leads.  - Non-ischemic myocardial injury secondary to acute on chronic systolic and diastolic heart failure.     Valvular heart disease.  - Mild aortic valve stenosis.  Moderate to severe mitral regurgitation.  Mild to moderate tricuspid valve regurgitation.  Pulmonic valve with trace regurgitation.  - 12/28/23 TTE:   Aortic Valve The aortic valve is trileaflet. The leaflets are moderately thickened. The leaflets are mildly calcified. The leaflets exhibit normal mobility. There is mild regurgitation. The aortic valve has no significant stenosis.   Mitral Valve Mitral valve structure is normal.  There is moderate to severe regurgitation. There is no evidence of stenosis.   Tricuspid Valve Tricuspid valve structure is normal. There is mild to moderate regurgitation. There is no evidence of stenosis. The right ventricular systolic pressure is severely elevated at 78 mmHg.   Pulmonic Valve Pulmonic valve structure is normal. There is trace regurgitation. There is no evidence of stenosis.   - 6/07/23 TTE:   Aortic Valve The aortic valve is trileaflet. The leaflets are moderately thickened. The leaflets are mildly calcified. The leaflets exhibit normal  mobility. There is mild regurgitation. There is mild stenosis.   Mitral Valve There is moderate to severe regurgitation with a centrally directed jet. There is no evidence of stenosis.   Tricuspid Valve There is mild to moderate regurgitation.  Pulmonary artery pressure 50 to 55 mmHg. There is no evidence of stenosis.   Pulmonic Valve There is trace regurgitation. There is no evidence of stenosis.     Coronary artery disease.  - s/p PCI of LAD and diagonal (2013).  - Stable. Patient denies experiencing chest pain.  - Currently on pravastatin 80 mg daily and aspirin 81 mg daily.  Holding pravastatin 80 mg daily in setting of transaminitis.     Hypertension.  - BP acceptable.  - Currently on Toprol-XL 50 mg twice daily and Aldactone 12.5 mg daily.  - Transition from IV Lasix to home dose of Lasix 40 mg daily.  - Continue to monitor BP.     Pulmonary hypertension.  -12/28/23 TTE:  The right ventricular systolic pressure is severely elevated at 78 mmHg.     Transaminitis.  - 12/27/23 (admission) ALT: 163, AST: 143.  - 12/28/23 ALT: 153, AST: 134.   - 12/29/23 ALT: 125, AST: 93.   - Elevation may be secondary to acute on chronic combined systolic and diastolic heart failure.   - Continue to trend ALT and AST.     7. CVA.  - CVA June 2022.  - Currently on pravastatin 80 mg daily and aspirin 81 mg daily. Hold pravastatin 80 mg daily in setting of transaminitis.     8. Dementia.  - Care per primary team.     Subjective / Objective:         Patient underwent IR thoracentesis for right pleural effusion yesterday, 750 mL were drained.  Patient reports feeling well this morning and states that she is eager to be discharged.  She denies experiencing chest pain, palpitations, shortness of breath, lightheadedness, dizziness, headache, nausea, or vomiting.       Hypomagnesemia and hypokalemia noted on a.m. labs.  Repletion started by primary team.         Vitals: Blood pressure 151/72, pulse 76, temperature 97.8 °F (36.6 °C),  "temperature source Oral, resp. rate 17, height 5' 4\" (1.626 m), weight 50.3 kg (110 lb 14.4 oz), SpO2 93%.  Vitals:    12/28/23 1000 12/29/23 0558   Weight: 52.6 kg (116 lb) 50.3 kg (110 lb 14.4 oz)     Body mass index is 19.04 kg/m².  BP Readings from Last 3 Encounters:   12/29/23 151/72   12/01/23 140/80   11/01/23 156/84     Orthostatic Blood Pressures      Flowsheet Row Most Recent Value   Blood Pressure 151/72 filed at 12/29/2023 0849   Patient Position - Orthostatic VS Lying filed at 12/28/2023 2300          I/O         12/26 0701  12/27 0700 12/27 0701  12/28 0700 12/28 0701  12/29 0700           Unmeasured Urine Occurrence  1 x           Invasive Devices       Peripheral Intravenous Line  Duration             Peripheral IV 12/27/23 Left Antecubital 1 day                      Intake/Output Summary (Last 24 hours) at 12/29/2023 1000  Last data filed at 12/28/2023 1630  Gross per 24 hour   Intake --   Output 350 ml   Net -350 ml         Physical Exam:   Physical Exam  Vitals reviewed.   Constitutional:       General: She is not in acute distress.  Cardiovascular:      Rate and Rhythm: Normal rate and regular rhythm.      Pulses: Normal pulses.      Heart sounds: Murmur heard.   Pulmonary:      Effort: Pulmonary effort is normal. No respiratory distress.      Breath sounds: Decreased breath sounds present.   Abdominal:      General: Abdomen is flat. There is no distension.      Palpations: Abdomen is soft.      Tenderness: There is no abdominal tenderness.   Musculoskeletal:      Right lower leg: No edema.      Left lower leg: No edema.   Skin:     General: Skin is warm and dry.   Neurological:      Mental Status: She is alert and oriented to person, place, and time.       Medications/ Allergies:     Current Facility-Administered Medications   Medication Dose Route Frequency Provider Last Rate    acetaminophen  650 mg Oral Q6H PRN Gely Samano PA-C      aluminum-magnesium hydroxide-simethicone  30 mL Oral Q6H " "PRN Gely Samano PA-C      aspirin  81 mg Oral Daily Gely Samano PA-C      Empagliflozin  10 mg Oral Daily Gely Samano PA-C      furosemide  50 mg Intravenous BID (diuretic) Gely Samano PA-C      heparin (porcine)  5,000 Units Subcutaneous Q8H SHABBIR Gely Samano PA-C      magnesium sulfate  2 g Intravenous Once BERTIN Pike      metoprolol succinate  50 mg Oral BID Gely Samano PA-C      mirtazapine  30 mg Oral HS Maria Fernanda Revankar, DO      ondansetron  4 mg Intravenous Q6H PRN Gely Samano PA-C      potassium chloride  20 mEq Intravenous Once LINDEN PikeNP      Followed by    potassium chloride  20 mEq Intravenous Once BERTIN Pike      spironolactone  12.5 mg Oral Daily Gely Samano PA-C       acetaminophen, 650 mg, Q6H PRN  aluminum-magnesium hydroxide-simethicone, 30 mL, Q6H PRN  ondansetron, 4 mg, Q6H PRN      Allergies   Allergen Reactions    Bee Venom Anaphylaxis     Reaction Date: 02Feb2004; Annotation - [id number]: lito    Enalapril Other (See Comments)     Reaction Date: 28Oct2003;   Leg  pain    Erythromycin Dizziness     Reaction Date: 28Oct2003;     Estrogens Other (See Comments)     Reaction Date: 02Feb2004; Annotation - [id number]: lito    Ezetimibe Hives    Penicillins Hives     \"All cillins\", \"and all myocillins\"    Ramipril Other (See Comments)     Reaction Date: 02Feb2004; Annotation - [id number]: jjensw    Statins Other (See Comments)     Muscle pain    Versed [Midazolam] Other (See Comments)     States she \"passed out\" when recovering from a procedure and was told to not use it again.    Zithromax [Azithromycin] Anaphylaxis       VTE Pharmacologic Prophylaxis:   Heparin    Labs:   Troponins:  Results from last 7 days   Lab Units 12/27/23 2015 12/27/23  1805   HSTNI D2 ng/L  --  -10   HSTNI D4 ng/L -14  --          CBC with diff:  Results from last 7 days   Lab Units 12/28/23  0450 12/27/23  1428   WBC Thousand/uL 9.59 10.67* " "  HEMOGLOBIN g/dL 13.0 14.1   HEMATOCRIT % 39.3 42.9   MCV fL 98 97   PLATELETS Thousands/uL 322 376   RBC Million/uL 4.03 4.42   MCH pg 32.3 31.9   MCHC g/dL 33.1 32.9   RDW % 16.5* 16.5*   MPV fL 10.2 10.0   NRBC AUTO /100 WBCs 0 0       CMP:  Results from last 7 days   Lab Units 12/29/23  0507 12/28/23  0450 12/27/23  1428   SODIUM mmol/L 140 140 141   POTASSIUM mmol/L 2.8* 3.6 4.9   CHLORIDE mmol/L 102 107 104   CO2 mmol/L 28 22 21   ANION GAP mmol/L 10 11 16   BUN mg/dL 26* 26* 26*   CREATININE mg/dL 0.73 0.74 0.68   GLUCOSE FASTING mg/dL  --  100*  --    CALCIUM mg/dL 8.6 8.9 9.8   AST U/L 93* 134* 143*   ALT U/L 125* 153* 163*   ALK PHOS U/L 101 113* 136*   TOTAL PROTEIN g/dL 6.3* 6.5 7.7   ALBUMIN g/dL 3.2* 3.4* 3.9   TOTAL BILIRUBIN mg/dL 0.80 0.79 0.89   EGFR ml/min/1.73sq m 72 71 77       Magnesium:  Results from last 7 days   Lab Units 12/29/23  0507 12/28/23  0450   MAGNESIUM mg/dL 1.7* 1.8*     Coags:  Results from last 7 days   Lab Units 12/28/23  0450 12/27/23  1428   PTT seconds  --  26   INR  1.20* 1.21*     TSH:  Results from last 7 days   Lab Units 12/27/23  1818   TSH 3RD GENERATON uIU/mL 1.741     No components found for: \"TSH3\"  Lipid Profile:    Hgb A1c:    NT-proBNP: No results for input(s): \"NTBNP\" in the last 72 hours.     Imaging & Testing   I have personally reviewed pertinent reports.    IR thoracentesis     Result Date: 12/28/2023    PROCEDURE:  Using ultrasound guidance, the right pleural cavity was punctured and a catheter placed into the pleural cavity. A total of 750 milliliters of fluid was removed. The catheter was then removed.    US right upper quadrant    Result Date: 12/27/2023    Impression: Normal.     XR chest 1 view portable    Result Date: 12/27/2023    Impression: Stable cardiomegaly and right pleural effusion and trace left pleural effusion and right lung base consolidation.       EKG / Monitor: Personally reviewed.      Not currently on telemetry.     Cardiac testing: "   Echo complete with contrast if indicated  Result date: 23   Left Ventricle Left ventricular cavity size is mildly dilated. Wall thickness is normal. The left ventricular ejection fraction is 30 to 35 % by visual estimation. Systolic function is severely reduced.  There is severe global hypokinesis with regional variation. Diastolic function is moderately abnormal, consistent with grade II (pseudonormal) relaxation.   Right Ventricle Right ventricular cavity size is normal. Systolic function is mildly to moderately reduced. Wall thickness is normal.   Left Atrium The atrium is moderately dilated.   Right Atrium The atrium is mildly dilated.   Aortic Valve The aortic valve is trileaflet. The leaflets are moderately thickened. The leaflets are mildly calcified. The leaflets exhibit normal mobility. There is mild regurgitation. There is mild stenosis.   Mitral Valve There is moderate to severe regurgitation with a centrally directed jet. There is no evidence of stenosis.   Tricuspid Valve There is mild to moderate regurgitation.  Pulmonary artery pressure 50 to 55 mmHg. There is no evidence of stenosis.   Pulmonic Valve There is trace regurgitation. There is no evidence of stenosis.   Ascending Aorta The aortic root is normal in size.   IVC/SVC The inferior vena cava is upper normal in size.  It has around 50% collapse with inspiration.   Pericardium There is no pericardial effusion. The pericardium is normal in appearance.         Results for orders placed during the hospital encounter of 18     Echo complete with contrast if indicated     Narrative  [place] [phone]     Transthoracic Echocardiogram  2D, M-mode, Doppler, and Color Doppler     Study date:  2018     Patient: YAHAIRA MENDOSA  MR number: [id number]  Account number: [id number]  : 03-Oct-1933  Age: 84 years  Gender: Female  Status: Routine  Location: Echo  lab  Height: 64 in  Weight: 140.8 lb  BP: 126/ 80 mmHg     Indications: Syncope     Diagnoses: R55. - Syncope and collapse     Sonographer:  KENAN Ellis  Primary Physician:  Aria Clark MD  Referring Physician:  Zen Edwards MD  Group:  [facility]  Interpreting Physician:  Zen Edwards MD     SUMMARY     LEFT VENTRICLE:  Systolic function was at the lower limits of normal. Ejection fraction was estimated in the range of 50 % to 55 % to be 55 %.  Although no diagnostic regional wall motion abnormality was identified, this possibility cannot be completely excluded on the basis of this study.  Wall thickness was mildly increased.  There was mild concentric hypertrophy.     MITRAL VALVE:  There was mild annular calcification.  There was mild regurgitation.     AORTIC VALVE:  There was mild regurgitation.     TRICUSPID VALVE:  There was mild regurgitation.  Estimated peak PA pressure was 30 mmHg.     HISTORY: PRIOR HISTORY: HTN, CAD, Hyperlipidemia, Hypokalemia     PROCEDURE: The procedure was performed in the echo lab. This was a routine study. The transthoracic approach was used. The study included complete 2D imaging, M-mode, complete spectral Doppler, and color Doppler. The heart rate was 70 bpm,  at the start of the study. Image quality was adequate.     LEFT VENTRICLE: Size was normal. Systolic function was at the lower limits of normal. Ejection fraction was estimated in the range of 50 % to 55 % to be 55 %. Although no diagnostic regional wall motion abnormality was identified, this  possibility cannot be completely excluded on the basis of this study. Wall thickness was mildly increased. There was mild concentric hypertrophy. DOPPLER: Left ventricular diastolic function parameters were normal for the patient's age.     RIGHT VENTRICLE: The size was normal. Systolic function was normal.     LEFT ATRIUM: Size was at the upper limits of normal.     RIGHT ATRIUM: Size was  at the upper limits of normal.     MITRAL VALVE: There was mild annular calcification. There was normal leaflet separation. DOPPLER: There was no evidence for stenosis. There was mild regurgitation.     AORTIC VALVE: The valve was trileaflet. Leaflets exhibited mildly increased thickness, mild calcification, and normal cuspal separation. DOPPLER: There was no evidence for stenosis. There was mild regurgitation.     TRICUSPID VALVE: DOPPLER: There was mild regurgitation. Estimated peak PA pressure was 30 mmHg.     PULMONIC VALVE: DOPPLER: There was mild regurgitation.     PERICARDIUM: There was no thickening or calcification. There was no pericardial effusion.     AORTA: The root exhibited normal size.     SYSTEMIC VEINS: IVC: The inferior vena cava was normal in size. Respirophasic changes were normal.     SYSTEM MEASUREMENT TABLES     2D mode  AoR Diam 2D: 3.3 cm  LA Diam (2D): 3.5 cm  LA/Ao (2D): 1.06  FS (2D Teich): 28 %  IVSd (2D): 1.21 cm  LVDEV: 114 cm³  LVESV: 52.6 cm³  LVIDd(2D): 4.93 cm  LVISd (2D): 3.55 cm  LVPWd (2D): 1.17 cm  SV (Teich): 61.4 cm³     Apical four chamber  LVEF A4C: 55 %     Unspecified Scan Mode  MV Peak A Ki: 1010 mm/s  MV Peak E Ki. Mean: 605 mm/s  MVA (PHT): 3.61 cm squared  PHT: 61 ms  Max P mm[Hg]  V Max: 2390 mm/s  Vmax: 2270 mm/s  RA Area: 14.5 cm squared  RA Volume: 34.9 cm³  TAPSE: 1.9 cm     [employer] Accredited Echocardiography Laboratory     Prepared and electronically signed by     Zen Edwards MD  Signed 2018 17:27:28           Estela Florence PA-C

## 2023-12-29 NOTE — ASSESSMENT & PLAN NOTE
Wt Readings from Last 3 Encounters:   12/29/23 50.3 kg (110 lb 14.4 oz)   12/01/23 51.4 kg (113 lb 6.4 oz)   11/01/23 51.8 kg (114 lb 3.2 oz)     BNP >4,700. Reports compliance with torsemide, spironolactone.  Echo (6/2023): EF 30-35%, G2DD. Moderate-severe MV regurgitation.  Troponin elevated: 88.  Cardiology consulted; transitioned to oral lasix PTA on discharge    continue spironolactone  Echo showed EF of 25 to 30% with grade 2 diastolic dysfunction  Patient underwent thoracentesis on 12/28 with IR removing 750 ml fluid   Patient reports significant improvement in her breathing  PT/OT eval done, no needs identified  Spoke with daughter Keith, indicated they would like to pursue respite care for patient as she lives alone, has short term memory issues, and needs assistance with taking her medications  Case management aware; family will be making arrangements  Patient will be discharged to home today, follow up PCP in one to two weeks post discharge

## 2023-12-29 NOTE — TELEPHONE ENCOUNTER
"Regarding: medication refill tonight  ----- Message from Anne Mariejoey Bowman sent at 12/29/2023  5:20 PM EST -----  \"My mom just got out of the hospital today and they lost her medication. She needs it tonight. Can you send a refill of spironolactone (ALDACTONE) 25 mg tablet Take 0.5 tablets (12.5 mg total) by mouth daily to her pharamacy in [place]\"    "

## 2023-12-29 NOTE — CASE MANAGEMENT
Case Management Progress Note    Patient name Yaquelin Wright  Location 3 [place] [facility]/[address] [place] [facility]-* MRN [id number]  : 10/3/1933 Date 2023       LOS (days): 1  Geometric Mean LOS (GMLOS) (days):   Days to GMLOS:        OBJECTIVE:        Current admission status: Inpatient  Preferred Pharmacy:   [id number] - [place], [place] - [address]  [address] [address]  Phone: [phone] Fax: [phone]    [facility] - [address]  [address] [address]  Phone: [phone] Fax: [phone]    Primary Care Provider: Aria Clark MD    Primary Insurance: [employer]  RE[employer]  Secondary Insurance: [employer]    PROGRESS NOTE:    CM left 3rd message for patient's daughter Keith to make her aware that PT/OT completed evaluation and patient was found to have no rehab needs at this time. CM also stated that patient has been medically cleared for discharge today. CM requested a return phone call.

## 2023-12-30 ENCOUNTER — NURSE TRIAGE (OUTPATIENT)
Dept: OTHER | Facility: OTHER | Age: 88
End: 2023-12-30

## 2023-12-30 DIAGNOSIS — I25.10 CORONARY ARTERY DISEASE INVOLVING NATIVE CORONARY ARTERY OF NATIVE HEART WITHOUT ANGINA PECTORIS: ICD-10-CM

## 2023-12-30 DIAGNOSIS — E87.6 HYPOKALEMIA: ICD-10-CM

## 2023-12-30 DIAGNOSIS — I50.22 CHRONIC SYSTOLIC CONGESTIVE HEART FAILURE (HCC): ICD-10-CM

## 2023-12-30 DIAGNOSIS — I10 BENIGN ESSENTIAL HYPERTENSION: ICD-10-CM

## 2023-12-30 RX ORDER — POTASSIUM CHLORIDE 20 MEQ/1
20 TABLET, EXTENDED RELEASE ORAL 4 TIMES DAILY
Qty: 28 TABLET | Refills: 0 | Status: SHIPPED | OUTPATIENT
Start: 2023-12-30 | End: 2024-01-02 | Stop reason: SDUPTHER

## 2023-12-30 NOTE — TELEPHONE ENCOUNTER
"Regarding: urgent med refill  ----- Message from Kylah Worley sent at 12/30/2023  8:56 AM EST -----  \" My mom was discharged from the hospital, she needs a refill of her potassium chloride 20 mEq. \"    "

## 2023-12-30 NOTE — TELEPHONE ENCOUNTER
Pt's daughter called stating during pt's recent hospitalization, pt's medications were misplaced at the hospital.  Daughter is using list of medications indicated on pt's After Visit Summary to determine what medications pt should be taking.  Daughter was unable to find any Klor Con tablets at home and is requesting emergency refill of this medication.  One week supply sent to pt's pharmacy.  Daughter will again call hospital to see if medications have been located, and will also contact doctor's office on Tuesday to discuss further prescriptions of this medication. Advice offered per protocol.

## 2023-12-30 NOTE — TELEPHONE ENCOUNTER
"Reason for Disposition  • Caller has medicine question only, adult not sick, AND triager answers question    Answer Assessment - Initial Assessment Questions  1. NAME of MEDICATION: \"What medicine are you calling about?\"      Potassium  2. QUESTION: \"What is your question?\" (e.g., medication refill, side effect)      I need urgent refill of medicine because the hospital lost her meds   3. PRESCRIBING HCP: \"Who prescribed it?\" Reason: if prescribed by specialist, call should be referred to that group.      PCP  4. SYMPTOMS: \"Do you have any symptoms?\"      None yet    Protocols used: Medication Question Call-ADULT-    "

## 2023-12-31 LAB
BACTERIA SPEC BFLD CULT: NO GROWTH
GRAM STN SPEC: NORMAL
GRAM STN SPEC: NORMAL

## 2024-01-02 DIAGNOSIS — E87.6 HYPOKALEMIA: ICD-10-CM

## 2024-01-02 DIAGNOSIS — I25.10 CORONARY ARTERY DISEASE INVOLVING NATIVE CORONARY ARTERY OF NATIVE HEART WITHOUT ANGINA PECTORIS: ICD-10-CM

## 2024-01-02 DIAGNOSIS — I10 BENIGN ESSENTIAL HYPERTENSION: ICD-10-CM

## 2024-01-02 DIAGNOSIS — I50.42 CHRONIC COMBINED SYSTOLIC AND DIASTOLIC CONGESTIVE HEART FAILURE (HCC): ICD-10-CM

## 2024-01-02 DIAGNOSIS — I50.22 CHRONIC SYSTOLIC CONGESTIVE HEART FAILURE (HCC): ICD-10-CM

## 2024-01-02 RX ORDER — SPIRONOLACTONE 25 MG/1
12.5 TABLET ORAL DAILY
Qty: 4 TABLET | Refills: 0 | Status: SHIPPED | OUTPATIENT
Start: 2024-01-02 | End: 2024-01-03 | Stop reason: SDUPTHER

## 2024-01-03 ENCOUNTER — TELEPHONE (OUTPATIENT)
Dept: CARDIOLOGY CLINIC | Facility: CLINIC | Age: 89
End: 2024-01-03

## 2024-01-03 ENCOUNTER — OFFICE VISIT (OUTPATIENT)
Dept: FAMILY MEDICINE CLINIC | Facility: CLINIC | Age: 89
End: 2024-01-03
Payer: COMMERCIAL

## 2024-01-03 VITALS
HEART RATE: 66 BPM | HEIGHT: 64 IN | SYSTOLIC BLOOD PRESSURE: 120 MMHG | BODY MASS INDEX: 18.61 KG/M2 | RESPIRATION RATE: 14 BRPM | TEMPERATURE: 97 F | OXYGEN SATURATION: 92 % | WEIGHT: 109 LBS | DIASTOLIC BLOOD PRESSURE: 78 MMHG

## 2024-01-03 DIAGNOSIS — E44.1 MILD PROTEIN-CALORIE MALNUTRITION (HCC): Primary | ICD-10-CM

## 2024-01-03 DIAGNOSIS — F02.B3 MODERATE LATE ONSET ALZHEIMER'S DEMENTIA WITH MOOD DISTURBANCE (HCC): ICD-10-CM

## 2024-01-03 DIAGNOSIS — R63.4 WEIGHT LOSS: ICD-10-CM

## 2024-01-03 DIAGNOSIS — E87.6 HYPOKALEMIA: ICD-10-CM

## 2024-01-03 DIAGNOSIS — I50.43 ACUTE ON CHRONIC COMBINED SYSTOLIC (CONGESTIVE) AND DIASTOLIC (CONGESTIVE) HEART FAILURE (HCC): ICD-10-CM

## 2024-01-03 DIAGNOSIS — I50.42 CHRONIC COMBINED SYSTOLIC AND DIASTOLIC CONGESTIVE HEART FAILURE (HCC): ICD-10-CM

## 2024-01-03 DIAGNOSIS — I10 BENIGN ESSENTIAL HYPERTENSION: ICD-10-CM

## 2024-01-03 DIAGNOSIS — G30.1 MODERATE LATE ONSET ALZHEIMER'S DEMENTIA WITH MOOD DISTURBANCE (HCC): ICD-10-CM

## 2024-01-03 DIAGNOSIS — J90 PLEURAL EFFUSION: ICD-10-CM

## 2024-01-03 PROCEDURE — 99214 OFFICE O/P EST MOD 30 MIN: CPT | Performed by: FAMILY MEDICINE

## 2024-01-03 RX ORDER — MIRTAZAPINE 7.5 MG/1
TABLET, FILM COATED ORAL
Qty: 21 TABLET | Refills: 0 | Status: SHIPPED | OUTPATIENT
Start: 2024-01-03 | End: 2024-01-17

## 2024-01-03 RX ORDER — DONEPEZIL HYDROCHLORIDE 5 MG/1
TABLET, FILM COATED ORAL
COMMUNITY
Start: 2023-12-21

## 2024-01-03 RX ORDER — POTASSIUM CHLORIDE 20 MEQ/1
20 TABLET, EXTENDED RELEASE ORAL 4 TIMES DAILY
Qty: 28 TABLET | Refills: 0 | Status: SHIPPED | OUTPATIENT
Start: 2024-01-03

## 2024-01-03 RX ORDER — FUROSEMIDE 40 MG/1
40 TABLET ORAL DAILY
Qty: 30 TABLET | Refills: 6 | Status: SHIPPED | OUTPATIENT
Start: 2024-01-03

## 2024-01-03 RX ORDER — SPIRONOLACTONE 25 MG/1
12.5 TABLET ORAL DAILY
Qty: 15 TABLET | Refills: 1 | Status: SHIPPED | OUTPATIENT
Start: 2024-01-03 | End: 2024-03-03

## 2024-01-03 NOTE — TELEPHONE ENCOUNTER
Dr Edwards's pt-  Daughter called to cancel appt we made for Yaquelin paramjit/ Amparo for tomorrow ; discharge summary says f/up ASAP in 2-4 weeks.Patient only wants to see Dr. Edwards.  Pls advise.

## 2024-01-03 NOTE — PROGRESS NOTES
Chief Complaint   Patient presents with   • Follow-up     Hospital - multiple issues        Patient ID: Yaquelin Wright is a 89 y.o. female.    HPI  Pt is seeing for f/u recent hospital stay for acute on chronic CHF and recurrent pleural effusion -  underwent pleurocentesis -  feeling back to baseline with breathing -  no legs swelling -  taking all meds -  cont to loose weight -  on Remeron 30 mg with no help -  sleeping a lot -  has Dementia     The following portions of the patient's history were reviewed and updated as appropriate: allergies, current medications, past family history, past medical history, past social history, past surgical history and problem list.    Review of Systems   Constitutional:  Positive for activity change, appetite change and fatigue.   HENT: Negative.     Respiratory: Negative.     Cardiovascular: Negative.    Gastrointestinal: Negative.    Genitourinary: Negative.    Musculoskeletal: Negative.    Neurological:  Negative for dizziness and headaches.   Psychiatric/Behavioral:  Positive for confusion (at baseline) and decreased concentration. Negative for behavioral problems, dysphoric mood, hallucinations and sleep disturbance. The patient is not nervous/anxious.        Current Outpatient Medications   Medication Sig Dispense Refill   • aspirin 81 MG tablet Take 1 tablet by mouth daily     • Empagliflozin (JARDIANCE) 10 MG TABS tablet Take 1 tablet (10 mg total) by mouth daily 30 tablet 5   • furosemide (LASIX) 40 mg tablet Take 1 tablet (40 mg total) by mouth daily New dose 30 tablet 6   • metoprolol succinate (TOPROL-XL) 50 mg 24 hr tablet TAKE 1 TABLET BY MOUTH TWICE A  tablet 1   • mirtazapine (REMERON) 30MG tablet Take 1 tab 21 tablet 0   • potassium chloride (Klor-Con M20) 20 mEq tablet Take 1 tablet (20 mEq total) by mouth 4 (four) times a day 28 tablet 0   • RABEprazole (ACIPHEX) 20 MG tablet Take 1 tablet (20 mg total) by mouth 3 (three) times a week     •  "spironolactone (ALDACTONE) 25 mg tablet Take 0.5 tablets (12.5 mg total) by mouth daily for 7 days 4 tablet 0   • donepezil (ARICEPT) 5 mg tablet TAKE 1 TABLET BY MOUTH EVERY DAY AT NIGHT (Patient not taking: Reported on 1/3/2024)       No current facility-administered medications for this visit.       Objective:    /78 (BP Location: Left arm, Patient Position: Sitting, Cuff Size: Standard)   Pulse 66   Temp (!) 97 °F (36.1 °C) (Temporal)   Resp 14   Ht 5' 4\" (1.626 m)   Wt 49.4 kg (109 lb)   SpO2 92%   BMI 18.71 kg/m²        Physical Exam  Constitutional:       General: She is not in acute distress.     Appearance: She is not ill-appearing.   Cardiovascular:      Rate and Rhythm: Normal rate and regular rhythm.   Pulmonary:      Effort: Pulmonary effort is normal. No respiratory distress.      Breath sounds: No wheezing, rhonchi or rales.   Abdominal:      Palpations: Abdomen is soft.      Tenderness: There is no abdominal tenderness.   Musculoskeletal:      Right lower leg: No edema.      Left lower leg: No edema.   Neurological:      Mental Status: She is alert. Mental status is at baseline.      Motor: No weakness.      Gait: Gait normal.   Psychiatric:         Mood and Affect: Mood normal.           Labs in chart were reviewed.      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  Likely due to " dementia   Chronic combined systolic and diastolic congestive heart failure (HCC)  -     spironolactone (ALDACTONE) 25 mg tablet; Take 0.5 tablets (12.5 mg total) by mouth daily  -     Empagliflozin (JARDIANCE) 10 MG TABS tablet; Take 1 tablet (10 mg total) by mouth daily  -     furosemide (LASIX) 40 mg tablet; Take 1 tablet (40 mg total) by mouth daily    Other orders  -     donepezil (ARICEPT) 5 mg tablet; TAKE 1 TABLET BY MOUTH EVERY DAY AT NIGHT (Patient not taking: Reported on 1/3/2024)        Rto in 1 m                     Aria Clark MD

## 2024-01-03 NOTE — TELEPHONE ENCOUNTER
"Spoke with pt.'s Daughter: Keith. Pt.will be getting labs done at [facility] on [address] during follow up today with PCP.    Stressed to Keith to keep a follow up appt.with Cardiology within 1-2 weeks of discharge with one of our PA's or CRNP's in the interim until she can get in to see . made aware he's booking out in to March.    Keith will return a call to schedule a follow up as she is tied up right now with intake at her home with, \"[facility].\" Keith stated, this is the reason why she had to cancel today's appt.w/Estela Florence.    Keith expressed a verbal understanding the importance of keeping a follow up appt and will call back for scheduling.  "

## 2024-01-04 ENCOUNTER — APPOINTMENT (OUTPATIENT)
Dept: LAB | Facility: CLINIC | Age: 89
End: 2024-01-04
Payer: COMMERCIAL

## 2024-01-04 DIAGNOSIS — E83.42 HYPOMAGNESEMIA: ICD-10-CM

## 2024-01-04 DIAGNOSIS — R79.89 ELEVATED LFTS: ICD-10-CM

## 2024-01-04 LAB
ALBUMIN SERPL BCP-MCNC: 3.8 G/DL (ref 3.5–5)
ALP SERPL-CCNC: 90 U/L (ref 34–104)
ALT SERPL W P-5'-P-CCNC: 38 U/L (ref 7–52)
ANION GAP SERPL CALCULATED.3IONS-SCNC: 9 MMOL/L
AST SERPL W P-5'-P-CCNC: 31 U/L (ref 13–39)
BILIRUB SERPL-MCNC: 0.88 MG/DL (ref 0.2–1)
BUN SERPL-MCNC: 23 MG/DL (ref 5–25)
CALCIUM SERPL-MCNC: 9.6 MG/DL (ref 8.4–10.2)
CHLORIDE SERPL-SCNC: 103 MMOL/L (ref 96–108)
CO2 SERPL-SCNC: 29 MMOL/L (ref 21–32)
CREAT SERPL-MCNC: 0.67 MG/DL (ref 0.6–1.3)
GFR SERPL CREATININE-BSD FRML MDRD: 77 ML/MIN/1.73SQ M
GLUCOSE P FAST SERPL-MCNC: 108 MG/DL (ref 65–99)
MAGNESIUM SERPL-MCNC: 2.1 MG/DL (ref 1.9–2.7)
POTASSIUM SERPL-SCNC: 4.5 MMOL/L (ref 3.5–5.3)
PROT SERPL-MCNC: 7.3 G/DL (ref 6.4–8.4)
SODIUM SERPL-SCNC: 141 MMOL/L (ref 135–147)

## 2024-01-04 PROCEDURE — 83735 ASSAY OF MAGNESIUM: CPT

## 2024-01-04 PROCEDURE — 80053 COMPREHEN METABOLIC PANEL: CPT

## 2024-01-04 PROCEDURE — 36415 COLL VENOUS BLD VENIPUNCTURE: CPT

## 2024-01-05 ENCOUNTER — TELEPHONE (OUTPATIENT)
Dept: INPATIENT UNIT | Facility: HOSPITAL | Age: 89
End: 2024-01-05

## 2024-01-08 ENCOUNTER — TELEPHONE (OUTPATIENT)
Dept: FAMILY MEDICINE CLINIC | Facility: CLINIC | Age: 89
End: 2024-01-08

## 2024-01-08 NOTE — TELEPHONE ENCOUNTER
----- Message from Aria Clark MD sent at 1/8/2024  3:37 PM EST -----  Pl, advise pt's daughter -  labs are good

## 2024-01-17 ENCOUNTER — TELEPHONE (OUTPATIENT)
Dept: INPATIENT UNIT | Facility: HOSPITAL | Age: 89
End: 2024-01-17

## 2024-01-25 ENCOUNTER — OFFICE VISIT (OUTPATIENT)
Dept: CARDIOLOGY CLINIC | Facility: CLINIC | Age: 89
End: 2024-01-25
Payer: COMMERCIAL

## 2024-01-25 VITALS
HEART RATE: 68 BPM | WEIGHT: 114 LBS | DIASTOLIC BLOOD PRESSURE: 64 MMHG | OXYGEN SATURATION: 98 % | SYSTOLIC BLOOD PRESSURE: 110 MMHG | HEIGHT: 64 IN | BODY MASS INDEX: 19.46 KG/M2

## 2024-01-25 DIAGNOSIS — E78.2 MIXED HYPERLIPIDEMIA: Primary | ICD-10-CM

## 2024-01-25 PROCEDURE — 99214 OFFICE O/P EST MOD 30 MIN: CPT | Performed by: PHYSICIAN ASSISTANT

## 2024-01-25 RX ORDER — PRAVASTATIN SODIUM 40 MG
40 TABLET ORAL DAILY
Qty: 30 TABLET | Refills: 3 | Status: SHIPPED | OUTPATIENT
Start: 2024-01-25 | End: 2024-05-24

## 2024-01-25 NOTE — PROGRESS NOTES
Progress Note - Cardiology Office  [facility]    Yaquelin Wright 89 y.o. female MRN: [id number]  : 10/3/1933  Encounter: [id number]      Assessment:     Chronic combined systolic and diastolic heart failure.   Valvular heart disease.   Coronary artery disease.   Essential hypertension.   Pulmonary hypertension.   Dyslipidemia.   CVA.   Neck pain.  Dementia.     Discussion Summary and Plan:    Chronic combined systolic and diastolic heart failure.  - Does not appear in acute exacerbation. Patient is euvolemic on examination.   - Patient was admitted from 23 to 23 to [facility] for acute on chronic combined systolic and diastolic heart failure. During hospitalization patient underwent IR thoracentesis with 750 mL of fluid drained from right chest. Patient was discharged on 23 on lasix 40 mg oral daily.   - 23 TTE: LVEF 25-30%. There is severe global hypokinesis with regional variations. Diastolic function is moderately abnormal, consistent with grade II (pseudonormal) relaxation.  Right ventricle systolic function is mildly to moderately reduced.  Left atrium is severely dilated.  Right atrium moderately dilated.  Aortic valve with mild regurgitation.  Moderate to severe mitral valve regurgitation.  Tricuspid valve regurgitation.  Right ventricle systolic pressure severely elevated at 78 mmHg.  Trace pulmonic valve regurgitation.   - 23 TTE: LVEF 30-35%. Systolic function is severely reduced. There is severe global hypokinesis with regional variation. Diastolic function is moderately abnormal, consistent with grade II (pseudonormal) relaxation.  Left atrium moderately dilated.  Right atrium mildly dilated.  Aortic valve with mild regurgitation.  Aortic valve with mild stenosis.  Moderate to severe mitral valve regurgitation.  Mild to moderate tricuspid valve regurgitation.  Pulmonary artery pressure around 50 to 55 mmHg.  - 22 TTE: LVEF  55-60%.  No regional wall motion abnormalities appreciated.  There is grade 1 diastolic dysfunction.  Normal right ventricle size and function.  Mild to moderate mitral valve regurgitation and mild aortic valve regurgitation.  Mild tricuspid valve regurgitation.  Mildly elevated pulmonary artery pressure, 39 mmHg.  - Currently on Toprol-XL 50 mg twice daily and Aldactone 12.5 mg daily.  - Currently on Jardiance 10 mg daily.  Continue at this time.  - Continue lasix 40 mg oral daily.   - Repeat CMP ordered.  - CHF education.     2. Valvular heart disease.  - Mild aortic valve stenosis.  Moderate to severe mitral regurgitation.  Mild to moderate tricuspid valve regurgitation.  Pulmonic valve with trace regurgitation.  - 12/28/23 TTE:   Aortic Valve The aortic valve is trileaflet. The leaflets are moderately thickened. The leaflets are mildly calcified. The leaflets exhibit normal mobility. There is mild regurgitation. The aortic valve has no significant stenosis.   Mitral Valve Mitral valve structure is normal.  There is moderate to severe regurgitation. There is no evidence of stenosis.   Tricuspid Valve Tricuspid valve structure is normal. There is mild to moderate regurgitation. There is no evidence of stenosis. The right ventricular systolic pressure is severely elevated at 78 mmHg.   Pulmonic Valve Pulmonic valve structure is normal. There is trace regurgitation. There is no evidence of stenosis.   - 6/07/23 TTE:   Aortic Valve The aortic valve is trileaflet. The leaflets are moderately thickened. The leaflets are mildly calcified. The leaflets exhibit normal mobility. There is mild regurgitation. There is mild stenosis.   Mitral Valve There is moderate to severe regurgitation with a centrally directed jet. There is no evidence of stenosis.   Tricuspid Valve There is mild to moderate regurgitation.  Pulmonary artery pressure 50 to 55 mmHg. There is no evidence of stenosis.   Pulmonic Valve There is trace  regurgitation. There is no evidence of stenosis.      3. Coronary artery disease.  - s/p PCI of LAD and diagonal (2013).  - Stable. Patient denies experiencing chest pain.  - Currently on aspirin 81 mg daily.  Patient's pravastatin 80 mg daily was held during 12/27-12/29 hospitalization in setting of transaminitis.  Review of 1/04/24 CMP notes AST and ALT have normalized.  Will plan to restart pravastatin at 40 mg daily.     4. Hypertension.  - BP during today's office visit, 110/64.  - Currently on Toprol-XL 50 mg twice daily and Aldactone 12.5 mg daily.  - Continue lasix 40 mg oral daily.   - 12/28/23 TTE: LVEF 25-30%. There is severe global hypokinesis with regional variations. Diastolic function is moderately abnormal, consistent with grade II (pseudonormal) relaxation.  Right ventricle systolic function is mildly to moderately reduced.  Left atrium is severely dilated.  Right atrium moderately dilated.  Aortic valve with mild regurgitation.  Moderate to severe mitral valve regurgitation.  Tricuspid valve regurgitation.  Right ventricle systolic pressure severely elevated at 78 mmHg.  Trace pulmonic valve regurgitation.     5. Dyslipidemia.   - 6/08/23 lipid panel: cholesterol 101, triglycerides 126, HDL 49, LDL 27.   Patient's pravastatin 80 mg daily was held during 12/27-12/29 hospitalization in setting of transaminitis.  Review of 1/04/24 CMP notes AST and ALT have normalized.  Will plan to restart pravastatin at 40 mg daily.     6. Pulmonary hypertension.  -12/28/23 TTE:  The right ventricular systolic pressure is severely elevated at 78 mmHg.   - 6/07/23 TTE: Pulmonary artery pressure 50 to 55 mmHg.       7. CVA.  - CVA June 2022.  - Continue aspirin 81 mg daily.   Patient's pravastatin 80 mg daily was held during 12/27-12/29 hospitalization in setting of transaminitis.  Review of 1/04/24 CMP notes AST and ALT have normalized.  Will plan to restart pravastatin at 40 mg daily.     8. Neck pain.   - During  today's office visit patient states she has been experiencing severe neck pain. She states that her neck pain started approximately 10 days ago.  She states she awoke with the pain and the pain has been constant severe aching sensation localized to the back of her neck, worse with movement.  She states that she has been taking Tylenol and Motrin which is helping take the edge off the pain however does does not have much of an effect.  In the office she rates her pain a 7 out of 10.  She states that she is unable to move her neck without significant pain.    - Discussed with patient recommendations to contact PCP for further evaluation.  - Discussed with patient and her daughter recommendations for over-the-counter remedies including topical lidocaine patches and tylenol as needed for pain.  Discussed the importance of minimal use of NSAIDs given history of heart failure.    9. Dementia.  - Care per primary team.       Patient / Caretaker was advised and educated to call our office  immediately if  patient has any new symptoms of chest pain/shortness of breath, near-syncope, syncope, light headedness sustained palpitations  or any other cardiovascular symptoms before their scheduled follow-up appointment.  Office number was provided #[phone].  Please call [phone] if any questions.  Counseling :  A description of the counseling.  Goals and Barriers.  Patient's ability to self care: Yes  Medication side effect reviewed with patient in detail and all their questions answered to their satisfaction.    HPI :     Yaquelin Wright is a 89 y.o. female with PMHx of chronic combined systolic and diastolic heart failure, valvular heart disease, CAD s/p PCI of LAD and diagonal (2013), HTN, pulmonary HTN, dyslipidemia, CVA (6/2022), dementia who presents for routine outpatient cardiology follow up and recent hospital discharge follow up.     Patient was admitted from 12/27/23 to 12/29/23 to [facility] for  acute on chronic combined systolic and diastolic heart failure. During hospitalization patient underwent IR thoracentesis with 750 mL of fluid drained from right chest. Patient was discharged on 12/29/23 on lasix 40 mg oral daily.      Since hospital discharge, patient overall states she has been doing well except she has severe neck pain.  She states that her neck pain started approximately 10 days ago.  She states she awoke with the pain and the pain has been constant severe aching sensation localized to the back of her neck, worse with movement.  She states that she has been taking Tylenol and Motrin which is helping take the edge off the pain however does does not have much of an effect.  In the office she rates her pain a 7 out of 10.  She states that she is unable to move her neck without significant pain.        Patient is accompanied by her daughter today who reports overall patient is doing well but has complained of significant neck pain.  Patient denies experiencing chest pain, palpitations, shortness of breath at rest or with exertion, lower extremity edema, orthopnea, lightheadedness, dizziness, headache, nausea, or vomiting.      Review of Systems   Constitutional:  Negative for activity change, appetite change, chills, diaphoresis, fatigue, fever and unexpected weight change.   Respiratory:  Negative for cough, chest tightness, shortness of breath and wheezing.    Cardiovascular:  Negative for chest pain, palpitations and leg swelling.   Gastrointestinal:  Negative for abdominal distention, abdominal pain, constipation, diarrhea, nausea and vomiting.   Musculoskeletal:  Positive for arthralgias (neck pain), neck pain and neck stiffness.   Neurological:  Negative for dizziness, syncope, weakness, light-headedness and headaches.       Historical Information   Past Medical History:   Diagnosis Date    Abscess of chest wall     Last Assessed: 2/27/2014    Arthritis     Back pain     Cataract     Start of     CHF (congestive heart failure) (HCC)     Colon polyp     Coronary artery disease     Diverticulitis of colon 12/04/2008    Female bladder prolapse     Gastric reflux     Hematuria     Last Assessed: 11/18/2014     History of diverticulitis     [place] (hard of hearing)     Hypercholesteremia     Hyperkeratosis of skin 11/03/2011    Hypertension     Hypokalemia 12/13/2011    Incomplete uterovaginal prolapse 05/28/2010    Myocardial infarction (HCC)     Osteoporosis     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    Last Assessed: 10/24/2012     Seasonal allergies     Sebaceous cyst     Last Assessed: 2/8/2014    Syncope     Trigeminal neuralgia 03/20/2012    Urinary incontinence     Uterine prolapse     Vertigo 09/15/2011    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    Wears glasses     Wears hearing aid     States she rarely wears them    Wears partial dentures     Upper      Past Surgical History:   Procedure Laterality Date    APPENDECTOMY      APPENDICO-VESICOSTOMY CUTANEOUS  03/01/2010    CHOLECYSTECTOMY      Laparoscopic    COLONOSCOPY      CORONARY ANGIOPLASTY      CORONARY ANGIOPLASTY WITH STENT PLACEMENT      2 aortic stents    CORONARY ANGIOPLASTY WITH STENT PLACEMENT  2013    2 distal left anterior descending artery     ESOPHAGOGASTRODUODENOSCOPY      FRACTURE SURGERY Right     Repair right arm- titanium servando from shoulder to elbow.    HYSTERECTOMY      Complete    IR THORACENTESIS  6/9/2023    IR THORACENTESIS  7/14/2023    IR THORACENTESIS  12/28/2023    WY CMBND ANTERPOST COLPORRAPHY W/CYSTO N/A 9/20/2016    Procedure: COLPORRHAPHY ANTERIOR POSTERIOR GRAFT;  Surgeon: Zach Abdi MD;  Location: [facility];  Service: UroGynecology           WY COLPOPEXY VAGINAL EXTRAPERITONEAL APPROACH N/A 9/20/2016    Procedure: COLPOPEXY VAGINAL EXTRAPERITONEAL  incision and drainage of vagina inclusion cyst x 4;  Surgeon: Zach Abdi MD;  Location: [facility];  Service: UroGynecology           WY CYSTOURETHROSCOPY N/A  "9/20/2016    Procedure: CYSTOSCOPY;  Surgeon: Zach Abdi MD;  Location[facility];  Service: UroGynecology           HI SLING OPERATION STRESS INCONTINENCE N/A 9/20/2016    Procedure: INSERTION PUBOVAGINAL SLING RETROPUBIC ;  Surgeon: Zach Abdi MD;  Location: [facility];  Service: UroGynecology            Social History     Substance and Sexual Activity   Alcohol Use Not Currently    Alcohol/week: 1.0 standard drink of alcohol    Types: 1 Cans of beer per week    Comment: once a day on most days of the week     Social History     Substance and Sexual Activity   Drug Use No     Social History     Tobacco Use   Smoking Status Never   Smokeless Tobacco Never     Family History:   Family History   Problem Relation Age of Onset    Hypertension Mother     Heart failure Mother     Coronary artery disease Mother     Arthritis Mother     Osteoporosis Mother     Hyperlipidemia Mother     Coronary artery disease Sister        Meds/Allergies     Allergies   Allergen Reactions    Bee Venom Anaphylaxis     Reaction Date: 02Feb2004; Annotation - [id number]: jjw    Enalapril Other (See Comments)     Reaction Date: 28Oct2003;   Leg  pain    Erythromycin Dizziness     Reaction Date: 28Oct2003;     Estrogens Other (See Comments)     Reaction Date: 02Feb2004; Annotation - [id number]: jjw    Ezetimibe Hives    Penicillins Hives     \"All cillins\", \"and all myocillins\"    Ramipril Other (See Comments)     Reaction Date: 02Feb2004; Annotation - [id number]: jjw    Statins Other (See Comments)     Muscle pain    Versed [Midazolam] Other (See Comments)     States she \"passed out\" when recovering from a procedure and was told to not use it again.    Zithromax [Azithromycin] Anaphylaxis       Current Outpatient Medications:     aspirin 81 MG tablet, Take 1 tablet by mouth daily, Disp: , Rfl:     Empagliflozin (JARDIANCE) 10 MG TABS tablet, Take 1 tablet (10 mg total) by mouth daily, Disp: 30 tablet, Rfl: 5    furosemide (LASIX) 40 mg " "tablet, Take 1 tablet (40 mg total) by mouth daily, Disp: 30 tablet, Rfl: 6    metoprolol succinate (TOPROL-XL) 50 mg 24 hr tablet, TAKE 1 TABLET BY MOUTH TWICE A DAY, Disp: 180 tablet, Rfl: 1    potassium chloride (Klor-Con M20) 20 mEq tablet, Take 1 tablet (20 mEq total) by mouth 4 (four) times a day, Disp: 28 tablet, Rfl: 0    RABEprazole (ACIPHEX) 20 MG tablet, Take 1 tablet (20 mg total) by mouth 3 (three) times a week, Disp: , Rfl:     spironolactone (ALDACTONE) 25 mg tablet, Take 0.5 tablets (12.5 mg total) by mouth daily, Disp: 15 tablet, Rfl: 1    mirtazapine (REMERON) 7.5 MG tablet, Take 2 tablets (15 mg total) by mouth daily at bedtime for 7 days, THEN 1 tablet (7.5 mg total) daily at bedtime for 7 days., Disp: 21 tablet, Rfl: 0    Vitals: Blood pressure 110/64, pulse 68, height 5' 4\" (1.626 m), weight 51.7 kg (114 lb), SpO2 98%.    Body mass index is 19.57 kg/m².  Wt Readings from Last 3 Encounters:   01/25/24 51.7 kg (114 lb)   01/03/24 49.4 kg (109 lb)   12/29/23 50.3 kg (110 lb 14.4 oz)     Vitals:    01/25/24 1244   Weight: 51.7 kg (114 lb)     BP Readings from Last 3 Encounters:   01/25/24 110/64   01/03/24 120/78   12/29/23 151/72       Physical Exam:  Physical Exam  Vitals reviewed.   Constitutional:       General: She is not in acute distress.  Cardiovascular:      Rate and Rhythm: Normal rate and regular rhythm.      Pulses: Normal pulses.      Heart sounds: Murmur heard.   Pulmonary:      Effort: Pulmonary effort is normal. No respiratory distress.      Breath sounds: Normal breath sounds.   Abdominal:      General: Abdomen is flat. There is no distension.      Palpations: Abdomen is soft.      Tenderness: There is no abdominal tenderness.   Musculoskeletal:      Cervical back: Pain with movement and muscular tenderness present. Decreased range of motion.      Right lower leg: No edema.      Left lower leg: No edema.   Skin:     General: Skin is warm and dry.   Neurological:      Mental Status: " She is alert and oriented to person, place, and time.       Diagnostic Studies Review Cardio:    Cardiac testing:   Echo complete w/ contrast if indicated    Result Date: 12/28/2023  Narrative:   Left Ventricle: Left ventricular cavity size is mildly dilated. Wall thickness is increased. There is mild concentric hypertrophy. Systolic function is severely reduced.  There is severe global hypokinesis with regional variations.  Estimated ejection fraction is 25-30 % Wall motion is normal. Diastolic function is moderately abnormal, consistent with grade II (pseudonormal) relaxation.   Right Ventricle: Systolic function is mildly to moderately reduced.   Left Atrium: The atrium is severely dilated.   Right Atrium: The atrium is moderately dilated.   Aortic Valve: The aortic valve is trileaflet. The leaflets are moderately thickened. The leaflets are mildly calcified. The leaflets exhibit normal mobility. There is mild regurgitation.   Mitral Valve: There is moderate to severe regurgitation.   Tricuspid Valve: There is mild to moderate regurgitation. The right ventricular systolic pressure is severely elevated at 78 mmHg.   Pulmonic Valve: There is trace regurgitation.   Prior TTE study available for comparison. Prior study date: 6/7/2023. Changes noted when compared to prior study. Changes include: There is slight decrease in ejection fraction, increase in RV systolic pressure and size of the atria.     Echo complete with contrast if indicated  Result date: 6/07/23   Left Ventricle Left ventricular cavity size is mildly dilated. Wall thickness is normal. The left ventricular ejection fraction is 30 to 35 % by visual estimation. Systolic function is severely reduced.  There is severe global hypokinesis with regional variation. Diastolic function is moderately abnormal, consistent with grade II (pseudonormal) relaxation.   Right Ventricle Right ventricular cavity size is normal. Systolic function is mildly to moderately  reduced. Wall thickness is normal.   Left Atrium The atrium is moderately dilated.   Right Atrium The atrium is mildly dilated.   Aortic Valve The aortic valve is trileaflet. The leaflets are moderately thickened. The leaflets are mildly calcified. The leaflets exhibit normal mobility. There is mild regurgitation. There is mild stenosis.   Mitral Valve There is moderate to severe regurgitation with a centrally directed jet. There is no evidence of stenosis.   Tricuspid Valve There is mild to moderate regurgitation.  Pulmonary artery pressure 50 to 55 mmHg. There is no evidence of stenosis.   Pulmonic Valve There is trace regurgitation. There is no evidence of stenosis.   Ascending Aorta The aortic root is normal in size.   IVC/SVC The inferior vena cava is upper normal in size.  It has around 50% collapse with inspiration.   Pericardium There is no pericardial effusion. The pericardium is normal in appearance.     Results for orders placed during the hospital encounter of 18    Echo complete with contrast if indicated    Narrative  [address] [address]  [phone]    Transthoracic Echocardiogram  2D, M-mode, Doppler, and Color Doppler    Study date:  2018    Patient: YAHAIRA MENDOSA  MR number: [id number]  Account number: [id number]  : 03-Oct-1933  Age: 84 years  Gender: Female  Status: Routine  Location: Echo lab  Height: 64 in  Weight: 140.8 lb  BP: 126/ 80 mmHg    Indications: Syncope    Diagnoses: R55. - Syncope and collapse    Sonographer:  KENAN Ellis  Primary Physician:  Aria Clark MD  Referring Physician:  Zen Edwards MD  Group:  [facility]  Interpreting Physician:  Zen Edwards MD    SUMMARY    LEFT VENTRICLE:  Systolic function was at the lower limits of normal. Ejection fraction was estimated in the range of 50 % to 55 % to be 55 %.  Although no diagnostic regional wall motion  abnormality was identified, this possibility cannot be completely excluded on the basis of this study.  Wall thickness was mildly increased.  There was mild concentric hypertrophy.    MITRAL VALVE:  There was mild annular calcification.  There was mild regurgitation.    AORTIC VALVE:  There was mild regurgitation.    TRICUSPID VALVE:  There was mild regurgitation.  Estimated peak PA pressure was 30 mmHg.    HISTORY: PRIOR HISTORY: HTN, CAD, Hyperlipidemia, Hypokalemia    PROCEDURE: The procedure was performed in the echo lab. This was a routine study. The transthoracic approach was used. The study included complete 2D imaging, M-mode, complete spectral Doppler, and color Doppler. The heart rate was 70 bpm,  at the start of the study. Image quality was adequate.    LEFT VENTRICLE: Size was normal. Systolic function was at the lower limits of normal. Ejection fraction was estimated in the range of 50 % to 55 % to be 55 %. Although no diagnostic regional wall motion abnormality was identified, this  possibility cannot be completely excluded on the basis of this study. Wall thickness was mildly increased. There was mild concentric hypertrophy. DOPPLER: Left ventricular diastolic function parameters were normal for the patient's age.    RIGHT VENTRICLE: The size was normal. Systolic function was normal.    LEFT ATRIUM: Size was at the upper limits of normal.    RIGHT ATRIUM: Size was at the upper limits of normal.    MITRAL VALVE: There was mild annular calcification. There was normal leaflet separation. DOPPLER: There was no evidence for stenosis. There was mild regurgitation.    AORTIC VALVE: The valve was trileaflet. Leaflets exhibited mildly increased thickness, mild calcification, and normal cuspal separation. DOPPLER: There was no evidence for stenosis. There was mild regurgitation.    TRICUSPID VALVE: DOPPLER: There was mild regurgitation. Estimated peak PA pressure was 30 mmHg.    PULMONIC VALVE: DOPPLER: There  "was mild regurgitation.    PERICARDIUM: There was no thickening or calcification. There was no pericardial effusion.    AORTA: The root exhibited normal size.    SYSTEMIC VEINS: IVC: The inferior vena cava was normal in size. Respirophasic changes were normal.    SYSTEM MEASUREMENT TABLES    2D mode  AoR Diam 2D: 3.3 cm  LA Diam (2D): 3.5 cm  LA/Ao (2D): 1.06  FS (2D Teich): 28 %  IVSd (2D): 1.21 cm  LVDEV: 114 cm³  LVESV: 52.6 cm³  LVIDd(2D): 4.93 cm  LVISd (2D): 3.55 cm  LVPWd (2D): 1.17 cm  SV (Teich): 61.4 cm³    Apical four chamber  LVEF A4C: 55 %    Unspecified Scan Mode  MV Peak A Ki: 1010 mm/s  MV Peak E Ki. Mean: 605 mm/s  MVA (PHT): 3.61 cm squared  PHT: 61 ms  Max P mm[Hg]  V Max: 2390 mm/s  Vmax: 2270 mm/s  RA Area: 14.5 cm squared  RA Volume: 34.9 cm³  TAPSE: 1.9 cm    [employer] Accredited Echocardiography Laboratory    Prepared and electronically signed by    Zen Edwards MD  Signed 2018 17:27:28      Imaging:  Chest X-Ray:     CT-scan of the chest:     No CTA results available for this patient.  Lab Review   Lab Results   Component Value Date    WBC 9.59 2023    HGB 13.0 2023    HCT 39.3 2023    MCV 98 2023    RDW 16.5 (H) 2023     2023     BMP:  Lab Results   Component Value Date    SODIUM 141 2024    K 4.5 2024     2024    CO2 29 2024    BUN 23 2024    CREATININE 0.67 2024    GLUC 75 2023    GLUF 108 (H) 2024    CALCIUM 9.6 2024    CORRECTEDCA 9.2 2023    EGFR 77 2024    MG 2.1 2024     Troponins:    LFT:  Lab Results   Component Value Date    AST 31 2024    ALT 38 2024    ALKPHOS 90 2024    TP 7.3 2024    ALB 3.8 2024      No components found for: \"TSH3\"  Lab Results   Component Value Date    BJK3BGFLQZFE 1.741 2023     Lab Results   Component Value Date    HGBA1C 5.8 2022     Lipid Profile:   Lab Results "   Component Value Date    CHOLESTEROL 101 06/08/2023    HDL 49 (L) 06/08/2023    LDLCALC 27 06/08/2023    TRIG 126 06/08/2023     Lab Results   Component Value Date    CHOLESTEROL 101 06/08/2023    CHOLESTEROL 126 04/04/2023     Lab Results   Component Value Date    TROPONINI 0.03 05/20/2018       Estela Iyer PA-C

## 2024-01-26 DIAGNOSIS — I50.42 CHRONIC COMBINED SYSTOLIC AND DIASTOLIC CONGESTIVE HEART FAILURE (HCC): ICD-10-CM

## 2024-01-26 RX ORDER — SPIRONOLACTONE 25 MG/1
12.5 TABLET ORAL DAILY
Qty: 45 TABLET | Refills: 1 | Status: SHIPPED | OUTPATIENT
Start: 2024-01-26

## 2024-01-28 ENCOUNTER — RA CDI HCC (OUTPATIENT)
Dept: OTHER | Facility: HOSPITAL | Age: 89
End: 2024-01-28

## 2024-01-28 NOTE — PROGRESS NOTES
I11.o  HCC coding opportunities          Chart Reviewed number of suggestions sent to Provider: 1     Patients Insurance     Medicare Insurance: [employer]vantage

## 2024-01-30 DIAGNOSIS — K21.9 GASTROESOPHAGEAL REFLUX DISEASE: ICD-10-CM

## 2024-01-30 RX ORDER — RABEPRAZOLE SODIUM 20 MG/1
20 TABLET, DELAYED RELEASE ORAL DAILY
Qty: 90 TABLET | Refills: 3 | Status: SHIPPED | OUTPATIENT
Start: 2024-01-30

## 2024-02-01 ENCOUNTER — TELEPHONE (OUTPATIENT)
Dept: INPATIENT UNIT | Facility: HOSPITAL | Age: 89
End: 2024-02-01

## 2024-02-12 ENCOUNTER — TELEPHONE (OUTPATIENT)
Age: 89
End: 2024-02-12

## 2024-02-12 DIAGNOSIS — E87.6 HYPOKALEMIA: ICD-10-CM

## 2024-02-12 DIAGNOSIS — I25.10 CORONARY ARTERY DISEASE INVOLVING NATIVE CORONARY ARTERY OF NATIVE HEART WITHOUT ANGINA PECTORIS: ICD-10-CM

## 2024-02-12 DIAGNOSIS — I50.22 CHRONIC SYSTOLIC CONGESTIVE HEART FAILURE (HCC): ICD-10-CM

## 2024-02-12 DIAGNOSIS — I10 BENIGN ESSENTIAL HYPERTENSION: ICD-10-CM

## 2024-02-12 RX ORDER — POTASSIUM CHLORIDE 20 MEQ/1
20 TABLET, EXTENDED RELEASE ORAL 4 TIMES DAILY
Qty: 360 TABLET | Refills: 1 | Status: SHIPPED | OUTPATIENT
Start: 2024-02-12

## 2024-02-12 NOTE — TELEPHONE ENCOUNTER
Requesting office call back to schedule appointment in separate encounter    Reason for call:   [x] Refill   [] Prior Auth  [] Other:     Office:   [x] PCP/Provider - Noah LAMB / Aria  [] Specialty/Provider -     Medication: potassium chloride    Dose/Frequency: 20mEQ qid    Quantity: 360    Pharmacy: [facility] #[id number] - [place], [place] - [facility] [address] [facility]     Does the patient have enough for 3 days?   [] Yes   [x] No - Send as HP to POD

## 2024-02-12 NOTE — TELEPHONE ENCOUNTER
Patient's daughter, Keith, had to cancel appointment on 02/05/24. She is requesting a call back to reschedule.    Please call Keith at [phone]

## 2024-02-28 ENCOUNTER — OFFICE VISIT (OUTPATIENT)
Dept: FAMILY MEDICINE CLINIC | Facility: CLINIC | Age: 89
End: 2024-02-28
Payer: COMMERCIAL

## 2024-02-28 VITALS
OXYGEN SATURATION: 92 % | DIASTOLIC BLOOD PRESSURE: 72 MMHG | BODY MASS INDEX: 20.11 KG/M2 | HEART RATE: 72 BPM | RESPIRATION RATE: 14 BRPM | WEIGHT: 117.8 LBS | SYSTOLIC BLOOD PRESSURE: 120 MMHG | TEMPERATURE: 98.5 F | HEIGHT: 64 IN

## 2024-02-28 DIAGNOSIS — I25.10 CORONARY ARTERY DISEASE INVOLVING NATIVE CORONARY ARTERY OF NATIVE HEART WITHOUT ANGINA PECTORIS: ICD-10-CM

## 2024-02-28 DIAGNOSIS — G30.1 MODERATE LATE ONSET ALZHEIMER'S DEMENTIA WITH MOOD DISTURBANCE (HCC): ICD-10-CM

## 2024-02-28 DIAGNOSIS — F02.B3 MODERATE LATE ONSET ALZHEIMER'S DEMENTIA WITH MOOD DISTURBANCE (HCC): ICD-10-CM

## 2024-02-28 DIAGNOSIS — I50.42 CHRONIC COMBINED SYSTOLIC AND DIASTOLIC CONGESTIVE HEART FAILURE (HCC): Primary | ICD-10-CM

## 2024-02-28 PROBLEM — J90 PLEURAL EFFUSION: Status: RESOLVED | Noted: 2023-07-13 | Resolved: 2024-02-28

## 2024-02-28 PROBLEM — R09.02 HYPOXIA: Status: RESOLVED | Noted: 2023-10-18 | Resolved: 2024-02-28

## 2024-02-28 PROCEDURE — 99214 OFFICE O/P EST MOD 30 MIN: CPT | Performed by: FAMILY MEDICINE

## 2024-02-28 RX ORDER — LOSARTAN POTASSIUM 50 MG/1
50 TABLET ORAL DAILY
COMMUNITY
Start: 2024-02-01 | End: 2024-02-28

## 2024-02-28 RX ORDER — MIRTAZAPINE 15 MG/1
15 TABLET, FILM COATED ORAL
Qty: 90 TABLET | Refills: 2 | Status: SHIPPED | OUTPATIENT
Start: 2024-02-28 | End: 2024-11-24

## 2024-02-28 RX ORDER — MIRTAZAPINE 15 MG/1
15 TABLET, FILM COATED ORAL
COMMUNITY
End: 2024-02-28 | Stop reason: SDUPTHER

## 2024-02-28 NOTE — PROGRESS NOTES
Chief Complaint   Patient presents with   • Follow-up     Dementia and appetite        Patient ID: Yaquelin Wright is a 89 y.o. female.    HPI  Pt is seeing with her daughter for f/u Dementia, CHF, weight loss -  doing well -  gained weight -  eating better  -  on Remeron 15 mg/day     The following portions of the patient's history were reviewed and updated as appropriate: allergies, current medications, past family history, past medical history, past social history, past surgical history and problem list.    Review of Systems   Constitutional: Negative.    Respiratory: Negative.     Cardiovascular: Negative.    Gastrointestinal: Negative.    Genitourinary: Negative.    Musculoskeletal: Negative.    Skin: Negative.    Neurological: Negative.    Psychiatric/Behavioral:  Negative for agitation, hallucinations, sleep disturbance and suicidal ideas. The patient is not nervous/anxious.        Current Outpatient Medications   Medication Sig Dispense Refill   • aspirin 81 MG tablet Take 1 tablet by mouth daily     • Empagliflozin (JARDIANCE) 10 MG TABS tablet Take 1 tablet (10 mg total) by mouth daily 30 tablet 5   • furosemide (LASIX) 40 mg tablet Take 1 tablet (40 mg total) by mouth daily 30 tablet 6   • metoprolol succinate (TOPROL-XL) 50 mg 24 hr tablet TAKE 1 TABLET BY MOUTH TWICE A  tablet 1   • mirtazapine (REMERON) 15 mg tablet Take 15 mg by mouth daily at bedtime     • potassium chloride (Klor-Con M20) 20 mEq tablet Take 1 tablet (20 mEq total) by mouth 4 (four) times a day 360 tablet 1   • pravastatin (PRAVACHOL) 40 mg tablet Take 1 tablet (40 mg total) by mouth daily 30 tablet 3   • RABEprazole (ACIPHEX) 20 MG tablet TAKE 1 TABLET DAILY 90 tablet 3   • spironolactone (ALDACTONE) 25 mg tablet TAKE 1/2 TABLET BY MOUTH EVERY DAY 45 tablet 1   • losartan (COZAAR) 50 mg tablet Take 50 mg by mouth daily (Patient not taking: Reported on 2/28/2024)       No current facility-administered medications for this  "visit.       Objective:    /72 (BP Location: Left arm, Patient Position: Sitting, Cuff Size: Standard)   Pulse 72   Temp 98.5 °F (36.9 °C) (Temporal)   Resp 14   Ht 5' 4\" (1.626 m)   Wt 53.4 kg (117 lb 12.8 oz)   SpO2 92%   BMI 20.22 kg/m²        Physical Exam  Constitutional:       General: She is not in acute distress.     Appearance: She is not ill-appearing.   Cardiovascular:      Rate and Rhythm: Normal rate and regular rhythm.      Heart sounds:      No gallop.   Pulmonary:      Effort: Pulmonary effort is normal. No respiratory distress.      Breath sounds: No wheezing, rhonchi or rales.   Musculoskeletal:      Right lower leg: No edema.      Left lower leg: No edema.   Neurological:      Mental Status: She is alert. Mental status is at baseline.      Motor: No weakness.      Gait: Gait normal.   Psychiatric:         Mood and Affect: Mood normal.         Behavior: Behavior is cooperative.         Cognition and Memory: She exhibits impaired recent memory.           Labs in chart were reviewed.      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

## 2024-04-03 ENCOUNTER — RA CDI HCC (OUTPATIENT)
Dept: OTHER | Facility: HOSPITAL | Age: 89
End: 2024-04-03

## 2024-04-03 NOTE — PROGRESS NOTES
I11.0  HCC coding opportunities          Chart Reviewed number of suggestions sent to Provider: 1     Patients Insurance     Medicare Insurance: [employer] Advantage

## 2024-04-10 ENCOUNTER — OFFICE VISIT (OUTPATIENT)
Dept: FAMILY MEDICINE CLINIC | Facility: CLINIC | Age: 89
End: 2024-04-10
Payer: COMMERCIAL

## 2024-04-10 VITALS
SYSTOLIC BLOOD PRESSURE: 128 MMHG | HEART RATE: 59 BPM | DIASTOLIC BLOOD PRESSURE: 60 MMHG | OXYGEN SATURATION: 95 % | RESPIRATION RATE: 16 BRPM | HEIGHT: 64 IN | TEMPERATURE: 97 F | WEIGHT: 125.2 LBS | BODY MASS INDEX: 21.37 KG/M2

## 2024-04-10 DIAGNOSIS — G30.1 MODERATE LATE ONSET ALZHEIMER'S DEMENTIA WITH MOOD DISTURBANCE (HCC): Primary | ICD-10-CM

## 2024-04-10 DIAGNOSIS — I25.10 ARTERIOSCLEROSIS OF CORONARY ARTERY: ICD-10-CM

## 2024-04-10 DIAGNOSIS — F02.B3 MODERATE LATE ONSET ALZHEIMER'S DEMENTIA WITH MOOD DISTURBANCE (HCC): Primary | ICD-10-CM

## 2024-04-10 DIAGNOSIS — R06.02 SOB (SHORTNESS OF BREATH) ON EXERTION: ICD-10-CM

## 2024-04-10 DIAGNOSIS — E78.2 MIXED HYPERLIPIDEMIA: ICD-10-CM

## 2024-04-10 DIAGNOSIS — I10 BENIGN ESSENTIAL HYPERTENSION: ICD-10-CM

## 2024-04-10 DIAGNOSIS — E87.6 HYPOKALEMIA: ICD-10-CM

## 2024-04-10 PROBLEM — F33.9 DEPRESSION, RECURRENT (HCC): Status: RESOLVED | Noted: 2023-05-31 | Resolved: 2024-04-10

## 2024-04-10 PROCEDURE — 99214 OFFICE O/P EST MOD 30 MIN: CPT | Performed by: FAMILY MEDICINE

## 2024-04-10 PROCEDURE — G2211 COMPLEX E/M VISIT ADD ON: HCPCS | Performed by: FAMILY MEDICINE

## 2024-04-10 RX ORDER — METOPROLOL SUCCINATE 50 MG/1
50 TABLET, EXTENDED RELEASE ORAL 2 TIMES DAILY
Qty: 180 TABLET | Refills: 1 | Status: SHIPPED | OUTPATIENT
Start: 2024-04-10

## 2024-04-10 RX ORDER — MIRTAZAPINE 15 MG/1
15 TABLET, FILM COATED ORAL
Qty: 90 TABLET | Refills: 2 | Status: SHIPPED | OUTPATIENT
Start: 2024-04-10 | End: 2025-01-05

## 2024-04-10 RX ORDER — PRAVASTATIN SODIUM 40 MG
40 TABLET ORAL DAILY
Qty: 90 TABLET | Refills: 1 | Status: SHIPPED | OUTPATIENT
Start: 2024-04-10 | End: 2024-10-07

## 2024-04-10 RX ORDER — POTASSIUM CHLORIDE 20 MEQ/1
40 TABLET, EXTENDED RELEASE ORAL 2 TIMES DAILY
Qty: 360 TABLET | Refills: 1 | Status: SHIPPED | OUTPATIENT
Start: 2024-04-10

## 2024-04-10 NOTE — PROGRESS NOTES
Chief Complaint   Patient presents with   • Follow-up     6 WEEK        Patient ID: Yaquelin Wright is a 89 y.o. female.    HPI  Pt is seeing for f/u Dementia -  seeing with her daughter -  gained weight   -  has CHF and HTN  -  stable    The following portions of the patient's history were reviewed and updated as appropriate: allergies, current medications, past family history, past medical history, past social history, past surgical history and problem list.    Review of Systems   Unable to perform ROS: Dementia   Constitutional: Negative.    HENT: Negative.     Respiratory:  Positive for shortness of breath (occasionally as per daughter).    Cardiovascular:  Negative for chest pain, palpitations and leg swelling.   Gastrointestinal: Negative.    Musculoskeletal: Negative.        Current Outpatient Medications   Medication Sig Dispense Refill   • aspirin 81 MG tablet Take 1 tablet by mouth daily     • Empagliflozin (JARDIANCE) 10 MG TABS tablet Take 1 tablet (10 mg total) by mouth daily 90 tablet 1   • furosemide (LASIX) 40 mg tablet Take 1 tablet (40 mg total) by mouth daily 30 tablet 6   • metoprolol succinate (TOPROL-XL) 50 mg 24 hr tablet TAKE 1 TABLET BY MOUTH TWICE A DAY (Patient taking differently: Take 50 mg by mouth 2 (two) times a day) 180 tablet 1   • mirtazapine (REMERON) 15 mg tablet Take 1 tablet (15 mg total) by mouth daily at bedtime 90 tablet 2   • potassium chloride (Klor-Con M20) 20 mEq tablet Take 1 tablet (20 mEq total) by mouth 4 (four) times a day (Patient taking differently: Take 20 mEq by mouth 4 (four) times a day 2 IN THE MORNING AND TWO AT NIGHT) 360 tablet 1   • pravastatin (PRAVACHOL) 40 mg tablet Take 1 tablet (40 mg total) by mouth daily 30 tablet 3   • RABEprazole (ACIPHEX) 20 MG tablet TAKE 1 TABLET DAILY 90 tablet 3   • spironolactone (ALDACTONE) 25 mg tablet TAKE 1/2 TABLET BY MOUTH EVERY DAY 45 tablet 1     No current facility-administered medications for this visit.  "      Objective:    /60 (BP Location: Left arm, Patient Position: Sitting, Cuff Size: Standard)   Pulse 59   Temp (!) 97 °F (36.1 °C) (Temporal)   Resp 16   Ht 5' 4\" (1.626 m)   Wt 56.8 kg (125 lb 3.2 oz)   SpO2 95%   BMI 21.49 kg/m²        Physical Exam  Constitutional:       General: She is not in acute distress.     Appearance: She is not ill-appearing.   Cardiovascular:      Rate and Rhythm: Normal rate and regular rhythm.   Pulmonary:      Effort: No respiratory distress.      Breath sounds: No wheezing, rhonchi or rales.   Musculoskeletal:      Right lower leg: No edema.      Left lower leg: No edema.   Neurological:      General: No focal deficit present.      Mental Status: She is alert. Mental status is at baseline.      Cranial Nerves: No cranial nerve deficit.      Motor: No weakness.      Gait: Gait normal.   Psychiatric:         Mood and Affect: Mood normal.           Labs in chart were reviewed.      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)  -     mirtazapine (REMERON) 15 mg tablet; Take 1 tablet (15 mg total) by mouth daily at bedtime    Hypokalemia  -     potassium chloride (Klor-Con M20) 20 mEq tablet; Take 2 tablets (40 mEq total) by mouth 2 (two) times a day  -     Comprehensive metabolic panel; Future    Mixed hyperlipidemia  -     pravastatin (PRAVACHOL) 40 mg tablet; Take 1 tablet (40 mg total) by mouth daily    Benign essential hypertension  -     metoprolol succinate (TOPROL-XL) 50 mg 24 hr tablet; Take 1 tablet (50 mg total) by mouth 2 (two) times a day  -     Comprehensive metabolic panel; Future    Arteriosclerosis of coronary artery    SOB (shortness of breath) on exertion  -     XR chest pa & lateral; Future        Rto in 2 m                     Aria Clark MD      "

## 2024-04-17 ENCOUNTER — APPOINTMENT (OUTPATIENT)
Dept: RADIOLOGY | Facility: CLINIC | Age: 89
End: 2024-04-17
Payer: COMMERCIAL

## 2024-04-17 ENCOUNTER — APPOINTMENT (OUTPATIENT)
Dept: LAB | Facility: CLINIC | Age: 89
End: 2024-04-17
Payer: COMMERCIAL

## 2024-04-17 DIAGNOSIS — E87.6 HYPOKALEMIA: ICD-10-CM

## 2024-04-17 DIAGNOSIS — I50.42 CHRONIC COMBINED SYSTOLIC AND DIASTOLIC CONGESTIVE HEART FAILURE (HCC): ICD-10-CM

## 2024-04-17 DIAGNOSIS — E78.2 MIXED HYPERLIPIDEMIA: ICD-10-CM

## 2024-04-17 DIAGNOSIS — R06.02 SOB (SHORTNESS OF BREATH) ON EXERTION: ICD-10-CM

## 2024-04-17 DIAGNOSIS — I10 BENIGN ESSENTIAL HYPERTENSION: ICD-10-CM

## 2024-04-17 LAB
ALBUMIN SERPL BCP-MCNC: 3.9 G/DL (ref 3.5–5)
ALP SERPL-CCNC: 74 U/L (ref 34–104)
ALT SERPL W P-5'-P-CCNC: 19 U/L (ref 7–52)
ANION GAP SERPL CALCULATED.3IONS-SCNC: 9 MMOL/L (ref 4–13)
AST SERPL W P-5'-P-CCNC: 27 U/L (ref 13–39)
BILIRUB SERPL-MCNC: 0.69 MG/DL (ref 0.2–1)
BUN SERPL-MCNC: 20 MG/DL (ref 5–25)
CALCIUM SERPL-MCNC: 9.6 MG/DL (ref 8.4–10.2)
CHLORIDE SERPL-SCNC: 101 MMOL/L (ref 96–108)
CO2 SERPL-SCNC: 30 MMOL/L (ref 21–32)
CREAT SERPL-MCNC: 0.63 MG/DL (ref 0.6–1.3)
GFR SERPL CREATININE-BSD FRML MDRD: 79 ML/MIN/1.73SQ M
GLUCOSE P FAST SERPL-MCNC: 102 MG/DL (ref 65–99)
POTASSIUM SERPL-SCNC: 3.8 MMOL/L (ref 3.5–5.3)
PROT SERPL-MCNC: 7.3 G/DL (ref 6.4–8.4)
SODIUM SERPL-SCNC: 140 MMOL/L (ref 135–147)

## 2024-04-17 PROCEDURE — 36415 COLL VENOUS BLD VENIPUNCTURE: CPT

## 2024-04-17 PROCEDURE — 80053 COMPREHEN METABOLIC PANEL: CPT

## 2024-04-17 PROCEDURE — 71046 X-RAY EXAM CHEST 2 VIEWS: CPT

## 2024-04-17 RX ORDER — SPIRONOLACTONE 25 MG/1
12.5 TABLET ORAL DAILY
Qty: 45 TABLET | Refills: 1 | OUTPATIENT
Start: 2024-04-17

## 2024-04-18 ENCOUNTER — TELEPHONE (OUTPATIENT)
Dept: CARDIOLOGY CLINIC | Facility: CLINIC | Age: 89
End: 2024-04-18

## 2024-04-18 NOTE — TELEPHONE ENCOUNTER
Spoke with patients daughter, made aware of results.   She is on communication consent scanned. She had also asked about the spironolactone refill to [facility]. I let her know that she should have one more refill at the pharmacy as a 90 day supply with one refill was sent in January.   She will follow up with [facility].

## 2024-04-18 NOTE — TELEPHONE ENCOUNTER
----- Message from Estela Iyer PA-C sent at 4/18/2024  1:06 PM EDT -----  Can you please inform the patient that her lab work has been reviewed.  Her lab values are acceptable, continue current medication regimen at this time.  Thank you.  ----- Message -----  From: Lab, Background User  Sent: 4/17/2024   8:07 PM EDT  To: Estela Iyer PA-C

## 2024-04-24 ENCOUNTER — TELEPHONE (OUTPATIENT)
Dept: FAMILY MEDICINE CLINIC | Facility: CLINIC | Age: 89
End: 2024-04-24

## 2024-04-24 NOTE — TELEPHONE ENCOUNTER
----- Message from Aria Clark MD sent at 4/24/2024  6:55 PM EDT -----  Pl, advise pt's daughter -  CXR is normal

## 2024-05-30 ENCOUNTER — OFFICE VISIT (OUTPATIENT)
Dept: FAMILY MEDICINE CLINIC | Facility: CLINIC | Age: 89
End: 2024-05-30
Payer: COMMERCIAL

## 2024-05-30 VITALS
BODY MASS INDEX: 21.85 KG/M2 | WEIGHT: 128 LBS | SYSTOLIC BLOOD PRESSURE: 128 MMHG | OXYGEN SATURATION: 98 % | RESPIRATION RATE: 16 BRPM | DIASTOLIC BLOOD PRESSURE: 70 MMHG | TEMPERATURE: 98.2 F | HEART RATE: 66 BPM | HEIGHT: 64 IN

## 2024-05-30 DIAGNOSIS — J90 PLEURAL EFFUSION: ICD-10-CM

## 2024-05-30 DIAGNOSIS — Z13.0 SCREENING, IRON DEFICIENCY ANEMIA: ICD-10-CM

## 2024-05-30 DIAGNOSIS — G30.1 MODERATE LATE ONSET ALZHEIMER'S DEMENTIA WITH MOOD DISTURBANCE (HCC): ICD-10-CM

## 2024-05-30 DIAGNOSIS — E87.8 ELECTROLYTE ABNORMALITY: ICD-10-CM

## 2024-05-30 DIAGNOSIS — I10 BENIGN ESSENTIAL HYPERTENSION: Primary | ICD-10-CM

## 2024-05-30 DIAGNOSIS — I25.10 CORONARY ARTERY DISEASE INVOLVING NATIVE CORONARY ARTERY OF NATIVE HEART WITHOUT ANGINA PECTORIS: ICD-10-CM

## 2024-05-30 DIAGNOSIS — E78.2 MIXED HYPERLIPIDEMIA: ICD-10-CM

## 2024-05-30 DIAGNOSIS — E87.6 HYPOKALEMIA: ICD-10-CM

## 2024-05-30 DIAGNOSIS — Z86.73 H/O: CVA (CEREBROVASCULAR ACCIDENT): ICD-10-CM

## 2024-05-30 DIAGNOSIS — E44.1 MILD PROTEIN-CALORIE MALNUTRITION (HCC): ICD-10-CM

## 2024-05-30 DIAGNOSIS — F02.B3 MODERATE LATE ONSET ALZHEIMER'S DEMENTIA WITH MOOD DISTURBANCE (HCC): ICD-10-CM

## 2024-05-30 PROBLEM — R79.89 ABNORMAL CBC: Status: RESOLVED | Noted: 2022-02-15 | Resolved: 2024-05-30

## 2024-05-30 PROCEDURE — G2211 COMPLEX E/M VISIT ADD ON: HCPCS | Performed by: STUDENT IN AN ORGANIZED HEALTH CARE EDUCATION/TRAINING PROGRAM

## 2024-05-30 PROCEDURE — 99214 OFFICE O/P EST MOD 30 MIN: CPT | Performed by: STUDENT IN AN ORGANIZED HEALTH CARE EDUCATION/TRAINING PROGRAM

## 2024-05-30 NOTE — PROGRESS NOTES
Clinic Visit Note  Yaquelin Wright 89 y.o. 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    Mixed hyperlipidemia  Continue statin therapy    Mild protein-calorie malnutrition (HCC)  BMI improvement    H/O: CVA (cerebrovascular accident)    Electrolyte abnormality  Check CMP/magnesium, CBC given concerns for hypokalemia  -     Comprehensive metabolic panel; Future  -     Magnesium; Future    Screening, iron deficiency anemia  -     CBC and differential; Future    Hypokalemia    Pleural effusion  Rule out pleural effusion given concern for subjective shortness of breath  -     XR chest pa & lateral; Future    My impressions and treatment recommendations were discussed in detail with the patient who verbalized understanding and had no further questions.  Discharge instructions were provided.    Subjective     Chief Complaint: F/U    History of Present Illness:    Patient is a pleasant 89-year-old female coming in for chronic disease management follow-up, family notes questionable altered mental status, fatigue recently.    The following portions of the patient's history were reviewed and updated as appropriate: allergies, current medications, past family history, past medical history, past social history, past surgical history and problem list.    REVIEW OF SYSTEMS:  A complete 12-point review of systems is negative other than that noted in the HPI.    Review of Systems   Constitutional:  Negative for chills and fever.   HENT:  Negative for ear pain and sore  throat.    Eyes:  Negative for pain and visual disturbance.   Respiratory:  Negative for cough and shortness of breath.    Cardiovascular:  Negative for chest pain and palpitations.   Gastrointestinal:  Negative for abdominal pain and vomiting.   Genitourinary:  Negative for dysuria and hematuria.   Musculoskeletal:  Negative for arthralgias and back pain.   Skin:  Negative for color change and rash.   Neurological:  Negative for seizures and syncope.   All other systems reviewed and are negative.        Current Outpatient Medications:   •  aspirin 81 MG tablet, Take 1 tablet by mouth daily, Disp: , Rfl:   •  Empagliflozin (JARDIANCE) 10 MG TABS tablet, Take 1 tablet (10 mg total) by mouth daily, Disp: 90 tablet, Rfl: 1  •  furosemide (LASIX) 40 mg tablet, Take 1 tablet (40 mg total) by mouth daily, Disp: 30 tablet, Rfl: 6  •  metoprolol succinate (TOPROL-XL) 50 mg 24 hr tablet, Take 1 tablet (50 mg total) by mouth 2 (two) times a day, Disp: 180 tablet, Rfl: 1  •  mirtazapine (REMERON) 15 mg tablet, Take 1 tablet (15 mg total) by mouth daily at bedtime, Disp: 90 tablet, Rfl: 2  •  potassium chloride (Klor-Con M20) 20 mEq tablet, Take 2 tablets (40 mEq total) by mouth 2 (two) times a day, Disp: 360 tablet, Rfl: 1  •  pravastatin (PRAVACHOL) 40 mg tablet, Take 1 tablet (40 mg total) by mouth daily, Disp: 90 tablet, Rfl: 1  •  RABEprazole (ACIPHEX) 20 MG tablet, TAKE 1 TABLET DAILY, Disp: 90 tablet, Rfl: 3  •  spironolactone (ALDACTONE) 25 mg tablet, TAKE 1/2 TABLET BY MOUTH EVERY DAY, Disp: 45 tablet, Rfl: 1  Past Medical History:   Diagnosis Date   • Abscess of chest wall     Last Assessed: 2/27/2014   • Arthritis    • Back pain    • Cataract     Start of   • CHF (congestive heart failure) (HCC)    • Colon polyp    • Coronary artery disease    • Diverticulitis of colon 12/04/2008   • Female bladder prolapse    • Gastric reflux    • Hematuria     Last Assessed: 11/18/2014    • History of diverticulitis    • [place] (hard  of hearing)    • Hypercholesteremia    • Hyperkeratosis of skin 11/03/2011   • Hypertension    • Hypokalemia 12/13/2011   • Incomplete uterovaginal prolapse 05/28/2010   • Myocardial infarction (HCC)    • Osteoporosis    • 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    Last Assessed: 10/24/2012    • Seasonal allergies    • Sebaceous cyst     Last Assessed: 2/8/2014   • Syncope    • Trigeminal neuralgia 03/20/2012   • Urinary incontinence    • Uterine prolapse    • Vertigo 09/15/2011   • 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   • Wears glasses    • Wears hearing aid     States she rarely wears them   • Wears partial dentures     Upper      Past Surgical History:   Procedure Laterality Date   • APPENDECTOMY     • APPENDICO-VESICOSTOMY CUTANEOUS  03/01/2010   • CHOLECYSTECTOMY      Laparoscopic   • COLONOSCOPY     • CORONARY ANGIOPLASTY     • CORONARY ANGIOPLASTY WITH STENT PLACEMENT      2 aortic stents   • CORONARY ANGIOPLASTY WITH STENT PLACEMENT  2013    2 distal left anterior descending artery    • ESOPHAGOGASTRODUODENOSCOPY     • FRACTURE SURGERY Right     Repair right arm- titanium servando from shoulder to elbow.   • HYSTERECTOMY      Complete   • IR THORACENTESIS  6/9/2023   • IR THORACENTESIS  7/14/2023   • IR THORACENTESIS  12/28/2023   • SD CMBND ANTERPOST COLPORRAPHY W/CYSTO N/A 9/20/2016    Procedure: COLPORRHAPHY ANTERIOR POSTERIOR GRAFT;  Surgeon: Zach Abdi MD;  Location: [facility];  Service: UroGynecology          • SD COLPOPEXY VAGINAL EXTRAPERITONEAL APPROACH N/A 9/20/2016    Procedure: COLPOPEXY VAGINAL EXTRAPERITONEAL  incision and drainage of vagina inclusion cyst x 4;  Surgeon: Zach Abdi MD;  Location: [facility];  Service: UroGynecology          • SD CYSTOURETHROSCOPY N/A 9/20/2016    Procedure: CYSTOSCOPY;  Surgeon: Zach Abdi MD;  Location: [facility];  Service: UroGynecology          • SD SLING OPERATION STRESS INCONTINENCE N/A 9/20/2016    Procedure: INSERTION PUBOVAGINAL SLING  RETROPUBIC ;  Surgeon: Zach Abdi MD;  Location: [facility] OR;  Service: UroGynecology            Social History     Socioeconomic History   • Marital status:      Spouse name: Not on file   • Number of children: Not on file   • Years of education: Not on file   • Highest education level: Not on file   Occupational History   • Not on file   Tobacco Use   • Smoking status: Never   • Smokeless tobacco: Never   Vaping Use   • Vaping status: Never Used   Substance and Sexual Activity   • Alcohol use: Not Currently     Alcohol/week: 1.0 standard drink of alcohol     Types: 1 Cans of beer per week     Comment: once a day on most days of the week   • Drug use: No   • Sexual activity: Not on file   Other Topics Concern   • Not on file   Social History Narrative    Daily Coffee Consumption- 2 cups/day     Exercising Regularly      Social Determinants of Health     Financial Resource Strain: Low Risk  (7/13/2023)    Overall Financial Resource Strain (CARDIA)    • Difficulty of Paying Living Expenses: Not hard at all   Food Insecurity: Patient Declined (12/29/2023)    Hunger Vital Sign    • Worried About Running Out of Food in the Last Year: Patient declined    • Ran Out of Food in the Last Year: Patient declined   Transportation Needs: No Transportation Needs (12/29/2023)    PRAPARE - Transportation    • Lack of Transportation (Medical): No    • Lack of Transportation (Non-Medical): No   Physical Activity: Not on file   Stress: Not on file   Social Connections: Not on file   Intimate Partner Violence: Not on file   Housing Stability: Unknown (12/29/2023)    Housing Stability Vital Sign    • Unable to Pay for Housing in the Last Year: Patient declined    • Number of Places Lived in the Last Year: 1    • Unstable Housing in the Last Year: No     Family History   Problem Relation Age of Onset   • Hypertension Mother    • Heart failure Mother    • Coronary artery disease Mother    • Arthritis Mother    • Osteoporosis  "Mother    • Hyperlipidemia Mother    • Coronary artery disease Sister      Allergies   Allergen Reactions   • Bee Venom Anaphylaxis     Reaction Date: 02Feb2004; Annotation - [id number]: jjw   • Enalapril Other (See Comments)     Reaction Date: 28Oct2003;   Leg  pain   • Erythromycin Dizziness     Reaction Date: 28Oct2003;    • Estrogens Other (See Comments)     Reaction Date: 02Feb2004; Annotation - [id number]: jjw   • Ezetimibe Hives   • Penicillins Hives     \"All cillins\", \"and all myocillins\"   • Ramipril Other (See Comments)     Reaction Date: 02Feb2004; Annotation - [id number]: jjw   • Statins Other (See Comments)     Muscle pain   • Versed [Midazolam] Other (See Comments)     States she \"passed out\" when recovering from a procedure and was told to not use it again.   • Zithromax [Azithromycin] Anaphylaxis       Objective     Vitals:    05/30/24 1556   BP: 128/70   BP Location: Left arm   Patient Position: Sitting   Cuff Size: Standard   Pulse: 66   Resp: 16   Temp: 98.2 °F (36.8 °C)   TempSrc: Temporal   SpO2: 98%   Weight: 58.1 kg (128 lb)   Height: 5' 4\" (1.626 m)       Physical Exam:     GENERAL: NAD, pleasant   HEENT:  NC/AT, PERRL, EOMI, no scleral icterus  CARDIAC:  RRR, +S1/S2, no S3/S4 appreciated, no m/g/r  PULMONARY:  CTA B/L, no wheezing/rales/rhonci, non-labored breathing  ABDOMEN:  Soft, NT/ND, no rebound/guarding/rigidity  Extremities:. No edema, cyanosis, or clubbing  Musculoskeletal:  Full range of motion intact in all extremities   NEUROLOGIC: Grossly intact, no focal deficits  SKIN:  No rashes or erythema noted on exposed skin  Psych: Normal affect, mood stable    ==  PLEASE NOTE:  This encounter was completed utilizing the [employer] Speech Voice Recognition Software. Grammatical errors, random word insertions, pronoun errors and incomplete sentences are occasional consequences of the system due to software limitations, ambient noise and hardware issues.These may be missed by " proof reading prior to affixing electronic signature. Any questions or concerns about the content, text or information contained within the body of this dictation should be directly addressed to the physician for clarification. Please do not hesitate to call me directly if you have any any questions or concerns.    Samir John, DO  [facility]   [facility]

## 2024-05-31 ENCOUNTER — APPOINTMENT (OUTPATIENT)
Dept: LAB | Facility: CLINIC | Age: 89
End: 2024-05-31
Payer: COMMERCIAL

## 2024-05-31 ENCOUNTER — APPOINTMENT (OUTPATIENT)
Dept: RADIOLOGY | Facility: CLINIC | Age: 89
End: 2024-05-31
Payer: COMMERCIAL

## 2024-05-31 DIAGNOSIS — J90 PLEURAL EFFUSION: ICD-10-CM

## 2024-05-31 DIAGNOSIS — E87.8 ELECTROLYTE ABNORMALITY: ICD-10-CM

## 2024-05-31 DIAGNOSIS — Z13.0 SCREENING, IRON DEFICIENCY ANEMIA: ICD-10-CM

## 2024-05-31 LAB
ALBUMIN SERPL BCP-MCNC: 4.1 G/DL (ref 3.5–5)
ALP SERPL-CCNC: 76 U/L (ref 34–104)
ALT SERPL W P-5'-P-CCNC: 18 U/L (ref 7–52)
ANION GAP SERPL CALCULATED.3IONS-SCNC: 8 MMOL/L (ref 4–13)
AST SERPL W P-5'-P-CCNC: 26 U/L (ref 13–39)
BASOPHILS # BLD AUTO: 0.05 THOUSANDS/ÂΜL (ref 0–0.1)
BASOPHILS NFR BLD AUTO: 1 % (ref 0–1)
BILIRUB SERPL-MCNC: 0.34 MG/DL (ref 0.2–1)
BUN SERPL-MCNC: 22 MG/DL (ref 5–25)
CALCIUM SERPL-MCNC: 9.5 MG/DL (ref 8.4–10.2)
CHLORIDE SERPL-SCNC: 101 MMOL/L (ref 96–108)
CO2 SERPL-SCNC: 29 MMOL/L (ref 21–32)
CREAT SERPL-MCNC: 0.71 MG/DL (ref 0.6–1.3)
EOSINOPHIL # BLD AUTO: 0.32 THOUSAND/ÂΜL (ref 0–0.61)
EOSINOPHIL NFR BLD AUTO: 5 % (ref 0–6)
ERYTHROCYTE [DISTWIDTH] IN BLOOD BY AUTOMATED COUNT: 13.9 % (ref 11.6–15.1)
GFR SERPL CREATININE-BSD FRML MDRD: 75 ML/MIN/1.73SQ M
GLUCOSE P FAST SERPL-MCNC: 85 MG/DL (ref 65–99)
HCT VFR BLD AUTO: 50.9 % (ref 34.8–46.1)
HGB BLD-MCNC: 16.1 G/DL (ref 11.5–15.4)
IMM GRANULOCYTES # BLD AUTO: 0.03 THOUSAND/UL (ref 0–0.2)
IMM GRANULOCYTES NFR BLD AUTO: 0 % (ref 0–2)
LYMPHOCYTES # BLD AUTO: 2.22 THOUSANDS/ÂΜL (ref 0.6–4.47)
LYMPHOCYTES NFR BLD AUTO: 33 % (ref 14–44)
MAGNESIUM SERPL-MCNC: 2.2 MG/DL (ref 1.9–2.7)
MCH RBC QN AUTO: 31 PG (ref 26.8–34.3)
MCHC RBC AUTO-ENTMCNC: 31.6 G/DL (ref 31.4–37.4)
MCV RBC AUTO: 98 FL (ref 82–98)
MONOCYTES # BLD AUTO: 0.96 THOUSAND/ÂΜL (ref 0.17–1.22)
MONOCYTES NFR BLD AUTO: 14 % (ref 4–12)
NEUTROPHILS # BLD AUTO: 3.18 THOUSANDS/ÂΜL (ref 1.85–7.62)
NEUTS SEG NFR BLD AUTO: 47 % (ref 43–75)
NRBC BLD AUTO-RTO: 0 /100 WBCS
PLATELET # BLD AUTO: 245 THOUSANDS/UL (ref 149–390)
PMV BLD AUTO: 10.8 FL (ref 8.9–12.7)
POTASSIUM SERPL-SCNC: 4.1 MMOL/L (ref 3.5–5.3)
PROT SERPL-MCNC: 7.5 G/DL (ref 6.4–8.4)
RBC # BLD AUTO: 5.19 MILLION/UL (ref 3.81–5.12)
SODIUM SERPL-SCNC: 138 MMOL/L (ref 135–147)
WBC # BLD AUTO: 6.76 THOUSAND/UL (ref 4.31–10.16)

## 2024-05-31 PROCEDURE — 71046 X-RAY EXAM CHEST 2 VIEWS: CPT

## 2024-05-31 PROCEDURE — 80053 COMPREHEN METABOLIC PANEL: CPT

## 2024-05-31 PROCEDURE — 36415 COLL VENOUS BLD VENIPUNCTURE: CPT

## 2024-05-31 PROCEDURE — 83735 ASSAY OF MAGNESIUM: CPT

## 2024-05-31 PROCEDURE — 85025 COMPLETE CBC W/AUTO DIFF WBC: CPT

## 2024-06-02 ENCOUNTER — RA CDI HCC (OUTPATIENT)
Dept: OTHER | Facility: HOSPITAL | Age: 89
End: 2024-06-02

## 2024-06-03 ENCOUNTER — TELEPHONE (OUTPATIENT)
Age: 89
End: 2024-06-03

## 2024-06-03 NOTE — TELEPHONE ENCOUNTER
Patient daughter Keith called in for Xray and blood work results. Communication consent verified.  Blood work results given.  Xray results are not back yet.     Daughter aware we will call when Xray results come in.   I also tried to assist with setting up My Chart.  Encouraged daughter to call My Chart IT.  Verbalized understanding.

## 2024-06-04 ENCOUNTER — TELEPHONE (OUTPATIENT)
Dept: FAMILY MEDICINE CLINIC | Facility: CLINIC | Age: 89
End: 2024-06-04

## 2024-06-04 NOTE — TELEPHONE ENCOUNTER
Patient's daughter Keith returned call to confirm she received message with lab results. Confirmed OV scheduled for 6/10/24 at 1:30 PM. Keith did have a question regarding x-ray chest completed 5/31; no results to date. Please follow up with Keith when results are aavailable.

## 2024-06-04 NOTE — TELEPHONE ENCOUNTER
----- Message from Samir John DO sent at 6/1/2024 10:43 AM EDT -----  Please let patient know that labs are stable, follow-up Medicare annual wellness with primary care physician.

## 2024-06-06 ENCOUNTER — TELEPHONE (OUTPATIENT)
Dept: FAMILY MEDICINE CLINIC | Facility: CLINIC | Age: 89
End: 2024-06-06

## 2024-06-06 NOTE — TELEPHONE ENCOUNTER
Patient's daughter Keith called back and RN reviewed x-ray results per provider's comments. Eve was happy to receibe the good news.

## 2024-06-06 NOTE — TELEPHONE ENCOUNTER
----- Message from Samir John DO sent at 6/6/2024  7:13 AM EDT -----  Please let patient know that x-ray shows no acute cardiopulmonary disease, no pleural effusion.

## 2024-06-10 ENCOUNTER — OFFICE VISIT (OUTPATIENT)
Dept: FAMILY MEDICINE CLINIC | Facility: CLINIC | Age: 89
End: 2024-06-10
Payer: COMMERCIAL

## 2024-06-10 VITALS
OXYGEN SATURATION: 97 % | SYSTOLIC BLOOD PRESSURE: 152 MMHG | RESPIRATION RATE: 18 BRPM | TEMPERATURE: 98.3 F | BODY MASS INDEX: 22.71 KG/M2 | HEIGHT: 64 IN | WEIGHT: 133 LBS | HEART RATE: 62 BPM | DIASTOLIC BLOOD PRESSURE: 72 MMHG

## 2024-06-10 DIAGNOSIS — I50.42 CHRONIC COMBINED SYSTOLIC AND DIASTOLIC CONGESTIVE HEART FAILURE (HCC): ICD-10-CM

## 2024-06-10 DIAGNOSIS — E87.6 HYPOKALEMIA: ICD-10-CM

## 2024-06-10 DIAGNOSIS — I10 BENIGN ESSENTIAL HYPERTENSION: ICD-10-CM

## 2024-06-10 DIAGNOSIS — I50.22 CHRONIC SYSTOLIC CONGESTIVE HEART FAILURE (HCC): ICD-10-CM

## 2024-06-10 DIAGNOSIS — F02.B3 MODERATE LATE ONSET ALZHEIMER'S DEMENTIA WITH MOOD DISTURBANCE (HCC): Primary | ICD-10-CM

## 2024-06-10 DIAGNOSIS — G30.1 MODERATE LATE ONSET ALZHEIMER'S DEMENTIA WITH MOOD DISTURBANCE (HCC): Primary | ICD-10-CM

## 2024-06-10 PROCEDURE — G2211 COMPLEX E/M VISIT ADD ON: HCPCS | Performed by: FAMILY MEDICINE

## 2024-06-10 PROCEDURE — 99214 OFFICE O/P EST MOD 30 MIN: CPT | Performed by: FAMILY MEDICINE

## 2024-06-10 RX ORDER — POTASSIUM CHLORIDE 3 G/15ML
40 SOLUTION ORAL DAILY
Qty: 473 ML | Refills: 3 | Status: SHIPPED | OUTPATIENT
Start: 2024-06-10

## 2024-06-10 RX ORDER — LOSARTAN POTASSIUM 50 MG/1
50 TABLET ORAL DAILY
COMMUNITY
Start: 2024-05-09

## 2024-06-10 RX ORDER — AMLODIPINE BESYLATE 2.5 MG/1
2.5 TABLET ORAL DAILY
COMMUNITY

## 2024-06-10 RX ORDER — FEXOFENADINE HCL 60 MG/1
60 TABLET, FILM COATED ORAL 2 TIMES DAILY
COMMUNITY

## 2024-06-10 RX ORDER — CLOPIDOGREL BISULFATE 75 MG/1
75 TABLET ORAL DAILY
COMMUNITY

## 2024-06-10 RX ORDER — POTASSIUM CHLORIDE 20MEQ/15ML
20 LIQUID (ML) ORAL DAILY
COMMUNITY
End: 2024-06-10

## 2024-06-10 NOTE — PROGRESS NOTES
Chief Complaint   Patient presents with   • Follow-up     Pt here for for 8 week f/u        Patient ID: Yaquelin Wright is a 89 y.o. female.    HPI  Pt is seeing with her daughter for f/u Dementia, CHF, Hypokalemia, HTN -  all stable, gained weight  -  having issues with potassium pills due to size     The following portions of the patient's history were reviewed and updated as appropriate: allergies, current medications, past family history, past medical history, past social history, past surgical history and problem list.    Review of Systems   Constitutional: Negative.    Respiratory: Negative.     Cardiovascular: Negative.    Gastrointestinal: Negative.    Genitourinary: Negative.    Musculoskeletal: Negative.    Neurological: Negative.    Psychiatric/Behavioral:  Positive for confusion (at baseline).        Current Outpatient Medications   Medication Sig Dispense Refill   • amLODIPine (NORVASC) 2.5 mg tablet Take 2.5 mg by mouth daily     • aspirin 81 MG tablet Take 1 tablet by mouth daily     • clopidogrel (PLAVIX) 75 mg tablet Take 75 mg by mouth daily     • Empagliflozin (JARDIANCE) 10 MG TABS tablet Take 1 tablet (10 mg total) by mouth daily 90 tablet 1   • fexofenadine (Allegra Allergy) 60 MG tablet Take 60 mg by mouth 2 (two) times a day     • furosemide (LASIX) 40 mg tablet Take 1 tablet (40 mg total) by mouth daily 30 tablet 6   • losartan (COZAAR) 50 mg tablet Take 50 mg by mouth daily     • metoprolol succinate (TOPROL-XL) 50 mg 24 hr tablet Take 1 tablet (50 mg total) by mouth 2 (two) times a day 180 tablet 1   • mirtazapine (REMERON) 15 mg tablet Take 1 tablet (15 mg total) by mouth daily at bedtime 90 tablet 2   • potassium chloride (Klor-Con M20) 20 mEq tablet Take 2 tablets (40 mEq total) by mouth 2 (two) times a day 360 tablet 1   • potassium chloride 40 MEQ 2 times a day    3   • pravastatin (PRAVACHOL) 40 mg tablet Take 1 tablet (40 mg total) by mouth daily 90 tablet 1   • RABEprazole  "(ACIPHEX) 20 MG tablet TAKE 1 TABLET DAILY 90 tablet 3   • spironolactone (ALDACTONE) 25 mg tablet TAKE 1/2 TABLET BY MOUTH EVERY DAY 45 tablet 1     No current facility-administered medications for this visit.       Objective:    /72 (BP Location: Right arm, Patient Position: Sitting, Cuff Size: Standard)   Pulse 62   Temp 98.3 °F (36.8 °C) (Temporal)   Resp 18   Ht 5' 4\" (1.626 m)   Wt 60.3 kg (133 lb)   SpO2 97%   BMI 22.83 kg/m²        Physical Exam  Constitutional:       General: She is not in acute distress.     Appearance: She is not ill-appearing.   Cardiovascular:      Rate and Rhythm: Normal rate and regular rhythm.      Heart sounds: Murmur heard.      Systolic murmur is present with a grade of 3/6.      No gallop.   Pulmonary:      Effort: No respiratory distress.      Breath sounds: No wheezing, rhonchi or rales.   Musculoskeletal:      Right lower leg: No edema.      Left lower leg: No edema.   Neurological:      General: No focal deficit present.      Mental Status: She is alert. Mental status is at baseline.      Cranial Nerves: No cranial nerve deficit.      Motor: No weakness.      Gait: Gait normal.   Psychiatric:         Mood and Affect: Mood normal.           Labs in chart were reviewed.      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)    Stable   Benign essential hypertension  -     Comprehensive metabolic panel; Future    Chronic systolic congestive heart failure (HCC)  Euvolemic on exam  Cont meds    Hypokalemia  -     potassium chloride 40 MEQ/15ML (20%) oral solution; Take 15 mL (40 mEq total) by mouth daily  -     Comprehensive metabolic panel; Future    Chronic combined systolic and diastolic congestive heart failure (HCC)  -     Empagliflozin (JARDIANCE) 10 MG TABS tablet; Take 1 tablet (10 mg total) by mouth daily    Other orders  -     amLODIPine (NORVASC) 2.5 mg tablet; Take 2.5 " mg by mouth daily  -     clopidogrel (PLAVIX) 75 mg tablet; Take 75 mg by mouth daily  -     Discontinue: potassium chloride 10% oral solution; Take 20 mEq by mouth daily (Patient not taking: Reported on 6/10/2024)  -     fexofenadine (Allegra Allergy) 60 MG tablet; Take 60 mg by mouth 2 (two) times a day  -     losartan (COZAAR) 50 mg tablet; Take 50 mg by mouth daily        Rto in 1 m                   Aria Clark MD

## 2024-06-19 DIAGNOSIS — G30.1 MODERATE LATE ONSET ALZHEIMER'S DEMENTIA WITH MOOD DISTURBANCE (HCC): Primary | ICD-10-CM

## 2024-06-19 DIAGNOSIS — F02.B3 MODERATE LATE ONSET ALZHEIMER'S DEMENTIA WITH MOOD DISTURBANCE (HCC): Primary | ICD-10-CM

## 2024-06-19 RX ORDER — DONEPEZIL HYDROCHLORIDE 5 MG/1
TABLET, FILM COATED ORAL
Qty: 90 TABLET | Refills: 1 | Status: SHIPPED | OUTPATIENT
Start: 2024-06-19

## 2024-06-28 DIAGNOSIS — I50.42 CHRONIC COMBINED SYSTOLIC AND DIASTOLIC CONGESTIVE HEART FAILURE (HCC): ICD-10-CM

## 2024-06-28 RX ORDER — FUROSEMIDE 40 MG/1
40 TABLET ORAL DAILY
Qty: 90 TABLET | Refills: 1 | Status: SHIPPED | OUTPATIENT
Start: 2024-06-28

## 2024-06-30 ENCOUNTER — RA CDI HCC (OUTPATIENT)
Dept: OTHER | Facility: HOSPITAL | Age: 89
End: 2024-06-30

## 2024-07-10 ENCOUNTER — OFFICE VISIT (OUTPATIENT)
Dept: FAMILY MEDICINE CLINIC | Facility: CLINIC | Age: 89
End: 2024-07-10
Payer: COMMERCIAL

## 2024-07-10 VITALS
HEIGHT: 64 IN | DIASTOLIC BLOOD PRESSURE: 70 MMHG | HEART RATE: 61 BPM | OXYGEN SATURATION: 96 % | SYSTOLIC BLOOD PRESSURE: 138 MMHG | WEIGHT: 133.6 LBS | TEMPERATURE: 97.6 F | BODY MASS INDEX: 22.81 KG/M2 | RESPIRATION RATE: 16 BRPM

## 2024-07-10 DIAGNOSIS — M53.3 COCCYALGIA: ICD-10-CM

## 2024-07-10 DIAGNOSIS — I10 BENIGN ESSENTIAL HYPERTENSION: ICD-10-CM

## 2024-07-10 DIAGNOSIS — G30.1 MODERATE LATE ONSET ALZHEIMER'S DEMENTIA WITH MOOD DISTURBANCE (HCC): ICD-10-CM

## 2024-07-10 DIAGNOSIS — I50.22 CHRONIC SYSTOLIC CONGESTIVE HEART FAILURE (HCC): Primary | ICD-10-CM

## 2024-07-10 DIAGNOSIS — F02.B3 MODERATE LATE ONSET ALZHEIMER'S DEMENTIA WITH MOOD DISTURBANCE (HCC): ICD-10-CM

## 2024-07-10 PROCEDURE — 99214 OFFICE O/P EST MOD 30 MIN: CPT | Performed by: FAMILY MEDICINE

## 2024-07-10 PROCEDURE — G2211 COMPLEX E/M VISIT ADD ON: HCPCS | Performed by: FAMILY MEDICINE

## 2024-07-10 NOTE — PROGRESS NOTES
Chief Complaint   Patient presents with   • Follow-up   • medication check   Dementia, CHF, HTN      Patient ID: Yaquelin Wright is a 89 y.o. female.    HPI  Pt is seeing with her daughter for f/u Dementia, CHF, HTN -  all stable     The following portions of the patient's history were reviewed and updated as appropriate: allergies, current medications, past family history, past medical history, past social history, past surgical history and problem list.    Review of Systems   Constitutional: Negative.    Respiratory: Negative.     Cardiovascular: Negative.    Gastrointestinal: Negative.    Genitourinary: Negative.    Musculoskeletal:         Tailbone pain after the fall 1 wk ago -  walking normally    Neurological: Negative.    Psychiatric/Behavioral:  Positive for confusion (at baseline).    Poor historian    Current Outpatient Medications   Medication Sig Dispense Refill   • aspirin 81 MG tablet Take 1 tablet by mouth daily     • Empagliflozin (JARDIANCE) 10 MG TABS tablet Take 1 tablet (10 mg total) by mouth daily 90 tablet 1   • furosemide (LASIX) 40 mg tablet TAKE 1 TABLET BY MOUTH EVERY DAY 90 tablet 1   • losartan (COZAAR) 50 mg tablet Take 50 mg by mouth daily     • metoprolol succinate (TOPROL-XL) 50 mg 24 hr tablet Take 1 tablet (50 mg total) by mouth 2 (two) times a day 180 tablet 1   • mirtazapine (REMERON) 15 mg tablet Take 1 tablet (15 mg total) by mouth daily at bedtime 90 tablet 2   • potassium chloride (Klor-Con M20) 20 mEq tablet Take 2 tablets (40 mEq total) by mouth 2 (two) times a day 360 tablet 1   • pravastatin (PRAVACHOL) 40 mg tablet Take 1 tablet (40 mg total) by mouth daily 90 tablet 1   • RABEprazole (ACIPHEX) 20 MG tablet TAKE 1 TABLET DAILY 90 tablet 3   • spironolactone (ALDACTONE) 25 mg tablet TAKE 1/2 TABLET BY MOUTH EVERY DAY 45 tablet 1   • clopidogrel (PLAVIX) 75 mg tablet Take 75 mg by mouth daily (Patient not taking: Reported on 7/10/2024)     • fexofenadine (Allegra  "Allergy) 60 MG tablet Take 60 mg by mouth 2 (two) times a day (Patient not taking: Reported on 7/10/2024)       No current facility-administered medications for this visit.       Objective:    /70 (BP Location: Left arm, Patient Position: Sitting, Cuff Size: Standard)   Pulse 61   Temp 97.6 °F (36.4 °C) (Temporal)   Resp 16   Ht 5' 4\" (1.626 m)   Wt 60.6 kg (133 lb 9.6 oz)   SpO2 96%   BMI 22.93 kg/m²        Physical Exam  Constitutional:       General: She is not in acute distress.     Appearance: She is not ill-appearing.   Cardiovascular:      Rate and Rhythm: Normal rate and regular rhythm.      Heart sounds: Murmur heard.      Systolic murmur is present with a grade of 3/6.      No gallop.   Pulmonary:      Effort: No respiratory distress.      Breath sounds: No wheezing, rhonchi or rales.   Musculoskeletal:        Back:       Right lower leg: No edema.      Left lower leg: No edema.   Neurological:      General: No focal deficit present.      Mental Status: She is alert. Mental status is at baseline.      Cranial Nerves: No cranial nerve deficit.      Motor: No weakness.      Gait: Gait normal.   Psychiatric:         Mood and Affect: Mood normal.           Labs in chart were reviewed.      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

## 2024-07-12 ENCOUNTER — APPOINTMENT (OUTPATIENT)
Dept: LAB | Facility: CLINIC | Age: 89
End: 2024-07-12
Payer: COMMERCIAL

## 2024-07-12 DIAGNOSIS — I10 BENIGN ESSENTIAL HYPERTENSION: ICD-10-CM

## 2024-07-12 DIAGNOSIS — E87.6 HYPOKALEMIA: ICD-10-CM

## 2024-07-12 LAB
ALBUMIN SERPL BCG-MCNC: 3.8 G/DL (ref 3.5–5)
ALP SERPL-CCNC: 78 U/L (ref 34–104)
ALT SERPL W P-5'-P-CCNC: 14 U/L (ref 7–52)
ANION GAP SERPL CALCULATED.3IONS-SCNC: 12 MMOL/L (ref 4–13)
AST SERPL W P-5'-P-CCNC: 24 U/L (ref 13–39)
BILIRUB SERPL-MCNC: 0.54 MG/DL (ref 0.2–1)
BUN SERPL-MCNC: 23 MG/DL (ref 5–25)
CALCIUM SERPL-MCNC: 9.8 MG/DL (ref 8.4–10.2)
CHLORIDE SERPL-SCNC: 100 MMOL/L (ref 96–108)
CO2 SERPL-SCNC: 28 MMOL/L (ref 21–32)
CREAT SERPL-MCNC: 0.7 MG/DL (ref 0.6–1.3)
GFR SERPL CREATININE-BSD FRML MDRD: 76 ML/MIN/1.73SQ M
GLUCOSE P FAST SERPL-MCNC: 74 MG/DL (ref 65–99)
POTASSIUM SERPL-SCNC: 4 MMOL/L (ref 3.5–5.3)
PROT SERPL-MCNC: 7.5 G/DL (ref 6.4–8.4)
SODIUM SERPL-SCNC: 140 MMOL/L (ref 135–147)

## 2024-07-12 PROCEDURE — 80053 COMPREHEN METABOLIC PANEL: CPT

## 2024-07-12 PROCEDURE — 36415 COLL VENOUS BLD VENIPUNCTURE: CPT

## 2024-07-15 ENCOUNTER — TELEPHONE (OUTPATIENT)
Dept: FAMILY MEDICINE CLINIC | Facility: CLINIC | Age: 89
End: 2024-07-15

## 2024-07-15 NOTE — TELEPHONE ENCOUNTER
----- Message from Aria Clark MD sent at 7/15/2024  2:59 PM EDT -----  Pl, advise pt's daughter-   normal labs

## 2024-08-13 ENCOUNTER — OFFICE VISIT (OUTPATIENT)
Dept: FAMILY MEDICINE CLINIC | Facility: CLINIC | Age: 89
End: 2024-08-13
Payer: COMMERCIAL

## 2024-08-13 VITALS
TEMPERATURE: 96.4 F | OXYGEN SATURATION: 98 % | SYSTOLIC BLOOD PRESSURE: 138 MMHG | WEIGHT: 134.2 LBS | BODY MASS INDEX: 22.91 KG/M2 | HEIGHT: 64 IN | DIASTOLIC BLOOD PRESSURE: 70 MMHG | RESPIRATION RATE: 18 BRPM | HEART RATE: 70 BPM

## 2024-08-13 DIAGNOSIS — G30.1 MODERATE LATE ONSET ALZHEIMER'S DEMENTIA WITH MOOD DISTURBANCE (HCC): ICD-10-CM

## 2024-08-13 DIAGNOSIS — F02.B3 MODERATE LATE ONSET ALZHEIMER'S DEMENTIA WITH MOOD DISTURBANCE (HCC): ICD-10-CM

## 2024-08-13 DIAGNOSIS — L03.116 CELLULITIS OF LEFT LOWER EXTREMITY: Primary | ICD-10-CM

## 2024-08-13 DIAGNOSIS — I10 BENIGN ESSENTIAL HYPERTENSION: ICD-10-CM

## 2024-08-13 DIAGNOSIS — Z00.00 MEDICARE ANNUAL WELLNESS VISIT, SUBSEQUENT: ICD-10-CM

## 2024-08-13 PROCEDURE — G0439 PPPS, SUBSEQ VISIT: HCPCS | Performed by: FAMILY MEDICINE

## 2024-08-13 PROCEDURE — 99213 OFFICE O/P EST LOW 20 MIN: CPT | Performed by: FAMILY MEDICINE

## 2024-08-13 RX ORDER — MIRTAZAPINE 15 MG/1
15 TABLET, FILM COATED ORAL
Qty: 90 TABLET | Refills: 2 | Status: SHIPPED | OUTPATIENT
Start: 2024-08-13 | End: 2025-05-10

## 2024-08-13 RX ORDER — METOPROLOL SUCCINATE 50 MG/1
50 TABLET, EXTENDED RELEASE ORAL 2 TIMES DAILY
Qty: 180 TABLET | Refills: 1 | Status: SHIPPED | OUTPATIENT
Start: 2024-08-13

## 2024-08-13 RX ORDER — DOXYCYCLINE 100 MG/1
100 CAPSULE ORAL EVERY 12 HOURS SCHEDULED
Qty: 14 CAPSULE | Refills: 0 | Status: SHIPPED | OUTPATIENT
Start: 2024-08-13 | End: 2024-08-20

## 2024-08-13 NOTE — PATIENT INSTRUCTIONS
Medicare Preventive Visit Patient Instructions  Thank you for completing your Welcome to Medicare Visit or Medicare Annual Wellness Visit today. Your next wellness visit will be due in one year (8/14/2025).  The screening/preventive services that you may require over the next 5-10 years are detailed below. Some tests may not apply to you based off risk factors and/or age. Screening tests ordered at today's visit but not completed yet may show as past due. Also, please note that scanned in results may not display below.  Preventive Screenings:  Service Recommendations Previous Testing/Comments   Colorectal Cancer Screening  * Colonoscopy    * Fecal Occult Blood Test (FOBT)/Fecal Immunochemical Test (FIT)  * Fecal DNA/Cologuard Test  * Flexible Sigmoidoscopy Age: 45-75 years old   Colonoscopy: every 10 years (may be performed more frequently if at higher risk)  OR  FOBT/FIT: every 1 year  OR  Cologuard: every 3 years  OR  Sigmoidoscopy: every 5 years  Screening may be recommended earlier than age 45 if at higher risk for colorectal cancer. Also, an individualized decision between you and your healthcare provider will decide whether screening between the ages of 76-85 would be appropriate. Colonoscopy: 05/29/2020  FOBT/FIT: Not on file  Cologuard: Not on file  Sigmoidoscopy: Not on file    Screening Not Indicated     Breast Cancer Screening Age: 40+ years old  Frequency: every 1-2 years  Not required if history of left and right mastectomy Mammogram: 09/12/2017    Screening Not Indicated   Cervical Cancer Screening Between the ages of 21-29, pap smear recommended once every 3 years.   Between the ages of 30-65, can perform pap smear with HPV co-testing every 5 years.   Recommendations may differ for women with a history of total hysterectomy, cervical cancer, or abnormal pap smears in past. Pap Smear: Not on file    Screening Not Indicated   Hepatitis C Screening Once for adults born between 1945 and 1965  More  frequently in patients at high risk for Hepatitis C Hep C Antibody: 03/01/2018    Screening Current   Diabetes Screening 1-2 times per year if you're at risk for diabetes or have pre-diabetes Fasting glucose: 74 mg/dL (7/12/2024)  A1C: 5.8 (6/5/2022)  Screening Current   Cholesterol Screening Once every 5 years if you don't have a lipid disorder. May order more often based on risk factors. Lipid panel: 06/08/2023    Screening Not Indicated  History Lipid Disorder     Other Preventive Screenings Covered by Medicare:  Abdominal Aortic Aneurysm (AAA) Screening: covered once if your at risk. You're considered to be at risk if you have a family history of AAA.  Lung Cancer Screening: covers low dose CT scan once per year if you meet all of the following conditions: (1) Age 55-77; (2) No signs or symptoms of lung cancer; (3) Current smoker or have quit smoking within the last 15 years; (4) You have a tobacco smoking history of at least 20 pack years (packs per day multiplied by number of years you smoked); (5) You get a written order from a healthcare provider.  Glaucoma Screening: covered annually if you're considered high risk: (1) You have diabetes OR (2) Family history of glaucoma OR (3)  aged 50 and older OR (4)  American aged 65 and older  Osteoporosis Screening: covered every 2 years if you meet one of the following conditions: (1) You're estrogen deficient and at risk for osteoporosis based off medical history and other findings; (2) Have a vertebral abnormality; (3) On glucocorticoid therapy for more than 3 months; (4) Have primary hyperparathyroidism; (5) On osteoporosis medications and need to assess response to drug therapy.   Last bone density test (DXA Scan): 09/16/2019.  HIV Screening: covered annually if you're between the age of 15-65. Also covered annually if you are younger than 15 and older than 65 with risk factors for HIV infection. For pregnant patients, it is covered up to 3  times per pregnancy.    Immunizations:  Immunization Recommendations   Influenza Vaccine Annual influenza vaccination during flu season is recommended for all persons aged >= 6 months who do not have contraindications   Pneumococcal Vaccine   * Pneumococcal conjugate vaccine = PCV13 (Prevnar 13), PCV15 (Vaxneuvance), PCV20 (Prevnar 20)  * Pneumococcal polysaccharide vaccine = PPSV23 (Pneumovax) Adults 19-65 yo with certain risk factors or if 65+ yo  If never received any pneumonia vaccine: recommend Prevnar 20 (PCV20)  Give PCV20 if previously received 1 dose of PCV13 or PPSV23   Hepatitis B Vaccine 3 dose series if at intermediate or high risk (ex: diabetes, end stage renal disease, liver disease)   Respiratory syncytial virus (RSV) Vaccine - COVERED BY MEDICARE PART D  * RSVPreF3 (Arexvy) CDC recommends that adults 60 years of age and older may receive a single dose of RSV vaccine using shared clinical decision-making (SCDM)   Tetanus (Td) Vaccine - COST NOT COVERED BY MEDICARE PART B Following completion of primary series, a booster dose should be given every 10 years to maintain immunity against tetanus. Td may also be given as tetanus wound prophylaxis.   Tdap Vaccine - COST NOT COVERED BY MEDICARE PART B Recommended at least once for all adults. For pregnant patients, recommended with each pregnancy.   Shingles Vaccine (Shingrix) - COST NOT COVERED BY MEDICARE PART B  2 shot series recommended in those 19 years and older who have or will have weakened immune systems or those 50 years and older     Health Maintenance Due:      Topic Date Due   • Hepatitis C Screening  Completed     Immunizations Due:      Topic Date Due   • COVID-19 Vaccine (5 - 2023-24 season) 09/01/2023   • Influenza Vaccine (1) 09/01/2024     Advance Directives   What are advance directives?  Advance directives are legal documents that state your wishes and plans for medical care. These plans are made ahead of time in case you lose your  ability to make decisions for yourself. Advance directives can apply to any medical decision, such as the treatments you want, and if you want to donate organs.   What are the types of advance directives?  There are many types of advance directives, and each state has rules about how to use them. You may choose a combination of any of the following:  Living will:  This is a written record of the treatment you want. You can also choose which treatments you do not want, which to limit, and which to stop at a certain time. This includes surgery, medicine, IV fluid, and tube feedings.   Durable power of  for healthcare (DPAHC):  This is a written record that states who you want to make healthcare choices for you when you are unable to make them for yourself. This person, called a proxy, is usually a family member or a friend. You may choose more than 1 proxy.  Do not resuscitate (DNR) order:  A DNR order is used in case your heart stops beating or you stop breathing. It is a request not to have certain forms of treatment, such as CPR. A DNR order may be included in other types of advance directives.  Medical directive:  This covers the care that you want if you are in a coma, near death, or unable to make decisions for yourself. You can list the treatments you want for each condition. Treatment may include pain medicine, surgery, blood transfusions, dialysis, IV or tube feedings, and a ventilator (breathing machine).  Values history:  This document has questions about your views, beliefs, and how you feel and think about life. This information can help others choose the care that you would choose.  Why are advance directives important?  An advance directive helps you control your care. Although spoken wishes may be used, it is better to have your wishes written down. Spoken wishes can be misunderstood, or not followed. Treatments may be given even if you do not want them. An advance directive may make it easier  for your family to make difficult choices about your care.   Fall Prevention    Fall prevention  includes ways to make your home and other areas safer. It also includes ways you can move more carefully to prevent a fall. Health conditions that cause changes in your blood pressure, vision, or muscle strength and coordination may increase your risk for falls. Medicines may also increase your risk for falls if they make you dizzy, weak, or sleepy.   Fall prevention tips:   Stand or sit up slowly.    Use assistive devices as directed.    Wear shoes that fit well and have soles that .    Wear a personal alarm.    Stay active.    Manage your medical conditions.    Home Safety Tips:  Add items to prevent falls in the bathroom.    Keep paths clear.    Install bright lights in your home.    Keep items you use often on shelves within reach.    Paint or place reflective tape on the edges of your stairs.    Urinary Incontinence   Urinary incontinence (UI)  is when you lose control of your bladder. UI develops because your bladder cannot store or empty urine properly. The 3 most common types of UI are stress incontinence, urge incontinence, or both.  Medicines:   May be given to help strengthen your bladder control. Report any side effects of medication to your healthcare provider.  Do pelvic muscle exercises often:  Your pelvic muscles help you stop urinating. Squeeze these muscles tight for 5 seconds, then relax for 5 seconds. Gradually work up to squeezing for 10 seconds. Do 3 sets of 15 repetitions a day, or as directed. This will help strengthen your pelvic muscles and improve bladder control.  Train your bladder:  Go to the bathroom at set times, such as every 2 hours, even if you do not feel the urge to go. You can also try to hold your urine when you feel the urge to go. For example, hold your urine for 5 minutes when you feel the urge to go. As that becomes easier, hold your urine for 10 minutes.   Self-care:   Keep  a UI record.  Write down how often you leak urine and how much you leak. Make a note of what you were doing when you leaked urine.  Drink liquids as directed. You may need to limit the amount of liquid you drink to help control your urine leakage. Do not drink any liquid right before you go to bed. Limit or do not have drinks that contain caffeine or alcohol.   Prevent constipation.  Eat a variety of high-fiber foods. Good examples are high-fiber cereals, beans, vegetables, and whole-grain breads. Walking is the best way to trigger your intestines to have a bowel movement.  Exercise regularly and maintain a healthy weight.  Weight loss and exercise will decrease pressure on your bladder and help you control your leakage.   Use a catheter as directed  to help empty your bladder. A catheter is a tiny, plastic tube that is put into your bladder to drain your urine.   Go to behavior therapy as directed.  Behavior therapy may be used to help you learn to control your urge to urinate.     © Copyright [employer] 2018 Information is for End User's use only and may not be sold, redistributed or otherwise used for commercial purposes. All illustrations and images included in CareNotes® are the copyrighted property of [employer] or [employer]

## 2024-08-13 NOTE — PROGRESS NOTES
Ambulatory Visit  Name: Yaquelin Wright      : 10/3/1933      MRN: [id number]  Encounter Provider: Aria Clark MD  Encounter Date: 2024   Encounter department: [facility]    Assessment & Plan   1. Cellulitis of left lower extremity  -     doxycycline hyclate (VIBRAMYCIN) 100 mg capsule; Take 1 capsule (100 mg total) by mouth every 12 (twelve) hours for 7 days  2. Medicare annual wellness visit, subsequent  3. Benign essential hypertension  -     metoprolol succinate (TOPROL-XL) 50 mg 24 hr tablet; Take 1 tablet (50 mg total) by mouth 2 (two) times a day  4. 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 Personalized health advice and appropriate referrals for health education or preventive services given if needed, as noted in patient's After Visit Summary.    History of Present Illness     Pt is seeing for painful redness and swelling of the L foot started 3 days ago -  no injury reported -  no therapy tried, worsening with walking      Patient Care Team:  Aria Clark MD as PCP - General (Family Medicine)  Ruy Fernandez MD as PCP - Endocrinology (Endocrinology)  MD Mo Dotson DO Priya D Rana, PA-C as Physician Assistant (Endocrinology)    Review of Systems   All other systems reviewed and are negative.  Poor historian due to dementia   Medical History Reviewed by provider this encounter:  Tobacco  Allergies  Meds  Problems  Med Hx  Surg Hx  Fam Hx       Annual Wellness Visit Questionnaire   Yaquelin is here for her Subsequent Wellness visit.     Historian  Patient cannot answer questions due to cognitive impairment, intelluctual disability, or  Chief Complaint   Patient presents with    Immunization/Injection     1. Have you been to the ER, urgent care clinic since your last visit? Hospitalized since your last visit? No    2. Have you seen or consulted any other health care providers outside of the [address] [place] [address] since your last visit? Include any pap smears or colon screening.  No expressive limitations. Information provided by: family.    Health Risk Assessment:   Patient rates overall health as good. Patient feels that their physical health rating is same. Patient is satisfied with their life. Eyesight was rated as same. Hearing was rated as same. Patient feels that their emotional and mental health rating is same. Patients states they are never, rarely angry. Patient states they are sometimes unusually tired/fatigued. Pain experienced in the last 7 days has been a lot. Patient's pain rating has been 4/10. Patient states that she has experienced weight loss or gain in last 6 months. foot    Depression Screening:   PHQ-2 Score: 0      Fall Risk Screening:   In the past year, patient has experienced: history of falling in past year    Number of falls: 2 or more  Injured during fall?: No    Feels unsteady when standing or walking?: No    Worried about falling?: No      Urinary Incontinence Screening:   Patient has leaked urine accidently in the last six months.     Home Safety:  Patient does not have trouble with stairs inside or outside of their home. Patient has working smoke alarms and has working carbon monoxide detector. Home safety hazards include: none.     Nutrition:   Current diet is Regular.     Medications:   Patient is not currently taking any over-the-counter supplements. Patient is not able to manage medications.     Activities of Daily Living (ADLs)/Instrumental Activities of Daily Living (IADLs):   Walk and transfer into and out of bed and chair?: Yes  Dress and groom yourself?: Yes    Bathe or shower yourself?: Yes    Feed yourself? Yes  Do your laundry/housekeeping?: Yes  Manage your money, pay your bills and track your expenses?: Yes  Make your own meals?: Yes    Do your own shopping?: Yes    Previous Hospitalizations:   Any hospitalizations or ED visits within the last 12 months?: Yes    How many hospitalizations have you had in the last year?: 1-2    Hospitalization  Comments: Fluid on  lung     Advance Care Planning:   Living will: Yes    Durable POA for healthcare: Yes    Advanced directive: Yes      Cognitive Screening:   Provider or family/friend/caregiver concerned regarding cognition?: Yes    PREVENTIVE SCREENINGS      Cardiovascular Screening:    General: Screening Not Indicated and History Lipid Disorder      Diabetes Screening:     General: Screening Current      Colorectal Cancer Screening:     General: Screening Not Indicated      Breast Cancer Screening:     General: Screening Not Indicated      Cervical Cancer Screening:    General: Screening Not Indicated      Osteoporosis Screening:    General: Screening Not Indicated and History Osteoporosis      Abdominal Aortic Aneurysm (AAA) Screening:        General: Screening Not Indicated      Lung Cancer Screening:     General: Screening Not Indicated      Hepatitis C Screening:    General: Screening Current    Screening, Brief Intervention, and Referral to Treatment (SBIRT)    Screening  Typical number of drinks in a day: 0  Typical number of drinks in a week: 0  Interpretation: Low risk drinking behavior.    Single Item Drug Screening:  How often have you used an illegal drug (including marijuana) or a prescription medication for non-medical reasons in the past year? never    Single Item Drug Screen Score: 0  Interpretation: Negative screen for possible drug use disorder    Social Determinants of Health     Financial Resource Strain: Low Risk  (7/13/2023)    Overall Financial Resource Strain (CARDIA)    • Difficulty of Paying Living Expenses: Not hard at all   Food Insecurity: No Food Insecurity (8/13/2024)    Hunger Vital Sign    • Worried About Running Out of Food in the Last Year: Never true    • Ran Out of Food in the Last Year: Never true   Transportation Needs: No Transportation Needs (8/13/2024)    PRAPARE - Transportation    • Lack of Transportation (Medical): No    • Lack of Transportation (Non-Medical): No  "  Housing Stability: Low Risk  (8/13/2024)    Housing Stability Vital Sign    • Unable to Pay for Housing in the Last Year: No    • Number of Times Moved in the Last Year: 0    • Homeless in the Last Year: No   Utilities: Not At Risk (8/13/2024)    [facility] Utilities    • Threatened with loss of utilities: No     No results found.    Objective     /70 (BP Location: Left arm, Patient Position: Sitting, Cuff Size: Standard)   Pulse 70   Temp (!) 96.4 °F (35.8 °C)   Resp 18   Ht 5' 4\" (1.626 m)   Wt 60.9 kg (134 lb 3.2 oz)   SpO2 98%   BMI 23.04 kg/m²     Physical Exam  Constitutional:       General: She is not in acute distress.     Appearance: She is not ill-appearing.   Musculoskeletal:      Left ankle: Normal.      Left foot: Normal range of motion. Swelling and tenderness present. No deformity or bony tenderness.        Legs:    Skin:     Findings: Erythema (dorsum L foot) present.   Neurological:      Mental Status: She is alert.      Gait: Gait abnormal (painful).         "

## 2024-08-26 ENCOUNTER — TELEPHONE (OUTPATIENT)
Age: 89
End: 2024-08-26

## 2024-08-26 NOTE — TELEPHONE ENCOUNTER
Pt's daughter Keith phoned in with concerns; stated that pt has infection on left foot from an insect bite, pt is now on doxycycline, also had cellulitis during this time.     Pt's daughter would like to know if there will be an impact or interaction with antibiotic and heart medications.     Please call pt's daughter, Keith [phone]

## 2024-10-02 ENCOUNTER — TELEPHONE (OUTPATIENT)
Dept: FAMILY MEDICINE CLINIC | Facility: CLINIC | Age: 89
End: 2024-10-02

## 2024-10-04 DIAGNOSIS — I50.42 CHRONIC COMBINED SYSTOLIC AND DIASTOLIC CONGESTIVE HEART FAILURE (HCC): ICD-10-CM

## 2024-10-04 RX ORDER — SPIRONOLACTONE 25 MG/1
12.5 TABLET ORAL DAILY
Qty: 45 TABLET | Refills: 1 | Status: SHIPPED | OUTPATIENT
Start: 2024-10-04

## 2024-10-08 NOTE — TELEPHONE ENCOUNTER
LEFT MESSAGE ON MACHINE for patient's daughter, need to clarify if patient is still on oxygen as forms were received from [facility] to be completed.

## 2024-10-08 NOTE — TELEPHONE ENCOUNTER
[facility] states that they need a script for oxygen tanks to be picked up.  Please fax to [phone]

## 2024-10-08 NOTE — TELEPHONE ENCOUNTER
Keith called back and stated the patient is no longer on oxygen as she has not needed it for months.  She would like to know if [facility] can be contacted to  the oxygen tanks.  Please advise.  Thank you!

## 2024-10-10 NOTE — TELEPHONE ENCOUNTER
Chayo from [facility] called, they offer overnight oximetry to make sure patient O2 sats do not drop overnight.  If you would like to check this before oxygen is picked up, please call her back and she can get overnight O2 sats on room air ordered.    [phone] option 1.

## 2024-10-30 NOTE — TELEPHONE ENCOUNTER
----- Message from Dk Valenzuela, Sal Seymour sent at 7/20/2021 10:52 AM EDT -----  07/20/21 10:53 AM    Hello, our patient Sang Avila has had Medicare AWV completed/performed  Please assist in updating the patient chart by making an External outreach to [facility]  Per pt, they are performing this service today  The date of service is 7/20/21      Thank you,  BERTIN Cleveland PG
Messaging sent to Manager regarding guidance on request 
No update on status 
Notify MD
Received response from [address]  regarding patient:    [address]  researched this patient; findings were that the patient did not have an AWV or [facility] visit  Member is currently attributed to a different healthcare system and they are unable to provide more information 
Upon review of the In Basket request we reached out to Costa nguyen regarding this AWV request  Ronald nguyen stated that they researched this patient; findings were that the patient did not have an AWV or [facility] visit  Member is currently attributed to a different healthcare system and they are unable to provide more information  Any additional questions or concerns should be emailed to the Practice Liaisons via [email or web address] email, please do not reply via In Basket      Thank you  Amanda Smith MA
Notify MD

## 2024-11-07 ENCOUNTER — IMMUNIZATIONS (OUTPATIENT)
Dept: FAMILY MEDICINE CLINIC | Facility: CLINIC | Age: 89
End: 2024-11-07
Payer: COMMERCIAL

## 2024-11-07 DIAGNOSIS — E87.6 HYPOKALEMIA: ICD-10-CM

## 2024-11-07 DIAGNOSIS — Z23 ENCOUNTER FOR IMMUNIZATION: Primary | ICD-10-CM

## 2024-11-07 PROCEDURE — 90662 IIV NO PRSV INCREASED AG IM: CPT

## 2024-11-07 PROCEDURE — G0008 ADMIN INFLUENZA VIRUS VAC: HCPCS

## 2024-11-07 RX ORDER — POTASSIUM CHLORIDE 1500 MG/1
40 TABLET, EXTENDED RELEASE ORAL 2 TIMES DAILY
Qty: 360 TABLET | Refills: 1 | Status: SHIPPED | OUTPATIENT
Start: 2024-11-07

## 2024-11-27 DIAGNOSIS — I50.42 CHRONIC COMBINED SYSTOLIC AND DIASTOLIC CONGESTIVE HEART FAILURE (HCC): ICD-10-CM

## 2024-11-27 RX ORDER — EMPAGLIFLOZIN 10 MG/1
10 TABLET, FILM COATED ORAL DAILY
Qty: 30 TABLET | Refills: 0 | Status: SHIPPED | OUTPATIENT
Start: 2024-11-27 | End: 2024-11-29

## 2024-11-29 RX ORDER — EMPAGLIFLOZIN 10 MG/1
10 TABLET, FILM COATED ORAL DAILY
Qty: 90 TABLET | Refills: 0 | Status: SHIPPED | OUTPATIENT
Start: 2024-11-29

## 2024-12-29 DIAGNOSIS — I50.42 CHRONIC COMBINED SYSTOLIC AND DIASTOLIC CONGESTIVE HEART FAILURE (HCC): ICD-10-CM

## 2024-12-30 RX ORDER — FUROSEMIDE 40 MG/1
40 TABLET ORAL DAILY
Qty: 90 TABLET | Refills: 1 | Status: SHIPPED | OUTPATIENT
Start: 2024-12-30

## 2025-01-08 DIAGNOSIS — I10 BENIGN ESSENTIAL HYPERTENSION: ICD-10-CM

## 2025-01-09 RX ORDER — METOPROLOL SUCCINATE 50 MG/1
50 TABLET, EXTENDED RELEASE ORAL 2 TIMES DAILY
Qty: 180 TABLET | Refills: 1 | Status: SHIPPED | OUTPATIENT
Start: 2025-01-09

## 2025-02-20 DIAGNOSIS — G30.1 MODERATE LATE ONSET ALZHEIMER'S DEMENTIA WITH MOOD DISTURBANCE (HCC): ICD-10-CM

## 2025-02-20 DIAGNOSIS — F02.B3 MODERATE LATE ONSET ALZHEIMER'S DEMENTIA WITH MOOD DISTURBANCE (HCC): ICD-10-CM

## 2025-02-21 RX ORDER — MIRTAZAPINE 15 MG/1
15 TABLET, FILM COATED ORAL
Qty: 30 TABLET | Refills: 0 | Status: SHIPPED | OUTPATIENT
Start: 2025-02-21 | End: 2025-11-18

## 2025-04-05 DIAGNOSIS — I50.42 CHRONIC COMBINED SYSTOLIC AND DIASTOLIC CONGESTIVE HEART FAILURE (HCC): ICD-10-CM

## 2025-04-06 DIAGNOSIS — I50.42 CHRONIC COMBINED SYSTOLIC AND DIASTOLIC CONGESTIVE HEART FAILURE (HCC): ICD-10-CM

## 2025-04-07 RX ORDER — FUROSEMIDE 40 MG/1
40 TABLET ORAL DAILY
Qty: 90 TABLET | Refills: 1 | Status: SHIPPED | OUTPATIENT
Start: 2025-04-07

## 2025-04-08 RX ORDER — SPIRONOLACTONE 25 MG/1
12.5 TABLET ORAL DAILY
Qty: 45 TABLET | Refills: 1 | Status: SHIPPED | OUTPATIENT
Start: 2025-04-08

## 2025-04-08 NOTE — TELEPHONE ENCOUNTER
Patient needs an appointment. Please contact the patient to schedule an appointment. Last office visit: 08.2024

## 2025-04-28 DIAGNOSIS — I50.42 CHRONIC COMBINED SYSTOLIC AND DIASTOLIC CONGESTIVE HEART FAILURE (HCC): ICD-10-CM

## 2025-04-29 RX ORDER — EMPAGLIFLOZIN 10 MG/1
10 TABLET, FILM COATED ORAL DAILY
Qty: 30 TABLET | Refills: 0 | Status: SHIPPED | OUTPATIENT
Start: 2025-04-29

## 2025-05-13 ENCOUNTER — OFFICE VISIT (OUTPATIENT)
Dept: FAMILY MEDICINE CLINIC | Facility: CLINIC | Age: OVER 89
End: 2025-05-13
Payer: COMMERCIAL

## 2025-05-13 VITALS
HEIGHT: 64 IN | BODY MASS INDEX: 24.04 KG/M2 | WEIGHT: 140.8 LBS | DIASTOLIC BLOOD PRESSURE: 80 MMHG | SYSTOLIC BLOOD PRESSURE: 116 MMHG | HEART RATE: 67 BPM | TEMPERATURE: 96.6 F | RESPIRATION RATE: 18 BRPM | OXYGEN SATURATION: 95 %

## 2025-05-13 DIAGNOSIS — I10 BENIGN ESSENTIAL HYPERTENSION: Primary | ICD-10-CM

## 2025-05-13 DIAGNOSIS — G30.1 MODERATE LATE ONSET ALZHEIMER'S DEMENTIA WITH MOOD DISTURBANCE (HCC): ICD-10-CM

## 2025-05-13 DIAGNOSIS — I50.22 CHRONIC SYSTOLIC CONGESTIVE HEART FAILURE (HCC): ICD-10-CM

## 2025-05-13 DIAGNOSIS — F02.B3 MODERATE LATE ONSET ALZHEIMER'S DEMENTIA WITH MOOD DISTURBANCE (HCC): ICD-10-CM

## 2025-05-13 DIAGNOSIS — I25.10 CORONARY ARTERY DISEASE INVOLVING NATIVE CORONARY ARTERY OF NATIVE HEART WITHOUT ANGINA PECTORIS: ICD-10-CM

## 2025-05-13 DIAGNOSIS — B35.1 ONYCHOMYCOSIS: ICD-10-CM

## 2025-05-13 DIAGNOSIS — E78.2 MIXED HYPERLIPIDEMIA: ICD-10-CM

## 2025-05-13 PROBLEM — E87.6 HYPOKALEMIA: Status: RESOLVED | Noted: 2018-05-19 | Resolved: 2025-05-13

## 2025-05-13 PROBLEM — R79.89 ELEVATED LFTS: Status: RESOLVED | Noted: 2023-12-27 | Resolved: 2025-05-13

## 2025-05-13 PROCEDURE — G2211 COMPLEX E/M VISIT ADD ON: HCPCS | Performed by: FAMILY MEDICINE

## 2025-05-13 PROCEDURE — 99214 OFFICE O/P EST MOD 30 MIN: CPT | Performed by: FAMILY MEDICINE

## 2025-05-13 NOTE — ASSESSMENT & PLAN NOTE
Wt Readings from Last 3 Encounters:   05/13/25 63.9 kg (140 lb 12.8 oz)   08/13/24 60.9 kg (134 lb 3.2 oz)   07/10/24 60.6 kg (133 lb 9.6 oz)     Stable  Cont meds

## 2025-05-13 NOTE — ASSESSMENT & PLAN NOTE
Stable   Orders:  •  Comprehensive metabolic panel; Future  •  Lipid panel; Future  •  TSH, 3rd generation; Future

## 2025-05-13 NOTE — PROGRESS NOTES
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)  stable       Chronic systolic congestive heart failure (HCC)  Wt Readings from Last 3 Encounters:   25 63.9 kg (140 lb 12.8 oz)   24 60.9 kg (134 lb 3.2 oz)   07/10/24 60.6 kg (133 lb 9.6 oz)     Stable  Cont meds               Coronary artery disease involving native coronary artery of native heart without angina pectoris  stable       Mixed hyperlipidemia  Stable   Orders:  •  Lipid panel; Future    Benign essential hypertension  Stable   Orders:  •  Comprehensive metabolic panel; Future  •  Lipid panel; Future  •  TSH, 3rd generation; Future    Onychomycosis    Orders:  •  Ambulatory Referral to Podiatry; Future         History of Present Illness     Pt is seeing for f/u Dementia, CHF, HTN, HLD -  all stable -  brought in by her daughter       Review of Systems   Unable to perform ROS: Dementia   Constitutional: Negative.    Respiratory: Negative.     Cardiovascular: Negative.    Gastrointestinal: Negative.    Musculoskeletal: Negative.    Psychiatric/Behavioral:  Positive for confusion (baseline).      Past Medical History:   Diagnosis Date   • Abscess of chest wall     Last Assessed: 2014   • Allergic    • Anemia    • Arthritis    • Back pain    • Cataract     Start of   • CHF (congestive heart failure) (HCC)    • Colon polyp    • Coronary artery disease    • Dementia (HCC)    • Depression    • Diverticulitis of colon 2008   • Female bladder prolapse    • Gastric reflux    • GERD (gastroesophageal reflux disease)    • Hematuria     Last Assessed: 2014    • History of diverticulitis    • HL (hearing loss)    • [place] (hard of hearing)    • Hypercholesteremia    • Hyperkeratosis of skin 2011   •  Hypertension    • Hypokalemia 12/13/2011   • Incomplete uterovaginal prolapse 05/28/2010   • Memory loss    • Myocardial infarction (HCC)    • Osteoporosis    • 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    Last Assessed: 10/24/2012    • Seasonal allergies    • Sebaceous cyst     Last Assessed: 2/8/2014   • Shingles    • Stroke ([facility])    • Syncope    • Trigeminal neuralgia 03/20/2012   • Urinary incontinence    • Uterine prolapse    • Vertigo 09/15/2011   • 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   • Wears glasses    • Wears hearing aid     States she rarely wears them   • Wears partial dentures     Upper      Past Surgical History:   Procedure Laterality Date   • APPENDECTOMY     • APPENDICO-VESICOSTOMY CUTANEOUS  03/01/2010   • CHOLECYSTECTOMY      Laparoscopic   • COLONOSCOPY     • CORONARY ANGIOPLASTY     • CORONARY ANGIOPLASTY WITH STENT PLACEMENT      2 aortic stents   • CORONARY ANGIOPLASTY WITH STENT PLACEMENT  2013    2 distal left anterior descending artery    • CORONARY ARTERY BYPASS GRAFT     • ESOPHAGOGASTRODUODENOSCOPY     • FRACTURE SURGERY Right     Repair right arm- titanium servando from shoulder to elbow.   • HYSTERECTOMY      Complete   • IR THORACENTESIS  06/09/2023   • IR THORACENTESIS  07/14/2023   • IR THORACENTESIS  12/28/2023   • VT CMBND ANTERPOST COLPORRAPHY W/CYSTO N/A 09/20/2016    Procedure: COLPORRHAPHY ANTERIOR POSTERIOR GRAFT;  Surgeon: Zach Abdi MD;  Location: [facility];  Service: UroGynecology          • VT COLPOPEXY VAGINAL EXTRAPERITONEAL APPROACH N/A 09/20/2016    Procedure: COLPOPEXY VAGINAL EXTRAPERITONEAL  incision and drainage of vagina inclusion cyst x 4;  Surgeon: Zahc Abdi MD;  Location: [facility];  Service: UroGynecology          • VT CYSTOURETHROSCOPY N/A 09/20/2016    Procedure: CYSTOSCOPY;  Surgeon: Zach Abdi MD;  Location: [facility];  Service: UroGynecology          • VT SLING OPERATION STRESS INCONTINENCE N/A 09/20/2016    Procedure: INSERTION PUBOVAGINAL  SLING RETROPUBIC ;  Surgeon: Zach Abdi MD;  Location: [facility];  Service: UroGynecology            Family History   Problem Relation Age of Onset   • Hypertension Mother    • Heart failure Mother    • Coronary artery disease Mother    • Arthritis Mother    • Osteoporosis Mother    • Hyperlipidemia Mother    • Coronary artery disease Sister      Social History     Tobacco Use   • Smoking status: Never     Passive exposure: Never   • Smokeless tobacco: Never   Vaping Use   • Vaping status: Never Used   Substance and Sexual Activity   • Alcohol use: Not Currently     Alcohol/week: 1.0 standard drink of alcohol     Types: 1 Cans of beer per week     Comment: once a day on most days of the week   • Drug use: No   • Sexual activity: Not Currently     Current Outpatient Medications on File Prior to Visit   Medication Sig   • aspirin 81 MG tablet Take 1 tablet by mouth daily   • Empagliflozin (Jardiance) 10 MG TABS tablet TAKE 1 TABLET BY MOUTH EVERY DAY   • furosemide (LASIX) 40 mg tablet TAKE 1 TABLET BY MOUTH EVERY DAY   • Klor-Con M20 20 MEQ tablet TAKE 2 TABLETS BY MOUTH 2 TIMES A DAY.   • metoprolol succinate (TOPROL-XL) 50 mg 24 hr tablet Take 1 tablet (50 mg total) by mouth 2 (two) times a day   • mirtazapine (REMERON) 15 mg tablet Take 1 tablet (15 mg total) by mouth daily at bedtime   • pravastatin (PRAVACHOL) 40 mg tablet Take 1 tablet (40 mg total) by mouth daily   • RABEprazole (ACIPHEX) 20 MG tablet TAKE 1 TABLET DAILY   • spironolactone (ALDACTONE) 25 mg tablet TAKE 1/2 TABLET BY MOUTH DAILY   • clopidogrel (PLAVIX) 75 mg tablet Take 75 mg by mouth daily (Patient not taking: Reported on 5/13/2025)   • fexofenadine (Allegra Allergy) 60 MG tablet Take 60 mg by mouth 2 (two) times a day (Patient not taking: Reported on 5/13/2025)   • [DISCONTINUED] losartan (COZAAR) 50 mg tablet Take 50 mg by mouth daily (Patient not taking: Reported on 5/13/2025)     Allergies   Allergen Reactions   • Bee Venom  "Anaphylaxis     Reaction Date: 02Feb2004; Annotation - [id number]: jjw   • Enalapril Other (See Comments)     Reaction Date: 28Oct2003;   Leg  pain   • Erythromycin Dizziness     Reaction Date: 28Oct2003;    • Estrogens Other (See Comments)     Reaction Date: 02Feb2004; Annotation - [id number]: jjw   • Ezetimibe Hives   • Penicillins Hives     \"All cillins\", \"and all myocillins\"   • Ramipril Other (See Comments)     Reaction Date: 02Feb2004; Annotation - [id number]: jjw   • Statins Other (See Comments)     Muscle pain   • Versed [Midazolam] Other (See Comments)     States she \"passed out\" when recovering from a procedure and was told to not use it again.   • Zithromax [Azithromycin] Anaphylaxis     Immunization History   Administered Date(s) Administered   • COVID-19 MODERNA VACC 0.5 ML IM 02/10/2021, 03/10/2021, 11/10/2021   • COVID-19 Pfizer vac (Thad-sucrose, gray cap) 12 yr+ IM 05/13/2022   • DTaP 5 03/21/2007, 03/21/2007   • Influenza Split High Dose Preservative Free IM 10/24/2012, 10/24/2012, 10/03/2013, 10/03/2013, 10/17/2014, 10/17/2014, 10/15/2015, 10/15/2015, 11/07/2016, 11/07/2016, 10/26/2017, 10/26/2017, 11/07/2024   • Influenza, high dose seasonal 0.7 mL 10/17/2018, 11/05/2019, 10/06/2020, 11/03/2021, 09/26/2022, 10/18/2023   • Influenza, seasonal, injectable 10/19/1999, 12/15/2000, 11/06/2001, 11/05/2003, 10/12/2005, 10/26/2006, 10/16/2007, 10/09/2008, 08/31/2009, 10/05/2010, 09/15/2011   • Influenza, seasonal, injectable, preservative free 10/19/1999, 12/15/2000, 11/06/2001, 11/05/2003, 10/12/2005, 10/26/2006, 10/16/2007, 10/09/2008, 08/31/2009, 10/05/2010, 09/15/2011   • Pneumococcal Conjugate 13-Valent 08/26/2015, 08/26/2015   • Pneumococcal Polysaccharide PPV23 02/18/2000, 02/18/2000   • Tdap 12/18/2014, 12/18/2014     Objective   /80 (BP Location: Left arm, Patient Position: Sitting, Cuff Size: Standard)   Pulse 67   Temp (!) 96.6 °F (35.9 °C) (Tympanic)   Resp 18   Ht 5' 4\" (1.626 m)   " Wt 63.9 kg (140 lb 12.8 oz)   SpO2 95%   BMI 24.17 kg/m²     Physical Exam  Constitutional:       General: She is not in acute distress.     Appearance: She is not ill-appearing.   Cardiovascular:      Rate and Rhythm: Normal rate and regular rhythm.      Heart sounds: Murmur heard.      No gallop.   Pulmonary:      Effort: Pulmonary effort is normal. No respiratory distress.      Breath sounds: No wheezing, rhonchi or rales.   Abdominal:      Palpations: Abdomen is soft.      Tenderness: There is no abdominal tenderness.   Musculoskeletal:      Right lower leg: No edema.      Left lower leg: No edema.   Skin:     Comments: Severe R great toenail deformity    Neurological:      Mental Status: She is alert. Mental status is at baseline.   Psychiatric:         Mood and Affect: Mood normal.

## 2025-05-14 ENCOUNTER — TELEPHONE (OUTPATIENT)
Dept: ADMINISTRATIVE | Facility: OTHER | Age: OVER 89
End: 2025-05-14

## 2025-05-14 ENCOUNTER — APPOINTMENT (OUTPATIENT)
Dept: LAB | Facility: CLINIC | Age: OVER 89
End: 2025-05-14
Payer: COMMERCIAL

## 2025-05-14 ENCOUNTER — RESULTS FOLLOW-UP (OUTPATIENT)
Dept: FAMILY MEDICINE CLINIC | Facility: CLINIC | Age: OVER 89
End: 2025-05-14

## 2025-05-14 DIAGNOSIS — I10 BENIGN ESSENTIAL HYPERTENSION: ICD-10-CM

## 2025-05-14 DIAGNOSIS — E78.2 MIXED HYPERLIPIDEMIA: ICD-10-CM

## 2025-05-14 LAB
ALBUMIN SERPL BCG-MCNC: 3.8 G/DL (ref 3.5–5)
ALP SERPL-CCNC: 75 U/L (ref 34–104)
ALT SERPL W P-5'-P-CCNC: 13 U/L (ref 7–52)
ANION GAP SERPL CALCULATED.3IONS-SCNC: 10 MMOL/L (ref 4–13)
AST SERPL W P-5'-P-CCNC: 19 U/L (ref 13–39)
BILIRUB SERPL-MCNC: 0.62 MG/DL (ref 0.2–1)
BUN SERPL-MCNC: 16 MG/DL (ref 5–25)
CALCIUM SERPL-MCNC: 9.3 MG/DL (ref 8.4–10.2)
CHLORIDE SERPL-SCNC: 103 MMOL/L (ref 96–108)
CHOLEST SERPL-MCNC: 176 MG/DL (ref ?–200)
CO2 SERPL-SCNC: 28 MMOL/L (ref 21–32)
CREAT SERPL-MCNC: 0.68 MG/DL (ref 0.6–1.3)
GFR SERPL CREATININE-BSD FRML MDRD: 76 ML/MIN/1.73SQ M
GLUCOSE P FAST SERPL-MCNC: 93 MG/DL (ref 65–99)
HDLC SERPL-MCNC: 43 MG/DL
LDLC SERPL CALC-MCNC: 86 MG/DL (ref 0–100)
NONHDLC SERPL-MCNC: 133 MG/DL
POTASSIUM SERPL-SCNC: 3.4 MMOL/L (ref 3.5–5.3)
PROT SERPL-MCNC: 6.9 G/DL (ref 6.4–8.4)
SODIUM SERPL-SCNC: 141 MMOL/L (ref 135–147)
TRIGL SERPL-MCNC: 237 MG/DL (ref ?–150)
TSH SERPL DL<=0.05 MIU/L-ACNC: 1.46 UIU/ML (ref 0.45–4.5)

## 2025-05-14 PROCEDURE — 84443 ASSAY THYROID STIM HORMONE: CPT

## 2025-05-14 PROCEDURE — 36415 COLL VENOUS BLD VENIPUNCTURE: CPT

## 2025-05-14 PROCEDURE — 80053 COMPREHEN METABOLIC PANEL: CPT

## 2025-05-14 PROCEDURE — 80061 LIPID PANEL: CPT

## 2025-05-14 NOTE — TELEPHONE ENCOUNTER
Pts daughter Keith called back, read provider msg verbatim. She states she will impress upon her mother to take her potassium but she has a hard time due to the pills being large, she declined need to speak to triage nurse.

## 2025-05-14 NOTE — TELEPHONE ENCOUNTER
----- Message from Aria Clark MD sent at 5/14/2025  6:25 PM EDT -----  Pl, advise pt's daughter -  labs are good -  minimally low K -  make sure pt is taking potassium as Rx   ----- Message -----  From: Lab, Background User  Sent: 5/14/2025   3:49 PM EDT  To: Aria Clark MD

## 2025-05-14 NOTE — LETTER
Vaccination Request Form: COVID-19 aka SARS-CoV-2 (Moderna or Pfizer or J & J), RSV , and Zoster      Date Requested: 25  Patient: Yaquelin Wright  Patient : 10/3/1933   Referring Provider: Aria Clark MD       The above patient has informed us that they have had their   most recent COVID-19 aka SARS-CoV-2 (Moderna or Pfizer or J & J), RSV , and Zoster administered at your facility. Please   complete this form and attach all corresponding documentation.    Date of Vaccine(s) Given  ______________________________    Lot Number(s) _______________________________________    Manufacture(s) ______________________________________    Dose Amount (s) _____________________________________    Expiration Date(s) ____________________________________    Comments ___Within 7115-0640  _______________________________________________________  ____________________________________________________________________  ____________________________________________________________________  ____________________________________________________________________    Administering Facility  ________________________________________________    Vaccine Administered By (print name) ___________________________________      Form Completed By (print name) _______________________________________      Signature ___________________________________________________________      These reports are needed for  compliance.    Please fax this completed form and a copy of the Vaccine Document(s) to the [facility] Department as soon as possible via Fax [phone], attention Prema: Phone [phone]. Our office is located at [address] [place] [address].    We thank you for your assistance in treating our mutual patient.

## 2025-05-14 NOTE — TELEPHONE ENCOUNTER
----- Message from Ryleigh N sent at 5/13/2025  2:09 PM EDT -----  Regarding: CARE GAP REQUEST  05/13/25 2:09 PM    Hello, our patient attached above has had Immunization(COVID, RSV, Zoster) completed/performed. Please assist in updating the patient chart by making an External outreach to [facility] facility located in [facility]. The date of service is [id number].    Thank you,  Ryleigh Nemec PG [facility]

## 2025-05-14 NOTE — TELEPHONE ENCOUNTER
Upon review of the In Basket request and the patient's chart, initial outreach has been made via fax to facility. Please see Contacts section for details.     Thank you  Prema Ellis MA

## 2025-05-14 NOTE — LETTER
Vaccination Request Form: COVID-19 aka SARS-CoV-2 (Moderna or Pfizer or J & J), RSV , and Zoster      Date Requested: 25  Patient: Yaquelin Wright  Patient : 10/3/1933   Referring Provider: Aria Clark MD       The above patient has informed us that they have had their   most recent COVID-19 aka SARS-CoV-2 (Moderna or Pfizer or J & J), RSV , and Zoster administered at your facility. Please   complete this form and attach all corresponding documentation.    Date of Vaccine(s) Given  ______________________________    Lot Number(s) _______________________________________    Manufacture(s) ______________________________________    Dose Amount (s) _____________________________________    Expiration Date(s) ____________________________________    Comments _____Within [id number]  _____________________________________________________  ____________________________________________________________________  ____________________________________________________________________  ____________________________________________________________________    Administering Facility  ________________________________________________    Vaccine Administered By (print name) ___________________________________      Form Completed By (print name) _______________________________________      Signature ___________________________________________________________      These reports are needed for  compliance.    Please fax this completed form and a copy of the Vaccine Document(s) to the [facility] Department as soon as possible via Fax [phone], attention Prema: Phone [phone]. Our office is located at [address] [place] [address].    We thank you for your assistance in treating our mutual patient.

## 2025-05-21 NOTE — TELEPHONE ENCOUNTER
As a follow-up, a second attempt has been made for outreach via fax to facility. Please see Contacts section for details.    Thank you  Prema Ellis MA

## 2025-05-29 DIAGNOSIS — I50.42 CHRONIC COMBINED SYSTOLIC AND DIASTOLIC CONGESTIVE HEART FAILURE (HCC): ICD-10-CM

## 2025-05-30 RX ORDER — EMPAGLIFLOZIN 10 MG/1
10 TABLET, FILM COATED ORAL DAILY
Qty: 90 TABLET | Refills: 3 | Status: SHIPPED | OUTPATIENT
Start: 2025-05-30

## 2025-06-03 NOTE — TELEPHONE ENCOUNTER
As a final attempt, a third outreach has been made via telephone call to facility. Please see Contacts section for details. This encounter will be closed and completed by end of day. Should we receive the requested information because of previous outreach attempts, the requested patient's chart will be updated appropriately.     Thank you  Prema Ellis MA

## 2025-06-03 NOTE — TELEPHONE ENCOUNTER
Upon review of the In Basket request we were able to locate, review, and update the patient chart as requested for Immunization(s) COVID.Spoke to the pharmacist. She only had record of the COVID vaccine done on 12/21/22.     Any additional questions or concerns should be emailed to the Practice Liaisons via the appropriate education email address, please do not reply via In Basket.    Thank you  Prema Ellis MA   [facility]

## 2025-06-10 DIAGNOSIS — G30.1 MODERATE LATE ONSET ALZHEIMER'S DEMENTIA WITH MOOD DISTURBANCE (HCC): ICD-10-CM

## 2025-06-10 DIAGNOSIS — F02.B3 MODERATE LATE ONSET ALZHEIMER'S DEMENTIA WITH MOOD DISTURBANCE (HCC): ICD-10-CM

## 2025-06-10 RX ORDER — MIRTAZAPINE 15 MG/1
15 TABLET, FILM COATED ORAL
Qty: 90 TABLET | Refills: 1 | Status: SHIPPED | OUTPATIENT
Start: 2025-06-10

## 2025-06-27 ENCOUNTER — OFFICE VISIT (OUTPATIENT)
Age: OVER 89
End: 2025-06-27
Payer: COMMERCIAL

## 2025-06-27 VITALS — WEIGHT: 140 LBS | HEIGHT: 64 IN | BODY MASS INDEX: 23.9 KG/M2 | RESPIRATION RATE: 18 BRPM

## 2025-06-27 DIAGNOSIS — M79.671 RIGHT FOOT PAIN: ICD-10-CM

## 2025-06-27 DIAGNOSIS — L03.031 PARONYCHIA OF TOENAIL OF RIGHT FOOT: Primary | ICD-10-CM

## 2025-06-27 DIAGNOSIS — L60.0 INGROWN TOENAIL: ICD-10-CM

## 2025-06-27 DIAGNOSIS — B35.1 ONYCHOMYCOSIS: ICD-10-CM

## 2025-06-27 PROCEDURE — 99202 OFFICE O/P NEW SF 15 MIN: CPT | Performed by: PODIATRIST

## 2025-06-27 NOTE — PROGRESS NOTES
"Assessment/Plan: Pain upon ambulation secondary to chronic ingrown toenail right hallux.  Paronychia.  Mild mycosis of nail.    Plan.  Chart reviewed.  PCP notes reviewed.  Patient evaluated.  Patient family advised on nail cutting instruction.  Shoe recommendations made.  Watch for signs of infection.  Aftercare instruction given.  Return as needed.         Diagnoses and all orders for this visit:    Paronychia of toenail of right foot    Onychomycosis  -     Ambulatory Referral to Podiatry    Right foot pain    Ingrown toenail          Subjective: Patient gets occasional pain in her right big toe.  She presents for evaluation.    Allergies   Allergen Reactions    Bee Venom Anaphylaxis     Reaction Date: 02Feb2004; Annotation - [id number]: jben    Enalapril Other (See Comments)     Reaction Date: 28Oct2003;   Leg  pain    Erythromycin Dizziness     Reaction Date: 28Oct2003;     Estrogens Other (See Comments)     Reaction Date: 02Feb2004; Annotation - [id number]: jjensw    Ezetimibe Hives    Penicillins Hives     \"All cillins\", \"and all myocillins\"    Ramipril Other (See Comments)     Reaction Date: 02Feb2004; Annotation - [id number]: jben    Statins Other (See Comments)     Muscle pain    Versed [Midazolam] Other (See Comments)     States she \"passed out\" when recovering from a procedure and was told to not use it again.    Zithromax [Azithromycin] Anaphylaxis       Current Medications[1]    Problem List[2]       Patient ID: Yaquelin Wright is a 89 y.o. female.    HPI    The following portions of the patient's history were reviewed and updated as appropriate:     family history includes Arthritis in her mother; Coronary artery disease in her mother and sister; Heart failure in her mother; Hyperlipidemia in her mother; Hypertension in her mother; Osteoporosis in her mother.      reports that she has never smoked. She has never been exposed to tobacco smoke. She has never used smokeless tobacco. She reports that she does " not currently use alcohol after a past usage of about 1.0 standard drink of alcohol per week. She reports that she does not use drugs.    Vitals:    06/27/25 1540   Resp: 18       Review of Systems      Objective:  Patient's shoes and socks removed.   Foot Exam    General  General Appearance: appears stated age and healthy   Orientation: alert and oriented to person, place, and time   Affect: appropriate       Right Foot/Ankle     Inspection and Palpation  Swelling: none   Arch: pes cavus  Hallux valgus: yes  Skin Exam: dry skin;     Neurovascular  Dorsalis pedis: 2+  Posterior tibial: 1+      Left Foot/Ankle      Inspection and Palpation  Swelling: none   Arch: pes cavus  Hallux valgus: yes  Skin Exam: dry skin;     Neurovascular  Dorsalis pedis: 2+  Posterior tibial: 1+      Physical Exam  Vitals and nursing note reviewed.   Constitutional:       Appearance: Normal appearance.     Cardiovascular:      Rate and Rhythm: Normal rate and regular rhythm.      Pulses:           Dorsalis pedis pulses are 2+ on the right side and 2+ on the left side.        Posterior tibial pulses are 1+ on the right side and 1+ on the left side.     Musculoskeletal:      Right foot: Bunion present.      Left foot: Bunion present.   Feet:      Right foot:      Skin integrity: Dry skin present.      Left foot:      Skin integrity: Dry skin present.     Skin:     Capillary Refill: Capillary refill takes less than 2 seconds.      Comments: All nails are dystrophic.  Right hallux demonstrates profound dystrophy with thickening of nail.  Mild paronychia tibial nail groove.  Negative pus.     Neurological:      Mental Status: She is alert.     Psychiatric:         Mood and Affect: Mood normal.         Behavior: Behavior normal.         Thought Content: Thought content normal.         Judgment: Judgment normal.                          [1]   Current Outpatient Medications:     aspirin 81 MG tablet, Take 1 tablet by mouth in the morning., Disp: ,  Rfl:     Empagliflozin (Jardiance) 10 MG TABS tablet, TAKE 1 TABLET BY MOUTH EVERY DAY, Disp: 90 tablet, Rfl: 3    furosemide (LASIX) 40 mg tablet, TAKE 1 TABLET BY MOUTH EVERY DAY, Disp: 90 tablet, Rfl: 1    Klor-Con M20 20 MEQ tablet, TAKE 2 TABLETS BY MOUTH 2 TIMES A DAY., Disp: 360 tablet, Rfl: 1    metoprolol succinate (TOPROL-XL) 50 mg 24 hr tablet, Take 1 tablet (50 mg total) by mouth 2 (two) times a day, Disp: 180 tablet, Rfl: 1    mirtazapine (REMERON) 15 mg tablet, TAKE 1 TABLET BY MOUTH DAILY AT BEDTIME, Disp: 90 tablet, Rfl: 1    RABEprazole (ACIPHEX) 20 MG tablet, TAKE 1 TABLET DAILY, Disp: 90 tablet, Rfl: 3    spironolactone (ALDACTONE) 25 mg tablet, TAKE 1/2 TABLET BY MOUTH DAILY, Disp: 45 tablet, Rfl: 1    clopidogrel (PLAVIX) 75 mg tablet, Take 75 mg by mouth daily (Patient not taking: Reported on 5/13/2025), Disp: , Rfl:     fexofenadine (Allegra Allergy) 60 MG tablet, Take 60 mg by mouth 2 (two) times a day (Patient not taking: Reported on 5/13/2025), Disp: , Rfl:     pravastatin (PRAVACHOL) 40 mg tablet, Take 1 tablet (40 mg total) by mouth daily, Disp: 90 tablet, Rfl: 1  [2]   Patient Active Problem List  Diagnosis    Arteriosclerosis of coronary artery    Benign essential hypertension    Hyperlipidemia    Coronary artery disease    Osteoporosis    Vitamin D deficiency    H/O: CVA (cerebrovascular accident)    Mild protein-calorie malnutrition (HCC)    Moderate dementia with mood disturbance (HCC)    Chronic systolic congestive heart failure (HCC)

## 2025-07-06 DIAGNOSIS — I10 BENIGN ESSENTIAL HYPERTENSION: ICD-10-CM

## 2025-07-07 RX ORDER — METOPROLOL SUCCINATE 50 MG/1
50 TABLET, EXTENDED RELEASE ORAL 2 TIMES DAILY
Qty: 180 TABLET | Refills: 1 | Status: SHIPPED | OUTPATIENT
Start: 2025-07-07

## 2025-08-15 ENCOUNTER — OFFICE VISIT (OUTPATIENT)
Dept: FAMILY MEDICINE CLINIC | Facility: CLINIC | Age: OVER 89
End: 2025-08-15
Payer: COMMERCIAL

## 2025-08-22 ENCOUNTER — NURSE TRIAGE (OUTPATIENT)
Age: OVER 89
End: 2025-08-22

## 2025-08-22 ENCOUNTER — OFFICE VISIT (OUTPATIENT)
Dept: CARDIOLOGY CLINIC | Facility: CLINIC | Age: OVER 89
End: 2025-08-22
Payer: COMMERCIAL

## 2025-08-22 VITALS
HEART RATE: 67 BPM | HEIGHT: 64 IN | SYSTOLIC BLOOD PRESSURE: 130 MMHG | BODY MASS INDEX: 23.01 KG/M2 | DIASTOLIC BLOOD PRESSURE: 74 MMHG | OXYGEN SATURATION: 97 % | WEIGHT: 134.8 LBS

## 2025-08-22 DIAGNOSIS — I10 BENIGN ESSENTIAL HYPERTENSION: ICD-10-CM

## 2025-08-22 DIAGNOSIS — E78.2 MIXED HYPERLIPIDEMIA: ICD-10-CM

## 2025-08-22 DIAGNOSIS — I42.9 CARDIOMYOPATHY, UNSPECIFIED TYPE (HCC): ICD-10-CM

## 2025-08-22 DIAGNOSIS — I34.0 MODERATE TO SEVERE MITRAL REGURGITATION: ICD-10-CM

## 2025-08-22 DIAGNOSIS — I50.42 CHRONIC COMBINED SYSTOLIC AND DIASTOLIC CONGESTIVE HEART FAILURE (HCC): ICD-10-CM

## 2025-08-22 DIAGNOSIS — E78.5 DYSLIPIDEMIA: ICD-10-CM

## 2025-08-22 DIAGNOSIS — I25.10 ARTERIOSCLEROSIS OF CORONARY ARTERY: ICD-10-CM

## 2025-08-22 PROCEDURE — 93000 ELECTROCARDIOGRAM COMPLETE: CPT | Performed by: INTERNAL MEDICINE

## 2025-08-22 PROCEDURE — 99214 OFFICE O/P EST MOD 30 MIN: CPT | Performed by: INTERNAL MEDICINE

## 2025-08-22 RX ORDER — LOSARTAN POTASSIUM 25 MG/1
12.5 TABLET ORAL DAILY
Qty: 45 TABLET | Refills: 1 | Status: SHIPPED | OUTPATIENT
Start: 2025-08-22